# Patient Record
Sex: MALE | Race: WHITE | NOT HISPANIC OR LATINO
[De-identification: names, ages, dates, MRNs, and addresses within clinical notes are randomized per-mention and may not be internally consistent; named-entity substitution may affect disease eponyms.]

---

## 2017-01-19 ENCOUNTER — APPOINTMENT (OUTPATIENT)
Dept: CARDIOLOGY | Facility: CLINIC | Age: 82
End: 2017-01-19

## 2017-01-19 LAB — INR PPP: 2.3 RATIO

## 2017-02-23 ENCOUNTER — APPOINTMENT (OUTPATIENT)
Dept: CARDIOLOGY | Facility: CLINIC | Age: 82
End: 2017-02-23

## 2017-03-03 ENCOUNTER — APPOINTMENT (OUTPATIENT)
Dept: CARDIOLOGY | Facility: CLINIC | Age: 82
End: 2017-03-03

## 2017-03-03 LAB — INR PPP: 2.4 RATIO

## 2017-03-31 ENCOUNTER — APPOINTMENT (OUTPATIENT)
Dept: CARDIOLOGY | Facility: CLINIC | Age: 82
End: 2017-03-31

## 2017-03-31 VITALS — HEART RATE: 90 BPM | HEIGHT: 73 IN | BODY MASS INDEX: 23.99 KG/M2 | WEIGHT: 181 LBS

## 2017-03-31 LAB
INR PPP: 2.3 RATIO
QUALITY CONTROL: YES

## 2017-04-19 ENCOUNTER — RX RENEWAL (OUTPATIENT)
Age: 82
End: 2017-04-19

## 2017-04-28 ENCOUNTER — APPOINTMENT (OUTPATIENT)
Dept: CARDIOLOGY | Facility: CLINIC | Age: 82
End: 2017-04-28

## 2017-05-05 ENCOUNTER — APPOINTMENT (OUTPATIENT)
Dept: CARDIOLOGY | Facility: CLINIC | Age: 82
End: 2017-05-05

## 2017-05-05 VITALS — BODY MASS INDEX: 23.99 KG/M2 | HEART RATE: 90 BPM | HEIGHT: 73 IN | WEIGHT: 181 LBS

## 2017-05-05 LAB
INR PPP: 2.2 RATIO
QUALITY CONTROL: YES

## 2017-05-25 ENCOUNTER — APPOINTMENT (OUTPATIENT)
Dept: CARDIOLOGY | Facility: CLINIC | Age: 82
End: 2017-05-25

## 2017-05-25 VITALS — WEIGHT: 181 LBS | HEIGHT: 73 IN | HEART RATE: 90 BPM | BODY MASS INDEX: 23.99 KG/M2

## 2017-05-25 LAB
INR PPP: 2 RATIO
QUALITY CONTROL: YES

## 2017-06-02 ENCOUNTER — APPOINTMENT (OUTPATIENT)
Dept: CARDIOLOGY | Facility: CLINIC | Age: 82
End: 2017-06-02

## 2017-06-02 LAB — INR PPP: 2.9 RATIO

## 2017-06-19 ENCOUNTER — RX RENEWAL (OUTPATIENT)
Age: 82
End: 2017-06-19

## 2017-06-22 ENCOUNTER — APPOINTMENT (OUTPATIENT)
Dept: CARDIOLOGY | Facility: CLINIC | Age: 82
End: 2017-06-22

## 2017-06-22 LAB — INR PPP: 5.5 RATIO

## 2017-06-26 ENCOUNTER — APPOINTMENT (OUTPATIENT)
Dept: CARDIOLOGY | Facility: CLINIC | Age: 82
End: 2017-06-26

## 2017-06-26 LAB — INR PPP: 1.5 RATIO

## 2017-07-03 ENCOUNTER — APPOINTMENT (OUTPATIENT)
Dept: CARDIOLOGY | Facility: CLINIC | Age: 82
End: 2017-07-03

## 2017-07-03 LAB — INR PPP: 2.2 RATIO

## 2017-07-10 ENCOUNTER — APPOINTMENT (OUTPATIENT)
Dept: CARDIOLOGY | Facility: CLINIC | Age: 82
End: 2017-07-10

## 2017-07-11 LAB — INR PPP: 1.7 RATIO

## 2017-07-17 ENCOUNTER — APPOINTMENT (OUTPATIENT)
Dept: CARDIOLOGY | Facility: CLINIC | Age: 82
End: 2017-07-17

## 2017-07-17 LAB — INR PPP: 2.3 RATIO

## 2017-07-24 ENCOUNTER — APPOINTMENT (OUTPATIENT)
Dept: CARDIOLOGY | Facility: CLINIC | Age: 82
End: 2017-07-24

## 2017-07-31 ENCOUNTER — APPOINTMENT (OUTPATIENT)
Dept: CARDIOLOGY | Facility: CLINIC | Age: 82
End: 2017-07-31

## 2017-08-02 ENCOUNTER — APPOINTMENT (OUTPATIENT)
Dept: CARDIOLOGY | Facility: CLINIC | Age: 82
End: 2017-08-02
Payer: MEDICARE

## 2017-08-02 ENCOUNTER — NON-APPOINTMENT (OUTPATIENT)
Age: 82
End: 2017-08-02

## 2017-08-02 VITALS
SYSTOLIC BLOOD PRESSURE: 132 MMHG | HEART RATE: 113 BPM | OXYGEN SATURATION: 100 % | BODY MASS INDEX: 23.99 KG/M2 | WEIGHT: 181 LBS | DIASTOLIC BLOOD PRESSURE: 82 MMHG | HEIGHT: 73 IN

## 2017-08-02 DIAGNOSIS — E78.5 HYPERLIPIDEMIA, UNSPECIFIED: ICD-10-CM

## 2017-08-02 PROCEDURE — 93000 ELECTROCARDIOGRAM COMPLETE: CPT

## 2017-08-02 PROCEDURE — 99215 OFFICE O/P EST HI 40 MIN: CPT

## 2017-08-10 ENCOUNTER — APPOINTMENT (OUTPATIENT)
Dept: CARDIOLOGY | Facility: CLINIC | Age: 82
End: 2017-08-10
Payer: MEDICARE

## 2017-08-10 PROCEDURE — 93224 XTRNL ECG REC UP TO 48 HRS: CPT

## 2017-08-21 ENCOUNTER — OTHER (OUTPATIENT)
Age: 82
End: 2017-08-21

## 2017-09-01 ENCOUNTER — OTHER (OUTPATIENT)
Age: 82
End: 2017-09-01

## 2017-09-05 ENCOUNTER — APPOINTMENT (OUTPATIENT)
Dept: CARDIOLOGY | Facility: CLINIC | Age: 82
End: 2017-09-05

## 2017-10-24 ENCOUNTER — APPOINTMENT (OUTPATIENT)
Dept: CARDIOLOGY | Facility: CLINIC | Age: 82
End: 2017-10-24
Payer: MEDICARE

## 2017-10-24 VITALS — HEIGHT: 73 IN | WEIGHT: 181 LBS | HEART RATE: 100 BPM | BODY MASS INDEX: 23.99 KG/M2

## 2017-10-24 LAB
INR PPP: 1.9 RATIO
QUALITY CONTROL: YES

## 2017-10-24 PROCEDURE — 85610 PROTHROMBIN TIME: CPT | Mod: QW

## 2017-10-24 PROCEDURE — 99211 OFF/OP EST MAY X REQ PHY/QHP: CPT

## 2017-11-21 ENCOUNTER — APPOINTMENT (OUTPATIENT)
Dept: CARDIOLOGY | Facility: CLINIC | Age: 82
End: 2017-11-21
Payer: MEDICARE

## 2017-11-21 LAB — INR PPP: 3.4 RATIO

## 2017-11-21 PROCEDURE — 85610 PROTHROMBIN TIME: CPT | Mod: QW

## 2017-11-21 PROCEDURE — 99211 OFF/OP EST MAY X REQ PHY/QHP: CPT

## 2017-12-15 ENCOUNTER — RX RENEWAL (OUTPATIENT)
Age: 82
End: 2017-12-15

## 2017-12-21 ENCOUNTER — APPOINTMENT (OUTPATIENT)
Dept: CARDIOLOGY | Facility: CLINIC | Age: 82
End: 2017-12-21

## 2018-03-08 ENCOUNTER — APPOINTMENT (OUTPATIENT)
Dept: CARDIOLOGY | Facility: CLINIC | Age: 83
End: 2018-03-08
Payer: MEDICARE

## 2018-03-08 VITALS — HEIGHT: 73 IN | BODY MASS INDEX: 23.99 KG/M2 | HEART RATE: 100 BPM | WEIGHT: 181 LBS

## 2018-03-08 LAB
INR PPP: 1.6 RATIO
QUALITY CONTROL: YES

## 2018-03-08 PROCEDURE — 85610 PROTHROMBIN TIME: CPT | Mod: QW

## 2018-03-08 PROCEDURE — 99211 OFF/OP EST MAY X REQ PHY/QHP: CPT

## 2018-03-15 ENCOUNTER — APPOINTMENT (OUTPATIENT)
Dept: CARDIOLOGY | Facility: CLINIC | Age: 83
End: 2018-03-15
Payer: MEDICARE

## 2018-03-15 LAB
INR PPP: 1.9 RATIO
QUALITY CONTROL: YES

## 2018-03-15 PROCEDURE — 85610 PROTHROMBIN TIME: CPT | Mod: QW

## 2018-03-15 PROCEDURE — 99211 OFF/OP EST MAY X REQ PHY/QHP: CPT

## 2018-03-29 ENCOUNTER — APPOINTMENT (OUTPATIENT)
Dept: CARDIOLOGY | Facility: CLINIC | Age: 83
End: 2018-03-29
Payer: MEDICARE

## 2018-03-29 LAB
INR PPP: 1.8 RATIO
QUALITY CONTROL: YES

## 2018-03-29 PROCEDURE — 85610 PROTHROMBIN TIME: CPT | Mod: QW

## 2018-03-29 PROCEDURE — 99211 OFF/OP EST MAY X REQ PHY/QHP: CPT

## 2018-04-12 ENCOUNTER — APPOINTMENT (OUTPATIENT)
Dept: CARDIOLOGY | Facility: CLINIC | Age: 83
End: 2018-04-12
Payer: MEDICARE

## 2018-04-12 PROCEDURE — 99211 OFF/OP EST MAY X REQ PHY/QHP: CPT

## 2018-04-12 PROCEDURE — 85610 PROTHROMBIN TIME: CPT | Mod: QW

## 2018-04-15 LAB
INR PPP: 2.4 RATIO
QUALITY CONTROL: YES

## 2018-04-23 ENCOUNTER — APPOINTMENT (OUTPATIENT)
Dept: CARDIOLOGY | Facility: CLINIC | Age: 83
End: 2018-04-23
Payer: MEDICARE

## 2018-04-23 VITALS — BODY MASS INDEX: 23.99 KG/M2 | WEIGHT: 181 LBS | HEART RATE: 100 BPM | HEIGHT: 73 IN

## 2018-04-23 LAB
INR PPP: 2.1 RATIO
QUALITY CONTROL: YES

## 2018-04-23 PROCEDURE — 85610 PROTHROMBIN TIME: CPT | Mod: QW

## 2018-04-23 PROCEDURE — 99211 OFF/OP EST MAY X REQ PHY/QHP: CPT

## 2018-05-07 ENCOUNTER — APPOINTMENT (OUTPATIENT)
Dept: CARDIOLOGY | Facility: CLINIC | Age: 83
End: 2018-05-07
Payer: MEDICARE

## 2018-05-07 VITALS — BODY MASS INDEX: 23.99 KG/M2 | HEIGHT: 73 IN | WEIGHT: 181 LBS | HEART RATE: 100 BPM

## 2018-05-07 LAB
INR PPP: 2.2 RATIO
QUALITY CONTROL: YES

## 2018-05-07 PROCEDURE — 99211 OFF/OP EST MAY X REQ PHY/QHP: CPT

## 2018-05-07 PROCEDURE — 85610 PROTHROMBIN TIME: CPT | Mod: QW

## 2018-05-08 ENCOUNTER — RX RENEWAL (OUTPATIENT)
Age: 83
End: 2018-05-08

## 2018-05-29 ENCOUNTER — APPOINTMENT (OUTPATIENT)
Dept: CARDIOLOGY | Facility: CLINIC | Age: 83
End: 2018-05-29
Payer: MEDICARE

## 2018-05-29 VITALS — WEIGHT: 181 LBS | HEART RATE: 100 BPM | HEIGHT: 73 IN | BODY MASS INDEX: 23.99 KG/M2

## 2018-05-29 PROCEDURE — 85610 PROTHROMBIN TIME: CPT | Mod: QW

## 2018-05-29 PROCEDURE — 99211 OFF/OP EST MAY X REQ PHY/QHP: CPT

## 2018-05-30 LAB
INR PPP: 1.7 RATIO
QUALITY CONTROL: YES

## 2018-06-14 ENCOUNTER — APPOINTMENT (OUTPATIENT)
Dept: CARDIOLOGY | Facility: CLINIC | Age: 83
End: 2018-06-14
Payer: MEDICARE

## 2018-06-14 VITALS — HEIGHT: 73 IN | BODY MASS INDEX: 23.99 KG/M2 | WEIGHT: 181 LBS

## 2018-06-14 LAB
INR PPP: 3 RATIO
QUALITY CONTROL: YES

## 2018-06-14 PROCEDURE — 93793 ANTICOAG MGMT PT WARFARIN: CPT

## 2018-06-14 PROCEDURE — 85610 PROTHROMBIN TIME: CPT | Mod: QW

## 2018-06-28 ENCOUNTER — APPOINTMENT (OUTPATIENT)
Dept: CARDIOLOGY | Facility: CLINIC | Age: 83
End: 2018-06-28
Payer: MEDICARE

## 2018-06-28 VITALS — HEIGHT: 73 IN | HEART RATE: 100 BPM | WEIGHT: 181 LBS | BODY MASS INDEX: 23.99 KG/M2

## 2018-06-28 LAB
INR PPP: 2.5 RATIO
QUALITY CONTROL: YES

## 2018-06-28 PROCEDURE — 93793 ANTICOAG MGMT PT WARFARIN: CPT

## 2018-06-28 PROCEDURE — 85610 PROTHROMBIN TIME: CPT | Mod: QW

## 2018-07-26 ENCOUNTER — APPOINTMENT (OUTPATIENT)
Dept: CARDIOLOGY | Facility: CLINIC | Age: 83
End: 2018-07-26

## 2018-08-09 ENCOUNTER — APPOINTMENT (OUTPATIENT)
Dept: CARDIOLOGY | Facility: CLINIC | Age: 83
End: 2018-08-09

## 2018-08-16 ENCOUNTER — APPOINTMENT (OUTPATIENT)
Dept: CARDIOLOGY | Facility: CLINIC | Age: 83
End: 2018-08-16
Payer: MEDICARE

## 2018-08-16 VITALS — BODY MASS INDEX: 23.99 KG/M2 | HEIGHT: 73 IN | WEIGHT: 181 LBS | HEART RATE: 100 BPM

## 2018-08-16 LAB
INR PPP: 2.5 RATIO
QUALITY CONTROL: YES

## 2018-08-16 PROCEDURE — 85610 PROTHROMBIN TIME: CPT | Mod: QW

## 2018-08-16 PROCEDURE — 93793 ANTICOAG MGMT PT WARFARIN: CPT

## 2018-09-13 ENCOUNTER — APPOINTMENT (OUTPATIENT)
Dept: CARDIOLOGY | Facility: CLINIC | Age: 83
End: 2018-09-13

## 2018-09-20 ENCOUNTER — APPOINTMENT (OUTPATIENT)
Dept: CARDIOLOGY | Facility: CLINIC | Age: 83
End: 2018-09-20
Payer: MEDICARE

## 2018-09-20 VITALS — HEART RATE: 100 BPM | BODY MASS INDEX: 23.99 KG/M2 | WEIGHT: 181 LBS | HEIGHT: 73 IN

## 2018-09-20 LAB
INR PPP: 3.3 RATIO
QUALITY CONTROL: YES

## 2018-09-20 PROCEDURE — 93793 ANTICOAG MGMT PT WARFARIN: CPT

## 2018-09-20 PROCEDURE — 85610 PROTHROMBIN TIME: CPT | Mod: QW

## 2018-10-04 ENCOUNTER — APPOINTMENT (OUTPATIENT)
Dept: CARDIOLOGY | Facility: CLINIC | Age: 83
End: 2018-10-04
Payer: MEDICARE

## 2018-10-04 VITALS — HEIGHT: 73 IN | HEART RATE: 100 BPM | BODY MASS INDEX: 23.99 KG/M2 | WEIGHT: 181 LBS

## 2018-10-04 LAB
INR PPP: 2.7 RATIO
QUALITY CONTROL: YES

## 2018-10-04 PROCEDURE — 85610 PROTHROMBIN TIME: CPT | Mod: QW

## 2018-10-04 PROCEDURE — 93793 ANTICOAG MGMT PT WARFARIN: CPT

## 2018-10-25 ENCOUNTER — APPOINTMENT (OUTPATIENT)
Dept: CARDIOLOGY | Facility: CLINIC | Age: 83
End: 2018-10-25

## 2018-11-06 ENCOUNTER — APPOINTMENT (OUTPATIENT)
Dept: CARDIOLOGY | Facility: CLINIC | Age: 83
End: 2018-11-06

## 2018-11-06 LAB
INR PPP: 2.51 RATIO
PT BLD: 28.9 SEC

## 2018-11-29 ENCOUNTER — APPOINTMENT (OUTPATIENT)
Dept: CARDIOLOGY | Facility: CLINIC | Age: 83
End: 2018-11-29
Payer: MEDICARE

## 2018-11-29 VITALS — HEART RATE: 100 BPM | HEIGHT: 73 IN | BODY MASS INDEX: 23.99 KG/M2 | WEIGHT: 181 LBS

## 2018-11-29 LAB
INR PPP: 2.1 RATIO
QUALITY CONTROL: YES

## 2018-11-29 PROCEDURE — 93793 ANTICOAG MGMT PT WARFARIN: CPT

## 2018-11-29 PROCEDURE — 85610 PROTHROMBIN TIME: CPT | Mod: QW

## 2018-12-03 ENCOUNTER — APPOINTMENT (OUTPATIENT)
Dept: CARDIOLOGY | Facility: CLINIC | Age: 83
End: 2018-12-03
Payer: MEDICARE

## 2018-12-03 VITALS — WEIGHT: 181 LBS | BODY MASS INDEX: 23.99 KG/M2 | HEIGHT: 73 IN | HEART RATE: 100 BPM

## 2018-12-03 LAB
INR PPP: 2 RATIO
QUALITY CONTROL: YES

## 2018-12-03 PROCEDURE — 93793 ANTICOAG MGMT PT WARFARIN: CPT

## 2018-12-03 PROCEDURE — 85610 PROTHROMBIN TIME: CPT | Mod: QW

## 2019-02-11 ENCOUNTER — RX RENEWAL (OUTPATIENT)
Age: 84
End: 2019-02-11

## 2019-02-26 ENCOUNTER — APPOINTMENT (OUTPATIENT)
Dept: CARDIOLOGY | Facility: CLINIC | Age: 84
End: 2019-02-26
Payer: MEDICARE

## 2019-02-26 VITALS
BODY MASS INDEX: 23.88 KG/M2 | HEART RATE: 100 BPM | WEIGHT: 181 LBS | SYSTOLIC BLOOD PRESSURE: 132 MMHG | DIASTOLIC BLOOD PRESSURE: 82 MMHG

## 2019-02-26 LAB
INR PPP: 3 RATIO
QUALITY CONTROL: YES

## 2019-02-26 PROCEDURE — 85610 PROTHROMBIN TIME: CPT | Mod: QW

## 2019-02-26 PROCEDURE — 93793 ANTICOAG MGMT PT WARFARIN: CPT

## 2019-03-26 ENCOUNTER — APPOINTMENT (OUTPATIENT)
Dept: CARDIOLOGY | Facility: CLINIC | Age: 84
End: 2019-03-26

## 2019-04-09 ENCOUNTER — APPOINTMENT (OUTPATIENT)
Dept: CARDIOLOGY | Facility: CLINIC | Age: 84
End: 2019-04-09
Payer: MEDICARE

## 2019-04-09 VITALS — HEART RATE: 100 BPM | OXYGEN SATURATION: 100 % | DIASTOLIC BLOOD PRESSURE: 82 MMHG | SYSTOLIC BLOOD PRESSURE: 132 MMHG

## 2019-04-09 LAB
INR PPP: 2.8 RATIO
QUALITY CONTROL: YES

## 2019-04-09 PROCEDURE — 85610 PROTHROMBIN TIME: CPT | Mod: QW

## 2019-04-09 PROCEDURE — 93793 ANTICOAG MGMT PT WARFARIN: CPT

## 2019-04-30 ENCOUNTER — RX RENEWAL (OUTPATIENT)
Age: 84
End: 2019-04-30

## 2019-05-03 ENCOUNTER — RX RENEWAL (OUTPATIENT)
Age: 84
End: 2019-05-03

## 2019-05-07 ENCOUNTER — APPOINTMENT (OUTPATIENT)
Dept: CARDIOLOGY | Facility: CLINIC | Age: 84
End: 2019-05-07
Payer: MEDICARE

## 2019-05-07 VITALS — SYSTOLIC BLOOD PRESSURE: 132 MMHG | HEART RATE: 100 BPM | OXYGEN SATURATION: 100 % | DIASTOLIC BLOOD PRESSURE: 82 MMHG

## 2019-05-07 LAB
INR PPP: 2.5 RATIO
QUALITY CONTROL: YES

## 2019-05-07 PROCEDURE — 93793 ANTICOAG MGMT PT WARFARIN: CPT

## 2019-05-07 PROCEDURE — 85610 PROTHROMBIN TIME: CPT | Mod: QW

## 2019-06-04 ENCOUNTER — APPOINTMENT (OUTPATIENT)
Dept: CARDIOLOGY | Facility: CLINIC | Age: 84
End: 2019-06-04

## 2019-06-20 ENCOUNTER — APPOINTMENT (OUTPATIENT)
Dept: CARDIOLOGY | Facility: CLINIC | Age: 84
End: 2019-06-20
Payer: MEDICARE

## 2019-06-20 VITALS — SYSTOLIC BLOOD PRESSURE: 132 MMHG | DIASTOLIC BLOOD PRESSURE: 82 MMHG | HEART RATE: 100 BPM | OXYGEN SATURATION: 100 %

## 2019-06-20 LAB
ALBUMIN SERPL ELPH-MCNC: 4.1 G/DL
ALP BLD-CCNC: 87 U/L
ALT SERPL-CCNC: 16 U/L
ANION GAP SERPL CALC-SCNC: 10 MMOL/L
AST SERPL-CCNC: 19 U/L
BILIRUB SERPL-MCNC: 1 MG/DL
BUN SERPL-MCNC: 17 MG/DL
CALCIUM SERPL-MCNC: 9.4 MG/DL
CHLORIDE SERPL-SCNC: 101 MMOL/L
CHOLEST SERPL-MCNC: 186 MG/DL
CHOLEST/HDLC SERPL: 4 RATIO
CO2 SERPL-SCNC: 29 MMOL/L
CREAT SERPL-MCNC: 1.14 MG/DL
GLUCOSE SERPL-MCNC: 112 MG/DL
HDLC SERPL-MCNC: 46 MG/DL
INR PPP: 2 RATIO
LDLC SERPL CALC-MCNC: 123 MG/DL
POTASSIUM SERPL-SCNC: 4.6 MMOL/L
PROT SERPL-MCNC: 6.9 G/DL
QUALITY CONTROL: YES
SODIUM SERPL-SCNC: 140 MMOL/L
TRIGL SERPL-MCNC: 87 MG/DL
TSH SERPL-ACNC: 3.16 UIU/ML

## 2019-06-20 PROCEDURE — 85610 PROTHROMBIN TIME: CPT | Mod: QW

## 2019-06-20 PROCEDURE — 93793 ANTICOAG MGMT PT WARFARIN: CPT

## 2019-06-21 LAB
ESTIMATED AVERAGE GLUCOSE: 105 MG/DL
HBA1C MFR BLD HPLC: 5.3 %

## 2019-08-29 ENCOUNTER — APPOINTMENT (OUTPATIENT)
Dept: CARDIOLOGY | Facility: CLINIC | Age: 84
End: 2019-08-29
Payer: MEDICARE

## 2019-08-29 ENCOUNTER — NON-APPOINTMENT (OUTPATIENT)
Age: 84
End: 2019-08-29

## 2019-08-29 VITALS
HEIGHT: 73 IN | HEART RATE: 77 BPM | DIASTOLIC BLOOD PRESSURE: 66 MMHG | OXYGEN SATURATION: 97 % | BODY MASS INDEX: 22.26 KG/M2 | WEIGHT: 168 LBS | SYSTOLIC BLOOD PRESSURE: 102 MMHG

## 2019-08-29 PROCEDURE — 93000 ELECTROCARDIOGRAM COMPLETE: CPT

## 2019-08-29 PROCEDURE — 99214 OFFICE O/P EST MOD 30 MIN: CPT

## 2019-08-29 RX ORDER — DILTIAZEM HYDROCHLORIDE 180 MG/1
180 CAPSULE, COATED, EXTENDED RELEASE ORAL
Qty: 90 | Refills: 3 | Status: DISCONTINUED | COMMUNITY
Start: 2017-08-21 | End: 2019-08-29

## 2019-09-11 ENCOUNTER — APPOINTMENT (OUTPATIENT)
Dept: CARDIOLOGY | Facility: CLINIC | Age: 84
End: 2019-09-11
Payer: MEDICARE

## 2019-09-11 PROCEDURE — 93306 TTE W/DOPPLER COMPLETE: CPT

## 2019-10-10 ENCOUNTER — APPOINTMENT (OUTPATIENT)
Dept: CARDIOLOGY | Facility: CLINIC | Age: 84
End: 2019-10-10
Payer: MEDICARE

## 2019-10-10 VITALS — SYSTOLIC BLOOD PRESSURE: 102 MMHG | OXYGEN SATURATION: 97 % | HEART RATE: 77 BPM | DIASTOLIC BLOOD PRESSURE: 66 MMHG

## 2019-10-10 PROCEDURE — 93793 ANTICOAG MGMT PT WARFARIN: CPT

## 2019-10-10 PROCEDURE — 85610 PROTHROMBIN TIME: CPT | Mod: QW

## 2019-10-11 LAB
INR PPP: 2.4 RATIO
QUALITY CONTROL: YES

## 2019-11-05 ENCOUNTER — APPOINTMENT (OUTPATIENT)
Dept: CARDIOLOGY | Facility: CLINIC | Age: 84
End: 2019-11-05
Payer: MEDICARE

## 2019-11-05 VITALS — WEIGHT: 168 LBS | HEIGHT: 73 IN | BODY MASS INDEX: 22.26 KG/M2 | HEART RATE: 77 BPM

## 2019-11-05 LAB
INR PPP: 2.9 RATIO
QUALITY CONTROL: YES

## 2019-11-05 PROCEDURE — 85610 PROTHROMBIN TIME: CPT | Mod: QW

## 2019-11-05 PROCEDURE — 93793 ANTICOAG MGMT PT WARFARIN: CPT

## 2019-12-03 ENCOUNTER — APPOINTMENT (OUTPATIENT)
Dept: CARDIOLOGY | Facility: CLINIC | Age: 84
End: 2019-12-03

## 2019-12-19 ENCOUNTER — APPOINTMENT (OUTPATIENT)
Dept: CARDIOLOGY | Facility: CLINIC | Age: 84
End: 2019-12-19
Payer: MEDICARE

## 2019-12-19 VITALS — HEIGHT: 73 IN | WEIGHT: 168 LBS | HEART RATE: 77 BPM | BODY MASS INDEX: 22.26 KG/M2

## 2019-12-19 LAB
INR PPP: 2.8 RATIO
QUALITY CONTROL: YES

## 2019-12-19 PROCEDURE — 85610 PROTHROMBIN TIME: CPT | Mod: QW

## 2019-12-19 PROCEDURE — 93793 ANTICOAG MGMT PT WARFARIN: CPT

## 2020-01-16 ENCOUNTER — APPOINTMENT (OUTPATIENT)
Dept: CARDIOLOGY | Facility: CLINIC | Age: 85
End: 2020-01-16
Payer: MEDICARE

## 2020-01-16 VITALS — HEIGHT: 73 IN | WEIGHT: 168 LBS | BODY MASS INDEX: 22.26 KG/M2 | HEART RATE: 77 BPM

## 2020-01-16 LAB
INR PPP: 2 RATIO
QUALITY CONTROL: YES

## 2020-01-16 PROCEDURE — 85610 PROTHROMBIN TIME: CPT | Mod: QW

## 2020-01-16 PROCEDURE — 93793 ANTICOAG MGMT PT WARFARIN: CPT

## 2020-02-18 ENCOUNTER — APPOINTMENT (OUTPATIENT)
Dept: CARDIOLOGY | Facility: CLINIC | Age: 85
End: 2020-02-18
Payer: MEDICARE

## 2020-02-18 LAB
INR PPP: 2.6 RATIO
QUALITY CONTROL: YES

## 2020-02-18 PROCEDURE — 85610 PROTHROMBIN TIME: CPT | Mod: QW

## 2020-02-18 PROCEDURE — 93793 ANTICOAG MGMT PT WARFARIN: CPT

## 2020-02-27 ENCOUNTER — RX RENEWAL (OUTPATIENT)
Age: 85
End: 2020-02-27

## 2020-03-17 ENCOUNTER — APPOINTMENT (OUTPATIENT)
Dept: CARDIOLOGY | Facility: CLINIC | Age: 85
End: 2020-03-17
Payer: MEDICARE

## 2020-03-17 LAB
INR PPP: 3.2 RATIO
QUALITY CONTROL: YES

## 2020-03-17 PROCEDURE — 85610 PROTHROMBIN TIME: CPT | Mod: QW

## 2020-03-17 PROCEDURE — 93793 ANTICOAG MGMT PT WARFARIN: CPT

## 2020-03-30 ENCOUNTER — APPOINTMENT (OUTPATIENT)
Dept: CARDIOLOGY | Facility: CLINIC | Age: 85
End: 2020-03-30

## 2020-04-15 ENCOUNTER — APPOINTMENT (OUTPATIENT)
Dept: CARDIOLOGY | Facility: CLINIC | Age: 85
End: 2020-04-15
Payer: MEDICARE

## 2020-04-15 PROCEDURE — 85610 PROTHROMBIN TIME: CPT | Mod: QW

## 2020-04-15 PROCEDURE — 93793 ANTICOAG MGMT PT WARFARIN: CPT

## 2020-04-16 VITALS — BODY MASS INDEX: 22.26 KG/M2 | HEIGHT: 73 IN | HEART RATE: 77 BPM | WEIGHT: 168 LBS

## 2020-04-16 LAB
INR PPP: 3.7 RATIO
QUALITY CONTROL: YES

## 2020-04-22 ENCOUNTER — APPOINTMENT (OUTPATIENT)
Dept: CARDIOLOGY | Facility: CLINIC | Age: 85
End: 2020-04-22
Payer: MEDICARE

## 2020-04-22 PROCEDURE — 93793 ANTICOAG MGMT PT WARFARIN: CPT

## 2020-04-22 PROCEDURE — 85610 PROTHROMBIN TIME: CPT | Mod: QW

## 2020-04-27 VITALS — RESPIRATION RATE: 16 BRPM | HEART RATE: 75 BPM | HEIGHT: 73 IN

## 2020-04-27 LAB
INR PPP: 2.4 RATIO
QUALITY CONTROL: YES

## 2020-05-06 ENCOUNTER — APPOINTMENT (OUTPATIENT)
Dept: CARDIOLOGY | Facility: CLINIC | Age: 85
End: 2020-05-06
Payer: MEDICARE

## 2020-05-06 PROCEDURE — 85610 PROTHROMBIN TIME: CPT | Mod: QW

## 2020-05-06 PROCEDURE — 93793 ANTICOAG MGMT PT WARFARIN: CPT

## 2020-05-11 VITALS — HEART RATE: 75 BPM | HEIGHT: 73 IN | BODY MASS INDEX: 22.26 KG/M2 | WEIGHT: 168 LBS

## 2020-05-11 LAB
INR PPP: 3.5 RATIO
QUALITY CONTROL: YES

## 2020-05-20 ENCOUNTER — APPOINTMENT (OUTPATIENT)
Dept: CARDIOLOGY | Facility: CLINIC | Age: 85
End: 2020-05-20
Payer: MEDICARE

## 2020-05-20 LAB
INR PPP: 3.1 RATIO
QUALITY CONTROL: YES

## 2020-05-20 PROCEDURE — 85610 PROTHROMBIN TIME: CPT | Mod: QW

## 2020-05-20 PROCEDURE — 93793 ANTICOAG MGMT PT WARFARIN: CPT

## 2020-06-10 ENCOUNTER — APPOINTMENT (OUTPATIENT)
Dept: CARDIOLOGY | Facility: CLINIC | Age: 85
End: 2020-06-10
Payer: MEDICARE

## 2020-06-10 PROCEDURE — 85610 PROTHROMBIN TIME: CPT | Mod: QW

## 2020-06-10 PROCEDURE — 93793 ANTICOAG MGMT PT WARFARIN: CPT

## 2020-06-11 LAB
INR PPP: 2.7 RATIO
QUALITY CONTROL: YES

## 2020-07-16 ENCOUNTER — APPOINTMENT (OUTPATIENT)
Dept: CARDIOLOGY | Facility: CLINIC | Age: 85
End: 2020-07-16
Payer: MEDICARE

## 2020-07-16 VITALS — HEART RATE: 75 BPM | BODY MASS INDEX: 22.26 KG/M2 | HEIGHT: 73 IN | WEIGHT: 168 LBS

## 2020-07-16 DIAGNOSIS — Z79.01 LONG TERM (CURRENT) USE OF ANTICOAGULANTS: ICD-10-CM

## 2020-07-16 DIAGNOSIS — Z51.81 ENCOUNTER FOR THERAPEUTIC DRUG LVL MONITORING: ICD-10-CM

## 2020-07-16 DIAGNOSIS — Z79.01 ENCOUNTER FOR THERAPEUTIC DRUG LVL MONITORING: ICD-10-CM

## 2020-07-16 PROCEDURE — 93793 ANTICOAG MGMT PT WARFARIN: CPT

## 2020-07-16 PROCEDURE — 85610 PROTHROMBIN TIME: CPT | Mod: QW

## 2020-07-23 LAB
INR PPP: 2.3 RATIO
QUALITY CONTROL: YES

## 2020-08-27 ENCOUNTER — APPOINTMENT (OUTPATIENT)
Dept: CARDIOLOGY | Facility: CLINIC | Age: 85
End: 2020-08-27
Payer: MEDICARE

## 2020-08-27 VITALS — HEIGHT: 73 IN | RESPIRATION RATE: 15 BRPM | BODY MASS INDEX: 22.26 KG/M2 | WEIGHT: 168 LBS

## 2020-08-27 LAB
INR PPP: 1.7 RATIO
QUALITY CONTROL: YES

## 2020-08-27 PROCEDURE — 85610 PROTHROMBIN TIME: CPT | Mod: QW

## 2020-08-27 PROCEDURE — 93793 ANTICOAG MGMT PT WARFARIN: CPT

## 2020-09-18 ENCOUNTER — APPOINTMENT (OUTPATIENT)
Dept: CARDIOLOGY | Facility: CLINIC | Age: 85
End: 2020-09-18

## 2020-10-08 ENCOUNTER — APPOINTMENT (OUTPATIENT)
Dept: CARDIOLOGY | Facility: CLINIC | Age: 85
End: 2020-10-08
Payer: MEDICARE

## 2020-10-08 VITALS — RESPIRATION RATE: 16 BRPM | HEIGHT: 73 IN | HEART RATE: 75 BPM

## 2020-10-08 PROCEDURE — 93793 ANTICOAG MGMT PT WARFARIN: CPT

## 2020-10-08 PROCEDURE — 85610 PROTHROMBIN TIME: CPT | Mod: QW

## 2020-10-09 LAB
INR PPP: 2.1 RATIO
QUALITY CONTROL: YES

## 2020-11-19 ENCOUNTER — APPOINTMENT (OUTPATIENT)
Dept: CARDIOLOGY | Facility: CLINIC | Age: 85
End: 2020-11-19
Payer: MEDICARE

## 2020-11-19 VITALS — RESPIRATION RATE: 15 BRPM | BODY MASS INDEX: 22.26 KG/M2 | HEIGHT: 73 IN | WEIGHT: 168 LBS

## 2020-11-19 PROCEDURE — 93793 ANTICOAG MGMT PT WARFARIN: CPT

## 2020-11-19 PROCEDURE — 85610 PROTHROMBIN TIME: CPT | Mod: QW

## 2020-11-20 ENCOUNTER — NON-APPOINTMENT (OUTPATIENT)
Age: 85
End: 2020-11-20

## 2020-11-20 LAB
INR PPP: 2.4 RATIO
QUALITY CONTROL: YES

## 2021-01-01 ENCOUNTER — NON-APPOINTMENT (OUTPATIENT)
Age: 86
End: 2021-01-01

## 2021-01-01 ENCOUNTER — OUTPATIENT (OUTPATIENT)
Dept: OUTPATIENT SERVICES | Facility: HOSPITAL | Age: 86
LOS: 1 days | End: 2021-01-01
Payer: MEDICARE

## 2021-01-01 ENCOUNTER — APPOINTMENT (OUTPATIENT)
Dept: CARDIOLOGY | Facility: CLINIC | Age: 86
End: 2021-01-01
Payer: MEDICARE

## 2021-01-01 ENCOUNTER — APPOINTMENT (OUTPATIENT)
Dept: ULTRASOUND IMAGING | Facility: CLINIC | Age: 86
End: 2021-01-01
Payer: MEDICARE

## 2021-01-01 VITALS — OXYGEN SATURATION: 94 % | HEART RATE: 109 BPM | HEIGHT: 73 IN

## 2021-01-01 VITALS — RESPIRATION RATE: 15 BRPM | HEIGHT: 73 IN | HEART RATE: 75 BPM

## 2021-01-01 VITALS — BODY MASS INDEX: 22.26 KG/M2 | WEIGHT: 168 LBS | TEMPERATURE: 98 F | HEIGHT: 73 IN

## 2021-01-01 VITALS
WEIGHT: 155 LBS | OXYGEN SATURATION: 100 % | BODY MASS INDEX: 20.54 KG/M2 | HEART RATE: 112 BPM | HEIGHT: 73 IN | DIASTOLIC BLOOD PRESSURE: 82 MMHG | SYSTOLIC BLOOD PRESSURE: 127 MMHG

## 2021-01-01 VITALS — HEART RATE: 98 BPM | OXYGEN SATURATION: 95 % | HEIGHT: 73 IN

## 2021-01-01 VITALS — RESPIRATION RATE: 16 BRPM | HEIGHT: 73 IN | BODY MASS INDEX: 22.26 KG/M2 | WEIGHT: 168 LBS

## 2021-01-01 DIAGNOSIS — Z00.8 ENCOUNTER FOR OTHER GENERAL EXAMINATION: ICD-10-CM

## 2021-01-01 LAB
INR PPP: 2.2 RATIO
INR PPP: 2.3 RATIO
INR PPP: 2.4 RATIO
INR PPP: 2.7 RATIO
INR PPP: 3.3 RATIO
QUALITY CONTROL: YES

## 2021-01-01 PROCEDURE — 93793 ANTICOAG MGMT PT WARFARIN: CPT

## 2021-01-01 PROCEDURE — 20611 DRAIN/INJ JOINT/BURSA W/US: CPT

## 2021-01-01 PROCEDURE — 85610 PROTHROMBIN TIME: CPT | Mod: QW

## 2021-01-01 PROCEDURE — 99214 OFFICE O/P EST MOD 30 MIN: CPT

## 2021-01-01 PROCEDURE — 20611 DRAIN/INJ JOINT/BURSA W/US: CPT | Mod: LT

## 2021-01-01 PROCEDURE — 93000 ELECTROCARDIOGRAM COMPLETE: CPT

## 2021-05-13 ENCOUNTER — APPOINTMENT (OUTPATIENT)
Dept: CARDIOLOGY | Facility: CLINIC | Age: 86
End: 2021-05-13
Payer: MEDICARE

## 2021-05-13 VITALS — OXYGEN SATURATION: 97 % | HEART RATE: 75 BPM | HEIGHT: 73 IN

## 2021-05-13 LAB
INR PPP: 2 RATIO
QUALITY CONTROL: YES

## 2021-05-13 PROCEDURE — 93793 ANTICOAG MGMT PT WARFARIN: CPT

## 2021-05-13 PROCEDURE — 85610 PROTHROMBIN TIME: CPT | Mod: QW

## 2021-06-17 ENCOUNTER — APPOINTMENT (OUTPATIENT)
Dept: CARDIOLOGY | Facility: CLINIC | Age: 86
End: 2021-06-17

## 2022-01-01 ENCOUNTER — INPATIENT (INPATIENT)
Facility: HOSPITAL | Age: 87
LOS: 16 days | DRG: 865 | End: 2022-07-02
Attending: INTERNAL MEDICINE | Admitting: INTERNAL MEDICINE
Payer: MEDICARE

## 2022-01-01 ENCOUNTER — NON-APPOINTMENT (OUTPATIENT)
Age: 87
End: 2022-01-01

## 2022-01-01 ENCOUNTER — TRANSCRIPTION ENCOUNTER (OUTPATIENT)
Age: 87
End: 2022-01-01

## 2022-01-01 ENCOUNTER — APPOINTMENT (OUTPATIENT)
Dept: CARDIOLOGY | Facility: CLINIC | Age: 87
End: 2022-01-01
Payer: MEDICARE

## 2022-01-01 ENCOUNTER — APPOINTMENT (OUTPATIENT)
Dept: CARDIOLOGY | Facility: CLINIC | Age: 87
End: 2022-01-01

## 2022-01-01 VITALS
HEIGHT: 72 IN | BODY MASS INDEX: 22.21 KG/M2 | SYSTOLIC BLOOD PRESSURE: 132 MMHG | WEIGHT: 164 LBS | HEART RATE: 130 BPM | DIASTOLIC BLOOD PRESSURE: 92 MMHG | OXYGEN SATURATION: 95 %

## 2022-01-01 VITALS
OXYGEN SATURATION: 97 % | WEIGHT: 154.98 LBS | RESPIRATION RATE: 18 BRPM | DIASTOLIC BLOOD PRESSURE: 69 MMHG | HEART RATE: 97 BPM | HEIGHT: 72 IN | TEMPERATURE: 98 F | SYSTOLIC BLOOD PRESSURE: 92 MMHG

## 2022-01-01 VITALS
DIASTOLIC BLOOD PRESSURE: 78 MMHG | WEIGHT: 158 LBS | BODY MASS INDEX: 20.94 KG/M2 | SYSTOLIC BLOOD PRESSURE: 119 MMHG | HEART RATE: 101 BPM | HEIGHT: 73 IN | OXYGEN SATURATION: 96 %

## 2022-01-01 VITALS — HEART RATE: 100 BPM | HEIGHT: 73 IN | OXYGEN SATURATION: 95 %

## 2022-01-01 VITALS — OXYGEN SATURATION: 96 % | HEIGHT: 72 IN | RESPIRATION RATE: 18 BRPM

## 2022-01-01 VITALS — RESPIRATION RATE: 15 BRPM | HEIGHT: 73 IN | WEIGHT: 155 LBS | BODY MASS INDEX: 20.54 KG/M2

## 2022-01-01 DIAGNOSIS — I48.91 UNSPECIFIED ATRIAL FIBRILLATION: ICD-10-CM

## 2022-01-01 DIAGNOSIS — G93.40 ENCEPHALOPATHY, UNSPECIFIED: ICD-10-CM

## 2022-01-01 DIAGNOSIS — N17.9 ACUTE KIDNEY FAILURE, UNSPECIFIED: ICD-10-CM

## 2022-01-01 DIAGNOSIS — I50.9 HEART FAILURE, UNSPECIFIED: ICD-10-CM

## 2022-01-01 DIAGNOSIS — I10 ESSENTIAL (PRIMARY) HYPERTENSION: ICD-10-CM

## 2022-01-01 DIAGNOSIS — J96.02 ACUTE RESPIRATORY FAILURE WITH HYPERCAPNIA: ICD-10-CM

## 2022-01-01 DIAGNOSIS — I50.22 CHRONIC SYSTOLIC (CONGESTIVE) HEART FAILURE: ICD-10-CM

## 2022-01-01 DIAGNOSIS — R63.4 ABNORMAL WEIGHT LOSS: ICD-10-CM

## 2022-01-01 DIAGNOSIS — E78.5 HYPERLIPIDEMIA, UNSPECIFIED: ICD-10-CM

## 2022-01-01 DIAGNOSIS — R53.1 WEAKNESS: ICD-10-CM

## 2022-01-01 DIAGNOSIS — M10.9 GOUT, UNSPECIFIED: ICD-10-CM

## 2022-01-01 DIAGNOSIS — B34.8 OTHER VIRAL INFECTIONS OF UNSPECIFIED SITE: ICD-10-CM

## 2022-01-01 DIAGNOSIS — J90 PLEURAL EFFUSION, NOT ELSEWHERE CLASSIFIED: ICD-10-CM

## 2022-01-01 DIAGNOSIS — Z29.9 ENCOUNTER FOR PROPHYLACTIC MEASURES, UNSPECIFIED: ICD-10-CM

## 2022-01-01 LAB
ACHR BLOCK AB SER-ACNC: 11 % — SIGNIFICANT CHANGE UP (ref 0–25)
ACRM BINDING ANTIBODY: 0.05 NMOL/L — SIGNIFICANT CHANGE UP (ref 0–0.24)
ALBUMIN SERPL ELPH-MCNC: 1.9 G/DL — LOW (ref 3.3–5)
ALBUMIN SERPL ELPH-MCNC: 2 G/DL — LOW (ref 3.3–5)
ALBUMIN SERPL ELPH-MCNC: 2.1 G/DL — LOW (ref 3.3–5)
ALBUMIN SERPL ELPH-MCNC: 2.2 G/DL — LOW (ref 3.3–5)
ALBUMIN SERPL ELPH-MCNC: 2.3 G/DL — LOW (ref 3.3–5)
ALBUMIN SERPL ELPH-MCNC: 2.5 G/DL — LOW (ref 3.3–5)
ALBUMIN SERPL ELPH-MCNC: 2.6 G/DL — LOW (ref 3.3–5)
ALP SERPL-CCNC: 108 U/L — SIGNIFICANT CHANGE UP (ref 40–120)
ALP SERPL-CCNC: 112 U/L — SIGNIFICANT CHANGE UP (ref 40–120)
ALP SERPL-CCNC: 118 U/L — SIGNIFICANT CHANGE UP (ref 40–120)
ALP SERPL-CCNC: 119 U/L — SIGNIFICANT CHANGE UP (ref 40–120)
ALP SERPL-CCNC: 127 U/L — HIGH (ref 40–120)
ALP SERPL-CCNC: 130 U/L — HIGH (ref 40–120)
ALP SERPL-CCNC: 136 U/L — HIGH (ref 40–120)
ALP SERPL-CCNC: 156 U/L — HIGH (ref 40–120)
ALP SERPL-CCNC: 91 U/L — SIGNIFICANT CHANGE UP (ref 40–120)
ALP SERPL-CCNC: 93 U/L — SIGNIFICANT CHANGE UP (ref 40–120)
ALP SERPL-CCNC: 94 U/L — SIGNIFICANT CHANGE UP (ref 40–120)
ALP SERPL-CCNC: 96 U/L — SIGNIFICANT CHANGE UP (ref 40–120)
ALP SERPL-CCNC: 99 U/L — SIGNIFICANT CHANGE UP (ref 40–120)
ALT FLD-CCNC: 111 U/L — HIGH (ref 12–78)
ALT FLD-CCNC: 24 U/L — SIGNIFICANT CHANGE UP (ref 12–78)
ALT FLD-CCNC: 25 U/L — SIGNIFICANT CHANGE UP (ref 12–78)
ALT FLD-CCNC: 26 U/L — SIGNIFICANT CHANGE UP (ref 12–78)
ALT FLD-CCNC: 27 U/L — SIGNIFICANT CHANGE UP (ref 12–78)
ALT FLD-CCNC: 27 U/L — SIGNIFICANT CHANGE UP (ref 12–78)
ALT FLD-CCNC: 35 U/L — SIGNIFICANT CHANGE UP (ref 12–78)
ALT FLD-CCNC: 35 U/L — SIGNIFICANT CHANGE UP (ref 12–78)
ALT FLD-CCNC: 41 U/L — SIGNIFICANT CHANGE UP (ref 12–78)
ALT FLD-CCNC: 41 U/L — SIGNIFICANT CHANGE UP (ref 12–78)
ALT FLD-CCNC: 46 U/L — SIGNIFICANT CHANGE UP (ref 12–78)
ALT FLD-CCNC: 48 U/L — SIGNIFICANT CHANGE UP (ref 12–78)
ALT FLD-CCNC: 55 U/L — SIGNIFICANT CHANGE UP (ref 12–78)
ALT FLD-CCNC: 62 U/L — SIGNIFICANT CHANGE UP (ref 12–78)
ALT FLD-CCNC: 74 U/L — SIGNIFICANT CHANGE UP (ref 12–78)
AMMONIA BLD-MCNC: 31 UMOL/L — SIGNIFICANT CHANGE UP (ref 11–32)
ANION GAP SERPL CALC-SCNC: 1 MMOL/L — LOW (ref 5–17)
ANION GAP SERPL CALC-SCNC: 1 MMOL/L — LOW (ref 5–17)
ANION GAP SERPL CALC-SCNC: 10 MMOL/L — SIGNIFICANT CHANGE UP (ref 5–17)
ANION GAP SERPL CALC-SCNC: 10 MMOL/L — SIGNIFICANT CHANGE UP (ref 5–17)
ANION GAP SERPL CALC-SCNC: 16 MMOL/L — SIGNIFICANT CHANGE UP (ref 5–17)
ANION GAP SERPL CALC-SCNC: 2 MMOL/L — LOW (ref 5–17)
ANION GAP SERPL CALC-SCNC: 3 MMOL/L — LOW (ref 5–17)
ANION GAP SERPL CALC-SCNC: 4 MMOL/L — LOW (ref 5–17)
ANION GAP SERPL CALC-SCNC: 4 MMOL/L — LOW (ref 5–17)
ANION GAP SERPL CALC-SCNC: 5 MMOL/L — SIGNIFICANT CHANGE UP (ref 5–17)
ANION GAP SERPL CALC-SCNC: 6 MMOL/L — SIGNIFICANT CHANGE UP (ref 5–17)
ANION GAP SERPL CALC-SCNC: 7 MMOL/L — SIGNIFICANT CHANGE UP (ref 5–17)
ANION GAP SERPL CALC-SCNC: 8 MMOL/L — SIGNIFICANT CHANGE UP (ref 5–17)
ANION GAP SERPL CALC-SCNC: 8 MMOL/L — SIGNIFICANT CHANGE UP (ref 5–17)
APPEARANCE UR: ABNORMAL
APPEARANCE UR: CLEAR — SIGNIFICANT CHANGE UP
APTT BLD: 31.2 SEC — SIGNIFICANT CHANGE UP (ref 27.5–35.5)
APTT BLD: 33.6 SEC — SIGNIFICANT CHANGE UP (ref 27.5–35.5)
APTT BLD: 33.7 SEC — SIGNIFICANT CHANGE UP (ref 27.5–35.5)
APTT BLD: 34 SEC — SIGNIFICANT CHANGE UP (ref 27.5–35.5)
APTT BLD: 35.5 SEC — SIGNIFICANT CHANGE UP (ref 27.5–35.5)
AST SERPL-CCNC: 102 U/L — HIGH (ref 15–37)
AST SERPL-CCNC: 125 U/L — HIGH (ref 15–37)
AST SERPL-CCNC: 20 U/L — SIGNIFICANT CHANGE UP (ref 15–37)
AST SERPL-CCNC: 23 U/L — SIGNIFICANT CHANGE UP (ref 15–37)
AST SERPL-CCNC: 24 U/L — SIGNIFICANT CHANGE UP (ref 15–37)
AST SERPL-CCNC: 24 U/L — SIGNIFICANT CHANGE UP (ref 15–37)
AST SERPL-CCNC: 26 U/L — SIGNIFICANT CHANGE UP (ref 15–37)
AST SERPL-CCNC: 28 U/L — SIGNIFICANT CHANGE UP (ref 15–37)
AST SERPL-CCNC: 28 U/L — SIGNIFICANT CHANGE UP (ref 15–37)
AST SERPL-CCNC: 29 U/L — SIGNIFICANT CHANGE UP (ref 15–37)
AST SERPL-CCNC: 34 U/L — SIGNIFICANT CHANGE UP (ref 15–37)
AST SERPL-CCNC: 35 U/L — SIGNIFICANT CHANGE UP (ref 15–37)
AST SERPL-CCNC: 37 U/L — SIGNIFICANT CHANGE UP (ref 15–37)
AST SERPL-CCNC: 37 U/L — SIGNIFICANT CHANGE UP (ref 15–37)
AST SERPL-CCNC: 43 U/L — HIGH (ref 15–37)
BASE EXCESS BLDA CALC-SCNC: 1.8 MMOL/L — SIGNIFICANT CHANGE UP (ref -2–3)
BASE EXCESS BLDA CALC-SCNC: 10 MMOL/L — HIGH (ref -2–3)
BASE EXCESS BLDA CALC-SCNC: 10.4 MMOL/L — HIGH (ref -2–3)
BASE EXCESS BLDA CALC-SCNC: 11.9 MMOL/L — HIGH (ref -2–3)
BASE EXCESS BLDA CALC-SCNC: 7.4 MMOL/L — HIGH (ref -2–3)
BASE EXCESS BLDV CALC-SCNC: 13.5 MMOL/L — HIGH (ref -2–3)
BASE EXCESS BLDV CALC-SCNC: 14.7 MMOL/L — HIGH (ref -2–3)
BASOPHILS # BLD AUTO: 0 K/UL — SIGNIFICANT CHANGE UP (ref 0–0.2)
BASOPHILS # BLD AUTO: 0.02 K/UL — SIGNIFICANT CHANGE UP (ref 0–0.2)
BASOPHILS # BLD AUTO: 0.02 K/UL — SIGNIFICANT CHANGE UP (ref 0–0.2)
BASOPHILS # BLD AUTO: 0.03 K/UL — SIGNIFICANT CHANGE UP (ref 0–0.2)
BASOPHILS # BLD AUTO: 0.03 K/UL — SIGNIFICANT CHANGE UP (ref 0–0.2)
BASOPHILS # BLD AUTO: 0.04 K/UL — SIGNIFICANT CHANGE UP (ref 0–0.2)
BASOPHILS # BLD AUTO: 0.04 K/UL — SIGNIFICANT CHANGE UP (ref 0–0.2)
BASOPHILS # BLD AUTO: 0.05 K/UL — SIGNIFICANT CHANGE UP (ref 0–0.2)
BASOPHILS # BLD AUTO: 0.07 K/UL — SIGNIFICANT CHANGE UP (ref 0–0.2)
BASOPHILS NFR BLD AUTO: 0 % — SIGNIFICANT CHANGE UP (ref 0–2)
BASOPHILS NFR BLD AUTO: 0.3 % — SIGNIFICANT CHANGE UP (ref 0–2)
BASOPHILS NFR BLD AUTO: 0.5 % — SIGNIFICANT CHANGE UP (ref 0–2)
BASOPHILS NFR BLD AUTO: 0.7 % — SIGNIFICANT CHANGE UP (ref 0–2)
BASOPHILS NFR BLD AUTO: 0.9 % — SIGNIFICANT CHANGE UP (ref 0–2)
BILIRUB SERPL-MCNC: 1.4 MG/DL — HIGH (ref 0.2–1.2)
BILIRUB SERPL-MCNC: 1.6 MG/DL — HIGH (ref 0.2–1.2)
BILIRUB SERPL-MCNC: 1.7 MG/DL — HIGH (ref 0.2–1.2)
BILIRUB SERPL-MCNC: 1.8 MG/DL — HIGH (ref 0.2–1.2)
BILIRUB SERPL-MCNC: 2 MG/DL — HIGH (ref 0.2–1.2)
BILIRUB SERPL-MCNC: 2 MG/DL — HIGH (ref 0.2–1.2)
BILIRUB SERPL-MCNC: 2.1 MG/DL — HIGH (ref 0.2–1.2)
BILIRUB SERPL-MCNC: 2.5 MG/DL — HIGH (ref 0.2–1.2)
BILIRUB UR-MCNC: NEGATIVE — SIGNIFICANT CHANGE UP
BILIRUB UR-MCNC: NEGATIVE — SIGNIFICANT CHANGE UP
BLOOD GAS COMMENTS ARTERIAL: SIGNIFICANT CHANGE UP
BLOOD GAS COMMENTS, VENOUS: SIGNIFICANT CHANGE UP
BLOOD GAS COMMENTS, VENOUS: SIGNIFICANT CHANGE UP
BUN SERPL-MCNC: 28 MG/DL — HIGH (ref 7–23)
BUN SERPL-MCNC: 28 MG/DL — HIGH (ref 7–23)
BUN SERPL-MCNC: 29 MG/DL — HIGH (ref 7–23)
BUN SERPL-MCNC: 30 MG/DL — HIGH (ref 7–23)
BUN SERPL-MCNC: 35 MG/DL — HIGH (ref 7–23)
BUN SERPL-MCNC: 37 MG/DL — HIGH (ref 7–23)
BUN SERPL-MCNC: 39 MG/DL — HIGH (ref 7–23)
BUN SERPL-MCNC: 42 MG/DL — HIGH (ref 7–23)
BUN SERPL-MCNC: 43 MG/DL — HIGH (ref 7–23)
BUN SERPL-MCNC: 44 MG/DL — HIGH (ref 7–23)
BUN SERPL-MCNC: 48 MG/DL — HIGH (ref 7–23)
BUN SERPL-MCNC: 49 MG/DL — HIGH (ref 7–23)
BUN SERPL-MCNC: 50 MG/DL — HIGH (ref 7–23)
BUN SERPL-MCNC: 55 MG/DL — HIGH (ref 7–23)
BUN SERPL-MCNC: 56 MG/DL — HIGH (ref 7–23)
BUN SERPL-MCNC: 63 MG/DL — HIGH (ref 7–23)
BUN SERPL-MCNC: 64 MG/DL — HIGH (ref 7–23)
BUN SERPL-MCNC: 65 MG/DL — HIGH (ref 7–23)
BUN SERPL-MCNC: 70 MG/DL — HIGH (ref 7–23)
BUN SERPL-MCNC: 80 MG/DL — HIGH (ref 7–23)
CALCIUM SERPL-MCNC: 8.1 MG/DL — LOW (ref 8.5–10.1)
CALCIUM SERPL-MCNC: 8.2 MG/DL — LOW (ref 8.5–10.1)
CALCIUM SERPL-MCNC: 8.5 MG/DL — SIGNIFICANT CHANGE UP (ref 8.5–10.1)
CALCIUM SERPL-MCNC: 8.6 MG/DL — SIGNIFICANT CHANGE UP (ref 8.5–10.1)
CALCIUM SERPL-MCNC: 8.7 MG/DL — SIGNIFICANT CHANGE UP (ref 8.5–10.1)
CALCIUM SERPL-MCNC: 8.7 MG/DL — SIGNIFICANT CHANGE UP (ref 8.5–10.1)
CALCIUM SERPL-MCNC: 8.8 MG/DL — SIGNIFICANT CHANGE UP (ref 8.5–10.1)
CALCIUM SERPL-MCNC: 8.9 MG/DL — SIGNIFICANT CHANGE UP (ref 8.5–10.1)
CALCIUM SERPL-MCNC: 8.9 MG/DL — SIGNIFICANT CHANGE UP (ref 8.5–10.1)
CALCIUM SERPL-MCNC: 9.4 MG/DL — SIGNIFICANT CHANGE UP (ref 8.5–10.1)
CALCIUM SERPL-MCNC: 9.4 MG/DL — SIGNIFICANT CHANGE UP (ref 8.5–10.1)
CHLORIDE SERPL-SCNC: 100 MMOL/L — SIGNIFICANT CHANGE UP (ref 96–108)
CHLORIDE SERPL-SCNC: 103 MMOL/L — SIGNIFICANT CHANGE UP (ref 96–108)
CHLORIDE SERPL-SCNC: 104 MMOL/L — SIGNIFICANT CHANGE UP (ref 96–108)
CHLORIDE SERPL-SCNC: 104 MMOL/L — SIGNIFICANT CHANGE UP (ref 96–108)
CHLORIDE SERPL-SCNC: 105 MMOL/L — SIGNIFICANT CHANGE UP (ref 96–108)
CHLORIDE SERPL-SCNC: 106 MMOL/L — SIGNIFICANT CHANGE UP (ref 96–108)
CHLORIDE SERPL-SCNC: 106 MMOL/L — SIGNIFICANT CHANGE UP (ref 96–108)
CHLORIDE SERPL-SCNC: 108 MMOL/L — SIGNIFICANT CHANGE UP (ref 96–108)
CHLORIDE SERPL-SCNC: 97 MMOL/L — SIGNIFICANT CHANGE UP (ref 96–108)
CHOLEST SERPL-MCNC: 173 MG/DL — SIGNIFICANT CHANGE UP
CO2 SERPL-SCNC: 19 MMOL/L — LOW (ref 22–31)
CO2 SERPL-SCNC: 24 MMOL/L — SIGNIFICANT CHANGE UP (ref 22–31)
CO2 SERPL-SCNC: 26 MMOL/L — SIGNIFICANT CHANGE UP (ref 22–31)
CO2 SERPL-SCNC: 28 MMOL/L — SIGNIFICANT CHANGE UP (ref 22–31)
CO2 SERPL-SCNC: 29 MMOL/L — SIGNIFICANT CHANGE UP (ref 22–31)
CO2 SERPL-SCNC: 29 MMOL/L — SIGNIFICANT CHANGE UP (ref 22–31)
CO2 SERPL-SCNC: 30 MMOL/L — SIGNIFICANT CHANGE UP (ref 22–31)
CO2 SERPL-SCNC: 30 MMOL/L — SIGNIFICANT CHANGE UP (ref 22–31)
CO2 SERPL-SCNC: 31 MMOL/L — SIGNIFICANT CHANGE UP (ref 22–31)
CO2 SERPL-SCNC: 32 MMOL/L — HIGH (ref 22–31)
CO2 SERPL-SCNC: 33 MMOL/L — HIGH (ref 22–31)
CO2 SERPL-SCNC: 34 MMOL/L — HIGH (ref 22–31)
CO2 SERPL-SCNC: 34 MMOL/L — HIGH (ref 22–31)
CO2 SERPL-SCNC: 35 MMOL/L — HIGH (ref 22–31)
CO2 SERPL-SCNC: 35 MMOL/L — HIGH (ref 22–31)
CO2 SERPL-SCNC: 36 MMOL/L — HIGH (ref 22–31)
CO2 SERPL-SCNC: 40 MMOL/L — HIGH (ref 22–31)
COLOR SPEC: SIGNIFICANT CHANGE UP
COLOR SPEC: YELLOW — SIGNIFICANT CHANGE UP
CREAT SERPL-MCNC: 0.84 MG/DL — SIGNIFICANT CHANGE UP (ref 0.5–1.3)
CREAT SERPL-MCNC: 0.85 MG/DL — SIGNIFICANT CHANGE UP (ref 0.5–1.3)
CREAT SERPL-MCNC: 0.9 MG/DL — SIGNIFICANT CHANGE UP (ref 0.5–1.3)
CREAT SERPL-MCNC: 0.93 MG/DL — SIGNIFICANT CHANGE UP (ref 0.5–1.3)
CREAT SERPL-MCNC: 0.98 MG/DL — SIGNIFICANT CHANGE UP (ref 0.5–1.3)
CREAT SERPL-MCNC: 0.98 MG/DL — SIGNIFICANT CHANGE UP (ref 0.5–1.3)
CREAT SERPL-MCNC: 1 MG/DL — SIGNIFICANT CHANGE UP (ref 0.5–1.3)
CREAT SERPL-MCNC: 1.1 MG/DL — SIGNIFICANT CHANGE UP (ref 0.5–1.3)
CREAT SERPL-MCNC: 1.2 MG/DL — SIGNIFICANT CHANGE UP (ref 0.5–1.3)
CREAT SERPL-MCNC: 1.3 MG/DL — SIGNIFICANT CHANGE UP (ref 0.5–1.3)
CREAT SERPL-MCNC: 1.4 MG/DL — HIGH (ref 0.5–1.3)
CREAT SERPL-MCNC: 1.5 MG/DL — HIGH (ref 0.5–1.3)
CREAT SERPL-MCNC: 1.8 MG/DL — HIGH (ref 0.5–1.3)
CREAT SERPL-MCNC: 2.1 MG/DL — HIGH (ref 0.5–1.3)
CREAT SERPL-MCNC: 2.9 MG/DL — HIGH (ref 0.5–1.3)
CULTURE RESULTS: SIGNIFICANT CHANGE UP
DIFF PNL FLD: ABNORMAL
DIFF PNL FLD: ABNORMAL
DIGOXIN SERPL-MCNC: 0.9 NG/ML — SIGNIFICANT CHANGE UP (ref 0.8–2)
DIGOXIN SERPL-MCNC: 1.4 NG/ML — SIGNIFICANT CHANGE UP (ref 0.8–2)
EGFR: 20 ML/MIN/1.73M2 — LOW
EGFR: 29 ML/MIN/1.73M2 — LOW
EGFR: 35 ML/MIN/1.73M2 — LOW
EGFR: 44 ML/MIN/1.73M2 — LOW
EGFR: 47 ML/MIN/1.73M2 — LOW
EGFR: 52 ML/MIN/1.73M2 — LOW
EGFR: 57 ML/MIN/1.73M2 — LOW
EGFR: 63 ML/MIN/1.73M2 — SIGNIFICANT CHANGE UP
EGFR: 71 ML/MIN/1.73M2 — SIGNIFICANT CHANGE UP
EGFR: 73 ML/MIN/1.73M2 — SIGNIFICANT CHANGE UP
EGFR: 73 ML/MIN/1.73M2 — SIGNIFICANT CHANGE UP
EGFR: 78 ML/MIN/1.73M2 — SIGNIFICANT CHANGE UP
EGFR: 81 ML/MIN/1.73M2 — SIGNIFICANT CHANGE UP
EGFR: 82 ML/MIN/1.73M2 — SIGNIFICANT CHANGE UP
EGFR: 82 ML/MIN/1.73M2 — SIGNIFICANT CHANGE UP
EOSINOPHIL # BLD AUTO: 0 K/UL — SIGNIFICANT CHANGE UP (ref 0–0.5)
EOSINOPHIL # BLD AUTO: 0.01 K/UL — SIGNIFICANT CHANGE UP (ref 0–0.5)
EOSINOPHIL # BLD AUTO: 0.02 K/UL — SIGNIFICANT CHANGE UP (ref 0–0.5)
EOSINOPHIL # BLD AUTO: 0.07 K/UL — SIGNIFICANT CHANGE UP (ref 0–0.5)
EOSINOPHIL # BLD AUTO: 0.09 K/UL — SIGNIFICANT CHANGE UP (ref 0–0.5)
EOSINOPHIL # BLD AUTO: 0.14 K/UL — SIGNIFICANT CHANGE UP (ref 0–0.5)
EOSINOPHIL # BLD AUTO: 0.16 K/UL — SIGNIFICANT CHANGE UP (ref 0–0.5)
EOSINOPHIL # BLD AUTO: 0.4 K/UL — SIGNIFICANT CHANGE UP (ref 0–0.5)
EOSINOPHIL NFR BLD AUTO: 0 % — SIGNIFICANT CHANGE UP (ref 0–6)
EOSINOPHIL NFR BLD AUTO: 0.1 % — SIGNIFICANT CHANGE UP (ref 0–6)
EOSINOPHIL NFR BLD AUTO: 0.2 % — SIGNIFICANT CHANGE UP (ref 0–6)
EOSINOPHIL NFR BLD AUTO: 0.7 % — SIGNIFICANT CHANGE UP (ref 0–6)
EOSINOPHIL NFR BLD AUTO: 1.3 % — SIGNIFICANT CHANGE UP (ref 0–6)
EOSINOPHIL NFR BLD AUTO: 2 % — SIGNIFICANT CHANGE UP (ref 0–6)
EOSINOPHIL NFR BLD AUTO: 2 % — SIGNIFICANT CHANGE UP (ref 0–6)
EOSINOPHIL NFR BLD AUTO: 5.3 % — SIGNIFICANT CHANGE UP (ref 0–6)
FOLATE SERPL-MCNC: >20 NG/ML — SIGNIFICANT CHANGE UP
GAS PNL BLDA: SIGNIFICANT CHANGE UP
GAS PNL BLDV: SIGNIFICANT CHANGE UP
GLUCOSE SERPL-MCNC: 102 MG/DL — HIGH (ref 70–99)
GLUCOSE SERPL-MCNC: 103 MG/DL — HIGH (ref 70–99)
GLUCOSE SERPL-MCNC: 104 MG/DL — HIGH (ref 70–99)
GLUCOSE SERPL-MCNC: 104 MG/DL — HIGH (ref 70–99)
GLUCOSE SERPL-MCNC: 108 MG/DL — HIGH (ref 70–99)
GLUCOSE SERPL-MCNC: 108 MG/DL — HIGH (ref 70–99)
GLUCOSE SERPL-MCNC: 111 MG/DL — HIGH (ref 70–99)
GLUCOSE SERPL-MCNC: 130 MG/DL — HIGH (ref 70–99)
GLUCOSE SERPL-MCNC: 137 MG/DL — HIGH (ref 70–99)
GLUCOSE SERPL-MCNC: 143 MG/DL — HIGH (ref 70–99)
GLUCOSE SERPL-MCNC: 143 MG/DL — HIGH (ref 70–99)
GLUCOSE SERPL-MCNC: 148 MG/DL — HIGH (ref 70–99)
GLUCOSE SERPL-MCNC: 169 MG/DL — HIGH (ref 70–99)
GLUCOSE SERPL-MCNC: 184 MG/DL — HIGH (ref 70–99)
GLUCOSE SERPL-MCNC: 197 MG/DL — HIGH (ref 70–99)
GLUCOSE SERPL-MCNC: 252 MG/DL — HIGH (ref 70–99)
GLUCOSE SERPL-MCNC: 82 MG/DL — SIGNIFICANT CHANGE UP (ref 70–99)
GLUCOSE SERPL-MCNC: 92 MG/DL — SIGNIFICANT CHANGE UP (ref 70–99)
GLUCOSE SERPL-MCNC: 92 MG/DL — SIGNIFICANT CHANGE UP (ref 70–99)
GLUCOSE SERPL-MCNC: 99 MG/DL — SIGNIFICANT CHANGE UP (ref 70–99)
GLUCOSE UR QL: NEGATIVE — SIGNIFICANT CHANGE UP
GLUCOSE UR QL: NEGATIVE — SIGNIFICANT CHANGE UP
HCO3 BLDA-SCNC: 27 MMOL/L — SIGNIFICANT CHANGE UP (ref 21–28)
HCO3 BLDA-SCNC: 32 MMOL/L — HIGH (ref 21–28)
HCO3 BLDA-SCNC: 34 MMOL/L — HIGH (ref 21–28)
HCO3 BLDA-SCNC: 35 MMOL/L — HIGH (ref 21–28)
HCO3 BLDA-SCNC: 37 MMOL/L — HIGH (ref 21–28)
HCO3 BLDV-SCNC: 38 MMOL/L — HIGH (ref 22–29)
HCO3 BLDV-SCNC: 39 MMOL/L — HIGH (ref 22–29)
HCT VFR BLD CALC: 34.6 % — LOW (ref 39–50)
HCT VFR BLD CALC: 36.9 % — LOW (ref 39–50)
HCT VFR BLD CALC: 36.9 % — LOW (ref 39–50)
HCT VFR BLD CALC: 37.9 % — LOW (ref 39–50)
HCT VFR BLD CALC: 38 % — LOW (ref 39–50)
HCT VFR BLD CALC: 38.9 % — LOW (ref 39–50)
HCT VFR BLD CALC: 39.2 % — SIGNIFICANT CHANGE UP (ref 39–50)
HCT VFR BLD CALC: 39.3 % — SIGNIFICANT CHANGE UP (ref 39–50)
HCT VFR BLD CALC: 39.4 % — SIGNIFICANT CHANGE UP (ref 39–50)
HCT VFR BLD CALC: 39.9 % — SIGNIFICANT CHANGE UP (ref 39–50)
HCT VFR BLD CALC: 40 % — SIGNIFICANT CHANGE UP (ref 39–50)
HCT VFR BLD CALC: 40.1 % — SIGNIFICANT CHANGE UP (ref 39–50)
HCT VFR BLD CALC: 40.4 % — SIGNIFICANT CHANGE UP (ref 39–50)
HCT VFR BLD CALC: 40.7 % — SIGNIFICANT CHANGE UP (ref 39–50)
HCT VFR BLD CALC: 40.8 % — SIGNIFICANT CHANGE UP (ref 39–50)
HCT VFR BLD CALC: 40.9 % — SIGNIFICANT CHANGE UP (ref 39–50)
HCT VFR BLD CALC: 41.4 % — SIGNIFICANT CHANGE UP (ref 39–50)
HCT VFR BLD CALC: 42.1 % — SIGNIFICANT CHANGE UP (ref 39–50)
HCT VFR BLD CALC: 42.1 % — SIGNIFICANT CHANGE UP (ref 39–50)
HCT VFR BLD CALC: 43.1 % — SIGNIFICANT CHANGE UP (ref 39–50)
HDLC SERPL-MCNC: 32 MG/DL — LOW
HGB BLD-MCNC: 11 G/DL — LOW (ref 13–17)
HGB BLD-MCNC: 11.8 G/DL — LOW (ref 13–17)
HGB BLD-MCNC: 12.4 G/DL — LOW (ref 13–17)
HGB BLD-MCNC: 12.5 G/DL — LOW (ref 13–17)
HGB BLD-MCNC: 12.5 G/DL — LOW (ref 13–17)
HGB BLD-MCNC: 12.6 G/DL — LOW (ref 13–17)
HGB BLD-MCNC: 12.7 G/DL — LOW (ref 13–17)
HGB BLD-MCNC: 12.8 G/DL — LOW (ref 13–17)
HGB BLD-MCNC: 12.8 G/DL — LOW (ref 13–17)
HGB BLD-MCNC: 13 G/DL — SIGNIFICANT CHANGE UP (ref 13–17)
HGB BLD-MCNC: 13.1 G/DL — SIGNIFICANT CHANGE UP (ref 13–17)
HGB BLD-MCNC: 13.3 G/DL — SIGNIFICANT CHANGE UP (ref 13–17)
HGB BLD-MCNC: 13.5 G/DL — SIGNIFICANT CHANGE UP (ref 13–17)
HGB BLD-MCNC: 13.5 G/DL — SIGNIFICANT CHANGE UP (ref 13–17)
HGB BLD-MCNC: 13.8 G/DL — SIGNIFICANT CHANGE UP (ref 13–17)
HMPV RNA SPEC QL NAA+PROBE: DETECTED
HOROWITZ INDEX BLDA+IHG-RTO: 100 — SIGNIFICANT CHANGE UP
HOROWITZ INDEX BLDA+IHG-RTO: 30 — SIGNIFICANT CHANGE UP
HOROWITZ INDEX BLDA+IHG-RTO: 40 — SIGNIFICANT CHANGE UP
HOROWITZ INDEX BLDA+IHG-RTO: 50 — SIGNIFICANT CHANGE UP
IMM GRANULOCYTES NFR BLD AUTO: 0.4 % — SIGNIFICANT CHANGE UP (ref 0–1.5)
IMM GRANULOCYTES NFR BLD AUTO: 0.5 % — SIGNIFICANT CHANGE UP (ref 0–1.5)
IMM GRANULOCYTES NFR BLD AUTO: 0.5 % — SIGNIFICANT CHANGE UP (ref 0–1.5)
IMM GRANULOCYTES NFR BLD AUTO: 0.7 % — SIGNIFICANT CHANGE UP (ref 0–1.5)
IMM GRANULOCYTES NFR BLD AUTO: 0.7 % — SIGNIFICANT CHANGE UP (ref 0–1.5)
IMM GRANULOCYTES NFR BLD AUTO: 1 % — SIGNIFICANT CHANGE UP (ref 0–1.5)
INR BLD: 2 RATIO — HIGH (ref 0.88–1.16)
INR BLD: 2.1 RATIO — HIGH (ref 0.88–1.16)
INR BLD: 2.8 RATIO — HIGH (ref 0.88–1.16)
INR BLD: 2.96 RATIO — HIGH (ref 0.88–1.16)
INR BLD: 3.43 RATIO — HIGH (ref 0.88–1.16)
INR PPP: 1.5 RATIO
INR PPP: 2 RATIO
INR PPP: 2 RATIO
INR PPP: 2.1 RATIO
INR PPP: 2.2 RATIO
INR PPP: 3.2 RATIO
INR PPP: 4.1 RATIO
KETONES UR-MCNC: NEGATIVE — SIGNIFICANT CHANGE UP
KETONES UR-MCNC: NEGATIVE — SIGNIFICANT CHANGE UP
LACTATE SERPL-SCNC: 1.5 MMOL/L — SIGNIFICANT CHANGE UP (ref 0.7–2)
LACTATE SERPL-SCNC: 1.8 MMOL/L — SIGNIFICANT CHANGE UP (ref 0.7–2)
LACTATE SERPL-SCNC: 2.9 MMOL/L — HIGH (ref 0.7–2)
LACTATE SERPL-SCNC: 3.1 MMOL/L — HIGH (ref 0.7–2)
LACTATE SERPL-SCNC: 3.2 MMOL/L — HIGH (ref 0.7–2)
LACTATE SERPL-SCNC: 3.4 MMOL/L — HIGH (ref 0.7–2)
LACTATE SERPL-SCNC: 3.5 MMOL/L — HIGH (ref 0.7–2)
LACTATE SERPL-SCNC: 3.6 MMOL/L — HIGH (ref 0.7–2)
LACTATE SERPL-SCNC: 5.2 MMOL/L — CRITICAL HIGH (ref 0.7–2)
LEUKOCYTE ESTERASE UR-ACNC: ABNORMAL
LEUKOCYTE ESTERASE UR-ACNC: NEGATIVE — SIGNIFICANT CHANGE UP
LIPID PNL WITH DIRECT LDL SERPL: 119 MG/DL — HIGH
LYMPHOCYTES # BLD AUTO: 0.59 K/UL — LOW (ref 1–3.3)
LYMPHOCYTES # BLD AUTO: 0.63 K/UL — LOW (ref 1–3.3)
LYMPHOCYTES # BLD AUTO: 0.63 K/UL — LOW (ref 1–3.3)
LYMPHOCYTES # BLD AUTO: 0.7 K/UL — LOW (ref 1–3.3)
LYMPHOCYTES # BLD AUTO: 0.7 K/UL — LOW (ref 1–3.3)
LYMPHOCYTES # BLD AUTO: 0.75 K/UL — LOW (ref 1–3.3)
LYMPHOCYTES # BLD AUTO: 0.86 K/UL — LOW (ref 1–3.3)
LYMPHOCYTES # BLD AUTO: 0.87 K/UL — LOW (ref 1–3.3)
LYMPHOCYTES # BLD AUTO: 0.89 K/UL — LOW (ref 1–3.3)
LYMPHOCYTES # BLD AUTO: 0.96 K/UL — LOW (ref 1–3.3)
LYMPHOCYTES # BLD AUTO: 1.44 K/UL — SIGNIFICANT CHANGE UP (ref 1–3.3)
LYMPHOCYTES # BLD AUTO: 10 % — LOW (ref 13–44)
LYMPHOCYTES # BLD AUTO: 11.1 % — LOW (ref 13–44)
LYMPHOCYTES # BLD AUTO: 14.3 % — SIGNIFICANT CHANGE UP (ref 13–44)
LYMPHOCYTES # BLD AUTO: 6.9 % — LOW (ref 13–44)
LYMPHOCYTES # BLD AUTO: 7.4 % — LOW (ref 13–44)
LYMPHOCYTES # BLD AUTO: 7.5 % — LOW (ref 13–44)
LYMPHOCYTES # BLD AUTO: 7.6 % — LOW (ref 13–44)
LYMPHOCYTES # BLD AUTO: 8.2 % — LOW (ref 13–44)
LYMPHOCYTES # BLD AUTO: 8.3 % — LOW (ref 13–44)
MAGNESIUM SERPL-MCNC: 2.1 MG/DL — SIGNIFICANT CHANGE UP (ref 1.6–2.6)
MAGNESIUM SERPL-MCNC: 2.1 MG/DL — SIGNIFICANT CHANGE UP (ref 1.6–2.6)
MAGNESIUM SERPL-MCNC: 2.2 MG/DL — SIGNIFICANT CHANGE UP (ref 1.6–2.6)
MAGNESIUM SERPL-MCNC: 2.3 MG/DL — SIGNIFICANT CHANGE UP (ref 1.6–2.6)
MAGNESIUM SERPL-MCNC: 2.4 MG/DL — SIGNIFICANT CHANGE UP (ref 1.6–2.6)
MAGNESIUM SERPL-MCNC: 2.5 MG/DL — SIGNIFICANT CHANGE UP (ref 1.6–2.6)
MAGNESIUM SERPL-MCNC: 2.7 MG/DL — HIGH (ref 1.6–2.6)
MAGNESIUM SERPL-MCNC: 2.8 MG/DL — HIGH (ref 1.6–2.6)
MCHC RBC-ENTMCNC: 31.3 GM/DL — LOW (ref 32–36)
MCHC RBC-ENTMCNC: 31.4 GM/DL — LOW (ref 32–36)
MCHC RBC-ENTMCNC: 31.5 GM/DL — LOW (ref 32–36)
MCHC RBC-ENTMCNC: 31.6 GM/DL — LOW (ref 32–36)
MCHC RBC-ENTMCNC: 31.6 GM/DL — LOW (ref 32–36)
MCHC RBC-ENTMCNC: 31.8 GM/DL — LOW (ref 32–36)
MCHC RBC-ENTMCNC: 31.9 GM/DL — LOW (ref 32–36)
MCHC RBC-ENTMCNC: 32 GM/DL — SIGNIFICANT CHANGE UP (ref 32–36)
MCHC RBC-ENTMCNC: 32.1 GM/DL — SIGNIFICANT CHANGE UP (ref 32–36)
MCHC RBC-ENTMCNC: 32.1 PG — SIGNIFICANT CHANGE UP (ref 27–34)
MCHC RBC-ENTMCNC: 32.3 PG — SIGNIFICANT CHANGE UP (ref 27–34)
MCHC RBC-ENTMCNC: 32.3 PG — SIGNIFICANT CHANGE UP (ref 27–34)
MCHC RBC-ENTMCNC: 32.4 PG — SIGNIFICANT CHANGE UP (ref 27–34)
MCHC RBC-ENTMCNC: 32.4 PG — SIGNIFICANT CHANGE UP (ref 27–34)
MCHC RBC-ENTMCNC: 32.5 PG — SIGNIFICANT CHANGE UP (ref 27–34)
MCHC RBC-ENTMCNC: 32.5 PG — SIGNIFICANT CHANGE UP (ref 27–34)
MCHC RBC-ENTMCNC: 32.6 PG — SIGNIFICANT CHANGE UP (ref 27–34)
MCHC RBC-ENTMCNC: 32.7 GM/DL — SIGNIFICANT CHANGE UP (ref 32–36)
MCHC RBC-ENTMCNC: 32.7 GM/DL — SIGNIFICANT CHANGE UP (ref 32–36)
MCHC RBC-ENTMCNC: 32.7 PG — SIGNIFICANT CHANGE UP (ref 27–34)
MCHC RBC-ENTMCNC: 32.7 PG — SIGNIFICANT CHANGE UP (ref 27–34)
MCHC RBC-ENTMCNC: 32.8 GM/DL — SIGNIFICANT CHANGE UP (ref 32–36)
MCHC RBC-ENTMCNC: 32.8 PG — SIGNIFICANT CHANGE UP (ref 27–34)
MCHC RBC-ENTMCNC: 32.9 GM/DL — SIGNIFICANT CHANGE UP (ref 32–36)
MCHC RBC-ENTMCNC: 32.9 GM/DL — SIGNIFICANT CHANGE UP (ref 32–36)
MCHC RBC-ENTMCNC: 32.9 PG — SIGNIFICANT CHANGE UP (ref 27–34)
MCHC RBC-ENTMCNC: 33 PG — SIGNIFICANT CHANGE UP (ref 27–34)
MCHC RBC-ENTMCNC: 33 PG — SIGNIFICANT CHANGE UP (ref 27–34)
MCHC RBC-ENTMCNC: 33.2 PG — SIGNIFICANT CHANGE UP (ref 27–34)
MCHC RBC-ENTMCNC: 33.3 GM/DL — SIGNIFICANT CHANGE UP (ref 32–36)
MCHC RBC-ENTMCNC: 33.3 PG — SIGNIFICANT CHANGE UP (ref 27–34)
MCHC RBC-ENTMCNC: 33.6 GM/DL — SIGNIFICANT CHANGE UP (ref 32–36)
MCHC RBC-ENTMCNC: 33.7 PG — SIGNIFICANT CHANGE UP (ref 27–34)
MCHC RBC-ENTMCNC: 33.8 PG — SIGNIFICANT CHANGE UP (ref 27–34)
MCV RBC AUTO: 100.5 FL — HIGH (ref 80–100)
MCV RBC AUTO: 100.5 FL — HIGH (ref 80–100)
MCV RBC AUTO: 100.8 FL — HIGH (ref 80–100)
MCV RBC AUTO: 101 FL — HIGH (ref 80–100)
MCV RBC AUTO: 101.1 FL — HIGH (ref 80–100)
MCV RBC AUTO: 101.3 FL — HIGH (ref 80–100)
MCV RBC AUTO: 101.3 FL — HIGH (ref 80–100)
MCV RBC AUTO: 101.5 FL — HIGH (ref 80–100)
MCV RBC AUTO: 101.7 FL — HIGH (ref 80–100)
MCV RBC AUTO: 102.3 FL — HIGH (ref 80–100)
MCV RBC AUTO: 102.4 FL — HIGH (ref 80–100)
MCV RBC AUTO: 102.5 FL — HIGH (ref 80–100)
MCV RBC AUTO: 102.9 FL — HIGH (ref 80–100)
MCV RBC AUTO: 103.1 FL — HIGH (ref 80–100)
MCV RBC AUTO: 103.1 FL — HIGH (ref 80–100)
MCV RBC AUTO: 103.3 FL — HIGH (ref 80–100)
MCV RBC AUTO: 103.7 FL — HIGH (ref 80–100)
MCV RBC AUTO: 103.9 FL — HIGH (ref 80–100)
MCV RBC AUTO: 104.4 FL — HIGH (ref 80–100)
MCV RBC AUTO: 99.5 FL — SIGNIFICANT CHANGE UP (ref 80–100)
MONOCYTES # BLD AUTO: 0.5 K/UL — SIGNIFICANT CHANGE UP (ref 0–0.9)
MONOCYTES # BLD AUTO: 0.52 K/UL — SIGNIFICANT CHANGE UP (ref 0–0.9)
MONOCYTES # BLD AUTO: 0.54 K/UL — SIGNIFICANT CHANGE UP (ref 0–0.9)
MONOCYTES # BLD AUTO: 0.58 K/UL — SIGNIFICANT CHANGE UP (ref 0–0.9)
MONOCYTES # BLD AUTO: 0.64 K/UL — SIGNIFICANT CHANGE UP (ref 0–0.9)
MONOCYTES # BLD AUTO: 0.76 K/UL — SIGNIFICANT CHANGE UP (ref 0–0.9)
MONOCYTES # BLD AUTO: 0.78 K/UL — SIGNIFICANT CHANGE UP (ref 0–0.9)
MONOCYTES # BLD AUTO: 0.89 K/UL — SIGNIFICANT CHANGE UP (ref 0–0.9)
MONOCYTES # BLD AUTO: 0.9 K/UL — SIGNIFICANT CHANGE UP (ref 0–0.9)
MONOCYTES # BLD AUTO: 0.97 K/UL — HIGH (ref 0–0.9)
MONOCYTES # BLD AUTO: 1.13 K/UL — HIGH (ref 0–0.9)
MONOCYTES NFR BLD AUTO: 11.1 % — SIGNIFICANT CHANGE UP (ref 2–14)
MONOCYTES NFR BLD AUTO: 11.4 % — SIGNIFICANT CHANGE UP (ref 2–14)
MONOCYTES NFR BLD AUTO: 12.8 % — SIGNIFICANT CHANGE UP (ref 2–14)
MONOCYTES NFR BLD AUTO: 13 % — SIGNIFICANT CHANGE UP (ref 2–14)
MONOCYTES NFR BLD AUTO: 13.3 % — SIGNIFICANT CHANGE UP (ref 2–14)
MONOCYTES NFR BLD AUTO: 4 % — SIGNIFICANT CHANGE UP (ref 2–14)
MONOCYTES NFR BLD AUTO: 5.9 % — SIGNIFICANT CHANGE UP (ref 2–14)
MONOCYTES NFR BLD AUTO: 6 % — SIGNIFICANT CHANGE UP (ref 2–14)
MONOCYTES NFR BLD AUTO: 6.3 % — SIGNIFICANT CHANGE UP (ref 2–14)
MONOCYTES NFR BLD AUTO: 6.4 % — SIGNIFICANT CHANGE UP (ref 2–14)
MONOCYTES NFR BLD AUTO: 6.8 % — SIGNIFICANT CHANGE UP (ref 2–14)
MUSK IGG SER IA-MCNC: SIGNIFICANT CHANGE UP
NEUTROPHILS # BLD AUTO: 12.38 K/UL — HIGH (ref 1.8–7.4)
NEUTROPHILS # BLD AUTO: 4.83 K/UL — SIGNIFICANT CHANGE UP (ref 1.8–7.4)
NEUTROPHILS # BLD AUTO: 5.22 K/UL — SIGNIFICANT CHANGE UP (ref 1.8–7.4)
NEUTROPHILS # BLD AUTO: 5.46 K/UL — SIGNIFICANT CHANGE UP (ref 1.8–7.4)
NEUTROPHILS # BLD AUTO: 5.97 K/UL — SIGNIFICANT CHANGE UP (ref 1.8–7.4)
NEUTROPHILS # BLD AUTO: 6.58 K/UL — SIGNIFICANT CHANGE UP (ref 1.8–7.4)
NEUTROPHILS # BLD AUTO: 6.79 K/UL — SIGNIFICANT CHANGE UP (ref 1.8–7.4)
NEUTROPHILS # BLD AUTO: 7.26 K/UL — SIGNIFICANT CHANGE UP (ref 1.8–7.4)
NEUTROPHILS # BLD AUTO: 7.85 K/UL — HIGH (ref 1.8–7.4)
NEUTROPHILS # BLD AUTO: 8.45 K/UL — HIGH (ref 1.8–7.4)
NEUTROPHILS # BLD AUTO: 9.75 K/UL — HIGH (ref 1.8–7.4)
NEUTROPHILS NFR BLD AUTO: 59 % — SIGNIFICANT CHANGE UP (ref 43–77)
NEUTROPHILS NFR BLD AUTO: 71 % — SIGNIFICANT CHANGE UP (ref 43–77)
NEUTROPHILS NFR BLD AUTO: 72.2 % — SIGNIFICANT CHANGE UP (ref 43–77)
NEUTROPHILS NFR BLD AUTO: 72.3 % — SIGNIFICANT CHANGE UP (ref 43–77)
NEUTROPHILS NFR BLD AUTO: 74.4 % — SIGNIFICANT CHANGE UP (ref 43–77)
NEUTROPHILS NFR BLD AUTO: 77.4 % — HIGH (ref 43–77)
NEUTROPHILS NFR BLD AUTO: 78 % — HIGH (ref 43–77)
NEUTROPHILS NFR BLD AUTO: 84.1 % — HIGH (ref 43–77)
NEUTROPHILS NFR BLD AUTO: 84.7 % — HIGH (ref 43–77)
NEUTROPHILS NFR BLD AUTO: 85.6 % — HIGH (ref 43–77)
NEUTROPHILS NFR BLD AUTO: 86.2 % — HIGH (ref 43–77)
NITRITE UR-MCNC: NEGATIVE — SIGNIFICANT CHANGE UP
NITRITE UR-MCNC: NEGATIVE — SIGNIFICANT CHANGE UP
NON HDL CHOLESTEROL: 141 MG/DL — HIGH
NRBC # BLD: 0 /100 WBCS — SIGNIFICANT CHANGE UP (ref 0–0)
NRBC # BLD: 1 /100 WBCS — HIGH (ref 0–0)
NRBC # BLD: SIGNIFICANT CHANGE UP /100 WBCS (ref 0–0)
NT-PROBNP SERPL-SCNC: 4263 PG/ML — HIGH (ref 0–450)
NT-PROBNP SERPL-SCNC: 4274 PG/ML — HIGH (ref 0–450)
NT-PROBNP SERPL-SCNC: HIGH PG/ML (ref 0–450)
PCO2 BLDA: 43 MMHG — SIGNIFICANT CHANGE UP (ref 35–48)
PCO2 BLDA: 45 MMHG — SIGNIFICANT CHANGE UP (ref 35–48)
PCO2 BLDA: 51 MMHG — HIGH (ref 35–48)
PCO2 BLDA: 55 MMHG — HIGH (ref 35–48)
PCO2 BLDA: 66 MMHG — HIGH (ref 35–48)
PCO2 BLDV: 54 MMHG — SIGNIFICANT CHANGE UP (ref 42–55)
PCO2 BLDV: 62 MMHG — HIGH (ref 42–55)
PH BLDA: 7.36 — SIGNIFICANT CHANGE UP (ref 7.35–7.45)
PH BLDA: 7.38 — SIGNIFICANT CHANGE UP (ref 7.35–7.45)
PH BLDA: 7.4 — SIGNIFICANT CHANGE UP (ref 7.35–7.45)
PH BLDA: 7.44 — SIGNIFICANT CHANGE UP (ref 7.35–7.45)
PH BLDA: 7.48 — HIGH (ref 7.35–7.45)
PH BLDV: 7.41 — SIGNIFICANT CHANGE UP (ref 7.32–7.43)
PH BLDV: 7.45 — HIGH (ref 7.32–7.43)
PH UR: 6 — SIGNIFICANT CHANGE UP (ref 5–8)
PH UR: 6 — SIGNIFICANT CHANGE UP (ref 5–8)
PHOSPHATE SERPL-MCNC: 2 MG/DL — LOW (ref 2.5–4.5)
PHOSPHATE SERPL-MCNC: 2.1 MG/DL — LOW (ref 2.5–4.5)
PHOSPHATE SERPL-MCNC: 2.3 MG/DL — LOW (ref 2.5–4.5)
PHOSPHATE SERPL-MCNC: 2.4 MG/DL — LOW (ref 2.5–4.5)
PHOSPHATE SERPL-MCNC: 2.4 MG/DL — LOW (ref 2.5–4.5)
PHOSPHATE SERPL-MCNC: 2.5 MG/DL — SIGNIFICANT CHANGE UP (ref 2.5–4.5)
PHOSPHATE SERPL-MCNC: 2.5 MG/DL — SIGNIFICANT CHANGE UP (ref 2.5–4.5)
PHOSPHATE SERPL-MCNC: 2.6 MG/DL — SIGNIFICANT CHANGE UP (ref 2.5–4.5)
PHOSPHATE SERPL-MCNC: 2.7 MG/DL — SIGNIFICANT CHANGE UP (ref 2.5–4.5)
PHOSPHATE SERPL-MCNC: 2.8 MG/DL — SIGNIFICANT CHANGE UP (ref 2.5–4.5)
PHOSPHATE SERPL-MCNC: 3.2 MG/DL — SIGNIFICANT CHANGE UP (ref 2.5–4.5)
PHOSPHATE SERPL-MCNC: 3.3 MG/DL — SIGNIFICANT CHANGE UP (ref 2.5–4.5)
PHOSPHATE SERPL-MCNC: 3.7 MG/DL — SIGNIFICANT CHANGE UP (ref 2.5–4.5)
PHOSPHATE SERPL-MCNC: 4 MG/DL — SIGNIFICANT CHANGE UP (ref 2.5–4.5)
PHOSPHATE SERPL-MCNC: 4.1 MG/DL — SIGNIFICANT CHANGE UP (ref 2.5–4.5)
PHOSPHATE SERPL-MCNC: 4.2 MG/DL — SIGNIFICANT CHANGE UP (ref 2.5–4.5)
PLATELET # BLD AUTO: 156 K/UL — SIGNIFICANT CHANGE UP (ref 150–400)
PLATELET # BLD AUTO: 158 K/UL — SIGNIFICANT CHANGE UP (ref 150–400)
PLATELET # BLD AUTO: 158 K/UL — SIGNIFICANT CHANGE UP (ref 150–400)
PLATELET # BLD AUTO: 162 K/UL — SIGNIFICANT CHANGE UP (ref 150–400)
PLATELET # BLD AUTO: 164 K/UL — SIGNIFICANT CHANGE UP (ref 150–400)
PLATELET # BLD AUTO: 166 K/UL — SIGNIFICANT CHANGE UP (ref 150–400)
PLATELET # BLD AUTO: 169 K/UL — SIGNIFICANT CHANGE UP (ref 150–400)
PLATELET # BLD AUTO: 172 K/UL — SIGNIFICANT CHANGE UP (ref 150–400)
PLATELET # BLD AUTO: 173 K/UL — SIGNIFICANT CHANGE UP (ref 150–400)
PLATELET # BLD AUTO: 175 K/UL — SIGNIFICANT CHANGE UP (ref 150–400)
PLATELET # BLD AUTO: 176 K/UL — SIGNIFICANT CHANGE UP (ref 150–400)
PLATELET # BLD AUTO: 181 K/UL — SIGNIFICANT CHANGE UP (ref 150–400)
PLATELET # BLD AUTO: 182 K/UL — SIGNIFICANT CHANGE UP (ref 150–400)
PLATELET # BLD AUTO: 184 K/UL — SIGNIFICANT CHANGE UP (ref 150–400)
PLATELET # BLD AUTO: 186 K/UL — SIGNIFICANT CHANGE UP (ref 150–400)
PLATELET # BLD AUTO: 188 K/UL — SIGNIFICANT CHANGE UP (ref 150–400)
PLATELET # BLD AUTO: 192 K/UL — SIGNIFICANT CHANGE UP (ref 150–400)
PLATELET # BLD AUTO: 208 K/UL — SIGNIFICANT CHANGE UP (ref 150–400)
PLATELET # BLD AUTO: 210 K/UL — SIGNIFICANT CHANGE UP (ref 150–400)
PLATELET # BLD AUTO: 214 K/UL — SIGNIFICANT CHANGE UP (ref 150–400)
PO2 BLDA: 106 MMHG — SIGNIFICANT CHANGE UP (ref 83–108)
PO2 BLDA: 238 MMHG — HIGH (ref 83–108)
PO2 BLDA: 57 MMHG — LOW (ref 83–108)
PO2 BLDA: 80 MMHG — LOW (ref 83–108)
PO2 BLDA: 93 MMHG — SIGNIFICANT CHANGE UP (ref 83–108)
PO2 BLDV: 167 MMHG — HIGH (ref 25–45)
PO2 BLDV: 61 MMHG — HIGH (ref 25–45)
POTASSIUM SERPL-MCNC: 3 MMOL/L — LOW (ref 3.5–5.3)
POTASSIUM SERPL-MCNC: 3 MMOL/L — LOW (ref 3.5–5.3)
POTASSIUM SERPL-MCNC: 3.2 MMOL/L — LOW (ref 3.5–5.3)
POTASSIUM SERPL-MCNC: 3.5 MMOL/L — SIGNIFICANT CHANGE UP (ref 3.5–5.3)
POTASSIUM SERPL-MCNC: 3.7 MMOL/L — SIGNIFICANT CHANGE UP (ref 3.5–5.3)
POTASSIUM SERPL-MCNC: 3.9 MMOL/L — SIGNIFICANT CHANGE UP (ref 3.5–5.3)
POTASSIUM SERPL-MCNC: 4 MMOL/L — SIGNIFICANT CHANGE UP (ref 3.5–5.3)
POTASSIUM SERPL-MCNC: 4 MMOL/L — SIGNIFICANT CHANGE UP (ref 3.5–5.3)
POTASSIUM SERPL-MCNC: 4.1 MMOL/L — SIGNIFICANT CHANGE UP (ref 3.5–5.3)
POTASSIUM SERPL-MCNC: 4.2 MMOL/L — SIGNIFICANT CHANGE UP (ref 3.5–5.3)
POTASSIUM SERPL-MCNC: 4.2 MMOL/L — SIGNIFICANT CHANGE UP (ref 3.5–5.3)
POTASSIUM SERPL-MCNC: 4.4 MMOL/L — SIGNIFICANT CHANGE UP (ref 3.5–5.3)
POTASSIUM SERPL-MCNC: 4.4 MMOL/L — SIGNIFICANT CHANGE UP (ref 3.5–5.3)
POTASSIUM SERPL-MCNC: 4.5 MMOL/L — SIGNIFICANT CHANGE UP (ref 3.5–5.3)
POTASSIUM SERPL-MCNC: 4.7 MMOL/L — SIGNIFICANT CHANGE UP (ref 3.5–5.3)
POTASSIUM SERPL-MCNC: 5.3 MMOL/L — SIGNIFICANT CHANGE UP (ref 3.5–5.3)
POTASSIUM SERPL-SCNC: 3 MMOL/L — LOW (ref 3.5–5.3)
POTASSIUM SERPL-SCNC: 3 MMOL/L — LOW (ref 3.5–5.3)
POTASSIUM SERPL-SCNC: 3.2 MMOL/L — LOW (ref 3.5–5.3)
POTASSIUM SERPL-SCNC: 3.5 MMOL/L — SIGNIFICANT CHANGE UP (ref 3.5–5.3)
POTASSIUM SERPL-SCNC: 3.7 MMOL/L — SIGNIFICANT CHANGE UP (ref 3.5–5.3)
POTASSIUM SERPL-SCNC: 3.9 MMOL/L — SIGNIFICANT CHANGE UP (ref 3.5–5.3)
POTASSIUM SERPL-SCNC: 4 MMOL/L — SIGNIFICANT CHANGE UP (ref 3.5–5.3)
POTASSIUM SERPL-SCNC: 4 MMOL/L — SIGNIFICANT CHANGE UP (ref 3.5–5.3)
POTASSIUM SERPL-SCNC: 4.1 MMOL/L — SIGNIFICANT CHANGE UP (ref 3.5–5.3)
POTASSIUM SERPL-SCNC: 4.2 MMOL/L — SIGNIFICANT CHANGE UP (ref 3.5–5.3)
POTASSIUM SERPL-SCNC: 4.2 MMOL/L — SIGNIFICANT CHANGE UP (ref 3.5–5.3)
POTASSIUM SERPL-SCNC: 4.4 MMOL/L — SIGNIFICANT CHANGE UP (ref 3.5–5.3)
POTASSIUM SERPL-SCNC: 4.4 MMOL/L — SIGNIFICANT CHANGE UP (ref 3.5–5.3)
POTASSIUM SERPL-SCNC: 4.5 MMOL/L — SIGNIFICANT CHANGE UP (ref 3.5–5.3)
POTASSIUM SERPL-SCNC: 4.7 MMOL/L — SIGNIFICANT CHANGE UP (ref 3.5–5.3)
POTASSIUM SERPL-SCNC: 5.3 MMOL/L — SIGNIFICANT CHANGE UP (ref 3.5–5.3)
PROCALCITONIN SERPL-MCNC: 0.1 NG/ML — HIGH
PROCALCITONIN SERPL-MCNC: 0.1 NG/ML — HIGH (ref 0–0.04)
PROCALCITONIN SERPL-MCNC: 0.13 NG/ML — HIGH (ref 0–0.04)
PROT SERPL-MCNC: 6.4 G/DL — SIGNIFICANT CHANGE UP (ref 6–8.3)
PROT SERPL-MCNC: 6.4 G/DL — SIGNIFICANT CHANGE UP (ref 6–8.3)
PROT SERPL-MCNC: 6.5 G/DL — SIGNIFICANT CHANGE UP (ref 6–8.3)
PROT SERPL-MCNC: 6.5 G/DL — SIGNIFICANT CHANGE UP (ref 6–8.3)
PROT SERPL-MCNC: 6.6 G/DL — SIGNIFICANT CHANGE UP (ref 6–8.3)
PROT SERPL-MCNC: 6.7 G/DL — SIGNIFICANT CHANGE UP (ref 6–8.3)
PROT SERPL-MCNC: 6.8 G/DL — SIGNIFICANT CHANGE UP (ref 6–8.3)
PROT SERPL-MCNC: 6.8 G/DL — SIGNIFICANT CHANGE UP (ref 6–8.3)
PROT SERPL-MCNC: 6.9 G/DL — SIGNIFICANT CHANGE UP (ref 6–8.3)
PROT SERPL-MCNC: 7.7 G/DL — SIGNIFICANT CHANGE UP (ref 6–8.3)
PROT UR-MCNC: 30 MG/DL
PROT UR-MCNC: NEGATIVE — SIGNIFICANT CHANGE UP
PROTHROM AB SERPL-ACNC: 23.6 SEC — HIGH (ref 10.5–13.4)
PROTHROM AB SERPL-ACNC: 24.8 SEC — HIGH (ref 10.5–13.4)
PROTHROM AB SERPL-ACNC: 33.1 SEC — HIGH (ref 10.5–13.4)
PROTHROM AB SERPL-ACNC: 35 SEC — HIGH (ref 10.5–13.4)
PROTHROM AB SERPL-ACNC: 40.6 SEC — HIGH (ref 10.5–13.4)
QUALITY CONTROL: YES
RAPID RVP RESULT: DETECTED
RBC # BLD: 3.35 M/UL — LOW (ref 4.2–5.8)
RBC # BLD: 3.65 M/UL — LOW (ref 4.2–5.8)
RBC # BLD: 3.67 M/UL — LOW (ref 4.2–5.8)
RBC # BLD: 3.76 M/UL — LOW (ref 4.2–5.8)
RBC # BLD: 3.8 M/UL — LOW (ref 4.2–5.8)
RBC # BLD: 3.81 M/UL — LOW (ref 4.2–5.8)
RBC # BLD: 3.82 M/UL — LOW (ref 4.2–5.8)
RBC # BLD: 3.83 M/UL — LOW (ref 4.2–5.8)
RBC # BLD: 3.84 M/UL — LOW (ref 4.2–5.8)
RBC # BLD: 3.87 M/UL — LOW (ref 4.2–5.8)
RBC # BLD: 3.87 M/UL — LOW (ref 4.2–5.8)
RBC # BLD: 3.95 M/UL — LOW (ref 4.2–5.8)
RBC # BLD: 3.99 M/UL — LOW (ref 4.2–5.8)
RBC # BLD: 3.99 M/UL — LOW (ref 4.2–5.8)
RBC # BLD: 4.01 M/UL — LOW (ref 4.2–5.8)
RBC # BLD: 4.01 M/UL — LOW (ref 4.2–5.8)
RBC # BLD: 4.09 M/UL — LOW (ref 4.2–5.8)
RBC # BLD: 4.1 M/UL — LOW (ref 4.2–5.8)
RBC # BLD: 4.11 M/UL — LOW (ref 4.2–5.8)
RBC # BLD: 4.15 M/UL — LOW (ref 4.2–5.8)
RBC # FLD: 14.7 % — HIGH (ref 10.3–14.5)
RBC # FLD: 15 % — HIGH (ref 10.3–14.5)
RBC # FLD: 15.1 % — HIGH (ref 10.3–14.5)
RBC # FLD: 15.2 % — HIGH (ref 10.3–14.5)
RBC # FLD: 15.3 % — HIGH (ref 10.3–14.5)
RBC # FLD: 15.4 % — HIGH (ref 10.3–14.5)
RBC # FLD: 15.4 % — HIGH (ref 10.3–14.5)
RBC # FLD: 15.5 % — HIGH (ref 10.3–14.5)
RBC # FLD: 15.5 % — HIGH (ref 10.3–14.5)
RBC # FLD: 15.6 % — HIGH (ref 10.3–14.5)
RBC # FLD: 15.7 % — HIGH (ref 10.3–14.5)
RBC # FLD: 15.8 % — HIGH (ref 10.3–14.5)
SAO2 % BLDA: 91.1 % — LOW (ref 94–98)
SAO2 % BLDA: 98.3 % — HIGH (ref 94–98)
SAO2 % BLDA: 99.2 % — HIGH (ref 94–98)
SAO2 % BLDA: 99.2 % — HIGH (ref 94–98)
SAO2 % BLDA: 99.9 % — HIGH (ref 94–98)
SAO2 % BLDV: 100 % — HIGH (ref 67–88)
SAO2 % BLDV: 90.8 % — HIGH (ref 67–88)
SARS-COV-2 RNA SPEC QL NAA+PROBE: SIGNIFICANT CHANGE UP
SODIUM SERPL-SCNC: 134 MMOL/L — LOW (ref 135–145)
SODIUM SERPL-SCNC: 136 MMOL/L — SIGNIFICANT CHANGE UP (ref 135–145)
SODIUM SERPL-SCNC: 137 MMOL/L — SIGNIFICANT CHANGE UP (ref 135–145)
SODIUM SERPL-SCNC: 138 MMOL/L — SIGNIFICANT CHANGE UP (ref 135–145)
SODIUM SERPL-SCNC: 139 MMOL/L — SIGNIFICANT CHANGE UP (ref 135–145)
SODIUM SERPL-SCNC: 141 MMOL/L — SIGNIFICANT CHANGE UP (ref 135–145)
SODIUM SERPL-SCNC: 142 MMOL/L — SIGNIFICANT CHANGE UP (ref 135–145)
SODIUM SERPL-SCNC: 142 MMOL/L — SIGNIFICANT CHANGE UP (ref 135–145)
SODIUM SERPL-SCNC: 143 MMOL/L — SIGNIFICANT CHANGE UP (ref 135–145)
SODIUM SERPL-SCNC: 144 MMOL/L — SIGNIFICANT CHANGE UP (ref 135–145)
SODIUM SERPL-SCNC: 145 MMOL/L — SIGNIFICANT CHANGE UP (ref 135–145)
SP GR SPEC: 1 — LOW (ref 1.01–1.02)
SP GR SPEC: 1.01 — SIGNIFICANT CHANGE UP (ref 1.01–1.02)
SPECIMEN SOURCE: SIGNIFICANT CHANGE UP
T3 SERPL-MCNC: 51 NG/DL — LOW (ref 80–200)
T4 AB SER-ACNC: 3.9 UG/DL — LOW (ref 4.6–12)
TRIGL SERPL-MCNC: 110 MG/DL — SIGNIFICANT CHANGE UP
TSH SERPL-MCNC: 2.35 UIU/ML — SIGNIFICANT CHANGE UP (ref 0.36–3.74)
TSH SERPL-MCNC: 2.46 UIU/ML — SIGNIFICANT CHANGE UP (ref 0.36–3.74)
URATE SERPL-MCNC: 5.3 MG/DL — SIGNIFICANT CHANGE UP (ref 3.4–8.8)
UROBILINOGEN FLD QL: NEGATIVE — SIGNIFICANT CHANGE UP
UROBILINOGEN FLD QL: NEGATIVE — SIGNIFICANT CHANGE UP
VGCC-P/Q BIND AB SER-SCNC: 11.1 PMOL/L — SIGNIFICANT CHANGE UP (ref 0–24.5)
VIT B12 SERPL-MCNC: >2000 PG/ML — HIGH (ref 232–1245)
VIT D25+D1,25 OH+D1,25 PNL SERPL-MCNC: 35.8 PG/ML — SIGNIFICANT CHANGE UP (ref 19.9–79.3)
WBC # BLD: 10.04 K/UL — SIGNIFICANT CHANGE UP (ref 3.8–10.5)
WBC # BLD: 10.1 K/UL — SIGNIFICANT CHANGE UP (ref 3.8–10.5)
WBC # BLD: 11.51 K/UL — HIGH (ref 3.8–10.5)
WBC # BLD: 14.4 K/UL — HIGH (ref 3.8–10.5)
WBC # BLD: 6.26 K/UL — SIGNIFICANT CHANGE UP (ref 3.8–10.5)
WBC # BLD: 6.69 K/UL — SIGNIFICANT CHANGE UP (ref 3.8–10.5)
WBC # BLD: 6.7 K/UL — SIGNIFICANT CHANGE UP (ref 3.8–10.5)
WBC # BLD: 6.8 K/UL — SIGNIFICANT CHANGE UP (ref 3.8–10.5)
WBC # BLD: 6.96 K/UL — SIGNIFICANT CHANGE UP (ref 3.8–10.5)
WBC # BLD: 7.19 K/UL — SIGNIFICANT CHANGE UP (ref 3.8–10.5)
WBC # BLD: 7.56 K/UL — SIGNIFICANT CHANGE UP (ref 3.8–10.5)
WBC # BLD: 7.93 K/UL — SIGNIFICANT CHANGE UP (ref 3.8–10.5)
WBC # BLD: 7.99 K/UL — SIGNIFICANT CHANGE UP (ref 3.8–10.5)
WBC # BLD: 8.02 K/UL — SIGNIFICANT CHANGE UP (ref 3.8–10.5)
WBC # BLD: 8.03 K/UL — SIGNIFICANT CHANGE UP (ref 3.8–10.5)
WBC # BLD: 8.5 K/UL — SIGNIFICANT CHANGE UP (ref 3.8–10.5)
WBC # BLD: 8.5 K/UL — SIGNIFICANT CHANGE UP (ref 3.8–10.5)
WBC # BLD: 8.64 K/UL — SIGNIFICANT CHANGE UP (ref 3.8–10.5)
WBC # BLD: 8.81 K/UL — SIGNIFICANT CHANGE UP (ref 3.8–10.5)
WBC # BLD: 9.11 K/UL — SIGNIFICANT CHANGE UP (ref 3.8–10.5)
WBC # FLD AUTO: 10.04 K/UL — SIGNIFICANT CHANGE UP (ref 3.8–10.5)
WBC # FLD AUTO: 10.1 K/UL — SIGNIFICANT CHANGE UP (ref 3.8–10.5)
WBC # FLD AUTO: 11.51 K/UL — HIGH (ref 3.8–10.5)
WBC # FLD AUTO: 14.4 K/UL — HIGH (ref 3.8–10.5)
WBC # FLD AUTO: 6.26 K/UL — SIGNIFICANT CHANGE UP (ref 3.8–10.5)
WBC # FLD AUTO: 6.69 K/UL — SIGNIFICANT CHANGE UP (ref 3.8–10.5)
WBC # FLD AUTO: 6.7 K/UL — SIGNIFICANT CHANGE UP (ref 3.8–10.5)
WBC # FLD AUTO: 6.8 K/UL — SIGNIFICANT CHANGE UP (ref 3.8–10.5)
WBC # FLD AUTO: 6.96 K/UL — SIGNIFICANT CHANGE UP (ref 3.8–10.5)
WBC # FLD AUTO: 7.19 K/UL — SIGNIFICANT CHANGE UP (ref 3.8–10.5)
WBC # FLD AUTO: 7.56 K/UL — SIGNIFICANT CHANGE UP (ref 3.8–10.5)
WBC # FLD AUTO: 7.93 K/UL — SIGNIFICANT CHANGE UP (ref 3.8–10.5)
WBC # FLD AUTO: 7.99 K/UL — SIGNIFICANT CHANGE UP (ref 3.8–10.5)
WBC # FLD AUTO: 8.02 K/UL — SIGNIFICANT CHANGE UP (ref 3.8–10.5)
WBC # FLD AUTO: 8.03 K/UL — SIGNIFICANT CHANGE UP (ref 3.8–10.5)
WBC # FLD AUTO: 8.5 K/UL — SIGNIFICANT CHANGE UP (ref 3.8–10.5)
WBC # FLD AUTO: 8.5 K/UL — SIGNIFICANT CHANGE UP (ref 3.8–10.5)
WBC # FLD AUTO: 8.64 K/UL — SIGNIFICANT CHANGE UP (ref 3.8–10.5)
WBC # FLD AUTO: 8.81 K/UL — SIGNIFICANT CHANGE UP (ref 3.8–10.5)
WBC # FLD AUTO: 9.11 K/UL — SIGNIFICANT CHANGE UP (ref 3.8–10.5)

## 2022-01-01 PROCEDURE — 83605 ASSAY OF LACTIC ACID: CPT

## 2022-01-01 PROCEDURE — 85610 PROTHROMBIN TIME: CPT | Mod: QW

## 2022-01-01 PROCEDURE — 99233 SBSQ HOSP IP/OBS HIGH 50: CPT | Mod: GC

## 2022-01-01 PROCEDURE — 92611 MOTION FLUOROSCOPY/SWALLOW: CPT

## 2022-01-01 PROCEDURE — 84145 PROCALCITONIN (PCT): CPT

## 2022-01-01 PROCEDURE — 93793 ANTICOAG MGMT PT WARFARIN: CPT

## 2022-01-01 PROCEDURE — 87040 BLOOD CULTURE FOR BACTERIA: CPT

## 2022-01-01 PROCEDURE — 99291 CRITICAL CARE FIRST HOUR: CPT

## 2022-01-01 PROCEDURE — 85027 COMPLETE CBC AUTOMATED: CPT

## 2022-01-01 PROCEDURE — 84480 ASSAY TRIIODOTHYRONINE (T3): CPT

## 2022-01-01 PROCEDURE — 99221 1ST HOSP IP/OBS SF/LOW 40: CPT

## 2022-01-01 PROCEDURE — 99233 SBSQ HOSP IP/OBS HIGH 50: CPT

## 2022-01-01 PROCEDURE — 71045 X-RAY EXAM CHEST 1 VIEW: CPT | Mod: 26

## 2022-01-01 PROCEDURE — 43753 TX GASTRO INTUB W/ASP: CPT | Mod: 59

## 2022-01-01 PROCEDURE — G1004: CPT

## 2022-01-01 PROCEDURE — 87635 SARS-COV-2 COVID-19 AMP PRB: CPT

## 2022-01-01 PROCEDURE — 36600 WITHDRAWAL OF ARTERIAL BLOOD: CPT

## 2022-01-01 PROCEDURE — 80048 BASIC METABOLIC PNL TOTAL CA: CPT

## 2022-01-01 PROCEDURE — 84100 ASSAY OF PHOSPHORUS: CPT

## 2022-01-01 PROCEDURE — 94667 MNPJ CHEST WALL 1ST: CPT

## 2022-01-01 PROCEDURE — 81001 URINALYSIS AUTO W/SCOPE: CPT

## 2022-01-01 PROCEDURE — 70450 CT HEAD/BRAIN W/O DYE: CPT | Mod: 26

## 2022-01-01 PROCEDURE — 82746 ASSAY OF FOLIC ACID SERUM: CPT

## 2022-01-01 PROCEDURE — 93000 ELECTROCARDIOGRAM COMPLETE: CPT

## 2022-01-01 PROCEDURE — 93224 XTRNL ECG REC UP TO 48 HRS: CPT

## 2022-01-01 PROCEDURE — 99232 SBSQ HOSP IP/OBS MODERATE 35: CPT

## 2022-01-01 PROCEDURE — 83036 HEMOGLOBIN GLYCOSYLATED A1C: CPT

## 2022-01-01 PROCEDURE — 82652 VIT D 1 25-DIHYDROXY: CPT

## 2022-01-01 PROCEDURE — 93306 TTE W/DOPPLER COMPLETE: CPT | Mod: 26

## 2022-01-01 PROCEDURE — 99497 ADVNCD CARE PLAN 30 MIN: CPT

## 2022-01-01 PROCEDURE — 85025 COMPLETE CBC W/AUTO DIFF WBC: CPT

## 2022-01-01 PROCEDURE — 97110 THERAPEUTIC EXERCISES: CPT

## 2022-01-01 PROCEDURE — 80053 COMPREHEN METABOLIC PANEL: CPT

## 2022-01-01 PROCEDURE — 99215 OFFICE O/P EST HI 40 MIN: CPT

## 2022-01-01 PROCEDURE — 86043 ACETYLCHOLN RCPTR MODLG ANTB: CPT

## 2022-01-01 PROCEDURE — 93308 TTE F-UP OR LMTD: CPT | Mod: 26

## 2022-01-01 PROCEDURE — 94668 MNPJ CHEST WALL SBSQ: CPT

## 2022-01-01 PROCEDURE — 99223 1ST HOSP IP/OBS HIGH 75: CPT

## 2022-01-01 PROCEDURE — 80061 LIPID PANEL: CPT

## 2022-01-01 PROCEDURE — 99214 OFFICE O/P EST MOD 30 MIN: CPT

## 2022-01-01 PROCEDURE — 76604 US EXAM CHEST: CPT | Mod: 26

## 2022-01-01 PROCEDURE — 93010 ELECTROCARDIOGRAM REPORT: CPT

## 2022-01-01 PROCEDURE — 70450 CT HEAD/BRAIN W/O DYE: CPT

## 2022-01-01 PROCEDURE — 36415 COLL VENOUS BLD VENIPUNCTURE: CPT

## 2022-01-01 PROCEDURE — 85730 THROMBOPLASTIN TIME PARTIAL: CPT

## 2022-01-01 PROCEDURE — 99292 CRITICAL CARE ADDL 30 MIN: CPT

## 2022-01-01 PROCEDURE — 97530 THERAPEUTIC ACTIVITIES: CPT

## 2022-01-01 PROCEDURE — A9698: CPT

## 2022-01-01 PROCEDURE — 0225U NFCT DS DNA&RNA 21 SARSCOV2: CPT

## 2022-01-01 PROCEDURE — 93005 ELECTROCARDIOGRAM TRACING: CPT

## 2022-01-01 PROCEDURE — 85610 PROTHROMBIN TIME: CPT

## 2022-01-01 PROCEDURE — 84436 ASSAY OF TOTAL THYROXINE: CPT

## 2022-01-01 PROCEDURE — 86041 ACETYLCHOLN RCPTR BNDNG ANTB: CPT

## 2022-01-01 PROCEDURE — 96374 THER/PROPH/DIAG INJ IV PUSH: CPT

## 2022-01-01 PROCEDURE — 97161 PT EVAL LOW COMPLEX 20 MIN: CPT

## 2022-01-01 PROCEDURE — 99291 CRITICAL CARE FIRST HOUR: CPT | Mod: 25

## 2022-01-01 PROCEDURE — 74230 X-RAY XM SWLNG FUNCJ C+: CPT | Mod: 26

## 2022-01-01 PROCEDURE — 99285 EMERGENCY DEPT VISIT HI MDM: CPT | Mod: FS

## 2022-01-01 PROCEDURE — 93306 TTE W/DOPPLER COMPLETE: CPT

## 2022-01-01 PROCEDURE — 74176 CT ABD & PELVIS W/O CONTRAST: CPT | Mod: 26,ME

## 2022-01-01 PROCEDURE — 82140 ASSAY OF AMMONIA: CPT

## 2022-01-01 PROCEDURE — 94640 AIRWAY INHALATION TREATMENT: CPT

## 2022-01-01 PROCEDURE — 71045 X-RAY EXAM CHEST 1 VIEW: CPT

## 2022-01-01 PROCEDURE — 86366 MUSCLE-SPECIFIC KINASE ANTB: CPT

## 2022-01-01 PROCEDURE — 83519 RIA NONANTIBODY: CPT

## 2022-01-01 PROCEDURE — 86042 ACETYLCHOLN RCPTR BLCKG ANTB: CPT

## 2022-01-01 PROCEDURE — 80162 ASSAY OF DIGOXIN TOTAL: CPT

## 2022-01-01 PROCEDURE — 84443 ASSAY THYROID STIM HORMONE: CPT

## 2022-01-01 PROCEDURE — 74176 CT ABD & PELVIS W/O CONTRAST: CPT | Mod: ME

## 2022-01-01 PROCEDURE — 83880 ASSAY OF NATRIURETIC PEPTIDE: CPT

## 2022-01-01 PROCEDURE — 71250 CT THORAX DX C-: CPT | Mod: 26,MG

## 2022-01-01 PROCEDURE — 83735 ASSAY OF MAGNESIUM: CPT

## 2022-01-01 PROCEDURE — 99285 EMERGENCY DEPT VISIT HI MDM: CPT

## 2022-01-01 PROCEDURE — 92610 EVALUATE SWALLOWING FUNCTION: CPT

## 2022-01-01 PROCEDURE — 82803 BLOOD GASES ANY COMBINATION: CPT

## 2022-01-01 PROCEDURE — 94760 N-INVAS EAR/PLS OXIMETRY 1: CPT

## 2022-01-01 PROCEDURE — 87086 URINE CULTURE/COLONY COUNT: CPT

## 2022-01-01 PROCEDURE — 86596 VOLTAGE-GTD CA CHNL ANTB EA: CPT

## 2022-01-01 PROCEDURE — 74230 X-RAY XM SWLNG FUNCJ C+: CPT

## 2022-01-01 PROCEDURE — 71250 CT THORAX DX C-: CPT | Mod: MG

## 2022-01-01 PROCEDURE — 82607 VITAMIN B-12: CPT

## 2022-01-01 PROCEDURE — P9047: CPT

## 2022-01-01 PROCEDURE — 94660 CPAP INITIATION&MGMT: CPT

## 2022-01-01 PROCEDURE — 84550 ASSAY OF BLOOD/URIC ACID: CPT

## 2022-01-01 PROCEDURE — 82962 GLUCOSE BLOOD TEST: CPT

## 2022-01-01 RX ORDER — APIXABAN 5 MG/1
5 TABLET, FILM COATED ORAL
Qty: 180 | Refills: 3 | Status: ACTIVE | COMMUNITY
Start: 2022-01-01

## 2022-01-01 RX ORDER — MIDODRINE HYDROCHLORIDE 2.5 MG/1
5 TABLET ORAL EVERY 8 HOURS
Refills: 0 | Status: DISCONTINUED | OUTPATIENT
Start: 2022-01-01 | End: 2022-01-01

## 2022-01-01 RX ORDER — FUROSEMIDE 40 MG
60 TABLET ORAL ONCE
Refills: 0 | Status: COMPLETED | OUTPATIENT
Start: 2022-01-01 | End: 2022-01-01

## 2022-01-01 RX ORDER — ACETAZOLAMIDE 250 MG/1
500 TABLET ORAL ONCE
Refills: 0 | Status: COMPLETED | OUTPATIENT
Start: 2022-01-01 | End: 2022-01-01

## 2022-01-01 RX ORDER — CEFEPIME 1 G/1
2000 INJECTION, POWDER, FOR SOLUTION INTRAMUSCULAR; INTRAVENOUS EVERY 24 HOURS
Refills: 0 | Status: DISCONTINUED | OUTPATIENT
Start: 2022-07-03 | End: 2022-01-01

## 2022-01-01 RX ORDER — AMIODARONE HYDROCHLORIDE 400 MG/1
150 TABLET ORAL ONCE
Refills: 0 | Status: COMPLETED | OUTPATIENT
Start: 2022-01-01 | End: 2022-01-01

## 2022-01-01 RX ORDER — DILTIAZEM HCL 120 MG
360 CAPSULE, EXT RELEASE 24 HR ORAL DAILY
Refills: 0 | Status: DISCONTINUED | OUTPATIENT
Start: 2022-01-01 | End: 2022-01-01

## 2022-01-01 RX ORDER — CHOLECALCIFEROL (VITAMIN D3) 125 MCG
1 CAPSULE ORAL
Qty: 0 | Refills: 0 | DISCHARGE

## 2022-01-01 RX ORDER — PYRIDOSTIGMINE BROMIDE 60 MG/5ML
30 SOLUTION ORAL EVERY 8 HOURS
Refills: 0 | Status: DISCONTINUED | OUTPATIENT
Start: 2022-01-01 | End: 2022-01-01

## 2022-01-01 RX ORDER — DIGOXIN 250 MCG
125 TABLET ORAL EVERY OTHER DAY
Refills: 0 | Status: DISCONTINUED | OUTPATIENT
Start: 2022-01-01 | End: 2022-01-01

## 2022-01-01 RX ORDER — METOPROLOL TARTRATE 50 MG
25 TABLET ORAL
Refills: 0 | Status: DISCONTINUED | OUTPATIENT
Start: 2022-01-01 | End: 2022-01-01

## 2022-01-01 RX ORDER — DILTIAZEM HCL 120 MG
10 CAPSULE, EXT RELEASE 24 HR ORAL
Refills: 0 | Status: DISCONTINUED | OUTPATIENT
Start: 2022-01-01 | End: 2022-01-01

## 2022-01-01 RX ORDER — AMIODARONE HYDROCHLORIDE 400 MG/1
1 TABLET ORAL
Qty: 900 | Refills: 0 | Status: COMPLETED | OUTPATIENT
Start: 2022-01-01 | End: 2022-01-01

## 2022-01-01 RX ORDER — SODIUM CHLORIDE 9 MG/ML
4 INJECTION INTRAMUSCULAR; INTRAVENOUS; SUBCUTANEOUS EVERY 6 HOURS
Refills: 0 | Status: DISCONTINUED | OUTPATIENT
Start: 2022-01-01 | End: 2022-01-01

## 2022-01-01 RX ORDER — METOPROLOL TARTRATE 50 MG
50 TABLET ORAL
Refills: 0 | Status: DISCONTINUED | OUTPATIENT
Start: 2022-01-01 | End: 2022-01-01

## 2022-01-01 RX ORDER — ACETAMINOPHEN 500 MG
650 TABLET ORAL EVERY 6 HOURS
Refills: 0 | Status: DISCONTINUED | OUTPATIENT
Start: 2022-01-01 | End: 2022-01-01

## 2022-01-01 RX ORDER — FUROSEMIDE 40 MG
40 TABLET ORAL ONCE
Refills: 0 | Status: COMPLETED | OUTPATIENT
Start: 2022-01-01 | End: 2022-01-01

## 2022-01-01 RX ORDER — ONDANSETRON 8 MG/1
4 TABLET, FILM COATED ORAL EVERY 8 HOURS
Refills: 0 | Status: DISCONTINUED | OUTPATIENT
Start: 2022-01-01 | End: 2022-01-01

## 2022-01-01 RX ORDER — SODIUM CHLORIDE 9 MG/ML
500 INJECTION, SOLUTION INTRAVENOUS ONCE
Refills: 0 | Status: COMPLETED | OUTPATIENT
Start: 2022-01-01 | End: 2022-01-01

## 2022-01-01 RX ORDER — MIDODRINE HYDROCHLORIDE 2.5 MG/1
15 TABLET ORAL EVERY 8 HOURS
Refills: 0 | Status: DISCONTINUED | OUTPATIENT
Start: 2022-01-01 | End: 2022-01-01

## 2022-01-01 RX ORDER — SODIUM CHLORIDE 9 MG/ML
500 INJECTION INTRAMUSCULAR; INTRAVENOUS; SUBCUTANEOUS ONCE
Refills: 0 | Status: COMPLETED | OUTPATIENT
Start: 2022-01-01 | End: 2022-01-01

## 2022-01-01 RX ORDER — FUROSEMIDE 40 MG
60 TABLET ORAL ONCE
Refills: 0 | Status: DISCONTINUED | OUTPATIENT
Start: 2022-01-01 | End: 2022-01-01

## 2022-01-01 RX ORDER — MIRTAZAPINE 45 MG/1
15 TABLET, ORALLY DISINTEGRATING ORAL DAILY
Refills: 0 | Status: DISCONTINUED | OUTPATIENT
Start: 2022-01-01 | End: 2022-01-01

## 2022-01-01 RX ORDER — PHENYLEPHRINE HYDROCHLORIDE 10 MG/ML
8 INJECTION INTRAVENOUS
Qty: 160 | Refills: 0 | Status: DISCONTINUED | OUTPATIENT
Start: 2022-01-01 | End: 2022-01-01

## 2022-01-01 RX ORDER — DEXTROSE 50 % IN WATER 50 %
50 SYRINGE (ML) INTRAVENOUS ONCE
Refills: 0 | Status: DISCONTINUED | OUTPATIENT
Start: 2022-01-01 | End: 2022-01-01

## 2022-01-01 RX ORDER — FUROSEMIDE 40 MG
1 TABLET ORAL
Qty: 0 | Refills: 0 | DISCHARGE

## 2022-01-01 RX ORDER — PANTOPRAZOLE SODIUM 20 MG/1
40 TABLET, DELAYED RELEASE ORAL ONCE
Refills: 0 | Status: COMPLETED | OUTPATIENT
Start: 2022-01-01 | End: 2022-01-01

## 2022-01-01 RX ORDER — LANOLIN ALCOHOL/MO/W.PET/CERES
3 CREAM (GRAM) TOPICAL AT BEDTIME
Refills: 0 | Status: DISCONTINUED | OUTPATIENT
Start: 2022-01-01 | End: 2022-01-01

## 2022-01-01 RX ORDER — CEFEPIME 1 G/1
INJECTION, POWDER, FOR SOLUTION INTRAMUSCULAR; INTRAVENOUS
Refills: 0 | Status: DISCONTINUED | OUTPATIENT
Start: 2022-01-01 | End: 2022-01-01

## 2022-01-01 RX ORDER — METOPROLOL TARTRATE 50 MG
25 TABLET ORAL ONCE
Refills: 0 | Status: COMPLETED | OUTPATIENT
Start: 2022-01-01 | End: 2022-01-01

## 2022-01-01 RX ORDER — POTASSIUM CHLORIDE 20 MEQ
40 PACKET (EA) ORAL ONCE
Refills: 0 | Status: COMPLETED | OUTPATIENT
Start: 2022-01-01 | End: 2022-01-01

## 2022-01-01 RX ORDER — BROMFENAC 0.76 MG/ML
1 SOLUTION/ DROPS OPHTHALMIC
Qty: 0 | Refills: 0 | DISCHARGE

## 2022-01-01 RX ORDER — APIXABAN 2.5 MG/1
2.5 TABLET, FILM COATED ORAL EVERY 12 HOURS
Refills: 0 | Status: DISCONTINUED | OUTPATIENT
Start: 2022-01-01 | End: 2022-01-01

## 2022-01-01 RX ORDER — IPRATROPIUM/ALBUTEROL SULFATE 18-103MCG
3 AEROSOL WITH ADAPTER (GRAM) INHALATION EVERY 6 HOURS
Refills: 0 | Status: DISCONTINUED | OUTPATIENT
Start: 2022-01-01 | End: 2022-01-01

## 2022-01-01 RX ORDER — SODIUM,POTASSIUM PHOSPHATES 278-250MG
1 POWDER IN PACKET (EA) ORAL ONCE
Refills: 0 | Status: COMPLETED | OUTPATIENT
Start: 2022-01-01 | End: 2022-01-01

## 2022-01-01 RX ORDER — ERYTHROMYCIN BASE 5 MG/GRAM
1 OINTMENT (GRAM) OPHTHALMIC (EYE)
Qty: 0 | Refills: 0 | DISCHARGE

## 2022-01-01 RX ORDER — ALLOPURINOL 300 MG
1 TABLET ORAL
Qty: 0 | Refills: 0 | DISCHARGE

## 2022-01-01 RX ORDER — SODIUM CHLORIDE 9 MG/ML
1000 INJECTION, SOLUTION INTRAVENOUS
Refills: 0 | Status: DISCONTINUED | OUTPATIENT
Start: 2022-01-01 | End: 2022-01-01

## 2022-01-01 RX ORDER — METOPROLOL TARTRATE 50 MG
5 TABLET ORAL
Refills: 0 | Status: DISCONTINUED | OUTPATIENT
Start: 2022-01-01 | End: 2022-01-01

## 2022-01-01 RX ORDER — ALBUMIN HUMAN 25 %
100 VIAL (ML) INTRAVENOUS ONCE
Refills: 0 | Status: COMPLETED | OUTPATIENT
Start: 2022-01-01 | End: 2022-01-01

## 2022-01-01 RX ORDER — CEFEPIME 1 G/1
2000 INJECTION, POWDER, FOR SOLUTION INTRAMUSCULAR; INTRAVENOUS ONCE
Refills: 0 | Status: COMPLETED | OUTPATIENT
Start: 2022-01-01 | End: 2022-01-01

## 2022-01-01 RX ORDER — DIGOXIN 250 MCG
125 TABLET ORAL DAILY
Refills: 0 | Status: DISCONTINUED | OUTPATIENT
Start: 2022-01-01 | End: 2022-01-01

## 2022-01-01 RX ORDER — MIDODRINE HYDROCHLORIDE 2.5 MG/1
5 TABLET ORAL ONCE
Refills: 0 | Status: COMPLETED | OUTPATIENT
Start: 2022-01-01 | End: 2022-01-01

## 2022-01-01 RX ORDER — ALBUTEROL 90 UG/1
2.5 AEROSOL, METERED ORAL EVERY 4 HOURS
Refills: 0 | Status: DISCONTINUED | OUTPATIENT
Start: 2022-01-01 | End: 2022-01-01

## 2022-01-01 RX ORDER — APIXABAN 2.5 MG/1
1 TABLET, FILM COATED ORAL
Qty: 0 | Refills: 0 | DISCHARGE

## 2022-01-01 RX ORDER — AMIODARONE HYDROCHLORIDE 400 MG/1
200 TABLET ORAL ONCE
Refills: 0 | Status: COMPLETED | OUTPATIENT
Start: 2022-01-01 | End: 2022-01-01

## 2022-01-01 RX ORDER — AMIODARONE HYDROCHLORIDE 400 MG/1
200 TABLET ORAL
Refills: 0 | Status: DISCONTINUED | OUTPATIENT
Start: 2022-01-01 | End: 2022-01-01

## 2022-01-01 RX ORDER — DILTIAZEM HCL 120 MG
15 CAPSULE, EXT RELEASE 24 HR ORAL
Qty: 125 | Refills: 0 | Status: DISCONTINUED | OUTPATIENT
Start: 2022-01-01 | End: 2022-01-01

## 2022-01-01 RX ORDER — POTASSIUM PHOSPHATE, MONOBASIC POTASSIUM PHOSPHATE, DIBASIC 236; 224 MG/ML; MG/ML
15 INJECTION, SOLUTION INTRAVENOUS ONCE
Refills: 0 | Status: COMPLETED | OUTPATIENT
Start: 2022-01-01 | End: 2022-01-01

## 2022-01-01 RX ORDER — DIGOXIN 250 MCG
250 TABLET ORAL EVERY 6 HOURS
Refills: 0 | Status: COMPLETED | OUTPATIENT
Start: 2022-01-01 | End: 2022-01-01

## 2022-01-01 RX ORDER — DIGOXIN 0.12 MG/1
125 TABLET ORAL
Qty: 90 | Refills: 3 | Status: DISCONTINUED | COMMUNITY
Start: 2017-04-19 | End: 2022-01-01

## 2022-01-01 RX ORDER — DILTIAZEM HCL 120 MG
90 CAPSULE, EXT RELEASE 24 HR ORAL
Refills: 0 | Status: DISCONTINUED | OUTPATIENT
Start: 2022-01-01 | End: 2022-01-01

## 2022-01-01 RX ORDER — VANCOMYCIN HCL 1 G
1000 VIAL (EA) INTRAVENOUS ONCE
Refills: 0 | Status: COMPLETED | OUTPATIENT
Start: 2022-01-01 | End: 2022-01-01

## 2022-01-01 RX ORDER — METOPROLOL TARTRATE 50 MG
100 TABLET ORAL
Refills: 0 | Status: DISCONTINUED | OUTPATIENT
Start: 2022-01-01 | End: 2022-01-01

## 2022-01-01 RX ORDER — METOPROLOL TARTRATE 50 MG
1 TABLET ORAL
Qty: 0 | Refills: 0 | DISCHARGE

## 2022-01-01 RX ORDER — DIGOXIN 250 MCG
125 TABLET ORAL ONCE
Refills: 0 | Status: DISCONTINUED | OUTPATIENT
Start: 2022-01-01 | End: 2022-01-01

## 2022-01-01 RX ORDER — DEXTROSE 50 % IN WATER 50 %
25 SYRINGE (ML) INTRAVENOUS ONCE
Refills: 0 | Status: COMPLETED | OUTPATIENT
Start: 2022-01-01 | End: 2022-01-01

## 2022-01-01 RX ORDER — DILTIAZEM HCL 120 MG
20 CAPSULE, EXT RELEASE 24 HR ORAL ONCE
Refills: 0 | Status: COMPLETED | OUTPATIENT
Start: 2022-01-01 | End: 2022-01-01

## 2022-01-01 RX ORDER — AMIODARONE HYDROCHLORIDE 400 MG/1
200 TABLET ORAL DAILY
Refills: 0 | Status: DISCONTINUED | OUTPATIENT
Start: 2022-01-01 | End: 2022-01-01

## 2022-01-01 RX ORDER — DILTIAZEM HCL 120 MG
90 CAPSULE, EXT RELEASE 24 HR ORAL EVERY 6 HOURS
Refills: 0 | Status: COMPLETED | OUTPATIENT
Start: 2022-01-01 | End: 2022-01-01

## 2022-01-01 RX ORDER — FUROSEMIDE 40 MG
20 TABLET ORAL DAILY
Refills: 0 | Status: DISCONTINUED | OUTPATIENT
Start: 2022-01-01 | End: 2022-01-01

## 2022-01-01 RX ORDER — METOPROLOL TARTRATE 50 MG
5 TABLET ORAL ONCE
Refills: 0 | Status: COMPLETED | OUTPATIENT
Start: 2022-01-01 | End: 2022-01-01

## 2022-01-01 RX ORDER — METOPROLOL TARTRATE 50 MG
25 TABLET ORAL EVERY 6 HOURS
Refills: 0 | Status: DISCONTINUED | OUTPATIENT
Start: 2022-01-01 | End: 2022-01-01

## 2022-01-01 RX ORDER — PANTOPRAZOLE SODIUM 20 MG/1
40 TABLET, DELAYED RELEASE ORAL DAILY
Refills: 0 | Status: DISCONTINUED | OUTPATIENT
Start: 2022-01-01 | End: 2022-01-01

## 2022-01-01 RX ORDER — PHENYLEPHRINE HYDROCHLORIDE 10 MG/ML
0.1 INJECTION INTRAVENOUS
Qty: 40 | Refills: 0 | Status: DISCONTINUED | OUTPATIENT
Start: 2022-01-01 | End: 2022-01-01

## 2022-01-01 RX ORDER — ASCORBIC ACID 60 MG
1 TABLET,CHEWABLE ORAL
Qty: 0 | Refills: 0 | DISCHARGE

## 2022-01-01 RX ORDER — FUROSEMIDE 40 MG
40 TABLET ORAL ONCE
Refills: 0 | Status: DISCONTINUED | OUTPATIENT
Start: 2022-01-01 | End: 2022-01-01

## 2022-01-01 RX ORDER — POTASSIUM CHLORIDE 20 MEQ
10 PACKET (EA) ORAL
Refills: 0 | Status: COMPLETED | OUTPATIENT
Start: 2022-01-01 | End: 2022-01-01

## 2022-01-01 RX ORDER — PANTOPRAZOLE SODIUM 20 MG/1
40 TABLET, DELAYED RELEASE ORAL
Refills: 0 | Status: DISCONTINUED | OUTPATIENT
Start: 2022-01-01 | End: 2022-01-01

## 2022-01-01 RX ORDER — GUAIFENESIN/DEXTROMETHORPHAN 600MG-30MG
10 TABLET, EXTENDED RELEASE 12 HR ORAL EVERY 6 HOURS
Refills: 0 | Status: DISCONTINUED | OUTPATIENT
Start: 2022-01-01 | End: 2022-01-01

## 2022-01-01 RX ORDER — DEXTROSE 50 % IN WATER 50 %
50 SYRINGE (ML) INTRAVENOUS ONCE
Refills: 0 | Status: COMPLETED | OUTPATIENT
Start: 2022-01-01 | End: 2022-01-01

## 2022-01-01 RX ORDER — AMIODARONE HYDROCHLORIDE 400 MG/1
0.5 TABLET ORAL
Qty: 900 | Refills: 0 | Status: COMPLETED | OUTPATIENT
Start: 2022-01-01 | End: 2022-01-01

## 2022-01-01 RX ORDER — FUROSEMIDE 20 MG/1
20 TABLET ORAL DAILY
Qty: 90 | Refills: 3 | Status: ACTIVE | COMMUNITY
Start: 2022-01-01 | End: 1900-01-01

## 2022-01-01 RX ORDER — METOPROLOL TARTRATE 50 MG
50 TABLET ORAL EVERY 8 HOURS
Refills: 0 | Status: COMPLETED | OUTPATIENT
Start: 2022-01-01 | End: 2022-01-01

## 2022-01-01 RX ORDER — SODIUM CHLORIDE 9 MG/ML
500 INJECTION, SOLUTION INTRAVENOUS
Refills: 0 | Status: COMPLETED | OUTPATIENT
Start: 2022-01-01 | End: 2022-01-01

## 2022-01-01 RX ORDER — VANCOMYCIN HCL 1 G
1000 VIAL (EA) INTRAVENOUS ONCE
Refills: 0 | Status: DISCONTINUED | OUTPATIENT
Start: 2022-01-01 | End: 2022-01-01

## 2022-01-01 RX ORDER — DILTIAZEM HCL 120 MG
5 CAPSULE, EXT RELEASE 24 HR ORAL
Qty: 125 | Refills: 0 | Status: DISCONTINUED | OUTPATIENT
Start: 2022-01-01 | End: 2022-01-01

## 2022-01-01 RX ORDER — VANCOMYCIN HCL 1 G
VIAL (EA) INTRAVENOUS
Refills: 0 | Status: DISCONTINUED | OUTPATIENT
Start: 2022-01-01 | End: 2022-01-01

## 2022-01-01 RX ORDER — SODIUM CHLORIDE 9 MG/ML
250 INJECTION INTRAMUSCULAR; INTRAVENOUS; SUBCUTANEOUS ONCE
Refills: 0 | Status: COMPLETED | OUTPATIENT
Start: 2022-01-01 | End: 2022-01-01

## 2022-01-01 RX ORDER — ALBUMIN HUMAN 25 %
50 VIAL (ML) INTRAVENOUS ONCE
Refills: 0 | Status: COMPLETED | OUTPATIENT
Start: 2022-01-01 | End: 2022-01-01

## 2022-01-01 RX ORDER — POTASSIUM CHLORIDE 20 MEQ
40 PACKET (EA) ORAL
Refills: 0 | Status: COMPLETED | OUTPATIENT
Start: 2022-01-01 | End: 2022-01-01

## 2022-01-01 RX ORDER — CHLORHEXIDINE GLUCONATE 213 G/1000ML
1 SOLUTION TOPICAL
Refills: 0 | Status: DISCONTINUED | OUTPATIENT
Start: 2022-01-01 | End: 2022-01-01

## 2022-01-01 RX ORDER — PREGABALIN 225 MG/1
1 CAPSULE ORAL
Qty: 0 | Refills: 0 | DISCHARGE

## 2022-01-01 RX ADMIN — Medication 360 MILLIGRAM(S): at 05:04

## 2022-01-01 RX ADMIN — CHLORHEXIDINE GLUCONATE 1 APPLICATION(S): 213 SOLUTION TOPICAL at 05:04

## 2022-01-01 RX ADMIN — Medication 20 MILLIGRAM(S): at 05:59

## 2022-01-01 RX ADMIN — Medication 100 MILLILITER(S): at 18:27

## 2022-01-01 RX ADMIN — Medication 125 MICROGRAM(S): at 11:26

## 2022-01-01 RX ADMIN — Medication 3 MILLILITER(S): at 15:03

## 2022-01-01 RX ADMIN — Medication 40 MILLIGRAM(S): at 11:28

## 2022-01-01 RX ADMIN — SODIUM CHLORIDE 4 MILLILITER(S): 9 INJECTION INTRAMUSCULAR; INTRAVENOUS; SUBCUTANEOUS at 01:14

## 2022-01-01 RX ADMIN — SODIUM CHLORIDE 4 MILLILITER(S): 9 INJECTION INTRAMUSCULAR; INTRAVENOUS; SUBCUTANEOUS at 15:04

## 2022-01-01 RX ADMIN — Medication 10 MILLILITER(S): at 22:03

## 2022-01-01 RX ADMIN — SODIUM CHLORIDE 4 MILLILITER(S): 9 INJECTION INTRAMUSCULAR; INTRAVENOUS; SUBCUTANEOUS at 07:53

## 2022-01-01 RX ADMIN — CHLORHEXIDINE GLUCONATE 1 APPLICATION(S): 213 SOLUTION TOPICAL at 05:07

## 2022-01-01 RX ADMIN — SODIUM CHLORIDE 4 MILLILITER(S): 9 INJECTION INTRAMUSCULAR; INTRAVENOUS; SUBCUTANEOUS at 08:16

## 2022-01-01 RX ADMIN — Medication 5 MILLIGRAM(S): at 21:40

## 2022-01-01 RX ADMIN — MIRTAZAPINE 15 MILLIGRAM(S): 45 TABLET, ORALLY DISINTEGRATING ORAL at 12:31

## 2022-01-01 RX ADMIN — MIDODRINE HYDROCHLORIDE 5 MILLIGRAM(S): 2.5 TABLET ORAL at 13:43

## 2022-01-01 RX ADMIN — Medication 90 MILLIGRAM(S): at 17:04

## 2022-01-01 RX ADMIN — SODIUM CHLORIDE 4 MILLILITER(S): 9 INJECTION INTRAMUSCULAR; INTRAVENOUS; SUBCUTANEOUS at 20:22

## 2022-01-01 RX ADMIN — Medication 250 MICROGRAM(S): at 12:29

## 2022-01-01 RX ADMIN — Medication 125 MICROGRAM(S): at 17:46

## 2022-01-01 RX ADMIN — PYRIDOSTIGMINE BROMIDE 30 MILLIGRAM(S): 60 SOLUTION ORAL at 21:39

## 2022-01-01 RX ADMIN — Medication 5 MILLIGRAM(S): at 05:31

## 2022-01-01 RX ADMIN — SODIUM CHLORIDE 4 MILLILITER(S): 9 INJECTION INTRAMUSCULAR; INTRAVENOUS; SUBCUTANEOUS at 14:12

## 2022-01-01 RX ADMIN — MIDODRINE HYDROCHLORIDE 5 MILLIGRAM(S): 2.5 TABLET ORAL at 22:25

## 2022-01-01 RX ADMIN — SODIUM CHLORIDE 500 MILLILITER(S): 9 INJECTION, SOLUTION INTRAVENOUS at 21:17

## 2022-01-01 RX ADMIN — Medication 10 MILLILITER(S): at 00:51

## 2022-01-01 RX ADMIN — Medication 50 MILLIGRAM(S): at 05:04

## 2022-01-01 RX ADMIN — Medication 3 MILLILITER(S): at 20:42

## 2022-01-01 RX ADMIN — Medication 90 MILLIGRAM(S): at 00:33

## 2022-01-01 RX ADMIN — Medication 50 MILLIGRAM(S): at 05:28

## 2022-01-01 RX ADMIN — APIXABAN 2.5 MILLIGRAM(S): 2.5 TABLET, FILM COATED ORAL at 05:11

## 2022-01-01 RX ADMIN — APIXABAN 2.5 MILLIGRAM(S): 2.5 TABLET, FILM COATED ORAL at 05:06

## 2022-01-01 RX ADMIN — Medication 3 MILLILITER(S): at 01:51

## 2022-01-01 RX ADMIN — PANTOPRAZOLE SODIUM 40 MILLIGRAM(S): 20 TABLET, DELAYED RELEASE ORAL at 05:10

## 2022-01-01 RX ADMIN — Medication 5 MILLIGRAM(S): at 11:21

## 2022-01-01 RX ADMIN — Medication 50 MILLIGRAM(S): at 11:28

## 2022-01-01 RX ADMIN — Medication 50 MILLIGRAM(S): at 22:05

## 2022-01-01 RX ADMIN — Medication 3 MILLILITER(S): at 20:10

## 2022-01-01 RX ADMIN — Medication 10 MILLILITER(S): at 05:28

## 2022-01-01 RX ADMIN — Medication 5 MILLIGRAM(S): at 17:47

## 2022-01-01 RX ADMIN — Medication 90 MILLIGRAM(S): at 11:24

## 2022-01-01 RX ADMIN — Medication 3 MILLIGRAM(S): at 01:24

## 2022-01-01 RX ADMIN — MIDODRINE HYDROCHLORIDE 5 MILLIGRAM(S): 2.5 TABLET ORAL at 22:28

## 2022-01-01 RX ADMIN — APIXABAN 2.5 MILLIGRAM(S): 2.5 TABLET, FILM COATED ORAL at 17:03

## 2022-01-01 RX ADMIN — Medication 3 MILLILITER(S): at 01:14

## 2022-01-01 RX ADMIN — PYRIDOSTIGMINE BROMIDE 30 MILLIGRAM(S): 60 SOLUTION ORAL at 22:05

## 2022-01-01 RX ADMIN — Medication 50 MILLIGRAM(S): at 17:33

## 2022-01-01 RX ADMIN — Medication 3 MILLILITER(S): at 01:36

## 2022-01-01 RX ADMIN — SODIUM CHLORIDE 500 MILLILITER(S): 9 INJECTION INTRAMUSCULAR; INTRAVENOUS; SUBCUTANEOUS at 13:44

## 2022-01-01 RX ADMIN — Medication 50 MILLIGRAM(S): at 05:59

## 2022-01-01 RX ADMIN — PHENYLEPHRINE HYDROCHLORIDE 2.31 MICROGRAM(S)/KG/MIN: 10 INJECTION INTRAVENOUS at 03:09

## 2022-01-01 RX ADMIN — Medication 20 MILLIGRAM(S): at 22:22

## 2022-01-01 RX ADMIN — PYRIDOSTIGMINE BROMIDE 30 MILLIGRAM(S): 60 SOLUTION ORAL at 13:03

## 2022-01-01 RX ADMIN — MIDODRINE HYDROCHLORIDE 5 MILLIGRAM(S): 2.5 TABLET ORAL at 21:15

## 2022-01-01 RX ADMIN — Medication 50 MILLIGRAM(S): at 11:11

## 2022-01-01 RX ADMIN — MIDODRINE HYDROCHLORIDE 5 MILLIGRAM(S): 2.5 TABLET ORAL at 14:06

## 2022-01-01 RX ADMIN — Medication 25 GRAM(S): at 06:40

## 2022-01-01 RX ADMIN — PYRIDOSTIGMINE BROMIDE 30 MILLIGRAM(S): 60 SOLUTION ORAL at 14:30

## 2022-01-01 RX ADMIN — MIDODRINE HYDROCHLORIDE 15 MILLIGRAM(S): 2.5 TABLET ORAL at 05:06

## 2022-01-01 RX ADMIN — APIXABAN 2.5 MILLIGRAM(S): 2.5 TABLET, FILM COATED ORAL at 05:04

## 2022-01-01 RX ADMIN — Medication 90 MILLIGRAM(S): at 17:02

## 2022-01-01 RX ADMIN — PYRIDOSTIGMINE BROMIDE 30 MILLIGRAM(S): 60 SOLUTION ORAL at 13:14

## 2022-01-01 RX ADMIN — Medication 90 MILLIGRAM(S): at 06:00

## 2022-01-01 RX ADMIN — ACETAZOLAMIDE 110 MILLIGRAM(S): 250 TABLET ORAL at 10:02

## 2022-01-01 RX ADMIN — SODIUM CHLORIDE 1000 MILLILITER(S): 9 INJECTION INTRAMUSCULAR; INTRAVENOUS; SUBCUTANEOUS at 14:32

## 2022-01-01 RX ADMIN — PYRIDOSTIGMINE BROMIDE 30 MILLIGRAM(S): 60 SOLUTION ORAL at 22:13

## 2022-01-01 RX ADMIN — Medication 3 MILLILITER(S): at 08:28

## 2022-01-01 RX ADMIN — Medication 20 MILLIGRAM(S): at 05:04

## 2022-01-01 RX ADMIN — Medication 3 MILLILITER(S): at 20:21

## 2022-01-01 RX ADMIN — Medication 90 MILLIGRAM(S): at 11:10

## 2022-01-01 RX ADMIN — Medication 90 MILLIGRAM(S): at 11:06

## 2022-01-01 RX ADMIN — APIXABAN 2.5 MILLIGRAM(S): 2.5 TABLET, FILM COATED ORAL at 17:13

## 2022-01-01 RX ADMIN — MIDODRINE HYDROCHLORIDE 5 MILLIGRAM(S): 2.5 TABLET ORAL at 12:42

## 2022-01-01 RX ADMIN — PANTOPRAZOLE SODIUM 40 MILLIGRAM(S): 20 TABLET, DELAYED RELEASE ORAL at 12:20

## 2022-01-01 RX ADMIN — Medication 90 MILLIGRAM(S): at 23:18

## 2022-01-01 RX ADMIN — APIXABAN 2.5 MILLIGRAM(S): 2.5 TABLET, FILM COATED ORAL at 05:08

## 2022-01-01 RX ADMIN — Medication 3 MILLILITER(S): at 14:00

## 2022-01-01 RX ADMIN — SODIUM CHLORIDE 4 MILLILITER(S): 9 INJECTION INTRAMUSCULAR; INTRAVENOUS; SUBCUTANEOUS at 20:10

## 2022-01-01 RX ADMIN — Medication 3 MILLILITER(S): at 20:17

## 2022-01-01 RX ADMIN — Medication 20 MILLIGRAM(S): at 06:22

## 2022-01-01 RX ADMIN — Medication 15 MG/HR: at 09:26

## 2022-01-01 RX ADMIN — Medication 10 MILLILITER(S): at 18:43

## 2022-01-01 RX ADMIN — Medication 40 MILLIGRAM(S): at 17:46

## 2022-01-01 RX ADMIN — Medication 20 MILLIGRAM(S): at 06:00

## 2022-01-01 RX ADMIN — APIXABAN 2.5 MILLIGRAM(S): 2.5 TABLET, FILM COATED ORAL at 17:05

## 2022-01-01 RX ADMIN — Medication 90 MILLIGRAM(S): at 02:13

## 2022-01-01 RX ADMIN — Medication 125 MICROGRAM(S): at 11:17

## 2022-01-01 RX ADMIN — SODIUM CHLORIDE 4 MILLILITER(S): 9 INJECTION INTRAMUSCULAR; INTRAVENOUS; SUBCUTANEOUS at 14:08

## 2022-01-01 RX ADMIN — Medication 3 MILLILITER(S): at 07:54

## 2022-01-01 RX ADMIN — SODIUM CHLORIDE 25 MILLILITER(S): 9 INJECTION, SOLUTION INTRAVENOUS at 06:09

## 2022-01-01 RX ADMIN — Medication 10 MILLILITER(S): at 05:03

## 2022-01-01 RX ADMIN — Medication 50 MILLIGRAM(S): at 19:31

## 2022-01-01 RX ADMIN — SODIUM CHLORIDE 25 MILLILITER(S): 9 INJECTION, SOLUTION INTRAVENOUS at 12:21

## 2022-01-01 RX ADMIN — Medication 3 MILLILITER(S): at 08:16

## 2022-01-01 RX ADMIN — SODIUM CHLORIDE 50 MILLILITER(S): 9 INJECTION, SOLUTION INTRAVENOUS at 06:51

## 2022-01-01 RX ADMIN — APIXABAN 2.5 MILLIGRAM(S): 2.5 TABLET, FILM COATED ORAL at 04:12

## 2022-01-01 RX ADMIN — Medication 90 MILLIGRAM(S): at 20:25

## 2022-01-01 RX ADMIN — Medication 3 MILLILITER(S): at 07:45

## 2022-01-01 RX ADMIN — MIDODRINE HYDROCHLORIDE 15 MILLIGRAM(S): 2.5 TABLET ORAL at 06:08

## 2022-01-01 RX ADMIN — PYRIDOSTIGMINE BROMIDE 30 MILLIGRAM(S): 60 SOLUTION ORAL at 06:08

## 2022-01-01 RX ADMIN — Medication 15 MG/HR: at 04:09

## 2022-01-01 RX ADMIN — PANTOPRAZOLE SODIUM 40 MILLIGRAM(S): 20 TABLET, DELAYED RELEASE ORAL at 13:03

## 2022-01-01 RX ADMIN — Medication 20 MILLIGRAM(S): at 05:03

## 2022-01-01 RX ADMIN — Medication 25 MILLIGRAM(S): at 17:32

## 2022-01-01 RX ADMIN — SODIUM CHLORIDE 4 MILLILITER(S): 9 INJECTION INTRAMUSCULAR; INTRAVENOUS; SUBCUTANEOUS at 08:28

## 2022-01-01 RX ADMIN — Medication 3 MILLILITER(S): at 13:43

## 2022-01-01 RX ADMIN — AMIODARONE HYDROCHLORIDE 618 MILLIGRAM(S): 400 TABLET ORAL at 04:32

## 2022-01-01 RX ADMIN — APIXABAN 2.5 MILLIGRAM(S): 2.5 TABLET, FILM COATED ORAL at 04:24

## 2022-01-01 RX ADMIN — SODIUM CHLORIDE 4 MILLILITER(S): 9 INJECTION INTRAMUSCULAR; INTRAVENOUS; SUBCUTANEOUS at 20:35

## 2022-01-01 RX ADMIN — Medication 125 MICROGRAM(S): at 05:07

## 2022-01-01 RX ADMIN — CHLORHEXIDINE GLUCONATE 1 APPLICATION(S): 213 SOLUTION TOPICAL at 05:05

## 2022-01-01 RX ADMIN — MIDODRINE HYDROCHLORIDE 15 MILLIGRAM(S): 2.5 TABLET ORAL at 15:51

## 2022-01-01 RX ADMIN — Medication 90 MILLIGRAM(S): at 22:23

## 2022-01-01 RX ADMIN — APIXABAN 2.5 MILLIGRAM(S): 2.5 TABLET, FILM COATED ORAL at 17:43

## 2022-01-01 RX ADMIN — PYRIDOSTIGMINE BROMIDE 30 MILLIGRAM(S): 60 SOLUTION ORAL at 21:31

## 2022-01-01 RX ADMIN — MIDODRINE HYDROCHLORIDE 5 MILLIGRAM(S): 2.5 TABLET ORAL at 22:01

## 2022-01-01 RX ADMIN — Medication 650 MILLIGRAM(S): at 01:22

## 2022-01-01 RX ADMIN — PYRIDOSTIGMINE BROMIDE 30 MILLIGRAM(S): 60 SOLUTION ORAL at 15:51

## 2022-01-01 RX ADMIN — Medication 1 PACKET(S): at 08:30

## 2022-01-01 RX ADMIN — AMIODARONE HYDROCHLORIDE 33.3 MG/MIN: 400 TABLET ORAL at 23:19

## 2022-01-01 RX ADMIN — CHLORHEXIDINE GLUCONATE 1 APPLICATION(S): 213 SOLUTION TOPICAL at 05:26

## 2022-01-01 RX ADMIN — Medication 90 MILLIGRAM(S): at 11:17

## 2022-01-01 RX ADMIN — Medication 3 MILLILITER(S): at 20:32

## 2022-01-01 RX ADMIN — AMIODARONE HYDROCHLORIDE 600 MILLIGRAM(S): 400 TABLET ORAL at 17:20

## 2022-01-01 RX ADMIN — Medication 50 MILLIGRAM(S): at 17:05

## 2022-01-01 RX ADMIN — Medication 125 MICROGRAM(S): at 11:10

## 2022-01-01 RX ADMIN — Medication 15 MG/HR: at 07:50

## 2022-01-01 RX ADMIN — SODIUM CHLORIDE 4 MILLILITER(S): 9 INJECTION INTRAMUSCULAR; INTRAVENOUS; SUBCUTANEOUS at 00:41

## 2022-01-01 RX ADMIN — MIDODRINE HYDROCHLORIDE 5 MILLIGRAM(S): 2.5 TABLET ORAL at 05:05

## 2022-01-01 RX ADMIN — POTASSIUM PHOSPHATE, MONOBASIC POTASSIUM PHOSPHATE, DIBASIC 62.5 MILLIMOLE(S): 236; 224 INJECTION, SOLUTION INTRAVENOUS at 09:11

## 2022-01-01 RX ADMIN — Medication 100 MILLIGRAM(S): at 04:25

## 2022-01-01 RX ADMIN — Medication 25 MILLIGRAM(S): at 11:33

## 2022-01-01 RX ADMIN — Medication 40 MILLIEQUIVALENT(S): at 14:16

## 2022-01-01 RX ADMIN — MIDODRINE HYDROCHLORIDE 15 MILLIGRAM(S): 2.5 TABLET ORAL at 21:40

## 2022-01-01 RX ADMIN — PHENYLEPHRINE HYDROCHLORIDE 2.31 MICROGRAM(S)/KG/MIN: 10 INJECTION INTRAVENOUS at 00:00

## 2022-01-01 RX ADMIN — Medication 90 MILLIGRAM(S): at 05:26

## 2022-01-01 RX ADMIN — SODIUM CHLORIDE 4 MILLILITER(S): 9 INJECTION INTRAMUSCULAR; INTRAVENOUS; SUBCUTANEOUS at 13:43

## 2022-01-01 RX ADMIN — SODIUM CHLORIDE 4 MILLILITER(S): 9 INJECTION INTRAMUSCULAR; INTRAVENOUS; SUBCUTANEOUS at 14:02

## 2022-01-01 RX ADMIN — MIDODRINE HYDROCHLORIDE 15 MILLIGRAM(S): 2.5 TABLET ORAL at 21:31

## 2022-01-01 RX ADMIN — MIDODRINE HYDROCHLORIDE 5 MILLIGRAM(S): 2.5 TABLET ORAL at 18:41

## 2022-01-01 RX ADMIN — Medication 125 MICROGRAM(S): at 05:04

## 2022-01-01 RX ADMIN — Medication 125 MICROGRAM(S): at 06:08

## 2022-01-01 RX ADMIN — APIXABAN 2.5 MILLIGRAM(S): 2.5 TABLET, FILM COATED ORAL at 17:23

## 2022-01-01 RX ADMIN — Medication 3 MILLILITER(S): at 19:44

## 2022-01-01 RX ADMIN — AMIODARONE HYDROCHLORIDE 33.3 MG/MIN: 400 TABLET ORAL at 17:28

## 2022-01-01 RX ADMIN — SODIUM CHLORIDE 4 MILLILITER(S): 9 INJECTION INTRAMUSCULAR; INTRAVENOUS; SUBCUTANEOUS at 20:17

## 2022-01-01 RX ADMIN — Medication 50 MILLIGRAM(S): at 21:03

## 2022-01-01 RX ADMIN — Medication 125 MICROGRAM(S): at 05:08

## 2022-01-01 RX ADMIN — AMIODARONE HYDROCHLORIDE 600 MILLIGRAM(S): 400 TABLET ORAL at 23:18

## 2022-01-01 RX ADMIN — PHENYLEPHRINE HYDROCHLORIDE 2.31 MICROGRAM(S)/KG/MIN: 10 INJECTION INTRAVENOUS at 01:11

## 2022-01-01 RX ADMIN — Medication 5 MILLIGRAM(S): at 21:17

## 2022-01-01 RX ADMIN — Medication 5 MILLIGRAM(S): at 14:13

## 2022-01-01 RX ADMIN — MIDODRINE HYDROCHLORIDE 5 MILLIGRAM(S): 2.5 TABLET ORAL at 13:51

## 2022-01-01 RX ADMIN — Medication 50 MILLIGRAM(S): at 22:01

## 2022-01-01 RX ADMIN — APIXABAN 2.5 MILLIGRAM(S): 2.5 TABLET, FILM COATED ORAL at 17:33

## 2022-01-01 RX ADMIN — Medication 40 MILLIGRAM(S): at 11:56

## 2022-01-01 RX ADMIN — Medication 90 MILLIGRAM(S): at 17:03

## 2022-01-01 RX ADMIN — AMIODARONE HYDROCHLORIDE 16.7 MG/MIN: 400 TABLET ORAL at 23:22

## 2022-01-01 RX ADMIN — Medication 50 MILLIGRAM(S): at 08:30

## 2022-01-01 RX ADMIN — MIDODRINE HYDROCHLORIDE 15 MILLIGRAM(S): 2.5 TABLET ORAL at 21:32

## 2022-01-01 RX ADMIN — SODIUM CHLORIDE 500 MILLILITER(S): 9 INJECTION, SOLUTION INTRAVENOUS at 05:46

## 2022-01-01 RX ADMIN — Medication 100 MILLIGRAM(S): at 05:04

## 2022-01-01 RX ADMIN — APIXABAN 2.5 MILLIGRAM(S): 2.5 TABLET, FILM COATED ORAL at 17:02

## 2022-01-01 RX ADMIN — Medication 125 MICROGRAM(S): at 11:21

## 2022-01-01 RX ADMIN — Medication 100 MILLIEQUIVALENT(S): at 11:02

## 2022-01-01 RX ADMIN — PYRIDOSTIGMINE BROMIDE 30 MILLIGRAM(S): 60 SOLUTION ORAL at 14:18

## 2022-01-01 RX ADMIN — PHENYLEPHRINE HYDROCHLORIDE 2.31 MICROGRAM(S)/KG/MIN: 10 INJECTION INTRAVENOUS at 00:30

## 2022-01-01 RX ADMIN — APIXABAN 2.5 MILLIGRAM(S): 2.5 TABLET, FILM COATED ORAL at 06:09

## 2022-01-01 RX ADMIN — PHENYLEPHRINE HYDROCHLORIDE 2.31 MICROGRAM(S)/KG/MIN: 10 INJECTION INTRAVENOUS at 20:38

## 2022-01-01 RX ADMIN — MIDODRINE HYDROCHLORIDE 15 MILLIGRAM(S): 2.5 TABLET ORAL at 05:11

## 2022-01-01 RX ADMIN — MIDODRINE HYDROCHLORIDE 5 MILLIGRAM(S): 2.5 TABLET ORAL at 07:00

## 2022-01-01 RX ADMIN — MIDODRINE HYDROCHLORIDE 5 MILLIGRAM(S): 2.5 TABLET ORAL at 05:27

## 2022-01-01 RX ADMIN — CHLORHEXIDINE GLUCONATE 1 APPLICATION(S): 213 SOLUTION TOPICAL at 05:12

## 2022-01-01 RX ADMIN — PYRIDOSTIGMINE BROMIDE 30 MILLIGRAM(S): 60 SOLUTION ORAL at 05:04

## 2022-01-01 RX ADMIN — SODIUM CHLORIDE 50 MILLILITER(S): 9 INJECTION, SOLUTION INTRAVENOUS at 08:36

## 2022-01-01 RX ADMIN — APIXABAN 2.5 MILLIGRAM(S): 2.5 TABLET, FILM COATED ORAL at 05:31

## 2022-01-01 RX ADMIN — Medication 3 MILLILITER(S): at 00:41

## 2022-01-01 RX ADMIN — Medication 40 MILLIEQUIVALENT(S): at 08:06

## 2022-01-01 RX ADMIN — Medication 3 MILLILITER(S): at 14:08

## 2022-01-01 RX ADMIN — Medication 40 MILLIEQUIVALENT(S): at 17:22

## 2022-01-01 RX ADMIN — Medication 5 MG/HR: at 23:28

## 2022-01-01 RX ADMIN — Medication 3 MILLILITER(S): at 07:30

## 2022-01-01 RX ADMIN — AMIODARONE HYDROCHLORIDE 16.7 MG/MIN: 400 TABLET ORAL at 17:23

## 2022-01-01 RX ADMIN — Medication 25 MILLIGRAM(S): at 05:09

## 2022-01-01 RX ADMIN — MIDODRINE HYDROCHLORIDE 15 MILLIGRAM(S): 2.5 TABLET ORAL at 13:25

## 2022-01-01 RX ADMIN — Medication 50 MILLIGRAM(S): at 18:27

## 2022-01-01 RX ADMIN — MIDODRINE HYDROCHLORIDE 5 MILLIGRAM(S): 2.5 TABLET ORAL at 13:05

## 2022-01-01 RX ADMIN — APIXABAN 2.5 MILLIGRAM(S): 2.5 TABLET, FILM COATED ORAL at 06:53

## 2022-01-01 RX ADMIN — Medication 3 MILLILITER(S): at 14:02

## 2022-01-01 RX ADMIN — Medication 3 MILLILITER(S): at 00:30

## 2022-01-01 RX ADMIN — Medication 1 PACKET(S): at 08:41

## 2022-01-01 RX ADMIN — APIXABAN 2.5 MILLIGRAM(S): 2.5 TABLET, FILM COATED ORAL at 05:27

## 2022-01-01 RX ADMIN — Medication 90 MILLIGRAM(S): at 05:04

## 2022-01-01 RX ADMIN — PHENYLEPHRINE HYDROCHLORIDE 86 MICROGRAM(S)/KG/MIN: 10 INJECTION INTRAVENOUS at 04:25

## 2022-01-01 RX ADMIN — CHLORHEXIDINE GLUCONATE 1 APPLICATION(S): 213 SOLUTION TOPICAL at 05:09

## 2022-01-01 RX ADMIN — AMIODARONE HYDROCHLORIDE 618 MILLIGRAM(S): 400 TABLET ORAL at 05:00

## 2022-01-01 RX ADMIN — MIDODRINE HYDROCHLORIDE 15 MILLIGRAM(S): 2.5 TABLET ORAL at 13:03

## 2022-01-01 RX ADMIN — SODIUM CHLORIDE 4 MILLILITER(S): 9 INJECTION INTRAMUSCULAR; INTRAVENOUS; SUBCUTANEOUS at 08:54

## 2022-01-01 RX ADMIN — PYRIDOSTIGMINE BROMIDE 30 MILLIGRAM(S): 60 SOLUTION ORAL at 06:51

## 2022-01-01 RX ADMIN — Medication 50 MILLIGRAM(S): at 17:15

## 2022-01-01 RX ADMIN — Medication 90 MILLIGRAM(S): at 17:14

## 2022-01-01 RX ADMIN — Medication 90 MILLIGRAM(S): at 22:01

## 2022-01-01 RX ADMIN — SODIUM CHLORIDE 250 MILLILITER(S): 9 INJECTION, SOLUTION INTRAVENOUS at 14:02

## 2022-01-01 RX ADMIN — APIXABAN 2.5 MILLIGRAM(S): 2.5 TABLET, FILM COATED ORAL at 06:00

## 2022-01-01 RX ADMIN — Medication 20 MILLIGRAM(S): at 05:28

## 2022-01-01 RX ADMIN — SODIUM CHLORIDE 50 MILLILITER(S): 9 INJECTION, SOLUTION INTRAVENOUS at 10:30

## 2022-01-01 RX ADMIN — SODIUM CHLORIDE 4 MILLILITER(S): 9 INJECTION INTRAMUSCULAR; INTRAVENOUS; SUBCUTANEOUS at 01:40

## 2022-01-01 RX ADMIN — APIXABAN 2.5 MILLIGRAM(S): 2.5 TABLET, FILM COATED ORAL at 17:14

## 2022-01-01 RX ADMIN — Medication 40 MILLIEQUIVALENT(S): at 09:02

## 2022-01-01 RX ADMIN — Medication 60 MILLIGRAM(S): at 12:20

## 2022-01-01 RX ADMIN — SODIUM CHLORIDE 4 MILLILITER(S): 9 INJECTION INTRAMUSCULAR; INTRAVENOUS; SUBCUTANEOUS at 19:46

## 2022-01-01 RX ADMIN — PYRIDOSTIGMINE BROMIDE 30 MILLIGRAM(S): 60 SOLUTION ORAL at 13:24

## 2022-01-01 RX ADMIN — MIDODRINE HYDROCHLORIDE 15 MILLIGRAM(S): 2.5 TABLET ORAL at 14:39

## 2022-01-01 RX ADMIN — APIXABAN 2.5 MILLIGRAM(S): 2.5 TABLET, FILM COATED ORAL at 17:22

## 2022-01-01 RX ADMIN — SODIUM CHLORIDE 4 MILLILITER(S): 9 INJECTION INTRAMUSCULAR; INTRAVENOUS; SUBCUTANEOUS at 14:00

## 2022-01-01 RX ADMIN — MIDODRINE HYDROCHLORIDE 15 MILLIGRAM(S): 2.5 TABLET ORAL at 22:13

## 2022-01-01 RX ADMIN — Medication 125 MICROGRAM(S): at 05:14

## 2022-01-01 RX ADMIN — Medication 125 MICROGRAM(S): at 05:05

## 2022-01-01 RX ADMIN — Medication 50 MILLILITER(S): at 12:18

## 2022-01-01 RX ADMIN — Medication 100 MILLIEQUIVALENT(S): at 10:06

## 2022-01-01 RX ADMIN — Medication 40 MILLIGRAM(S): at 11:20

## 2022-01-01 RX ADMIN — MIDODRINE HYDROCHLORIDE 5 MILLIGRAM(S): 2.5 TABLET ORAL at 22:05

## 2022-01-01 RX ADMIN — APIXABAN 2.5 MILLIGRAM(S): 2.5 TABLET, FILM COATED ORAL at 06:07

## 2022-01-01 RX ADMIN — MIDODRINE HYDROCHLORIDE 5 MILLIGRAM(S): 2.5 TABLET ORAL at 05:04

## 2022-01-01 RX ADMIN — Medication 50 MILLIGRAM(S): at 22:25

## 2022-01-01 RX ADMIN — Medication 3 MILLILITER(S): at 14:09

## 2022-01-01 RX ADMIN — Medication 90 MILLIGRAM(S): at 08:30

## 2022-01-01 RX ADMIN — Medication 90 MILLIGRAM(S): at 20:33

## 2022-01-01 RX ADMIN — Medication 100 MILLIEQUIVALENT(S): at 09:02

## 2022-01-01 RX ADMIN — SODIUM CHLORIDE 25 MILLILITER(S): 9 INJECTION, SOLUTION INTRAVENOUS at 18:54

## 2022-01-01 RX ADMIN — PYRIDOSTIGMINE BROMIDE 30 MILLIGRAM(S): 60 SOLUTION ORAL at 05:14

## 2022-01-01 RX ADMIN — Medication 90 MILLIGRAM(S): at 14:17

## 2022-01-01 RX ADMIN — CHLORHEXIDINE GLUCONATE 1 APPLICATION(S): 213 SOLUTION TOPICAL at 05:31

## 2022-01-01 RX ADMIN — CHLORHEXIDINE GLUCONATE 1 APPLICATION(S): 213 SOLUTION TOPICAL at 06:36

## 2022-01-01 RX ADMIN — Medication 25 MILLIGRAM(S): at 12:27

## 2022-01-01 RX ADMIN — Medication 25 MILLIGRAM(S): at 05:13

## 2022-01-01 RX ADMIN — Medication 3 MILLILITER(S): at 00:44

## 2022-01-01 RX ADMIN — Medication 50 MILLIGRAM(S): at 17:13

## 2022-01-01 RX ADMIN — MIRTAZAPINE 15 MILLIGRAM(S): 45 TABLET, ORALLY DISINTEGRATING ORAL at 11:12

## 2022-01-01 RX ADMIN — Medication 15 MG/HR: at 02:06

## 2022-01-01 RX ADMIN — SODIUM CHLORIDE 4 MILLILITER(S): 9 INJECTION INTRAMUSCULAR; INTRAVENOUS; SUBCUTANEOUS at 00:44

## 2022-01-01 RX ADMIN — MIDODRINE HYDROCHLORIDE 5 MILLIGRAM(S): 2.5 TABLET ORAL at 13:45

## 2022-01-01 RX ADMIN — MIDODRINE HYDROCHLORIDE 5 MILLIGRAM(S): 2.5 TABLET ORAL at 05:03

## 2022-01-01 RX ADMIN — Medication 360 MILLIGRAM(S): at 03:33

## 2022-01-01 RX ADMIN — SODIUM CHLORIDE 4 MILLILITER(S): 9 INJECTION INTRAMUSCULAR; INTRAVENOUS; SUBCUTANEOUS at 00:33

## 2022-01-01 RX ADMIN — Medication 90 MILLIGRAM(S): at 23:37

## 2022-01-01 RX ADMIN — APIXABAN 2.5 MILLIGRAM(S): 2.5 TABLET, FILM COATED ORAL at 05:59

## 2022-01-01 RX ADMIN — Medication 90 MILLIGRAM(S): at 11:26

## 2022-01-01 RX ADMIN — Medication 90 MILLIGRAM(S): at 05:03

## 2022-01-01 RX ADMIN — Medication 10 MILLILITER(S): at 02:51

## 2022-01-01 RX ADMIN — SODIUM CHLORIDE 4 MILLILITER(S): 9 INJECTION INTRAMUSCULAR; INTRAVENOUS; SUBCUTANEOUS at 07:54

## 2022-01-01 RX ADMIN — Medication 50 MILLIGRAM(S): at 12:42

## 2022-01-01 RX ADMIN — PYRIDOSTIGMINE BROMIDE 30 MILLIGRAM(S): 60 SOLUTION ORAL at 21:34

## 2022-01-01 RX ADMIN — MIDODRINE HYDROCHLORIDE 5 MILLIGRAM(S): 2.5 TABLET ORAL at 06:00

## 2022-01-01 RX ADMIN — Medication 40 MILLIGRAM(S): at 13:24

## 2022-01-01 RX ADMIN — PYRIDOSTIGMINE BROMIDE 30 MILLIGRAM(S): 60 SOLUTION ORAL at 05:07

## 2022-01-01 RX ADMIN — Medication 250 MICROGRAM(S): at 19:31

## 2022-01-01 RX ADMIN — Medication 90 MILLIGRAM(S): at 00:16

## 2022-01-01 RX ADMIN — PYRIDOSTIGMINE BROMIDE 30 MILLIGRAM(S): 60 SOLUTION ORAL at 11:45

## 2022-01-01 RX ADMIN — SODIUM CHLORIDE 4 MILLILITER(S): 9 INJECTION INTRAMUSCULAR; INTRAVENOUS; SUBCUTANEOUS at 20:40

## 2022-01-01 RX ADMIN — AMIODARONE HYDROCHLORIDE 200 MILLIGRAM(S): 400 TABLET ORAL at 23:58

## 2022-01-01 RX ADMIN — PYRIDOSTIGMINE BROMIDE 30 MILLIGRAM(S): 60 SOLUTION ORAL at 05:05

## 2022-01-01 RX ADMIN — MIDODRINE HYDROCHLORIDE 15 MILLIGRAM(S): 2.5 TABLET ORAL at 13:15

## 2022-01-01 RX ADMIN — MIDODRINE HYDROCHLORIDE 5 MILLIGRAM(S): 2.5 TABLET ORAL at 15:32

## 2022-01-01 RX ADMIN — APIXABAN 2.5 MILLIGRAM(S): 2.5 TABLET, FILM COATED ORAL at 19:31

## 2022-01-01 RX ADMIN — Medication 125 MICROGRAM(S): at 05:10

## 2022-01-01 RX ADMIN — Medication 90 MILLIGRAM(S): at 08:06

## 2022-01-01 RX ADMIN — Medication 90 MILLIGRAM(S): at 14:31

## 2022-01-01 RX ADMIN — SODIUM CHLORIDE 50 MILLILITER(S): 9 INJECTION, SOLUTION INTRAVENOUS at 09:47

## 2022-01-01 RX ADMIN — Medication 1 PACKET(S): at 08:06

## 2022-01-01 RX ADMIN — SODIUM CHLORIDE 4 MILLILITER(S): 9 INJECTION INTRAMUSCULAR; INTRAVENOUS; SUBCUTANEOUS at 07:45

## 2022-01-01 RX ADMIN — Medication 50 MILLIGRAM(S): at 05:10

## 2022-01-01 RX ADMIN — Medication 90 MILLIGRAM(S): at 06:07

## 2022-01-01 RX ADMIN — Medication 50 MILLIGRAM(S): at 06:06

## 2022-01-01 RX ADMIN — MIDODRINE HYDROCHLORIDE 15 MILLIGRAM(S): 2.5 TABLET ORAL at 21:35

## 2022-01-01 RX ADMIN — Medication 10 MILLIGRAM(S): at 17:45

## 2022-01-01 RX ADMIN — Medication 3 MILLILITER(S): at 08:54

## 2022-01-01 RX ADMIN — SODIUM CHLORIDE 50 MILLILITER(S): 9 INJECTION, SOLUTION INTRAVENOUS at 00:20

## 2022-01-01 RX ADMIN — Medication 25 MILLILITER(S): at 05:27

## 2022-01-01 RX ADMIN — MIDODRINE HYDROCHLORIDE 15 MILLIGRAM(S): 2.5 TABLET ORAL at 05:14

## 2022-06-15 NOTE — ED PROVIDER NOTE - NSICDXPASTMEDICALHX_GEN_ALL_CORE_FT
PAST MEDICAL HISTORY:  Anemia     Atrial fibrillation     CHF (congestive heart failure)     HLD (hyperlipidemia)     HTN (hypertension)

## 2022-06-15 NOTE — ED ADULT NURSE NOTE - NSICDXPASTMEDICALHX_GEN_ALL_CORE_FT
PAST MEDICAL HISTORY:  Anemia     Atrial fibrillation     CHF (congestive heart failure)     HLD (hyperlipidemia)     HTN (hypertension)      PAST MEDICAL HISTORY:  Anemia     Atrial fibrillation     CHF (congestive heart failure)     DVT, lower extremity     HLD (hyperlipidemia)     HTN (hypertension)

## 2022-06-15 NOTE — H&P ADULT - PROBLEM SELECTOR PLAN 3
BNP >10k  CT shows moderate bilateral pleural effusions  pt was reportedly on recent outpatient lasix, will consider starting tomorrow after afib controlled  cardiology consulted appreciate reccs

## 2022-06-15 NOTE — ED PROVIDER NOTE - CARE PLAN
1 Principal Discharge DX:	Generalized weakness  Secondary Diagnosis:	Pleural effusion  Secondary Diagnosis:	Human metapneumovirus pneumonia

## 2022-06-15 NOTE — H&P ADULT - PROBLEM SELECTOR PLAN 6
tested positive in ED  unclear if source of ground glass opacities on CT  symptomatic treatment, APAP PRN fever and guaifenesin-DM PRN cough  stable on room air, will monitor

## 2022-06-15 NOTE — CONSULT NOTE ADULT - NS ATTEND AMEND GEN_ALL_CORE FT
Pt with AF with RVR. BB as tolerate He appears sig dry on exam despite elevated BNP and pleural effusion. can give gentle hydration. May have metastatic disease. Consider thoracentesis. . Likely with underlying infectious process. trend lactate.   echo   monitor tele  + transaminitis. trend  hold ac 2.2 supratheraputic inr  Further cardiac workup will depend on clinical course.

## 2022-06-15 NOTE — ED ADULT NURSE NOTE - OBJECTIVE STATEMENT
Received pt sent in for increased weakness over the last week and cough for the past 4 days.   Denies fevers home.   Respirations even and unlabored, cough is moist but non productive.  Daughter reports an unintentional weight loss of ~30 lbs over the last several months with a decreased appetite.   Abd soft, diffusely tender to palpation.   Pt appears pale, denies blood in stool and states he was recently switched from Coumadin to Eliquis for tx of his Afib.  Pt calm, alert and oriented.   Pt tachycardic at this time afib, denies cp/sob/palpitations.  Will continue frequent monitoring. BN Received pt sent in for increased weakness over the last week and cough for the past 4 days.   Denies fevers home.   Respirations even and unlabored, cough is moist but non productive.  Daughter reports an unintentional weight loss of ~30 lbs over the last several months with a decreased appetite.   Abd soft, diffusely tender to palpation.   Pt appears pale, denies blood in stool and states he was recently switched from Coumadin to Eliquis for tx of his Afib.  Pt calm, alert and oriented.   Pt tachycardic at this time afib, denies cp/sob/palpitations.  Will continue frequent monitoring. BN  8106:  Call made to cardiology NP Nenita warren pt persistent taachycrdiac despite previously fluids and metoprolol.   Pending call back, pt continues to deny sob/cp/palpitations. BN Received pt sent in for increased weakness over the last week and cough for the past 4 days.   Denies fevers home.   Respirations even and unlabored, cough is moist but non productive.  Daughter reports an unintentional weight loss of ~30 lbs over the last several months with a decreased appetite.   Abd soft, diffusely tender to palpation.   Pt appears pale, denies blood in stool and states he was recently switched from Coumadin to Eliquis for tx of his Afib.  Pt calm, alert and oriented.   Pt tachycardic at this time afib, denies cp/sob/palpitations.  Will continue frequent monitoring. BN  1555:  Call made to cardiology NP Nenita warren pt persistent taachycrdiac despite previously fluids and metoprolol.   Pending call back, pt continues to deny sob/cp/palpitations. BN  1626:  Cardiology returned to bedside to reassess pt at this time.   Respirations even and unlabored.  Pt offers no complaints will continue to monitor. BN Received pt sent in for increased weakness over the last week and cough for the past 4 days.   Denies fevers home.   Respirations even and unlabored, cough is moist but non productive.  Daughter reports an unintentional weight loss of ~30 lbs over the last several months with a decreased appetite.   Abd soft, diffusely tender to palpation.   Pt appears pale, denies blood in stool and states he was recently switched from Coumadin to Eliquis for tx of his Afib.  Pt calm, alert and oriented.   Pt tachycardic at this time afib, denies cp/sob/palpitations.  Will continue frequent monitoring. BN  1554:  Call made to cardiology NP Nenita regarnding pt persistent taachycrdiac despite previously fluids and metoprolol.   Pending call back, pt continues to deny sob/cp/palpitations. BN  1626:  Cardiology returned to bedside to reassess pt at this time.   Respirations even and unlabored.  Pt offers no complaints will continue to monitor. BN  2038: After several conversations with cardio NP Nenita and conversation with Dr Johnson cardio, Dr Johnson requested admitted dr come to reeval pt regarding persistent tachycardia.   Dr Mahmood at bedside at this time to reeval pt.  Pt remains symptomatic of tachycardia.  Per MD he will further discuss with cardio and place orders at this time. BN Received pt sent in for increased weakness over the last week and cough for the past 4 days.   Denies fevers home.   Respirations even and unlabored, cough is moist but non productive.  Daughter reports an unintentional weight loss of ~30 lbs over the last several months with a decreased appetite.   Abd soft, diffusely tender to palpation.   Pt appears pale, denies blood in stool and states he was recently switched from Coumadin to Eliquis for tx of his Afib.  Pt calm, alert and oriented.   Pt tachycardic at this time afib, denies cp/sob/palpitations.  Will continue frequent monitoring. BN  1554:  Call made to cardiology NP Nenita regarnding pt persistent taachycrdiac despite previously fluids and metoprolol.   Pending call back, pt continues to deny sob/cp/palpitations. BN  1626:  Cardiology returned to bedside to reassess pt at this time.   Respirations even and unlabored.  Pt offers no complaints will continue to monitor. BN  2038: After several conversations with cardio NP Nenita and conversation with Dr Johnson cardio, Dr Johnson requested admitted dr come to reeval pt regarding persistent tachycardia.   Dr Mahmood at bedside at this time to reeval pt.  Pt remains symptomatic of tachycardia.  Per MD he will further discuss with cardio and place orders at this time. BN  2255:   Discussed improvement in HR after initially given bolus of cardizem.  Per MD he will put in orders for gtt at this time.   Pt safety maintained, continues to deny complaints.   BN

## 2022-06-15 NOTE — H&P ADULT - PROBLEM SELECTOR PLAN 4
BNP >10k  cardiology consulted appreciate reccs Multiple differentials possible. May be adverse effect of digoxin, recently discontinued last week  TSH, vit D levels pending, procalcitonin pending  positive for human metapneumovirus, will treat symptomatically  CT shows multiple nodules, will consider repeat imaging as outpatient or PET

## 2022-06-15 NOTE — H&P ADULT - PROBLEM SELECTOR PLAN 1
Likely triggered by recent medication change, URI, CHF  s/p IV metoprolol in ED, will trial 5mg IV push, resume home PO metoprolol as tartrate for now  cardiology consulted, appreciate recommendations  recently discontinued digoxin, will hold  will consider diltiazem if RVR not resolved  gentle IV bolus as per cardiology  continue home eliquis BID

## 2022-06-15 NOTE — H&P ADULT - PROBLEM SELECTOR PLAN 5
Multiple differentials possible. May be adverse effect of digoxin, recently discontinued last week  TSH, vit D levels pending, procalcitonin pending  positive for human metapneumovirus, will treat symptomatically  CT shows tested positive in ED  unclear if source of ground glass opacities on CT  symptomatic treatment, APAP PRN fever and guaifenesin-DM PRN cough  stable on room air, will monitor

## 2022-06-15 NOTE — CONSULT NOTE ADULT - SUBJECTIVE AND OBJECTIVE BOX
Date/Time Patient Seen:  		  Referring MD:   Data Reviewed	       Patient is a 89y old  Male who presents with a chief complaint of weakness (15 Rui 2022 14:26)      Subjective/HPI   · Chief Complaint: The patient is a 89y Male complaining of weakness.  · HPI Objective Statement: 89 M from home due to decreased appetite, weight loss x months, now with cough x 4-5 days. Yesterday was too weak to get up from toilet, family assisted. This morning he called Dr. Allen and was recommended go to ED for evaluation. Recently switched from coumadin to eliquis and his digoxin was discontinued. Has been on lasix 20mg for at least 3-4 weeks due to swelling. Denies f/c, cp, palpitations, abd pain, n/v/d, urinary complaints.    PAST MEDICAL & SURGICAL HISTORY:  Atrial fibrillation    HTN (hypertension)    HLD (hyperlipidemia)    Anemia    CHF (congestive heart failure)    DVT, lower extremity          Medication list         MEDICATIONS  (STANDING):  lactated ringers Bolus 500 milliLiter(s) IV Bolus once  metoprolol tartrate Injectable 5 milliGRAM(s) IV Push once    MEDICATIONS  (PRN):  acetaminophen     Tablet .. 650 milliGRAM(s) Oral every 6 hours PRN Temp greater or equal to 38C (100.4F), Mild Pain (1 - 3)  aluminum hydroxide/magnesium hydroxide/simethicone Suspension 30 milliLiter(s) Oral every 4 hours PRN Dyspepsia  melatonin 3 milliGRAM(s) Oral at bedtime PRN Insomnia  ondansetron Injectable 4 milliGRAM(s) IV Push every 8 hours PRN Nausea and/or Vomiting         Vitals log        ICU Vital Signs Last 24 Hrs  T(C): 36.7 (15 Rui 2022 12:24), Max: 36.7 (15 Rui 2022 12:24)  T(F): 98 (15 Rui 2022 12:24), Max: 98 (15 Rui 2022 12:24)  HR: 142 (15 Rui 2022 20:40) (97 - 142)  BP: 109/65 (15 Rui 2022 20:40) (92/69 - 118/70)  BP(mean): --  ABP: --  ABP(mean): --  RR: 18 (15 Rui 2022 20:40) (18 - 18)  SpO2: 98% (15 Rui 2022 20:40) (96% - 99%)           Input and Output:  I&O's Detail      Lab Data                        13.1   6.69  )-----------( 208      ( 15 Rui 2022 13:16 )             40.0     06-15    139  |  103  |  63<H>  ----------------------------<  143<H>  5.3   |  29  |  1.80<H>    Ca    9.4      15 Rui 2022 13:16  Mg     2.8     06-15    TPro  6.9  /  Alb  2.6<L>  /  TBili  1.7<H>  /  DBili  x   /  AST  102<H>  /  ALT  111<H>  /  AlkPhos  136<H>  06-15            Review of Systems	      Objective     Physical Examination        Pertinent Lab findings & Imaging      Elmore:  NO   Adequate UO     I&O's Detail           Discussed with:     Cultures:	        Radiology    ACC: [id number] EXAM:  CT CHEST                          PROCEDURE DATE:  06/15/2022          INTERPRETATION:  INDICATION: Shortness of breath  TECHNIQUE: Unenhanced CT of the chest. Coronal, sagittal, and MIP images   were reconstructed and reviewed.  COMPARISON: No prior chest CT.    FINDINGS:    AIRWAYS, LUNGS and PLEURA: Patent central airways. Moderate bilateral   pleural effusions, interlobular septal thickening in patchy groundglass   opacities representing pulmonary edema. Multiple bilateral smaller than 6   mm nodules, indeterminant etiology.    MEDIASTINUM AND RENZO: Borderline/mildly enlarged mediastinal lymph nodes   including AP window lymph node measuring 1.1 cm short axis.    HEART AND VESSELS: Cardiomegaly. The left atrium is severely dilated.   Mitral annulus and coronary calcifications. No pericardial effusion.   Thoracic aorta and pulmonary artery normal in diameter.    VISUALIZED UPPER ABDOMEN: Small exophytic cyst within the left kidney.    CHEST WALL AND BONES: No aggressive osseous lesion.    LOWER NECK: Within normal limits.    IMPRESSION:    Moderate bilateral pleural effusions, interlobular septal thickening in   patchy groundglass opacities representing pulmonary edema.    Multiple bilateral small lung nodules, indeterminant etiology. Metastatic   disease is a possibility. Recommend follow-up chest CT in 4 weeks to   determine the stability. Further evaluation can be performed with PET CT.    --- End of Report ---            JADEN RODRÍGUEZ MD; Attending Radiologist  This document has been electronically signed. Rui 15 2022  4:10PM

## 2022-06-15 NOTE — CONSULT NOTE ADULT - SUBJECTIVE AND OBJECTIVE BOX
[facility] - Vincenzo Wyman, Daina Allen, Alxe Akers Savella  Office Number: [phone]    Initial Consult Note    CHIEF COMPLAINT: Patient is a 89y old  Male who presents with a chief complaint of     HPI:  89 year old male with past medical history of Afib on coumadin, HLD     PAST MEDICAL & SURGICAL HISTORY:      SOCIAL HISTORY:  No tobacco, ethanol, or drug abuse.    FAMILY HISTORY:    No family history of acute MI or sudden cardiac death.    MEDICATIONS  (STANDING):    MEDICATIONS  (PRN):      Allergies    No Known Allergies    Intolerances        REVIEW OF SYSTEMS:    All other review of systems is negative unless indicated above    VITAL SIGNS:   Vital Signs Last 24 Hrs  T(C): 36.7 (15 Rui 2022 12:24), Max: 36.7 (15 Rui 2022 12:24)  T(F): 98 (15 Rui 2022 12:24), Max: 98 (15 Rui 2022 12:24)  HR: 132 (15 Rui 2022 14:10) (97 - 138)  BP: 109/76 (15 Rui 2022 14:10) (92/69 - 115/60)  BP(mean): --  RR: 18 (15 Rui 2022 14:10) (18 - 18)  SpO2: 99% (15 Rui 2022 14:10) (96% - 99%)    I&O's Summary      On Exam:    Constitutional: NAD, alert and oriented x 3  Lungs:  Non-labored, breath sounds are clear bilaterally, No wheezing, rales or rhonchi  Cardiovascular: RRR.  S1 and S2 positive.  No murmurs, rubs, gallops or clicks  Gastrointestinal: Bowel Sounds present, soft, nontender.   Lymph: No peripheral edema. No cervical lymphadenopathy.  Neurological: Alert, no focal deficits  Skin: No rashes or ulcers   Psych:  Mood & affect appropriate.    LABS: All Labs Reviewed:                        13.1   6.69  )-----------( 208      ( 15 Rui 2022 13:16 )             40.0     15 Rui 2022 13:16    139    |  103    |  63     ----------------------------<  143    5.3     |  29     |  1.80     Ca    9.4        15 Rui 2022 13:16  Mg     2.8       15 Rui 2022 13:16    TPro  6.9    /  Alb  2.6    /  TBili  1.7    /  DBili  x      /  AST  102    /  ALT  111    /  AlkPhos  136    15 Rui 2022 13:16    PT/INR - ( 15 Rui 2022 13:16 )   PT: 40.6 sec;   INR: 3.43 ratio         PTT - ( 15 Rui 2022 13:16 )  PTT:33.7 sec      Blood Culture:   06-15 @ 13:16  Pro Bnp [id number]        RADIOLOGY:    EKG:       [facility] - Vincenzo Wyman, Tiffany, Daina, Delphine, Deandra Johnson  Office Number: [phone]    Initial Consult Note    CHIEF COMPLAINT: Patient is a 89y old  Male who presents with a chief complaint of weakness. Patient was seen in office 6/6 by Dr Allen for decreased appetite and weakness. At that time digoxin was discontinued, was recommended to increase lopressor to 100mg Po BID and coumadin was changed to eliquis. Over the past week he reports worsening nausea, decreased appetite and weakness. He does reports 7 pound weight loss but over a year. Does admit to difficulty swallowing. Denies chest pain dizziness shortness of breath .Denies syncope no fever chills dysuria no recent illness or travel     HPI:  89 year old male with past medical history of Afib on coumadin, HLD     PAST MEDICAL & SURGICAL HISTORY:      SOCIAL HISTORY:  No tobacco, ethanol, or drug abuse.    FAMILY HISTORY:    No family history of acute MI or sudden cardiac death.    MEDICATIONS  (STANDING):    MEDICATIONS  (PRN):      Allergies    No Known Allergies    Intolerances        REVIEW OF SYSTEMS:    All other review of systems is negative unless indicated above    VITAL SIGNS:   Vital Signs Last 24 Hrs  T(C): 36.7 (15 Rui 2022 12:24), Max: 36.7 (15 Rui 2022 12:24)  T(F): 98 (15 Rui 2022 12:24), Max: 98 (15 Rui 2022 12:24)  HR: 132 (15 Rui 2022 14:10) (97 - 138)  BP: 109/76 (15 Rui 2022 14:10) (92/69 - 115/60)  BP(mean): --  RR: 18 (15 Rui 2022 14:10) (18 - 18)  SpO2: 99% (15 Rui 2022 14:10) (96% - 99%)    I&O's Summary      On Exam:    Constitutional: NAD, alert and oriented x 3 frail appearing   Lungs:  Non-labored, breath sounds are dim bases   Cardiovascular: tachy irregular  S1 and S2 positive. murmur  Gastrointestinal: Bowel Sounds present, soft, nontender.   Lymph: No peripheral edema. No cervical lymphadenopathy.  Neurological: Alert, no focal deficits  Skin: No rashes or ulcers   Psych:  Mood & affect appropriate.    LABS: All Labs Reviewed:                        13.1   6.69  )-----------( 208      ( 15 Riu 2022 13:16 )             40.0     15 Rui 2022 13:16    139    |  103    |  63     ----------------------------<  143    5.3     |  29     |  1.80     Ca    9.4        15 Rui 2022 13:16  Mg     2.8       15 Rui 2022 13:16    TPro  6.9    /  Alb  2.6    /  TBili  1.7    /  DBili  x      /  AST  102    /  ALT  111    /  AlkPhos  136    15 Rui 2022 13:16    PT/INR - ( 15 Rui 2022 13:16 )   PT: 40.6 sec;   INR: 3.43 ratio         PTT - ( 15 Rui 2022 13:16 )  PTT:33.7 sec      Blood Culture:   06-15 @ 13:16  Pro Bnp [id number]           [facility] - Vincenzo Wyman, Tiffany, Daina, Delphine, Deandra Johnson  Office Number: [phone]    Initial Consult Note    CHIEF COMPLAINT: Patient is a 89y old  Male who presents with a chief complaint of weakness.      HPI  Patient was seen in office 6/6 by Dr Allen for decreased appetite and weakness. At that time digoxin was discontinued, was recommended to increase lopressor to 100mg Po BID and coumadin was changed to eliquis. Over the past week he reports worsening nausea, decreased appetite and weakness. He does reports 7 pound weight loss but over a year. Does admit to difficulty swallowing. Denies chest pain dizziness shortness of breath .Denies syncope no fever chills dysuria no recent illness or travel       PAST MEDICAL & SURGICAL HISTORY:  afib htn hld     SOCIAL HISTORY:  No tobacco, ethanol, or drug abuse.    FAMILY HISTORY:    No family history of acute MI or sudden cardiac death.    MEDICATIONS  (STANDING):    MEDICATIONS  (PRN):      Allergies    No Known Allergies    Intolerances        REVIEW OF SYSTEMS:    All other review of systems is negative unless indicated above    VITAL SIGNS:   Vital Signs Last 24 Hrs  T(C): 36.7 (15 Rui 2022 12:24), Max: 36.7 (15 Rui 2022 12:24)  T(F): 98 (15 Rui 2022 12:24), Max: 98 (15 Rui 2022 12:24)  HR: 132 (15 Rui 2022 14:10) (97 - 138)  BP: 109/76 (15 Rui 2022 14:10) (92/69 - 115/60)  BP(mean): --  RR: 18 (15 Rui 2022 14:10) (18 - 18)  SpO2: 99% (15 Rui 2022 14:10) (96% - 99%)    I&O's Summary      On Exam:    Constitutional: NAD, alert and oriented x 3 frail appearing   Lungs:  Non-labored, breath sounds are dim bases   Cardiovascular: tachy irregular  S1 and S2 positive. murmur  Gastrointestinal: Bowel Sounds present, soft, nontender.   Lymph: No peripheral edema. No cervical lymphadenopathy.  Neurological: Alert, no focal deficits  Skin: No rashes or ulcers   Psych:  Mood & affect appropriate.    LABS: All Labs Reviewed:                        13.1   6.69  )-----------( 208      ( 15 Rui 2022 13:16 )             40.0     15 Rui 2022 13:16    139    |  103    |  63     ----------------------------<  143    5.3     |  29     |  1.80     Ca    9.4        15 Rui 2022 13:16  Mg     2.8       15 Rui 2022 13:16    TPro  6.9    /  Alb  2.6    /  TBili  1.7    /  DBili  x      /  AST  102    /  ALT  111    /  AlkPhos  136    15 Rui 2022 13:16    PT/INR - ( 15 Rui 2022 13:16 )   PT: 40.6 sec;   INR: 3.43 ratio         PTT - ( 15 Rui 2022 13:16 )  PTT:33.7 sec      Blood Culture:   06-15 @ 13:16  Pro Bnp [id number]           [facility] - Vincenzo Wyman, Tiffany, Daina, Delphine, Deandra Johnson  Office Number: [phone]    Initial Consult Note    CHIEF COMPLAINT: Patient is a 89y old  Male who presents with a chief complaint of weakness.      HPI  89 male pmh afib on ac, htn hld seen in office 6/6 by Dr Allen for decreased appetite and weakness. At that time digoxin was discontinued, was recommended to increase lopressor to 100mg Po BID and coumadin was changed to eliquis. Over the past week he reports worsening nausea, decreased appetite and weakness. He does reports 7 pound weight loss but over a year. Does admit to difficulty swallowing. Denies chest pain dizziness shortness of breath .Denies syncope no fever chills dysuria no recent illness or travel       PAST MEDICAL & SURGICAL HISTORY:  afib htn hld     SOCIAL HISTORY:  No tobacco, ethanol, or drug abuse.    FAMILY HISTORY:    No family history of acute MI or sudden cardiac death.    MEDICATIONS  (STANDING):    MEDICATIONS  (PRN):      Allergies    No Known Allergies    Intolerances        REVIEW OF SYSTEMS:    All other review of systems is negative unless indicated above    VITAL SIGNS:   Vital Signs Last 24 Hrs  T(C): 36.7 (15 Rui 2022 12:24), Max: 36.7 (15 Rui 2022 12:24)  T(F): 98 (15 Rui 2022 12:24), Max: 98 (15 Rui 2022 12:24)  HR: 132 (15 Rui 2022 14:10) (97 - 138)  BP: 109/76 (15 Rui 2022 14:10) (92/69 - 115/60)  BP(mean): --  RR: 18 (15 Rui 2022 14:10) (18 - 18)  SpO2: 99% (15 Rui 2022 14:10) (96% - 99%)    I&O's Summary      On Exam:    Constitutional: NAD, alert and oriented x 3 frail appearing   Lungs:  Non-labored, breath sounds are dim bases   Cardiovascular: tachy irregular  S1 and S2 positive. murmur  Gastrointestinal: Bowel Sounds present, soft, nontender.   Lymph: No peripheral edema. No cervical lymphadenopathy.  Neurological: Alert, no focal deficits  Skin: No rashes or ulcers   Psych:  Mood & affect appropriate.    LABS: All Labs Reviewed:                        13.1   6.69  )-----------( 208      ( 15 Rui 2022 13:16 )             40.0     15 Rui 2022 13:16    139    |  103    |  63     ----------------------------<  143    5.3     |  29     |  1.80     Ca    9.4        15 Rui 2022 13:16  Mg     2.8       15 Rui 2022 13:16    TPro  6.9    /  Alb  2.6    /  TBili  1.7    /  DBili  x      /  AST  102    /  ALT  111    /  AlkPhos  136    15 Rui 2022 13:16    PT/INR - ( 15 Rui 2022 13:16 )   PT: 40.6 sec;   INR: 3.43 ratio         PTT - ( 15 Rui 2022 13:16 )  PTT:33.7 sec      Blood Culture:   06-15 @ 13:16  Pro Bnp [id number]           [facility] - Vincenzo Wyman, Tiffany, Daina, Delphine, Deandra Johnson  Office Number: [phone]    Initial Consult Note    CHIEF COMPLAINT: Patient is a 89y old  Male who presents with a chief complaint of weakness.      HPI  89 male pmh afib on ac, htn hld seen in office 6/6 by Dr Allen for decreased appetite and weakness. At that time digoxin was discontinued, was recommended to increase lopressor to 100mg Po BID and coumadin was changed to eliquis. Over the past week he reports worsening nausea, decreased appetite and weakness. He does reports 7 pound weight loss but over a year. Does admit to difficulty swallowing. Denies chest pain dizziness shortness of breath .Denies syncope no fever chills dysuria no recent illness or travel       PAST MEDICAL & SURGICAL HISTORY:  afib htn hld     SOCIAL HISTORY:  No tobacco, ethanol, or drug abuse.    FAMILY HISTORY:    No family history of acute MI or sudden cardiac death.    MEDICATIONS  (STANDING):    MEDICATIONS  (PRN):      Allergies    No Known Allergies    Intolerances        REVIEW OF SYSTEMS:    All other review of systems is negative unless indicated above    VITAL SIGNS:   Vital Signs Last 24 Hrs  T(C): 36.7 (15 Rui 2022 12:24), Max: 36.7 (15 Rui 2022 12:24)  T(F): 98 (15 Rui 2022 12:24), Max: 98 (15 Rui 2022 12:24)  HR: 132 (15 Rui 2022 14:10) (97 - 138)  BP: 109/76 (15 Rui 2022 14:10) (92/69 - 115/60)  BP(mean): --  RR: 18 (15 Rui 2022 14:10) (18 - 18)  SpO2: 99% (15 Rui 2022 14:10) (96% - 99%)    I&O's Summary      On Exam:    Constitutional: NAD, alert and oriented x 3 frail appearing   HEENT Dry MM anicteric   Lungs:  Non-labored, breath sounds are dim bases   Cardiovascular: tachy irregular  S1 and S2 positive. murmur  Gastrointestinal: Bowel Sounds present, soft, nontender.   Lymph: No peripheral edema. No cervical lymphadenopathy.  Neurological: Alert, no focal deficits  Skin: No rashes or ulcers   Psych:  Mood & affect appropriate.    LABS: All Labs Reviewed:                        13.1   6.69  )-----------( 208      ( 15 Rui 2022 13:16 )             40.0     15 Rui 2022 13:16    139    |  103    |  63     ----------------------------<  143    5.3     |  29     |  1.80     Ca    9.4        15 Rui 2022 13:16  Mg     2.8       15 Rui 2022 13:16    TPro  6.9    /  Alb  2.6    /  TBili  1.7    /  DBili  x      /  AST  102    /  ALT  111    /  AlkPhos  136    15 Rui 2022 13:16    PT/INR - ( 15 Rui 2022 13:16 )   PT: 40.6 sec;   INR: 3.43 ratio         PTT - ( 15 Rui 2022 13:16 )  PTT:33.7 sec      Blood Culture:   06-15 @ 13:16  Pro Bnp [id number]

## 2022-06-15 NOTE — CONSULT NOTE ADULT - ASSESSMENT
stress test 2011 no ischemia  Echo 2019 MAC aortic sclerosis normal Lv function, no pulmonary htn, normal LA size, mild MR ef 64%    Home medications digitek , toprol  coumadin for goal INR 2-3 89 year old male with past medical history of Afib on coumadin, HLD         stress test 2011 no ischemia  Echo 2019 MAC aortic sclerosis normal Lv function, no pulmonary htn, normal LA size, mild MR ef 64%    Home medications digitek , toprol  coumadin for goal INR 2-3  MARISOL 1.8   Transaminitis   elevated lactate   BNP 51834  Chest xray with bilateral lower lobe infiltrates  Patient is a 89y old  Male who presents with a chief complaint of weakness. Patient was seen in office 6/6 by Dr Allen for decreased appetite and weakness. At that time digoxin was discontinued, was recommended to increase lopressor to 100mg Po BID and coumadin was changed to eliquis. Over the past week he reports worsening nausea, decreased appetite and weakness. He does reports 7 pound weight loss but over a year. Does admit to difficulty swallowing. Denies chest pain dizziness shortness of breath .Denies syncope, no fever chills dysuria no recent illness or travel     stress test 2011 no ischemia  Echo 2019 MAC aortic sclerosis normal Lv function, no pulmonary htn, normal LA size, mild MR ef 64%    Ekg afib with rvr lopressor 5 mg iv given in ed   tachycardia multifactorial in setting of underlying infection, lactate 3.1, decreased po intake and medication noncompliance   Digoxin recently discontinued for complaints of decreased appetite  he saw Dr Allen 6/6   lopressor was increased to 100mg BID 6/6 unclear if he increased as he has periods of confusion   coumadin was changed to eliquis 5mg po bid 6/6   MARISOL 1.8 likely pre renal in setting of decreased po intake   Transaminitis work up  follow up CT abdomen pelvis   BNP 53494 does not appear volume overloaded on exam with complaints of decreased appetite weight loss now sp IVF monitor closely for signs of fluid overload   Chest xray with bilateral lower lobe infiltrates   fu speech swallow eval complaints of dyphagia   further recs pending clinical course and results of above   Monitor and replete electrolytes. Keep K>4.0 and Mg>2.0.  Nenita Martinez FNP-C  Cardiology NP  [facility] [phone]   Patient is a 89y old  Male who presents with a chief complaint of weakness. Patient was seen in office 6/6 by Dr Allen for decreased appetite and weakness. At that time digoxin was discontinued, was recommended to increase lopressor to 100mg Po BID and coumadin was changed to eliquis. Over the past week he reports worsening nausea, decreased appetite and weakness. He does reports 7 pound weight loss but over a year. Does admit to difficulty swallowing. Denies chest pain dizziness shortness of breath .Denies syncope, no fever chills dysuria no recent illness or travel     stress test 2011 no ischemia  Echo 2019 MAC aortic sclerosis normal Lv function, no pulmonary htn, normal LA size, mild MR ef 64%    Ekg afib with rvr lopressor 5 mg iv given in ed   tachycardia multifactorial in setting of underlying infection, lactate 3.1, decreased po intake dry on exam and medication noncompliance   Digoxin recently discontinued for complaints of decreased appetite  he saw Dr Allen 6/6   lopressor was increased to 100mg BID 6/6 unclear if he increased as he has periods of confusion   coumadin was changed to eliquis 5mg po bid 6/6   MARISOL 1.8 likely pre renal in setting of decreased po intake   Transaminitis work up  follow up CT abdomen pelvis   BNP 62176 does not appear volume overloaded on exam with complaints of decreased appetite weight loss, agree with IVF   Chest xray with bilateral lower lobe infiltrates   fu speech swallow eval complaints of dyphagia   further recs pending clinical course and results of above   Monitor and replete electrolytes. Keep K>4.0 and Mg>2.0.  Nenita Martinez FNP-C  Cardiology NP  [facility] [phone]   Patient is a 89y old  Male pmg afib on ac htn hld chief complaint of weakness. Patient was seen in office 6/6 by Dr Allen for decreased appetite and weakness. At that time digoxin was discontinued, was recommended to increase lopressor to 100mg Po BID and coumadin was changed to eliquis. Over the past week he reports worsening nausea, decreased appetite and weakness. He does reports 7 pound weight loss but over a year. Does admit to difficulty swallowing. Denies chest pain dizziness shortness of breath .Denies syncope, no fever chills dysuria no recent illness or travel     stress test 2011 no ischemia  Echo 2019 MAC aortic sclerosis normal Lv function, no pulmonary htn, normal LA size, mild MR ef 64%    Ekg afib with rvr lopressor 5 mg iv given in ed   tachycardia multifactorial in setting of underlying infection, lactate 3.1, decreased po intake dry on exam and medication noncompliance   Digoxin recently discontinued for complaints of decreased appetite  he saw Dr Allen 6/6   lopressor was increased to 100mg BID 6/6 unclear if he increased as he has periods of confusion   coumadin was changed to eliquis 5mg po bid 6/6   MARISOL 1.8 likely pre renal in setting of decreased po intake   Transaminitis work up  follow up CT abdomen pelvis   BNP 13241 does not appear volume overloaded on exam with complaints of decreased appetite weight loss, agree with IVF   Chest xray with bilateral lower lobe infiltrates   fu speech swallow eval complaints of dyphagia   further recs pending clinical course and results of above   Monitor and replete electrolytes. Keep K>4.0 and Mg>2.0.  Nenita Martinez FNP-C  Cardiology NP  [facility] [phone]

## 2022-06-15 NOTE — H&P ADULT - NSICDXPASTMEDICALHX_GEN_ALL_CORE_FT
PAST MEDICAL HISTORY:  Anemia     Atrial fibrillation     CHF (congestive heart failure)     DVT, lower extremity     HLD (hyperlipidemia)     HTN (hypertension)

## 2022-06-15 NOTE — H&P ADULT - HISTORY OF PRESENT ILLNESS
Patient is a 89 yo M with PMH of Afib, HTN, DLD who was brought in by family for complaint of cough, weakness for the last few days. Patient reports weight loss and reduced appetite for the last month. Denies chest pain or palpitations, denies fever, denies N/V/D. Of note, patient was seen recently by cardiologist Dr Allen on 6/6 for weakness and decreased appetite and his digoxin was discontinue and his lopressor was increased and coumadin changed to Eliquis.          lactate 3.1 -> 1.8, LFTs elevated, BNP >10k, CT shows effusion, metapneumovirus  ?CHF   Patient is a 89 yo M with PMH of Afib, HTN, DLD who was brought in by family for complaint of cough, weakness for the last few days. Patient reports weight loss and reduced appetite for the last month. Denies chest pain or palpitations, denies fever, denies N/V/D. Of note, patient was seen recently by cardiologist Dr Allen on 6/6 for weakness and decreased appetite and his digoxin was discontinue and his lopressor was increased and coumadin changed to Eliquis.      ED course:  Na 139, K 5.3 (hemolyzed), Cr 1.80, Lactate 3.1, Mg 2.8, BNP 97239, Lactate 1.8, INR 3.43. Covid negative, resp panel positive for human metapneumovirus. CT shows ground glass and nodules. EKG shows afib, . Received metoprolol 5mg IV and cardiology consulted.  Patient is a 89 yo M with PMH of Afib, HTN, DLD who was brought in by family for complaint of cough, weakness for the last few days. Patient reports weight loss and reduced appetite for the last month. Denies chest pain or palpitations, denies fever, denies N/V/D. Of note, patient was seen recently by cardiologist Dr Allen on 6/6 for weakness and decreased appetite and his digoxin was discontinue and his lopressor was increased and coumadin changed to Eliquis.  Uses a cane occasionally at home, denies recent falls. Denies blood in stool.     ED course:  Na 139, K 5.3 (hemolyzed), Cr 1.80, Lactate 3.1, Mg 2.8, BNP 91358, Lactate 1.8, INR 3.43. Covid negative, resp panel positive for human metapneumovirus. CT shows ground glass and nodules. EKG shows afib, . Received metoprolol 5mg IV and cardiology consulted.

## 2022-06-15 NOTE — H&P ADULT - NSHPREVIEWOFSYSTEMS_GEN_ALL_CORE
REVIEW OF SYSTEMS:  CONSTITUTIONAL: Weight loss, weakness  EYES: No eye pain, visual disturbances, or discharge  ENMT:  No difficulty hearing, tinnitus, vertigo; No sinus or throat pain  NECK: No pain or stiffness  RESPIRATORY: Increased congestion, denies cough, no wheezing, chills or hemoptysis; No shortness of breath  CARDIOVASCULAR: No chest pain or palpitations  GASTROINTESTINAL: No abdominal or epigastric pain. No nausea, vomiting, or hematemesis; No diarrhea or constipation. No melena or hematochezia.  GENITOURINARY: No dysuria, frequency, hematuria, or incontinence  NEUROLOGICAL: No headaches, memory loss, loss of strength, numbness, or tremors  SKIN: No itching, burning, rashes, or lesions   LYMPH NODES: No enlarged glands  MUSCULOSKELETAL: No joint pain or swelling; No muscle, back, or extremity pain  PSYCHIATRIC: No depression, anxiety, mood swings, or difficulty sleeping  HEME/LYMPH: No easy bruising, or bleeding gums

## 2022-06-15 NOTE — ED ADULT TRIAGE NOTE - CHIEF COMPLAINT QUOTE
Sent by MD flowers for weakness and decreased appetite, cough, and shortness of breath. Per daughter, patient also having difficutly swallowing recently.

## 2022-06-15 NOTE — ED ADULT NURSE NOTE - NSIMPLEMENTINTERV_GEN_ALL_ED
Implemented All Fall with Harm Risk Interventions:  [place] to call system. Call bell, personal items and telephone within reach. Instruct patient to call for assistance. Room bathroom lighting operational. Non-slip footwear when patient is off stretcher. Physically safe environment: no spills, clutter or unnecessary equipment. Stretcher in lowest position, wheels locked, appropriate side rails in place. Provide visual cue, wrist band, yellow gown, etc. Monitor gait and stability. Monitor for mental status changes and reorient to person, place, and time. Review medications for side effects contributing to fall risk. Reinforce activity limits and safety measures with patient and family. Provide visual clues: red socks.

## 2022-06-15 NOTE — ED PROVIDER NOTE - ATTENDING APP SHARED VISIT CONTRIBUTION OF CARE
Exam revealed thin white elderly male in NAD with clear lungs, rapid irreg/irreg HSs and flat and soft, non tender abdomen. I agree with plan and management outlined by PA.

## 2022-06-15 NOTE — ED PROVIDER NOTE - NS ED ATTENDING STATEMENT MOD
This was a shared visit with the BETH. I reviewed and verified the documentation and independently performed the documented:

## 2022-06-15 NOTE — H&P ADULT - PROBLEM SELECTOR PLAN 2
MOHS Surgery with Dr.Lauren Coreas- referral is placed  Address: [id number] [address] [place] [address]  Phone: [phone]    Unprotected UV exposure to the sun or indoor tanning devices is a known risk  factor for the development of skin cancer.    There is no scientifically validated, safe threshold level of UV exposure from the sun or indoor tanning devices that allows for maximal vitamin D synthesis without  increasing skin cancer risk.    To protect against skin cancer, a comprehensive photoprotective regimen,  including the regular use and proper use of a broad-spectrum sunscreen, is  recommended.     Ideally, sunscreen should be broad-spectrum (protecting against UVA and UVB rays), and at least SPF 30.  Sunscreen should be reapplied every 2 hours, or even more frequently if swimming or toweling off.  Make sure sunscreen is water resistant if you will be swimming or perspiring heavily.  Pregnant women should avoid sunscreen containing oxybenzone.      For more information on sunscreen:  https://www.aad.org/media/stats/prevention-and-care/sunscreen-faqs    The American Academy of Dermatology recommends that an adequate amount of vitamin D should be obtained from a healthy diet that includes foods naturally rich in vitamin D, foods/beverages fortified with vitamin D, and/or vitamin D supplements. Vitamin D should not be obtained from unprotected exposure to ultraviolet (UV) radiation.    
gentle IV bolus as per cardiology  continue home metoprolol

## 2022-06-15 NOTE — H&P ADULT - NSHPLABSRESULTS_GEN_ALL_CORE
Labs:                          13.1   6.69  )-----------( 208      ( 15 Rui 2022 13:16 )             40.0       06-15    139  |  103  |  63<H>  ----------------------------<  143<H>  5.3   |  29  |  1.80<H>    Ca    9.4      15 Rui 2022 13:16  Mg     2.8     06-15    TPro  6.9  /  Alb  2.6<L>  /  TBili  1.7<H>  /  DBili  x   /  AST  102<H>  /  ALT  111<H>  /  AlkPhos  136<H>  -15              Urinalysis Basic - ( 15 Rui 2022 16:37 )    Color: Yellow / Appearance: Clear / S.015 / pH: x  Gluc: x / Ketone: Negative  / Bili: Negative / Urobili: Negative   Blood: x / Protein: 30 mg/dL / Nitrite: Negative   Leuk Esterase: Negative / RBC: 3-5 /HPF / WBC 0-2   Sq Epi: x / Non Sq Epi: Occasional / Bacteria: Occasional        PT/INR - ( 15 Rui 2022 13:16 )   PT: 40.6 sec;   INR: 3.43 ratio         PTT - ( 15 Rui 2022 13:16 )  PTT:33.7 sec    Lactate Trend  15 @ 16:37 Lactate:1.8   15 @ 13:16 Lactate:3.1             CAPILLARY BLOOD GLUCOSE

## 2022-06-15 NOTE — ED PROVIDER NOTE - CLINICAL SUMMARY MEDICAL DECISION MAKING FREE TEXT BOX
89 yo white male with few days of weakness, fatigue and poor appetite x few days. No chest pain. No SOB. Recent hard time swallowing. This case will require evaluation as well as labs, ecg, IVFs and meds.

## 2022-06-15 NOTE — H&P ADULT - NSHPPHYSICALEXAM_GEN_ALL_CORE
GENERAL: Elderly adult male, appears frail, no acute distress, appropriate behavior  EYES: sclera clear, PERRLA  ENMT: poor dentition, no cervical lymphadenopathy appreciated  NECK: supple, soft, no thyromegaly noted  LUNGS:  clear to auscultation bilaterally, no rales, wheezing or rhonchi appreciated  HEART: S1/S2, rapid irregularly irregular rate and rhythm, no murmurs noted  GASTROINTESTINAL: abdomen is soft, nontender, nondistended, no palpable masses appreciated  INTEGUMENT: good skin turgor, warm, dry and intact, no lesions appreciated  MUSCULOSKELETAL: no clubbing or cyanosis, no obvious deformity  NEUROLOGIC: awake, alert, oriented x3, strength no obvious sensory deficits  PSYCHIATRIC: mood is good, affect is congruent, linear and logical thought process  HEME/LYMPH:  no obvious ecchymosis or petechiae appreciated

## 2022-06-15 NOTE — ED PROVIDER NOTE - OBJECTIVE STATEMENT
89 M from home due to decreased appetite, weight loss x months, now with cough x 4-5 days. Yesterday was too weak to get up from toilet, family assisted. This morning he called Dr. Allen and was recommended go to ED for evaluation. Recently switched from coumadin to eliquis and his digoxin was discontinued. Has been on lasix 20mg for at least 3-4 weeks due to swelling. Denies f/c, cp, palpitations, abd pain, n/v/d, urinary complaints.

## 2022-06-16 NOTE — ED ADULT NURSE REASSESSMENT NOTE - NS ED NURSE REASSESS COMMENT FT1
0730:  received patient.  awake, alert.  continuously removing nasal cannula from face. Will dip down to 89-90 on room air.  94-95 on 2L.   No complaints at this time. No complaints of chest pain or sob.  He remains hypotensive and tachy.  on cardizem infusion at 15ml/hour.  awaiting bed assignment.  alvaro nation.
1115:  son in law at bedside.  alvaro nation.
1239:  patient medicated as per order.  Dr. hunter at the bedside.   echo is still to be done but will wait for tachycardia to subside.  alvaro nation.
pt assisted to hospital bed for increased comfort, pt reports decreased back pain.  Pt afib, rate at 120''s MD aware as Spoke with Dr Pace, Re orders for cardizem maintenance dose @ 15/hr, orders received. Pt unable to tolerate PO metoprolol secondary to BP of 89/52, notified Dr Pace.  Pt asymptomatic, resting comfortably, will continue to monitor
pt resting in bed, on CM, CHILANGO, -130's, Cardizem drip started at 5ml, increased to 10ml due to HR maintaining 120's, will continue to monitor

## 2022-06-16 NOTE — PROGRESS NOTE ADULT - ATTENDING COMMENTS
89 yo M with PMH of chronic Afib, HTN, DVT, HLD, GOUT (on allopurinol and Colchicine), CHF (TTE 2019 LVED 64%) and macular degeneration who was brought in by family for complaint of cough, weakness for the last few days. Imaging shows moderate bilateral pleural effusions Admitted with acute on chronic HFpEF A fib with RVR and management of pleural effusion, + hMPv Virus PLan: supportive care, cont cardizem gtt, apprec cardio recs, monitor clinical course, apprec pulm recs, cont vol status monitoring, diuresis tomorrow, monitor on tele

## 2022-06-16 NOTE — PROGRESS NOTE ADULT - PROBLEM SELECTOR PLAN 2
- Patient with sob, cough and  breath sounds at the bases on the physical exam  - CT shows Moderate bilateral pleural effusions, interlobular septal thickening in patchy ground glass opacities representing pulmonary edema.  - CXR  shows Bilateral lower lobe infiltrates.  - Incentive spirometer  - Resumed home Lasix 20 mg daily with hold parameter    - Consulted Dr. Carpio, no immediate need for thoracentesis

## 2022-06-16 NOTE — PROGRESS NOTE ADULT - PROBLEM SELECTOR PLAN 3
- 's DBP 50-60's   - gentle IV bolus as per cardiology  - continue home lopressor 50mg BID with hold parameters   - Monitor routine hemodynamics

## 2022-06-16 NOTE — CONSULT NOTE ADULT - SUBJECTIVE AND OBJECTIVE BOX
[facility]  INFECTIOUS DISEASES - Jet Goldman, Carlos  [address] [place] [address], [place] [address]  Tel: [phone]     Fax: [phone]  =======================================================    [id number]  ROBERT JOHNSON     CC: Patient is a 89y old  Male who presents with a chief complaint of weight loss (2022 08:51)    HPI:  Patient is a 89 yo M with PMH of Afib, HTN, DLD who was brought in by family for complaint of cough, weakness for the last few days. Patient reports weight loss and reduced appetite for the last month. Denies chest pain or palpitations, denies fever, denies N/V/D. Of note, patient was seen recently by cardiologist Dr Allen on  for weakness and decreased appetite and his digoxin was discontinue and his lopressor was increased and coumadin changed to Eliquis.  Uses a cane occasionally at home, denies recent falls. Denies blood in stool.     ED course:  Na 139, K 5.3 (hemolyzed), Cr 1.80, Lactate 3.1, Mg 2.8, BNP 98408, Lactate 1.8, INR 3.43. Covid negative, resp panel positive for human metapneumovirus. CT shows ground glass and nodules. EKG shows afib, . Received metoprolol 5mg IV and cardiology consulted.  (15 Rui 2022 20:48)      PAST MEDICAL & SURGICAL HISTORY:  Atrial fibrillation      HTN (hypertension)      HLD (hyperlipidemia)      Anemia      CHF (congestive heart failure)      DVT, lower extremity          Social Hx:     FAMILY HISTORY:      Allergies    No Known Allergies    Intolerances        Antibiotics:  MEDICATIONS  (STANDING):  apixaban 2.5 milliGRAM(s) Oral every 12 hours  digoxin  Injectable 250 MICROGram(s) IV Push every 6 hours  diltiazem Infusion 15 mG/Hr (15 mL/Hr) IV Continuous <Continuous>  furosemide    Tablet 20 milliGRAM(s) Oral daily  metoprolol tartrate 50 milliGRAM(s) Oral two times a day    MEDICATIONS  (PRN):  acetaminophen     Tablet .. 650 milliGRAM(s) Oral every 6 hours PRN Temp greater or equal to 38C (100.4F), Mild Pain (1 - 3)  ALBUTerol    0.083% 2.5 milliGRAM(s) Nebulizer every 4 hours PRN Shortness of Breath and/or Wheezing  aluminum hydroxide/magnesium hydroxide/simethicone Suspension 30 milliLiter(s) Oral every 4 hours PRN Dyspepsia  guaifenesin/dextromethorphan Oral Liquid 10 milliLiter(s) Oral every 6 hours PRN Cough  melatonin 3 milliGRAM(s) Oral at bedtime PRN Insomnia  ondansetron Injectable 4 milliGRAM(s) IV Push every 8 hours PRN Nausea and/or Vomiting       REVIEW OF SYSTEMS:  CONSTITUTIONAL:  No Fever or chills  HEENT:  No diplopia or blurred vision.  No sore throat or runny nose.  CARDIOVASCULAR:  No chest pain or SOB.  RESPIRATORY:  No cough, shortness of breath, PND or orthopnea.  GASTROINTESTINAL:  No nausea, vomiting or diarrhea.  GENITOURINARY:  No dysuria, frequency or urgency. No Blood in urine  MUSCULOSKELETAL:  no joint aches, no muscle pain  SKIN:  No change in skin, hair or nails.  NEUROLOGIC:  No paresthesias, fasciculations, seizures or weakness.  PSYCHIATRIC:  No disorder of thought or mood.  ENDOCRINE:  No heat or cold intolerance, polyuria or polydipsia.  HEMATOLOGICAL:  No easy bruising or bleeding.     Physical Exam:  Vital Signs Last 24 Hrs  T(C): 37.1 (2022 07:35), Max: 37.2 (15 Rui 2022 21:16)  T(F): 98.8 (2022 07:35), Max: 98.9 (15 Rui 2022 21:16)  HR: 122 (2022 12:38) (119 - 142)  BP: 103/73 (2022 12:38) (82/51 - 118/70)  BP(mean): --  RR: 17 (2022 12:38) (17 - 18)  SpO2: 98% (2022 12:38) (93% - 99%)    GEN: NAD  HEENT: normocephalic and atraumatic. EOMI. PERRL.    NECK: Supple.  No lymphadenopathy   LUNGS: Clear to auscultation.  HEART: Regular rate and rhythm without murmur.  ABDOMEN: Soft, nontender, and nondistended.  Positive bowel sounds.    : No CVA tenderness  EXTREMITIES: Without any cyanosis, clubbing, rash, lesions or edema.  NEUROLOGIC: grossly intact.  PSYCHIATRIC: Appropriate affect .  SKIN: No ulceration or induration present.    Labs:      141  |  105  |  55<H>  ----------------------------<  103<H>  4.4   |  30  |  1.40<H>    Ca    8.8      2022 06:17  Mg     2.8     06-15    TPro  6.9  /  Alb  2.6<L>  /  TBili  1.7<H>  /  DBili  x   /  AST  102<H>  /  ALT  111<H>  /  AlkPhos  136<H>  06-15                          12.4   6.70  )-----------( 210      ( 2022 06:17 )             36.9     PT/INR - ( 2022 06:17 )   PT: 33.1 sec;   INR: 2.80 ratio         PTT - ( 2022 06:17 )  PTT:34.0 sec  Urinalysis Basic - ( 15 Rui 2022 16:37 )    Color: Yellow / Appearance: Clear / S.015 / pH: x  Gluc: x / Ketone: Negative  / Bili: Negative / Urobili: Negative   Blood: x / Protein: 30 mg/dL / Nitrite: Negative   Leuk Esterase: Negative / RBC: 3-5 /HPF / WBC 0-2   Sq Epi: x / Non Sq Epi: Occasional / Bacteria: Occasional      LIVER FUNCTIONS - ( 15 Rui 2022 13:16 )  Alb: 2.6 g/dL / Pro: 6.9 g/dL / ALK PHOS: 136 U/L / ALT: 111 U/L / AST: 102 U/L / GGT: x                 Procalcitonin, Serum: 0.10 ng/mL (22 @ 08:13)  Procalcitonin, Serum: 0.10 ng/mL (22 @ 06:17)        SARS-CoV-2: NotDetec (06-15-22 @ 13:19)      RECENT CULTURES:  06-15 @ 13:19          Detected        All imaging and other data have been reviewed.      Assessment and Plan:       Thank you for courtesy of this consult.     Will follow.  Discussed with the primary team.     Danitza Gonzalez MD  Division of Infectious Diseases   Cell [phone] between 8am and 6pm   After 6pm and weekends please call ID service at [phone].  [facility]  INFECTIOUS DISEASES - Jet Goldman, [place] [address]  Tel: [phone]     Fax: [phone]  =======================================================    [id number]  ROBERT JOHNSON     CC: Patient is a 89y old  Male who presents with a chief complaint of weight loss (2022 08:51)    HPI:  89 yo M with PMH of Afib, HTN, DLD who presented with cough, weakness for the last few days. Patient reports weight loss and reduced appetite for the last month. Denies chest pain or palpitations, denies fever, denies N/V/D. Of note, patient was seen recently by cardiologist Dr Allen on  for weakness and decreased appetite and his digoxin was discontinue and his lopressor was increased and coumadin changed to Eliquis. Tachycardic in the ED and on 2L nasal cannula.      PAST MEDICAL & SURGICAL HISTORY:  Atrial fibrillation      HTN (hypertension)      HLD (hyperlipidemia)      Anemia      CHF (congestive heart failure)      DVT, lower extremity          Social Hx:     FAMILY HISTORY:      Allergies    No Known Allergies    Intolerances        Antibiotics:  MEDICATIONS  (STANDING):  apixaban 2.5 milliGRAM(s) Oral every 12 hours  digoxin  Injectable 250 MICROGram(s) IV Push every 6 hours  diltiazem Infusion 15 mG/Hr (15 mL/Hr) IV Continuous <Continuous>  furosemide    Tablet 20 milliGRAM(s) Oral daily  metoprolol tartrate 50 milliGRAM(s) Oral two times a day    MEDICATIONS  (PRN):  acetaminophen     Tablet .. 650 milliGRAM(s) Oral every 6 hours PRN Temp greater or equal to 38C (100.4F), Mild Pain (1 - 3)  ALBUTerol    0.083% 2.5 milliGRAM(s) Nebulizer every 4 hours PRN Shortness of Breath and/or Wheezing  aluminum hydroxide/magnesium hydroxide/simethicone Suspension 30 milliLiter(s) Oral every 4 hours PRN Dyspepsia  guaifenesin/dextromethorphan Oral Liquid 10 milliLiter(s) Oral every 6 hours PRN Cough  melatonin 3 milliGRAM(s) Oral at bedtime PRN Insomnia  ondansetron Injectable 4 milliGRAM(s) IV Push every 8 hours PRN Nausea and/or Vomiting       REVIEW OF SYSTEMS:  CONSTITUTIONAL:  No Fever or chills  CARDIOVASCULAR:  No chest pain   RESPIRATORY:  see history  GASTROINTESTINAL:  No nausea, vomiting or diarrhea. no abdominal pain  GENITOURINARY:  No dysuria, frequency or urgency.   MUSCULOSKELETAL:  no back pain  NEUROLOGIC:  No headache, no dizziness  PSYCHIATRIC:  No disorder of thought or mood.      Physical Exam:  Vital Signs Last 24 Hrs  T(C): 37.1 (2022 07:35), Max: 37.2 (15 Rui 2022 21:16)  T(F): 98.8 (2022 07:35), Max: 98.9 (15 Rui 2022 21:16)  HR: 122 (2022 12:38) (119 - 142)  BP: 103/73 (2022 12:38) (82/51 - 118/70)  BP(mean): --  RR: 17 (2022 12:38) (17 - 18)  SpO2: 98% (2022 12:38) (93% - 99%)    GEN: NAD  HEENT: normocephalic and atraumatic.    NECK: Supple.   LUNGS: Clear to auscultation.  HEART: Tachycardic  ABDOMEN: Soft, nontender, and nondistended.    EXTREMITIES: No leg edema  NEUROLOGIC: grossly intact.  PSYCHIATRIC: Appropriate affect .      Labs:      141  |  105  |  55<H>  ----------------------------<  103<H>  4.4   |  30  |  1.40<H>    Ca    8.8      2022 06:17  Mg     2.8     15    TPro  6.9  /  Alb  2.6<L>  /  TBili  1.7<H>  /  DBili  x   /  AST  102<H>  /  ALT  111<H>  /  AlkPhos  136<H>  15                          12.4   6.70  )-----------( 210      ( 2022 06:17 )             36.9     PT/INR - ( 2022 06:17 )   PT: 33.1 sec;   INR: 2.80 ratio         PTT - ( 2022 06:17 )  PTT:34.0 sec  Urinalysis Basic - ( 15 Rui 2022 16:37 )    Color: Yellow / Appearance: Clear / S.015 / pH: x  Gluc: x / Ketone: Negative  / Bili: Negative / Urobili: Negative   Blood: x / Protein: 30 mg/dL / Nitrite: Negative   Leuk Esterase: Negative / RBC: 3-5 /HPF / WBC 0-2   Sq Epi: x / Non Sq Epi: Occasional / Bacteria: Occasional      LIVER FUNCTIONS - ( 15 Rui 2022 13:16 )  Alb: 2.6 g/dL / Pro: 6.9 g/dL / ALK PHOS: 136 U/L / ALT: 111 U/L / AST: 102 U/L / GGT: x                 Procalcitonin, Serum: 0.10 ng/mL (22 @ 08:13)  Procalcitonin, Serum: 0.10 ng/mL (22 @ 06:17)        SARS-CoV-2: NotDetec (06-15-22 @ 13:19)      RECENT CULTURES:  06-15 @ 13:19          Detected        All imaging and other data have been reviewed.    CT chest and A/P:  FINDINGS:  LOWER CHEST: Bilateral moderate pleural effusions. Mild atelectatic   changes.    LIVER: Within normal limits.  BILE DUCTS: Normal caliber.  GALLBLADDER: Within normal limits.  SPLEEN: Within normal limits.  PANCREAS: Within normal limits.  ADRENALS: Within normal limits.  KIDNEYS/URETERS: 1.4 cm left renal cyst.    BLADDER: Within normal limits.  REPRODUCTIVE ORGANS: Grossly unremarkable.    BOWEL: No bowel obstruction. No evidence for appendicitis. Colonic   diverticuli without gross inflammation.  PERITONEUM: No ascites.  VESSELS: Vascular calcifications.  RETROPERITONEUM/LYMPH NODES: No lymphadenopathy.  ABDOMINAL WALL: Small umbilical hernia containing fat. Small left   inguinal hernia containing fat.  BONES: Degenerative changes of bone.    IMPRESSION:  No acute intracranial pathology visualized on this noncontrast   examination.. Please correlate clinically.    Moderate bilateral pleural effusions.

## 2022-06-16 NOTE — PROGRESS NOTE ADULT - PROBLEM SELECTOR PLAN 5
- likely prerenal azotemia from decreased PO intake BUN /cr : 63/1.8 --> 55/1.4  - Continue gentle IV fluids, if tolerating PO will DC.    - s/p 1 L LR and 500ml bolus NS   - check urine lytes  - Avoid nephrotoxic agents.  - Caution with IVF given hx of CHF with b/l pleural effusion   - F/u BMP in AM  - Will obtain renal consult if renal function worsens.

## 2022-06-16 NOTE — PHYSICAL THERAPY INITIAL EVALUATION ADULT - ADDITIONAL COMMENTS
Pt lives in a private home with his son. 3 CARLOS. Prior to admission, pt was independent with ADLs and ambulation without an AD. Family stated that they recently purchased a walker as they noticed a decline in ambulation ability and weakness.

## 2022-06-16 NOTE — PROGRESS NOTE ADULT - PROBLEM SELECTOR PLAN 6
- On admission, BNP >10k  - CT shows Moderate bilateral pleural effusions, interlobular septal thickening in patchy ground glass opacities representing pulmonary edema.  - CXR  shows Bilateral lower lobe infiltrates.  - Resumed home Lasix 20 mg daily with hold parameter    - Repeat TTE, f/u results   - Cardiology consulted Dr Lama following

## 2022-06-16 NOTE — CONSULT NOTE ADULT - ASSESSMENT
89 yo M with PMH of Afib, HTN, DLD who presented with cough, weakness for the last few days. Found to be in Afib with RVR as well as positive for human metapneumovirus. He has no fever or leukocytosis. Serum procalcitonin is 0.10. CT showed moderate bilateral pleural effusions but no evidence of pneumonia.    -continue supportive management  -follow cultures to completion    Thank you for courtesy of this consult.     Will follow.  Discussed with the primary team.     Danitza Gonzalez MD  Division of Infectious Diseases   Cell [phone] between 8am and 6pm   After 6pm and weekends please call ID service at [phone].

## 2022-06-16 NOTE — PHYSICAL THERAPY INITIAL EVALUATION ADULT - GENERAL OBSERVATIONS, REHAB EVAL
Pt found NAD supine in bed. AOx4. IV. Tele. Ext Cath. Pt found NAD supine in bed. AOx4. IV. Tele, family present

## 2022-06-16 NOTE — PROGRESS NOTE ADULT - ASSESSMENT
Patient is a 89 yo M with PMH of Afib, HTN, DVT, HLD, GOUT (on allopurinol and Colchicine), CHF (TTE 2019 LVED 64%) and macular degeneration who was brought in by family for complaint of cough, weakness for the last few days. Imaging shows moderate bilateral pleural effusions Admitted with A fib with RVR and management of pleural effusion, + hMPv Virus  Patient is a 89 yo M with PMH of chronic Afib, HTN, DVT, HLD, GOUT (on allopurinol and Colchicine), CHF (TTE 2019 LVED 64%) and macular degeneration who was brought in by family for complaint of cough, weakness for the last few days. Imaging shows moderate bilateral pleural effusions Admitted with acute on chronic HFpEF A fib with RVR and management of pleural effusion, + hMPv Virus

## 2022-06-16 NOTE — PATIENT PROFILE ADULT - FALL HARM RISK - HARM RISK INTERVENTIONS

## 2022-06-16 NOTE — SWALLOW BEDSIDE ASSESSMENT ADULT - COMMENTS
Pt received upright in bed, awake and cooperative, on supplemental O2 via NC. Pt's daughter present at bedside. Pt was agreeable to assessment. Pt followed simple commands independently. Pt's vocal quality/intensity and speech production was WFL. Pt denied pain pre and post assessment.    CT chest 6/15: "Moderate bilateral pleural effusions, interlobular septal thickening in patchy groundglass opacities representing pulmonary edema. Multiple bilateral small lung nodules, indeterminant etiology. Metastatic disease is a possibility. Recommend follow-up chest CT in 4 weeks to determine the stability. Further evaluation can be performed with PET CT." Pt's WBC is WFL, no fever.

## 2022-06-16 NOTE — PROGRESS NOTE ADULT - ASSESSMENT
89y old  Male PMH Afib on ac htn hld chief complaint of weakness. Patient was seen in office 6/6 by Dr Allen for decreased appetite and weakness, digoxin was discontinued and increased lopressor to 100mg Po BID     - Tachycardia multifactorial in setting of underlying infection, poor po intake and medication noncompliance   - EKG Afib with RVR  - Tele with Afib -130s   - S/p lopressor 5 mg iv given in ed  - Continue Diltiazem at 15 mg/hr.   - Continue BB   - Digoxin recently discontinued for complaints of decreased appetite  he saw Dr Allen 6/6. Lopressor was increased to 100mg BID 6/6 unclear if he increased as he has periods of confusion   - Continue Eliquis     - Echo 2019 MAC aortic sclerosis normal Lv function, no pulmonary htn, normal LA size, mild MR ef 64%  - Serum Pro-Brain Natriuretic Peptide: [id number]  - CT shows moderate bilateral pleural effusions  - Does not appear volume overloaded on exam  - Can resume home Lasix in am     - Creatinine improving, likely prerenal in setting of decreased po intake   - Chest x-ray with bilateral lower lobe infiltrates, +  human meta pneumovirus (hMPV). Management per primary team     - Monitor and replete lytes, keep K>4, Mg>2.  - Will continue to follow.    Angelique Villegas, MS FNP, AGAP  Nurse Practitioner- Cardiology   Spectra #[phone] /[phone]   89y old  Male PMH Afib on ac htn hld chief complaint of weakness. Patient was seen in office 6/6 by Dr Allen for decreased appetite and weakness, digoxin was discontinued and increased lopressor to 100mg Po BID     - Tachycardia multifactorial in setting of underlying infection, poor po intake and medication noncompliance   - EKG Afib with RVR  - Tele with Afib -130s   - S/p lopressor 5 mg iv given in ed  - Continue Diltiazem at 15 mg/hr.   - Continue BB   - Resume Digoxin. Load with 0.25 mcg IV Q6H x 2, then 0.125 mcg PO every other day   - Digoxin recently discontinued for complaints of decreased appetite  he saw Dr Allen 6/6. Lopressor was increased to 100mg BID 6/6 unclear if he increased as he has periods of confusion   - Continue Eliquis     - Echo 2019 MAC aortic sclerosis normal Lv function, no pulmonary htn, normal LA size, mild MR ef 64%  - Serum Pro-Brain Natriuretic Peptide: [id number]  - CT shows moderate bilateral pleural effusions  - Does not appear volume overloaded on exam  - Can resume home Lasix in am     - Creatinine improving, likely prerenal in setting of decreased po intake   - Chest x-ray with bilateral lower lobe infiltrates, +  human meta pneumovirus (hMPV). Management per primary team     - Monitor and replete lytes, keep K>4, Mg>2.  - Will continue to follow.    Angelique Villegas, MS FNP, [facility]  Nurse Practitioner- Cardiology   Spectra #[phone] /[phone]

## 2022-06-16 NOTE — ED ADULT NURSE REASSESSMENT NOTE - SYMPTOMS
afib
cough, isolation maintained. Tachycardia, afib on cardiac monitored. Diltiazem infusing per order
none

## 2022-06-16 NOTE — PROGRESS NOTE ADULT - PROBLEM SELECTOR PLAN 9
- continue ELIQUIS 2.5mg BID - continue ELIQUIS 2.5mg BID    PT EVAL: rehabilitation facility; [facility]

## 2022-06-16 NOTE — PROGRESS NOTE ADULT - SUBJECTIVE AND OBJECTIVE BOX
Patient is a 89y old  Male who presents with a chief complaint of weight loss (2022 08:51)      INTERVAL HPI/OVERNIGHT EVENTS: Patient is [place].  Patient with low BP, 's and DBP 50-60's, HR ranges b/w 120-130's. On 2 L nasal cannula sat >90%. Patient desat to 89% on RA, back to 2L nasal cannula. Patient awake, alert, resting in bed. AAOx2, No complaints of chest pain, headache,  palpitations or dizziness. No signs of orthopnea or PND. Tolerating O2 via nasal cannula. Continuing on Cardizem gtt at 15. HR in 120s. Passed bedside S&S. tolerating diet well, bite size  Reports he lives with son Rainer. Daughter Leigh [phone]. Ambulates with walker at home.      MEDICATIONS  (STANDING):  apixaban 2.5 milliGRAM(s) Oral every 12 hours  digoxin  Injectable 250 MICROGram(s) IV Push every 6 hours  diltiazem Infusion 15 mG/Hr (15 mL/Hr) IV Continuous <Continuous>  furosemide    Tablet 20 milliGRAM(s) Oral daily  metoprolol tartrate 50 milliGRAM(s) Oral two times a day    MEDICATIONS  (PRN):  acetaminophen     Tablet .. 650 milliGRAM(s) Oral every 6 hours PRN Temp greater or equal to 38C (100.4F), Mild Pain (1 - 3)  ALBUTerol    0.083% 2.5 milliGRAM(s) Nebulizer every 4 hours PRN Shortness of Breath and/or Wheezing  aluminum hydroxide/magnesium hydroxide/simethicone Suspension 30 milliLiter(s) Oral every 4 hours PRN Dyspepsia  guaifenesin/dextromethorphan Oral Liquid 10 milliLiter(s) Oral every 6 hours PRN Cough  melatonin 3 milliGRAM(s) Oral at bedtime PRN Insomnia  ondansetron Injectable 4 milliGRAM(s) IV Push every 8 hours PRN Nausea and/or Vomiting      Allergies    No Known Allergies    Intolerances        REVIEW OF SYSTEMS:  CONSTITUTIONAL: No fever or chills  HEENT:  No headache, no sore throat  RESPIRATORY: + intermittent cough, no wheezing, +shortness of breath  CARDIOVASCULAR: No chest pain, palpitations  GASTROINTESTINAL: No abd pain, nausea, vomiting, or diarrhea  GENITOURINARY: No dysuria, frequency, or hematuria  NEUROLOGICAL: no focal weakness or dizziness  MUSCULOSKELETAL: no myalgias     Vital Signs Last 24 Hrs  T(C): 37.1 (2022 07:35), Max: 37.2 (15 Rui 2022 21:16)  T(F): 98.8 (2022 07:35), Max: 98.9 (15 Rui 2022 21:16)  HR: 122 (2022 11:03) (119 - 142)  BP: 101/73 (2022 11:03) (82/51 - 118/70)  BP(mean): --  RR: 18 (2022 10:31) (17 - 18)  SpO2: 98% (2022 10:31) (93% - 99%)    PHYSICAL EXAM:  GENERAL: on 2 litre nasal cannula sat >90%, NAD, Appears cachetic, weak and frail , [place]   HEENT:  anicteric, moist mucous membranes  CHEST/LUNG:  decrease breath sounds b/l at the lower lobes, no rales, mild expiratory wheezes, or rhonchi  HEART:  RRR, S1, S2  ABDOMEN:  BS+, soft, nontender, nondistended  EXTREMITIES: trace peripheral edema, no cyanosis, or calf tenderness  NERVOUS SYSTEM: answers questions and follows commands appropriately, cooperative    LABS:                        12.4   6.70  )-----------( 210      ( 2022 06:17 )             36.9     CBC Full  -  ( 2022 06:17 )  WBC Count : 6.70 K/uL  Hemoglobin : 12.4 g/dL  Hematocrit : 36.9 %  Platelet Count - Automated : 210 K/uL  Mean Cell Volume : 100.5 fl  Mean Cell Hemoglobin : 33.8 pg  Mean Cell Hemoglobin Concentration : 33.6 gm/dL  Auto Neutrophil # : x  Auto Lymphocyte # : x  Auto Monocyte # : x  Auto Eosinophil # : x  Auto Basophil # : x  Auto Neutrophil % : x  Auto Lymphocyte % : x  Auto Monocyte % : x  Auto Eosinophil % : x  Auto Basophil % : x    2022 06:17    141    |  105    |  55     ----------------------------<  103    4.4     |  30     |  1.40     Ca    8.8        2022 06:17  Mg     2.8       15 Rui 2022 13:16    TPro  6.9    /  Alb  2.6    /  TBili  1.7    /  DBili  x      /  AST  102    /  ALT  111    /  AlkPhos  136    15 Rui 2022 13:16    PT/INR - ( 2022 06:17 )   PT: 33.1 sec;   INR: 2.80 ratio         PTT - ( 2022 06:17 )  PTT:34.0 sec  Urinalysis Basic - ( 15 Rui 2022 16:37 )    Color: Yellow / Appearance: Clear / S.015 / pH: x  Gluc: x / Ketone: Negative  / Bili: Negative / Urobili: Negative   Blood: x / Protein: 30 mg/dL / Nitrite: Negative   Leuk Esterase: Negative / RBC: 3-5 /HPF / WBC 0-2   Sq Epi: x / Non Sq Epi: Occasional / Bacteria: Occasional      CAPILLARY BLOOD GLUCOSE      RADIOLOGY & ADDITIONAL TESTS:  < from: CT Chest No Cont (06.15.22 @ 15:39) >  IMPRESSION:    Moderate bilateral pleural effusions, interlobular septal thickening in   patchy groundglass opacities representing pulmonary edema.    Multiple bilateral small lung nodules, indeterminant etiology. Metastatic   disease is a possibility. Recommend follow-up chest CT in 4 weeks to   determine the stability. Further evaluation can be performed with PET CT.    --- End of Report ---    JADEN RODRÍGUEZ MD; Attending Radiologist  This document has been electronically signed. Rui 15 2022  4:10PM    < end of copied text >  < from: CT Abdomen and Pelvis No Cont (06.15.22 @ 15:39) >  IMPRESSION:  No acute intracranial pathology visualized on this noncontrast   examination.. Please correlate clinically.    Moderate bilateral pleural effusions.    --- End of Report ---      LINWOOD BLISS MD; Attending Radiologist  This document has been electronically signed. Rui 15 2022  4:22PM    < end of copied text >  < from: Xray Chest 1 View- PORTABLE-Urgent (06.15.22 @ 13:35) >  IMPRESSION: Bilateral lower lobe infiltrates.    --- End of Report ---    HASEEB BRAUN MD; Attending Radiologist  This document has been electronically signed. Rui 15 2022  1:56PM    < end of copied text >      Personally reviewed.     Consultant(s) Notes Reviewed:  [x] YES  [ ] NO     Patient is a 89y old  Male who presents with a chief complaint of weight loss (2022 08:51)      INTERVAL HPI/OVERNIGHT EVENTS: Patient is [place].  Patient with low BP, 's and DBP 50-60's, HR ranges b/w 120-130's. On 2 L nasal cannula sat >90%. Patient desat to 89% on RA, back to 2L nasal cannula. Patient awake, alert, resting in bed. AAOx2, + intermittent cough. No complaints of chest pain, headache,  palpitations or dizziness. No signs of orthopnea or PND. Tolerating O2 via nasal cannula. Continuing on Cardizem gtt at 15. HR in 120s. Passed bedside S&S. tolerating diet well, bite size  Reports he lives with son Rainer. Daughter Leigh [phone]. Ambulates with walker at home.      MEDICATIONS  (STANDING):  apixaban 2.5 milliGRAM(s) Oral every 12 hours  digoxin  Injectable 250 MICROGram(s) IV Push every 6 hours  diltiazem Infusion 15 mG/Hr (15 mL/Hr) IV Continuous <Continuous>  furosemide    Tablet 20 milliGRAM(s) Oral daily  metoprolol tartrate 50 milliGRAM(s) Oral two times a day    MEDICATIONS  (PRN):  acetaminophen     Tablet .. 650 milliGRAM(s) Oral every 6 hours PRN Temp greater or equal to 38C (100.4F), Mild Pain (1 - 3)  ALBUTerol    0.083% 2.5 milliGRAM(s) Nebulizer every 4 hours PRN Shortness of Breath and/or Wheezing  aluminum hydroxide/magnesium hydroxide/simethicone Suspension 30 milliLiter(s) Oral every 4 hours PRN Dyspepsia  guaifenesin/dextromethorphan Oral Liquid 10 milliLiter(s) Oral every 6 hours PRN Cough  melatonin 3 milliGRAM(s) Oral at bedtime PRN Insomnia  ondansetron Injectable 4 milliGRAM(s) IV Push every 8 hours PRN Nausea and/or Vomiting      Allergies    No Known Allergies    Intolerances        REVIEW OF SYSTEMS:  CONSTITUTIONAL: No fever or chills  HEENT:  No headache, no sore throat  RESPIRATORY: + intermittent cough, no wheezing, +shortness of breath  CARDIOVASCULAR: No chest pain, palpitations  GASTROINTESTINAL: No abd pain, nausea, vomiting, or diarrhea  GENITOURINARY: No dysuria, frequency, or hematuria  NEUROLOGICAL: no focal weakness or dizziness  MUSCULOSKELETAL: no myalgias     Vital Signs Last 24 Hrs  T(C): 37.1 (2022 07:35), Max: 37.2 (15 Rui 2022 21:16)  T(F): 98.8 (2022 07:35), Max: 98.9 (15 Rui 2022 21:16)  HR: 122 (2022 11:03) (119 - 142)  BP: 101/73 (2022 11:03) (82/51 - 118/70)  BP(mean): --  RR: 18 (2022 10:31) (17 - 18)  SpO2: 98% (2022 10:31) (93% - 99%)    PHYSICAL EXAM:  GENERAL: on 2 litre nasal cannula sat >90%, NAD, Appears cachetic, weak and frail , [place]   HEENT:  anicteric, moist mucous membranes  CHEST/LUNG:  decrease breath sounds b/l at the lower lobes, no rales, mild expiratory wheezes, or rhonchi  HEART:  RRR, S1, S2  ABDOMEN:  BS+, soft, nontender, nondistended  EXTREMITIES: trace peripheral edema, no cyanosis, or calf tenderness  NERVOUS SYSTEM: answers questions and follows commands appropriately, cooperative    LABS:                        12.4   6.70  )-----------( 210      ( 2022 06:17 )             36.9     CBC Full  -  ( 2022 06:17 )  WBC Count : 6.70 K/uL  Hemoglobin : 12.4 g/dL  Hematocrit : 36.9 %  Platelet Count - Automated : 210 K/uL  Mean Cell Volume : 100.5 fl  Mean Cell Hemoglobin : 33.8 pg  Mean Cell Hemoglobin Concentration : 33.6 gm/dL  Auto Neutrophil # : x  Auto Lymphocyte # : x  Auto Monocyte # : x  Auto Eosinophil # : x  Auto Basophil # : x  Auto Neutrophil % : x  Auto Lymphocyte % : x  Auto Monocyte % : x  Auto Eosinophil % : x  Auto Basophil % : x    2022 06:17    141    |  105    |  55     ----------------------------<  103    4.4     |  30     |  1.40     Ca    8.8        2022 06:17  Mg     2.8       15 Rui 2022 13:16    TPro  6.9    /  Alb  2.6    /  TBili  1.7    /  DBili  x      /  AST  102    /  ALT  111    /  AlkPhos  136    15 Rui 2022 13:16    PT/INR - ( 2022 06:17 )   PT: 33.1 sec;   INR: 2.80 ratio         PTT - ( 2022 06:17 )  PTT:34.0 sec  Urinalysis Basic - ( 15 Rui 2022 16:37 )    Color: Yellow / Appearance: Clear / S.015 / pH: x  Gluc: x / Ketone: Negative  / Bili: Negative / Urobili: Negative   Blood: x / Protein: 30 mg/dL / Nitrite: Negative   Leuk Esterase: Negative / RBC: 3-5 /HPF / WBC 0-2   Sq Epi: x / Non Sq Epi: Occasional / Bacteria: Occasional      CAPILLARY BLOOD GLUCOSE      RADIOLOGY & ADDITIONAL TESTS:  < from: CT Chest No Cont (06.15.22 @ 15:39) >  IMPRESSION:    Moderate bilateral pleural effusions, interlobular septal thickening in   patchy groundglass opacities representing pulmonary edema.    Multiple bilateral small lung nodules, indeterminant etiology. Metastatic   disease is a possibility. Recommend follow-up chest CT in 4 weeks to   determine the stability. Further evaluation can be performed with PET CT.    --- End of Report ---    JADEN RODRÍGUEZ MD; Attending Radiologist  This document has been electronically signed. Rui 15 2022  4:10PM    < end of copied text >  < from: CT Abdomen and Pelvis No Cont (06.15.22 @ 15:39) >  IMPRESSION:  No acute intracranial pathology visualized on this noncontrast   examination.. Please correlate clinically.    Moderate bilateral pleural effusions.    --- End of Report ---      LINWOOD BLISS MD; Attending Radiologist  This document has been electronically signed. Rui 15 2022  4:22PM    < end of copied text >  < from: Xray Chest 1 View- PORTABLE-Urgent (06.15.22 @ 13:35) >  IMPRESSION: Bilateral lower lobe infiltrates.    --- End of Report ---    HASEEB BRAUN MD; Attending Radiologist  This document has been electronically signed. Rui 15 2022  1:56PM    < end of copied text >      Personally reviewed.     Consultant(s) Notes Reviewed:  [x] YES  [ ] NO

## 2022-06-16 NOTE — PROGRESS NOTE ADULT - CRITICAL CARE ATTENDING COMMENT
f underlying infection, poor po intake and medication noncompliance   - EKG Afib with RVR  - Tele with Afib -130s   poorly rate controlled af in the setting of vol depl and suspected malignancy  hypotensive and tachycardic, with difficulty admin both dilt gtt and bb  resume dig with abbreviated load  cont dilt and bb as elsy  trend creat, does not seem overtly volume overloaded though does have mirna pl effs  can consider resuming diuretics in am    Upon my evaluation, this patient is at high risk for imminent or life threatening deterioration due to resp failure, hypotension,  tachycardia and other active medical issues which require my direct attention, intervention, and personal management.  I have personally spent >35 minutes  of critical care time exclusive of time spent on separate billing procedures. This includes review of laboratory data, radiology results, discussion with primary team\patient, and monitoring for potential decompensation Interventions were performed as documented above.

## 2022-06-16 NOTE — PROGRESS NOTE ADULT - PROBLEM SELECTOR PLAN 7
- Multiple differentials possible. May be adverse effect of digoxin, recently discontinued last week  - TSH wnl r/o thyroid disease   - f/u vit D level  - CT shows Multiple bilateral small lung nodules, indeterminant etiology. Metastatic disease is a possibility. - Recommend follow-up chest CT in 4 weeks to determine the stability. Further evaluation can be performed with PET CT. will consider repeat imaging as outpatient or PET

## 2022-06-16 NOTE — PROGRESS NOTE ADULT - PROBLEM SELECTOR PLAN 1
-  Tachycardia multifactorial in setting of underlying infection, poor po intake and medication noncompliance+ hMPv   - s/p IV metoprolol, 2x Lopressor 5mg IV push, Cardizem 20mg IVP  in ED  - Tele with Afib -130s   - gentle IV bolus as per cardiology  - Continue lopressor 50mg BID and Cardizem  at 15 mg/hr.   - Resumed Digoxin. Loading with 0.25 mcg IV Q6H x 2, then 0.125 mcg PO every other day  - Continue home Eliquis 2.5mg  BID  - Cardiology consulted Dr Lama following

## 2022-06-16 NOTE — PROGRESS NOTE ADULT - ASSESSMENT
89y old  Male pmg afib on ac htn hld chief complaint of weakness    AF management - rate and rhythm control  AC for AF  I and O  cvs rx regimen and BP control  hMPV - resp viral illness - supportive measures - Albuterol PRN for sob and or wheezing  monitor VS and HD and Sat  Pleural Eff - Atelectasis - I ez if able to tolerate - no immediate need for thoracentesis - medical management  diuresis as per CARDIO recs  GOC discussion  isolation precs for hMPV  cough rx regimen  cata liu

## 2022-06-16 NOTE — SWALLOW BEDSIDE ASSESSMENT ADULT - ORAL PREPARATORY PHASE
mildly prolonged mastication 2/2 incomplete dentition, though complete and functional Within functional limits

## 2022-06-16 NOTE — CHART NOTE - NSCHARTNOTEFT_GEN_A_CORE
Called by RN for patient w/ afib RVR rate ~120, on cardizem drip titrated to 15/hr. Patient reportedly missed evening metoprolol dose. Patient is asymptomatic at this time and hemodynamically stable.    - give morning metoprolol early  - will continue cardizem drip at 15mg/hr as patient unable to be transferred to floor on titrated drip.  - RN to call w/ any changes Called by RN for patient w/ afib RVR rate ~120, on cardizem drip titrated to 15/hr. Patient reportedly missed evening metoprolol dose. Patient is asymptomatic at this time and hemodynamically stable.    - give morning metoprolol early  - will continue cardizem drip at 15mg/hr as patient unable to be transferred to floor on titrated drip.  - RN to call w/ any changes    addendum: called by rn for patient w/ systolic bp 88, below BP threshold for hold parameter for metoprolol. Hold metoprolol, continue 15mg/hr cardizem. Will give additional 500cc bolus and recheck bp Called by RN for patient w/ afib RVR rate ~120, on cardizem drip titrated to 15/hr. Patient reportedly missed evening metoprolol dose. Patient is asymptomatic at this time and hemodynamically stable.    - give morning metoprolol early  - will continue cardizem drip at 15mg/hr as patient unable to be transferred to floor on titrated drip.  - RN to call w/ any changes    addendum: called by rn for patient w/ bp 88/55, below BP threshold for hold parameter for metoprolol. Hold metoprolol, continue 15mg/hr cardizem. Will give additional 500cc bolus and recheck bp as patient is likely overdiuresed, despite pleural effusions as per cardio note.

## 2022-06-16 NOTE — PROGRESS NOTE ADULT - SUBJECTIVE AND OBJECTIVE BOX
Date/Time Patient Seen:  		  Referring MD:   Data Reviewed	       Patient is a 89y old  Male who presents with a chief complaint of weight loss (15 Rui 2022 20:48)      Subjective/HPI     PAST MEDICAL & SURGICAL HISTORY:  Atrial fibrillation    HTN (hypertension)    HLD (hyperlipidemia)    Anemia    CHF (congestive heart failure)    DVT, lower extremity          Medication list         MEDICATIONS  (STANDING):  apixaban 2.5 milliGRAM(s) Oral every 12 hours  diltiazem Infusion 15 mG/Hr (15 mL/Hr) IV Continuous <Continuous>  metoprolol tartrate 50 milliGRAM(s) Oral two times a day    MEDICATIONS  (PRN):  acetaminophen     Tablet .. 650 milliGRAM(s) Oral every 6 hours PRN Temp greater or equal to 38C (100.4F), Mild Pain (1 - 3)  aluminum hydroxide/magnesium hydroxide/simethicone Suspension 30 milliLiter(s) Oral every 4 hours PRN Dyspepsia  guaifenesin/dextromethorphan Oral Liquid 10 milliLiter(s) Oral every 6 hours PRN Cough  melatonin 3 milliGRAM(s) Oral at bedtime PRN Insomnia  ondansetron Injectable 4 milliGRAM(s) IV Push every 8 hours PRN Nausea and/or Vomiting         Vitals log        ICU Vital Signs Last 24 Hrs  T(C): 37.2 (16 Jun 2022 00:47), Max: 37.2 (15 Rui 2022 21:16)  T(F): 98.9 (16 Jun 2022 00:47), Max: 98.9 (15 Rui 2022 21:16)  HR: 120 (16 Jun 2022 06:15) (97 - 142)  BP: 88/58 (16 Jun 2022 06:15) (88/58 - 118/70)  BP(mean): --  ABP: --  ABP(mean): --  RR: 18 (16 Jun 2022 06:15) (18 - 18)  SpO2: 93% (16 Jun 2022 06:15) (93% - 99%)           Input and Output:  I&O's Detail    15 Rui 2022 07:01  -  16 Jun 2022 06:30  --------------------------------------------------------  IN:    Diltiazem: 30 mL    Diltiazem: 30 mL    Oral Fluid: 120 mL  Total IN: 180 mL    OUT:    Voided (mL): 500 mL  Total OUT: 500 mL    Total NET: -320 mL          Lab Data                        12.4   6.70  )-----------( 210      ( 16 Jun 2022 06:17 )             36.9     06-15    139  |  103  |  63<H>  ----------------------------<  143<H>  5.3   |  29  |  1.80<H>    Ca    9.4      15 Rui 2022 13:16  Mg     2.8     06-15    TPro  6.9  /  Alb  2.6<L>  /  TBili  1.7<H>  /  DBili  x   /  AST  102<H>  /  ALT  111<H>  /  AlkPhos  136<H>  06-15            Review of Systems	      Objective     Physical Examination    heart s1s2  lung dec BS  abd soft      Pertinent Lab findings & Imaging      Ramírez:  NO   Adequate UO     I&O's Detail    15 Rui 2022 07:01  -  16 Jun 2022 06:30  --------------------------------------------------------  IN:    Diltiazem: 30 mL    Diltiazem: 30 mL    Oral Fluid: 120 mL  Total IN: 180 mL    OUT:    Voided (mL): 500 mL  Total OUT: 500 mL    Total NET: -320 mL               Discussed with:     Cultures:	        Radiology

## 2022-06-16 NOTE — PROGRESS NOTE ADULT - SUBJECTIVE AND OBJECTIVE BOX
[facility] -- Vincenzo Wyman, Tiffany, Daina, Alex Akers, Ganesh Liriano: Office # [phone]    Follow Up:  A fib RVR    Subjective/Observations: Patient seen and examined. Patient awake, alert, resting in bed. No complaints of chest pain, dyspnea, palpitations or dizziness. No signs of orthopnea or PND. Tolerating O2 via nasal cannula. Continuing on Cardizem gtt at 15. HR in 120s    REVIEW OF SYSTEMS: All other review of systems are negative unless indicated above    PAST MEDICAL & SURGICAL HISTORY:  Atrial fibrillation      HTN (hypertension)      HLD (hyperlipidemia)      Anemia      Anemia      CHF (congestive heart failure)      DVT, lower extremity    MEDICATIONS  (STANDING):  apixaban 2.5 milliGRAM(s) Oral every 12 hours  diltiazem Infusion 15 mG/Hr (15 mL/Hr) IV Continuous <Continuous>  metoprolol tartrate 50 milliGRAM(s) Oral two times a day    MEDICATIONS  (PRN):  acetaminophen     Tablet .. 650 milliGRAM(s) Oral every 6 hours PRN Temp greater or equal to 38C (100.4F), Mild Pain (1 - 3)  ALBUTerol    0.083% 2.5 milliGRAM(s) Nebulizer every 4 hours PRN Shortness of Breath and/or Wheezing  aluminum hydroxide/magnesium hydroxide/simethicone Suspension 30 milliLiter(s) Oral every 4 hours PRN Dyspepsia  guaifenesin/dextromethorphan Oral Liquid 10 milliLiter(s) Oral every 6 hours PRN Cough  melatonin 3 milliGRAM(s) Oral at bedtime PRN Insomnia  ondansetron Injectable 4 milliGRAM(s) IV Push every 8 hours PRN Nausea and/or Vomiting    Allergies  No Known Allergies    Vital Signs Last 24 Hrs  T(C): 37.1 (16 Jun 2022 07:35), Max: 37.2 (15 Joelle 2022 21:16)  T(F): 98.8 (16 Jun 2022 07:35), Max: 98.9 (15 Joelle 2022 21:16)  HR: 124 (16 Jun 2022 08:30) (97 - 142)  BP: 101/72 (16 Jun 2022 08:30) (85/59 - 118/70)  BP(mean): --  RR: 17 (16 Jun 2022 08:30) (17 - 18)  SpO2: 99% (16 Jun 2022 08:30) (93% - 99%)  I&O's Summary    15 Joelle 2022 07:01  -  16 Jun 2022 07:00  --------------------------------------------------------  IN: 180 mL / OUT: 500 mL / NET: -320 mL      Weight (kg): 70.3 (06-15 @ 12:24)    TELE: A fib -130s   PHYSICAL EXAM:  Constitutional: NAD, awake and alert, Well developed   HEENT: Moist Mucous Membranes, Anicteric  Pulmonary: Non-labored, breath sounds are clear bilaterally, Diminished at bases No wheezing, rales or rhonchi  Cardiovascular: Regular, S1 and S2, No murmurs, No rubs, gallops or clicks  Gastrointestinal:  soft, nontender, nondistended   Lymph: trace peripheral edema. No lymphadenopathy.   Skin: No visible rashes or ulcers.  Psych:  Mood & affect appropriate      LABS: All Labs Reviewed:                        12.4   6.70  )-----------( 210      ( 16 Jun 2022 06:17 )             36.9                         13.1   6.69  )-----------( 208      ( 15 Joelle 2022 13:16 )             40.0     16 Jun 2022 06:17    141    |  105    |  55     ----------------------------<  103    4.4     |  30     |  1.40   15 Joelle 2022 13:16    139    |  103    |  63     ----------------------------<  143    5.3     |  29     |  1.80     Ca    8.8        16 Jun 2022 06:17  Ca    9.4        15 Joelle 2022 13:16  Mg     2.8       15 Joelle 2022 13:16    TPro  6.9    /  Alb  2.6    /  TBili  1.7    /  DBili  x      /  AST  102    /  ALT  111    /  AlkPhos  136    15 Joelle 2022 13:16   LIVER FUNCTIONS - ( 15 Joelle 2022 13:16 )  Alb: 2.6 g/dL / Pro: 6.9 g/dL / ALK PHOS: 136 U/L / ALT: 111 U/L / AST: 102 U/L / GGT: x           PT/INR - ( 16 Jun 2022 06:17 )   PT: 33.1 sec;   INR: 2.80 ratio         PTT - ( 16 Jun 2022 06:17 )  PTT:34.0 secLactate, Blood: 1.8 mmol/L (06-15-22 @ 16:37)  Lactate, Blood: 3.1 mmol/L (06-15-22 @ 13:16)    12 Lead ECG:   Ventricular Rate 130 BPM    QRS Duration 78 ms    Q-T Interval 288 ms    QTC Calculation(Bazett) 423 ms    R Axis 78 degrees    T Axis 269 degrees    Diagnosis Line Atrial fibrillation with rapid ventricular response  ST & T wave abnormality, consider inferior ischemia  ST & T wave abnormality, consider anterolateral ischemia  Abnormal ECG  When compared with ECG of 15-JOELLE-2022 12:46, (Unconfirmed)  No significant change was found  Confirmed by TOMASZ HEBERT ([id number]) on 6/15/2022 3:04:33 PM (06-15-22 @ 12:48)

## 2022-06-17 NOTE — DIETITIAN INITIAL EVALUATION ADULT - PERTINENT LABORATORY DATA
06-17    142  |  105  |  42<H>  ----------------------------<  108<H>  4.0   |  31  |  1.10    Ca    8.8      17 Jun 2022 07:59  Mg     2.8     06-15    TPro  6.7  /  Alb  2.5<L>  /  TBili  1.8<H>  /  DBili  x   /  AST  37  /  ALT  74  /  AlkPhos  136<H>  06-17

## 2022-06-17 NOTE — DIETITIAN INITIAL EVALUATION ADULT - OTHER INFO
89 yr old male admitted for weight loss and weakness. + HMPV. CT chest confirms mild B/L pleural effusions with pulmonary edema.

## 2022-06-17 NOTE — DIETITIAN INITIAL EVALUATION ADULT - PERTINENT MEDS FT
MEDICATIONS  (STANDING):  apixaban 2.5 milliGRAM(s) Oral every 12 hours  diltiazem Infusion 15 mG/Hr (15 mL/Hr) IV Continuous <Continuous>  furosemide    Tablet 20 milliGRAM(s) Oral daily  metoprolol tartrate 50 milliGRAM(s) Oral two times a day    MEDICATIONS  (PRN):  acetaminophen     Tablet .. 650 milliGRAM(s) Oral every 6 hours PRN Temp greater or equal to 38C (100.4F), Mild Pain (1 - 3)  ALBUTerol    0.083% 2.5 milliGRAM(s) Nebulizer every 4 hours PRN Shortness of Breath and/or Wheezing  aluminum hydroxide/magnesium hydroxide/simethicone Suspension 30 milliLiter(s) Oral every 4 hours PRN Dyspepsia  guaifenesin/dextromethorphan Oral Liquid 10 milliLiter(s) Oral every 6 hours PRN Cough  melatonin 3 milliGRAM(s) Oral at bedtime PRN Insomnia  ondansetron Injectable 4 milliGRAM(s) IV Push every 8 hours PRN Nausea and/or Vomiting

## 2022-06-17 NOTE — PROGRESS NOTE ADULT - ASSESSMENT
89y old  Male pmg afib on ac htn hld chief complaint of weakness    ID eval noted  VS noted    AF management - rate and rhythm control  AC for AF  I and O  cvs rx regimen and BP control  hMPV - resp viral illness - supportive measures - Albuterol PRN for sob and or wheezing  monitor VS and HD and Sat  Pleural Eff - Atelectasis - I ze if able to tolerate - no immediate need for thoracentesis - medical management  diuresis as per CARDIO recs  GOC discussion  isolation precs for hMPV  cough rx regimen  replmelinda liu

## 2022-06-17 NOTE — PROGRESS NOTE ADULT - PROBLEM SELECTOR PLAN 4
- symptomatic treatment, APAP PRN fever and guaifenesin-DM PRN cough  - hypoxic desat 89% on RA , now on 4 litre nasal cannula sat >90%  - Diet bite size/thin fluid, aspiration precaution  - Blood cx x2: NGTD , urine cx : <10,000 normal urogenital lupe   - Consulted ID Dr Goldman. supportive management

## 2022-06-17 NOTE — CONSULT NOTE ADULT - ASSESSMENT
Patient is a 89 yo male with a pmh of Afib (on eliquis), CHF, DVT, HLD, HTN, Anemia who is now being consulted for ICU for a-fib rvr and hypotension.    Upon assessment, patient does not require ICU at this time, please see recommendations below.    Problem:  - Afib with RVR  - Hypotension  - Pleural effusions  - CHF    Recommendations:  - Ok to give metoprolol per hold parameters at this time, can give and reassess in x1 hour.  - Hypotensive likely due to tachy rhythm, monitor for improvement post rate control  - Cardiology following, appreciate recs, f/u plan with them   - May need to increase dig dose, consider with cardiology  - X1 albumin to be given to assist with diffusion of pleural effusion into vasculature to diuresis, unsure if not intravascularly depleted with pleural effusions remaining   - X1 5mg midodrine state, consider increasing dose to 10-15mg q 8 hr.   - May want to consider obtaining TTE, last one in 2019   - If SBP > 100 give cardizem this evening, do not skip dose if not needed.   - Patient may need cardizem infusion again if HR is uncontrolled   - If requiring continued diuresis, may consider giving albumin first followed by diuresis for diffusion.    Given patient is with appropriate hemodynamics, patient does not require ICU admission at this time. If patient decompensates or need arises, please reconsult. Thank you!     Case discussed with ICU attending, Dr. Dylan Talbert Patient is a 89 yo male with a pmh of Afib (on eliquis), CHF, DVT, HLD, HTN, Anemia who is now being consulted for ICU for a-fib rvr and hypotension.    Upon assessment, patient does not require ICU at this time, please see recommendations below.    Problem:  - Afib with RVR  - Hypotension  - Pleural effusions  - CHF    Recommendations:  - Ok to give metoprolol per hold parameters at this time, can give and reassess in x1 hour.  - Patient was noted to be eating and per family attempting to stand, unclear if prior HR elevation was d/t activity.   - Hypotensive likely due to tachy rhythm, monitor for improvement post rate control  - Cardiology following, appreciate recs, f/u plan with them   - May need to increase dig dose, consider with cardiology  - X1 albumin to be given to assist with diffusion of pleural effusion into vasculature to diuresis, unsure if not intravascularly depleted with pleural effusions remaining   - X1 5mg midodrine state, consider increasing dose to 10-15mg q 8 hr.   - May want to consider obtaining TTE, last one in 2019   - If SBP > 100 give cardizem this evening, do not skip dose if not needed.   - Patient may need cardizem infusion again if HR is uncontrolled   - If requiring continued diuresis, may consider giving albumin first followed by diuresis for diffusion.    Given patient is with appropriate hemodynamics, patient does not require ICU admission at this time. If patient decompensates or need arises, please reconsult. Thank you!     Case discussed with ICU attending, Dr. Dylan Talbert

## 2022-06-17 NOTE — PROGRESS NOTE ADULT - PROBLEM SELECTOR PLAN 3
- 's DBP 50-60's   - gentle IV bolus as per cardiology  - continue home lopressor 50mg BID with hold parameters   - Monitor routine hemodynamics - VSS   - continue home lopressor 50mg BID with hold parameters   - Monitor routine hemodynamics

## 2022-06-17 NOTE — PROGRESS NOTE ADULT - PROBLEM SELECTOR PLAN 2
- CT shows Moderate bilateral pleural effusions, interlobular septal thickening in patchy ground glass opacities representing pulmonary edema.  - CXR  shows Bilateral lower lobe infiltrates.  - Incentive spirometer  - Resumed home Lasix 20 mg daily with hold parameter    - Consulted Dr. Carpio, no immediate need for thoracentesis

## 2022-06-17 NOTE — PROGRESS NOTE ADULT - PROBLEM SELECTOR PLAN 8
- chronic   - At home on allopurinol 300mg 1 tablet daily and colchicine 0.6mg 1 tablet daily   - Chema hold in the setting of supra therapeutic INR 2.8<--3.4, As patient was on warfarin few weeks back  - Uric acid wnl - chronic   - At home on allopurinol 300mg 1 tablet daily and colchicine 0.6mg 1 tablet daily   - Will resume home allopurinol  - Uric acid wnl

## 2022-06-17 NOTE — PROGRESS NOTE ADULT - SUBJECTIVE AND OBJECTIVE BOX
[facility] -- Vincenzo Wyman, Tiffany, Daina, Alex Akers Savella, Goodger  Office # [phone]    Follow Up:    decreased appetite and weakness, AFIB 120s  Subjective/Observations:   Patient seen and examined. He has no complaints today.      REVIEW OF SYSTEMS: All other review of systems is negative unless indicated above  PAST MEDICAL & SURGICAL HISTORY:  Atrial fibrillation  HTN (hypertension)  HLD (hyperlipidemia)  Anemia  CHF (congestive heart failure)  DVT, lower extremity    MEDICATIONS  (STANDING):  apixaban 2.5 milliGRAM(s) Oral every 12 hours  diltiazem Infusion 15 mG/Hr (15 mL/Hr) IV Continuous <Continuous>  furosemide    Tablet 20 milliGRAM(s) Oral daily  metoprolol tartrate 50 milliGRAM(s) Oral two times a day    MEDICATIONS  (PRN):  acetaminophen     Tablet .. 650 milliGRAM(s) Oral every 6 hours PRN Temp greater or equal to 38C (100.4F), Mild Pain (1 - 3)  ALBUTerol    0.083% 2.5 milliGRAM(s) Nebulizer every 4 hours PRN Shortness of Breath and/or Wheezing  aluminum hydroxide/magnesium hydroxide/simethicone Suspension 30 milliLiter(s) Oral every 4 hours PRN Dyspepsia  guaifenesin/dextromethorphan Oral Liquid 10 milliLiter(s) Oral every 6 hours PRN Cough  melatonin 3 milliGRAM(s) Oral at bedtime PRN Insomnia  ondansetron Injectable 4 milliGRAM(s) IV Push every 8 hours PRN Nausea and/or Vomiting    Allergies    No Known Allergies    Intolerances      Vital Signs Last 24 Hrs  T(C): 36.5 (17 Jun 2022 04:30), Max: 36.5 (16 Jun 2022 20:02)  T(F): 97.7 (17 Jun 2022 04:30), Max: 97.7 (16 Jun 2022 20:02)  HR: 79 (17 Jun 2022 04:30) (79 - 127)  BP: 106/74 (17 Jun 2022 04:30) (98/56 - 116/56)  BP(mean): --  RR: 18 (17 Jun 2022 04:30) (16 - 18)  SpO2: 95% (17 Jun 2022 04:30) (93% - 96%)  I&O's Summary    17 Jun 2022 07:01  -  17 Jun 2022 12:49  --------------------------------------------------------  IN: 60 mL / OUT: 0 mL / NET: 60 mL      Weight (kg): 61.5 (06-16 @ 23:13)  PHYSICAL EXAM:  TELE: atrial  fibrillation  Constitutional: NAD, awake and alert, well-developed  HEENT: Moist Mucous Membranes, Anicteric  Pulmonary: Non-labored, breath sounds are clear bilaterally, No wheezing, rales or rhonchi  Cardiovascular: irregular rate and rhythm  Gastrointestinal: Bowel Sounds present, soft, nontender.   Lymph: No peripheral edema. No lymphadenopathy.  Skin: No visible rashes or ulcers.  Psych:  Mood & affect appropriate  LABS: All Labs Reviewed:                        13.8   6.96  )-----------( 214      ( 17 Jun 2022 07:59 )             41.4                         12.4   6.70  )-----------( 210      ( 16 Jun 2022 06:17 )             36.9                         13.1   6.69  )-----------( 208      ( 15 Rui 2022 13:16 )             40.0     17 Jun 2022 07:59    142    |  105    |  42     ----------------------------<  108    4.0     |  31     |  1.10   16 Jun 2022 06:17    141    |  105    |  55     ----------------------------<  103    4.4     |  30     |  1.40   15 Rui 2022 13:16    139    |  103    |  63     ----------------------------<  143    5.3     |  29     |  1.80     Ca    8.8        17 Jun 2022 07:59  Ca    8.8        16 Jun 2022 06:17  Ca    9.4        15 Rui 2022 13:16  Phos  2.6       17 Jun 2022 07:59  Mg     2.2       17 Jun 2022 07:59  Mg     2.8       15 Rui 2022 13:16    TPro  6.7    /  Alb  2.5    /  TBili  1.8    /  DBili  x      /  AST  37     /  ALT  74     /  AlkPhos  136    17 Jun 2022 07:59  TPro  6.9    /  Alb  2.6    /  TBili  1.7    /  DBili  x      /  AST  102    /  ALT  111    /  AlkPhos  136    15 Rui 2022 13:16    PT/INR - ( 17 Jun 2022 07:59 )   PT: 23.6 sec;   INR: 2.00 ratio         PTT - ( 17 Jun 2022 07:59 )  PTT:33.6 sec

## 2022-06-17 NOTE — DIETITIAN INITIAL EVALUATION ADULT - NUTRITIONGOAL OUTCOME2
maximize intake of least restrictive texture without complications  po intake > 75% served meals and supplements

## 2022-06-17 NOTE — PROGRESS NOTE ADULT - SUBJECTIVE AND OBJECTIVE BOX
Date/Time Patient Seen:  		  Referring MD:   Data Reviewed	       Patient is a 89y old  Male who presents with a chief complaint of weight loss (16 Jun 2022 13:15)      Subjective/HPI     PAST MEDICAL & SURGICAL HISTORY:  Atrial fibrillation    HTN (hypertension)    HLD (hyperlipidemia)    Anemia    CHF (congestive heart failure)    DVT, lower extremity          Medication list         MEDICATIONS  (STANDING):  apixaban 2.5 milliGRAM(s) Oral every 12 hours  diltiazem Infusion 15 mG/Hr (15 mL/Hr) IV Continuous <Continuous>  furosemide    Tablet 20 milliGRAM(s) Oral daily  metoprolol tartrate 50 milliGRAM(s) Oral two times a day    MEDICATIONS  (PRN):  acetaminophen     Tablet .. 650 milliGRAM(s) Oral every 6 hours PRN Temp greater or equal to 38C (100.4F), Mild Pain (1 - 3)  ALBUTerol    0.083% 2.5 milliGRAM(s) Nebulizer every 4 hours PRN Shortness of Breath and/or Wheezing  aluminum hydroxide/magnesium hydroxide/simethicone Suspension 30 milliLiter(s) Oral every 4 hours PRN Dyspepsia  guaifenesin/dextromethorphan Oral Liquid 10 milliLiter(s) Oral every 6 hours PRN Cough  melatonin 3 milliGRAM(s) Oral at bedtime PRN Insomnia  ondansetron Injectable 4 milliGRAM(s) IV Push every 8 hours PRN Nausea and/or Vomiting         Vitals log        ICU Vital Signs Last 24 Hrs  T(C): 36.5 (17 Jun 2022 04:30), Max: 37.1 (16 Jun 2022 07:35)  T(F): 97.7 (17 Jun 2022 04:30), Max: 98.8 (16 Jun 2022 07:35)  HR: 79 (17 Jun 2022 04:30) (79 - 132)  BP: 106/74 (17 Jun 2022 04:30) (82/51 - 116/56)  BP(mean): --  ABP: --  ABP(mean): --  RR: 18 (17 Jun 2022 04:30) (16 - 18)  SpO2: 95% (17 Jun 2022 04:30) (93% - 99%)           Input and Output:  I&O's Detail      Lab Data                        12.4   6.70  )-----------( 210      ( 16 Jun 2022 06:17 )             36.9     06-16    141  |  105  |  55<H>  ----------------------------<  103<H>  4.4   |  30  |  1.40<H>    Ca    8.8      16 Jun 2022 06:17  Mg     2.8     06-15    TPro  6.9  /  Alb  2.6<L>  /  TBili  1.7<H>  /  DBili  x   /  AST  102<H>  /  ALT  111<H>  /  AlkPhos  136<H>  06-15            Review of Systems	      Objective     Physical Examination  heart s1s2  lung dec BS  abd soft        Pertinent Lab findings & Imaging      Ramírez:  NO   Adequate UO     I&O's Detail           Discussed with:     Cultures:	        Radiology

## 2022-06-17 NOTE — CONSULT NOTE ADULT - SUBJECTIVE AND OBJECTIVE BOX
Patient is a 89y old  Male who presents with a chief complaint of weight loss (17 Jun 2022 16:19)      BRIEF HOSPITAL COURSE: Patient is a 89 yo male with a pmh of Afib (on eliquis), CHF, DVT, HLD, HTN, Anemia who presented to [facility] ED on 06/15/22 with cough and weakness. Patient was previously seen by Dr. Allen in office on 06/6/22 where digoxin was d/c'd and lopressor dose was increased. Patient was found to be in a-fib RVR and + for human metapneumovirus. CT revealed b/l pleural effusions. Patient was admitted to telemetry floor and started on Cardizem was successfully controlled. Patient was transitioned to PO cardizem today. Patient also on dig every other day, and lopressor 50 BID. Patient on home eliquis dose. Of note, patient was started on midodrine for hypotension and started on 20 mg lasix daily to assist with diuresing pleural effusions.   This evening RN found patient to have HR in 120's and hypotensive with SBP in 80's with hold parameters on rate controlled medications. Resident placed consult to ICU team for further evaluation.  Upon assessment, patient is pleasantly confused though interactive. Vital signs obtained showing BP 94/65, Spo2 95%, HR ranging . Patient in no acute distress. Per RN, patient able to take metoprolol now per parameters.     Events last 24 hours: Transitioned off cardizem gtt to PO. Back in A-fib with RVR, SBP noted to be in 80's, though resolved, ICU consulted.     PAST MEDICAL & SURGICAL HISTORY:  Atrial fibrillation  HTN (hypertension)  HLD (hyperlipidemia)  Anemia  CHF (congestive heart failure)  DVT, lower extremity    Review of Systems:  CONSTITUTIONAL: No fever, chills, or fatigue  EYES: No eye pain, visual disturbances, or discharge  ENMT:  [place] as baseline  RESPIRATORY: Productive cough, does not feel SOB  CARDIOVASCULAR: No chest pain, palpitations, dizziness, or leg swelling  GASTROINTESTINAL: No abdominal or epigastric pain. No nausea, vomiting, or hematemesis; No diarrhea or constipation.  GENITOURINARY: [place] in place  NEUROLOGICAL: Generalized weakness  SKIN: No itching, burning, rashes, or lesions   MUSCULOSKELETAL: No joint pain or swelling; No muscle, back, or extremity pain        Medications:  diltiazem    Tablet 90 milliGRAM(s) Oral every 6 hours  furosemide    Tablet 20 milliGRAM(s) Oral daily  metoprolol tartrate 50 milliGRAM(s) Oral two times a day  midodrine. 5 milliGRAM(s) Oral once  ALBUTerol    0.083% 2.5 milliGRAM(s) Nebulizer every 4 hours PRN  guaifenesin/dextromethorphan Oral Liquid 10 milliLiter(s) Oral every 6 hours PRN  acetaminophen     Tablet .. 650 milliGRAM(s) Oral every 6 hours PRN  melatonin 3 milliGRAM(s) Oral at bedtime PRN  ondansetron Injectable 4 milliGRAM(s) IV Push every 8 hours PRN  apixaban 2.5 milliGRAM(s) Oral every 12 hours  aluminum hydroxide/magnesium hydroxide/simethicone Suspension 30 milliLiter(s) Oral every 4 hours PRN  albumin human 25% IVPB 100 milliLiter(s) IV Intermittent once      ICU Vital Signs Last 24 Hrs  T(C): 36.3 (17 Jun 2022 13:28), Max: 36.5 (16 Jun 2022 20:02)  T(F): 97.3 (17 Jun 2022 13:28), Max: 97.7 (16 Jun 2022 20:02)  HR: 122 (17 Jun 2022 17:22) (79 - 122)  BP: 87/62 (17 Jun 2022 17:22) (87/62 - 109/76)  BP(mean): --  ABP: --  ABP(mean): --  RR: 17 (17 Jun 2022 17:22) (17 - 18)  SpO2: 91% (17 Jun 2022 17:22) (91% - 96%)      I&O's Detail    17 Jun 2022 07:01  -  17 Jun 2022 18:14  --------------------------------------------------------  IN:    Diltiazem: 75 mL  Total IN: 75 mL    OUT:  Total OUT: 0 mL    Total NET: 75 mL    LABS:                        13.8   6.96  )-----------( 214      ( 17 Jun 2022 07:59 )             41.4     06-17    142  |  105  |  42<H>  ----------------------------<  108<H>  4.0   |  31  |  1.10    Ca    8.8      17 Jun 2022 07:59  Phos  2.6     06-17  Mg     2.2     06-17    TPro  6.7  /  Alb  2.5<L>  /  TBili  1.8<H>  /  DBili  x   /  AST  37  /  ALT  74  /  AlkPhos  136<H>  06-17    CAPILLARY BLOOD GLUCOSE    PT/INR - ( 17 Jun 2022 07:59 )   PT: 23.6 sec;   INR: 2.00 ratio         PTT - ( 17 Jun 2022 07:59 )  PTT:33.6 sec    CULTURES:  Culture Results:   <10,000 CFU/mL Normal Urogenital Renae (06-15-22 @ 22:25)  Culture Results:   No growth to date. (06-15-22 @ 18:31)  Culture Results:   No growth to date. (06-15-22 @ 18:31)  Rapid RVP Result: Detected (06-15-22 @ 13:19)    Physical Examination:    General: No acute distress.  Alert, interactive. Resting comfortably    HEENT: Pupils equal, reactive to light.  Symmetric.    PULM: Upper airway rhonchi clearing with cough, diminished breath sounds b/l. Chest expansion symmetrical     CVS: Iregular rate and rhythm, S1 S2    ABD: Soft, nondistended, nontender, present bowel sounds    EXT: No edema, nontender    SKIN: Warm and well perfused, no rashes noted.    NEURO: A&Ox2, moving all ext, sitting in chair, interactive following commands.      RADIOLOGY: < from: CT Chest No Cont (06.15.22 @ 15:39) >  INTERPRETATION:  INDICATION: Shortness of breath  TECHNIQUE: Unenhanced CT of the chest. Coronal, sagittal, and MIP images   were reconstructed and reviewed.  COMPARISON: No prior chest CT.    FINDINGS:    AIRWAYS, LUNGS and PLEURA: Patent central airways. Moderate bilateral   pleural effusions, interlobular septal thickening in patchy groundglass   opacities representing pulmonary edema. Multiple bilateral smaller than 6   mm nodules, indeterminant etiology.    MEDIASTINUM AND RENZO: Borderline/mildly enlarged mediastinal lymph nodes   including AP window lymph node measuring 1.1 cm short axis.    HEART AND VESSELS: Cardiomegaly. The left atrium is severely dilated.   Mitral annulus and coronary calcifications. No pericardial effusion.   Thoracic aorta and pulmonary artery normal in diameter.    VISUALIZED UPPER ABDOMEN: Small exophytic cyst within the left kidney.    CHEST WALL AND BONES: No aggressive osseous lesion.    LOWER NECK: Within normal limits.    IMPRESSION:    Moderate bilateral pleural effusions, interlobular septal thickening in   patchy groundglass opacities representing pulmonary edema.    Multiple bilateral small lung nodules, indeterminant etiology. Metastatic   disease is a possibility. Recommend follow-up chest CT in 4 weeks to   determine the stability. Further evaluation can be performed with PET CT.

## 2022-06-17 NOTE — PROGRESS NOTE ADULT - PROBLEM SELECTOR PLAN 7
- Multiple differentials possible. May be adverse effect of digoxin, recently discontinued last week  - TSH wnl r/o thyroid disease   - f/u vit D level  - CT shows Multiple bilateral small lung nodules, indeterminant etiology. Metastatic disease is a possibility. - Recommend follow-up chest CT in 4 weeks to determine the stability. Further evaluation can be performed with PET CT. will consider repeat imaging as outpatient or PET - Multiple differentials possible. May be adverse effect of digoxin, recently discontinued last week  - TSH wnl r/o thyroid disease   - CT shows Multiple bilateral small lung nodules, indeterminant etiology. Metastatic disease is a possibility. - Recommend follow-up chest CT in 4 weeks to determine the stability. Further evaluation can be performed with PET CT. will consider repeat imaging as outpatient or PET

## 2022-06-17 NOTE — PROGRESS NOTE ADULT - ASSESSMENT
89 yo M with PMH of Afib, HTN, DLD who presented with cough, weakness for the last few days. Found to be in Afib with RVR as well as positive for human metapneumovirus.     Patient at risk for aspiration/pneumonia, although currently lower suspicion for bacterial pneumonia. He remains afebrile and without leukocytosis. Serum procalcitonin is 0.10. CT showed moderate bilateral pleural effusions but no evidence of pneumonia. Blood cultures currently no growth. Urine culture unrevealing.    -continue supportive management  -follow cultures to completion  -aspiration precautions  -will sign off, please call ID if any further questions. Thank you.    Danitza Gonzalez MD  Division of Infectious Diseases   Cell [phone] between 8am and 6pm   After 6pm and weekends please call ID service at [phone].

## 2022-06-17 NOTE — PROGRESS NOTE ADULT - PROBLEM SELECTOR PLAN 6
- On admission, BNP >10k  - CT shows Moderate bilateral pleural effusions, interlobular septal thickening in patchy ground glass opacities representing pulmonary edema.  - CXR  shows Bilateral lower lobe infiltrates.  - Resumed home Lasix 20 mg daily with hold parameter    - Repeat TTE, f/u results   - Cardiology consulted Dr Lama following - On admission, BNP >10k  - CT shows Moderate bilateral pleural effusions, interlobular septal thickening in patchy ground glass opacities representing pulmonary edema.  - CXR  shows Bilateral lower lobe infiltrates.  - Continue home Lasix 20 mg daily with hold parameter    - f/u TTE  - Cardiology consulted Dr Lama following

## 2022-06-17 NOTE — PROGRESS NOTE ADULT - ASSESSMENT
89y old  Male PMH Afib on ac htn hld chief complaint of weakness. Patient was seen in office 6/6 by Dr Allen for decreased appetite and weakness, digoxin was discontinued and increased lopressor to 100mg Po BID     - Tachycardia multifactorial in setting of underlying infection, poor po intake and medication noncompliance     AFIB:  -rate is better controlled today  cardizem gtt discontinued, transitioned to 90mg of cardizem every 6 hours  -continue dig every other day  -continue lopressor 50 bid  - Continue Eliquis     - Echo 2019 MAC aortic sclerosis normal Lv function, no pulmonary htn, normal LA size, mild MR ef 64%  - Serum Pro-Brain Natriuretic Peptide: [id number]  - CT shows moderate bilateral pleural effusions  - Does not appear volume overloaded on exam  - lasix 20mg po qd resumed    - Creatinine improving, likely prerenal in setting of decreased po intake   - Chest x-ray with bilateral lower lobe infiltrates, +  human meta pneumovirus (hMPV). Management per primary team     - Monitor and replete lytes, keep K>4, Mg>2.  - Will continue to follow.  Rosalind Rodriguez, JESSY  [phone]

## 2022-06-17 NOTE — PROGRESS NOTE ADULT - ASSESSMENT
Patient is a 89 yo M with PMH of chronic Afib, HTN, DVT, HLD, GOUT (on allopurinol and Colchicine), CHF (TTE 2019 LVED 64%) and macular degeneration who was brought in by family for complaint of cough, weakness for the last few days. Imaging shows moderate bilateral pleural effusions Admitted with acute on chronic HFpEF A fib with RVR and management of pleural effusion, + hMPv Virus

## 2022-06-17 NOTE — DIETITIAN INITIAL EVALUATION ADULT - SIGNS/SYMPTOMS
BMI < 18, see NPFE , hx mild oral phase dysphagia, rx for soft bite sized texture and mild thick liquids

## 2022-06-17 NOTE — DIETITIAN INITIAL EVALUATION ADULT - ORAL INTAKE PTA/DIET HISTORY
pt sleeping diet hx pending  . pt sleeping diet hx pending   called daughter Leigh Mars at number in contacts and left message at 11:57 am 6/17/22

## 2022-06-17 NOTE — PROGRESS NOTE ADULT - ATTENDING COMMENTS
89 yo M with PMH of chronic Afib, HTN, DVT, HLD, GOUT (on allopurinol and Colchicine), CHF (TTE 2019 LVED 64%) and macular degeneration who was brought in by family for complaint of cough, weakness for the last few days. Imaging shows moderate bilateral pleural effusions Admitted with acute on chronic HFpEF A fib with RVR and management of pleural effusion, + hMPv Virus PLan: monitor clinical course, supportive care, apprec pulm and cardio recs, apprec ICU consult, monitor hemodynamics, monitor i/os, metabolic encephalopathy today

## 2022-06-17 NOTE — PROGRESS NOTE ADULT - SUBJECTIVE AND OBJECTIVE BOX
[facility]  INFECTIOUS DISEASES - [address], [place] [address]  Tel: [phone]     Fax: [phone]  =======================================================    ROBERT JOHNSON [id number]    Follow up: No fevers. On 4L nasal cannula. Sitting up in chair, still with cough but denies any pain or SOB. Appears more confused today.    Allergies:  No Known Allergies      Antibiotics:  acetaminophen     Tablet .. 650 milliGRAM(s) Oral every 6 hours PRN  ALBUTerol    0.083% 2.5 milliGRAM(s) Nebulizer every 4 hours PRN  aluminum hydroxide/magnesium hydroxide/simethicone Suspension 30 milliLiter(s) Oral every 4 hours PRN  apixaban 2.5 milliGRAM(s) Oral every 12 hours  diltiazem    Tablet 90 milliGRAM(s) Oral every 6 hours  furosemide    Tablet 20 milliGRAM(s) Oral daily  guaifenesin/dextromethorphan Oral Liquid 10 milliLiter(s) Oral every 6 hours PRN  melatonin 3 milliGRAM(s) Oral at bedtime PRN  metoprolol tartrate 50 milliGRAM(s) Oral two times a day  ondansetron Injectable 4 milliGRAM(s) IV Push every 8 hours PRN       REVIEW OF SYSTEMS: *limited 2/2 mental status*  CONSTITUTIONAL:  No Fevers  CARDIOVASCULAR:  No chest pain   RESPIRATORY:  see history  GASTROINTESTINAL:  No abdominal pain  PSYCHIATRIC:  No headache     Physical Exam:  ICU Vital Signs Last 24 Hrs  T(C): 36.3 (2022 13:28), Max: 36.5 (2022 20:02)  T(F): 97.3 (2022 13:28), Max: 97.7 (2022 20:02)  HR: 92 (2022 13:28) (79 - 122)  BP: 94/55 (2022 14:32) (90/54 - 109/76)  BP(mean): --  ABP: --  ABP(mean): --  RR: 18 (2022 13:28) (17 - 18)  SpO2: 92% (2022 13:28) (92% - 96%)     GEN: not in apparent distress  HEENT: normocephalic and atraumatic.    NECK: Supple.   LUNGS: Clear to auscultation.  HEART: mildly tachycardic  ABDOMEN: Soft, nontender, and nondistended.    EXTREMITIES: No leg edema  NEUROLOGIC: AO x 2 (knows the year is )    Labs:      142  |  105  |  42<H>  ----------------------------<  108<H>  4.0   |  31  |  1.10    Ca    8.8      2022 07:59  Phos  2.6       Mg     2.2         TPro  6.7  /  Alb  2.5<L>  /  TBili  1.8<H>  /  DBili  x   /  AST  37  /  ALT  74  /  AlkPhos  136<H>                            13.8   6.96  )-----------( 214      ( 2022 07:59 )             41.4     PT/INR - ( 2022 07:59 )   PT: 23.6 sec;   INR: 2.00 ratio         PTT - ( 2022 07:59 )  PTT:33.6 sec  Urinalysis Basic - ( 15 Rui 2022 16:37 )    Color: Yellow / Appearance: Clear / S.015 / pH: x  Gluc: x / Ketone: Negative  / Bili: Negative / Urobili: Negative   Blood: x / Protein: 30 mg/dL / Nitrite: Negative   Leuk Esterase: Negative / RBC: 3-5 /HPF / WBC 0-2   Sq Epi: x / Non Sq Epi: Occasional / Bacteria: Occasional      LIVER FUNCTIONS - ( 2022 07:59 )  Alb: 2.5 g/dL / Pro: 6.7 g/dL / ALK PHOS: 136 U/L / ALT: 74 U/L / AST: 37 U/L / GGT: x             RECENT CULTURES:  06-15 @ 22:25 Clean Catch Clean Catch (Midstream)     <10,000 CFU/mL Normal Urogenital Renae        06-15 @ 18:31 .Blood Blood-Peripheral     No growth to date.        06-15 @ 13:19          Detected        All imaging and data are reviewed.

## 2022-06-17 NOTE — DIETITIAN INITIAL EVALUATION ADULT - NSFNSGIIOFT_GEN_A_CORE
Ht in chart 6'8". Pt does appear tall. He is sleeping, unable to get pt confirmation. Left message with mandy Abraham. RD estimating ht of 6'4" and will use for assesment purposes at this time. Will re-assess when ht confirmed

## 2022-06-17 NOTE — PROGRESS NOTE ADULT - PROBLEM SELECTOR PLAN 1
- Heart rate is better controlled today  - Cardizem gtt discontinued, transitioned to 90mg of Cardizem every 6 hours  - Continue digoxin every other day and continue lopressor 50mg BID   - Likely Tachycardia multifactorial in setting of underlying infection, poor po intake and med noncompliance+ hMPv  - Continue home Eliquis 2.5mg  BID  - Cardiology consulted Dr Lama following

## 2022-06-17 NOTE — CHART NOTE - NSCHARTNOTEFT_GEN_A_CORE
Called by RN @1730 for Afib w/ RVR up to 120's. Pt has been in 100's through most of the day that has been trending up to high 120's. On diltiazem 90 q6h and metoprolol 50 q12h w/ hold parameters due at 1800, which were subsequently held for soft BP w/ SBP 87. s/p midodrine 5 mg @1530 without improvement in BP. Pt has hx of CHF w/ pleural effusions receiving lasix 20 mg po qd saturating well on room air.    T(C): 36.3 (06-17-22 @ 13:28), Max: 36.5 (06-16-22 @ 20:02)  HR: 122 (06-17-22 @ 17:22) (79 - 122)  BP: 87/62 (06-17-22 @ 17:22) (87/62 - 109/76)  RR: 17 (06-17-22 @ 17:22) (17 - 18)  SpO2: 91% (06-17-22 @ 17:22) (91% - 96%)    PHYSICAL EXAM:  GENERAL: NAD, sitting in chair, frail  HEAD:  Atraumatic, Normocephalic  EYES: EOMI, conjunctiva and sclera clear  ENT: Moist mucous membranes  NECK: Supple, No JVD  CHEST/LUNG: crackles in lungs at bases b/l with increased respiratory effort  HEART: Tachycardic w/ irregularly irregular rates; No murmurs, rubs, or gallops  ABDOMEN: Bowel sounds present; Soft, Nontender, Nondistended.  EXTREMITIES:  No clubbing, cyanosis, or edema  NERVOUS SYSTEM:  Alert & Oriented X3, speech clear. No deficits     Plan:  - Afib w/ RVR w/ heart rate trending up in setting of soft BP   - s/p midodrine 5 mg w/ minimal response  - hold off on IVF in setting of CHF w/ pleural effusions and fluid overload (crackles on exam)  - hold metoprolol and dilt for now for soft BP  - c/w digoxin per cardio  - ICU consulted  - Repeat BP in 1 hour -> discussed w/ RN to call resident team  - Will continue to monitor, RN to call if any changes Called by RN @1730 for Afib w/ RVR up to 120's. Pt has been in 100's through most of the day that has been trending up to high 120's. On diltiazem 90 q6h and metoprolol 50 q12h w/ hold parameters due at 1800, which were subsequently held for soft BP w/ SBP 87. s/p midodrine 5 mg @1530 without improvement in BP. Pt has hx of CHF w/ pleural effusions receiving lasix 20 mg po qd saturating well on room air.    T(C): 36.3 (06-17-22 @ 13:28), Max: 36.5 (06-16-22 @ 20:02)  HR: 122 (06-17-22 @ 17:22) (79 - 122)  BP: 87/62 (06-17-22 @ 17:22) (87/62 - 109/76)  RR: 17 (06-17-22 @ 17:22) (17 - 18)  SpO2: 91% (06-17-22 @ 17:22) (91% - 96%)    PHYSICAL EXAM:  GENERAL: NAD, sitting in chair, frail  HEAD:  Atraumatic, Normocephalic  EYES: EOMI, conjunctiva and sclera clear  ENT: Moist mucous membranes  NECK: Supple, No JVD  CHEST/LUNG: crackles in lungs at bases b/l with increased respiratory effort  HEART: Tachycardic w/ irregularly irregular rates; No murmurs, rubs, or gallops  ABDOMEN: Bowel sounds present; Soft, Nontender, Nondistended.  EXTREMITIES:  No clubbing, cyanosis, or edema  NERVOUS SYSTEM:  Alert & Oriented X3, speech clear. No deficits     Plan:  - Afib w/ RVR w/ heart rate trending up in setting of soft BP   - s/p midodrine 5 mg w/ minimal response  - hold off on IVF in setting of CHF w/ pleural effusions and fluid overload (crackles on exam)  - hold metoprolol and dilt for now for soft BP  - c/w digoxin per cardio  - ICU consulted  - Repeat BP in 1 hour -> discussed w/ RN to call resident team  - Will continue to monitor, RN to call if any changes  - Dr. Faulkner contacted, dino w/ plan Called by RN @1730 for Afib w/ RVR up to 120's. Pt has been in 100's through most of the day that has been trending up to high 120's. On diltiazem 90 q6h and metoprolol 50 q12h w/ hold parameters due at 1800, which were subsequently held for soft BP w/ SBP 87. s/p midodrine 5 mg @1530 without improvement in BP. Pt has hx of CHF w/ pleural effusions receiving lasix 20 mg po qd saturating well on room air. Endorses feeling ill w/ dyspnea and weakness. Denies CP palpitations or AMS at this time.     T(C): 36.3 (06-17-22 @ 13:28), Max: 36.5 (06-16-22 @ 20:02)  HR: 122 (06-17-22 @ 17:22) (79 - 122)  BP: 87/62 (06-17-22 @ 17:22) (87/62 - 109/76)  RR: 17 (06-17-22 @ 17:22) (17 - 18)  SpO2: 91% (06-17-22 @ 17:22) (91% - 96%)    PHYSICAL EXAM:  GENERAL: NAD, sitting in chair, frail  HEAD:  Atraumatic, Normocephalic  EYES: EOMI, conjunctiva and sclera clear  ENT: Moist mucous membranes  NECK: Supple, No JVD  CHEST/LUNG: crackles in lungs at bases b/l with increased respiratory effort  HEART: Tachycardic w/ irregularly irregular rates; No murmurs, rubs, or gallops  ABDOMEN: Bowel sounds present; Soft, Nontender, Nondistended.  EXTREMITIES:  No clubbing, cyanosis, or edema  NERVOUS SYSTEM:  Alert & Oriented X3, speech clear. No deficits     Plan:  - Afib w/ RVR w/ heart rate trending up in setting of soft BP   - s/p midodrine 5 mg w/ minimal response  - hold off on IVF in setting of CHF w/ pleural effusions and fluid overload (crackles on exam)  - hold metoprolol and dilt for now for soft BP  - c/w digoxin per cardio  - c/w lasix 20 mg po qd for now  - ICU consulted  - Repeat BP in 1 hour -> discussed w/ RN to call resident team  - Will continue to monitor, RN to call if any changes  - Dr. Faulkner contacted, dino w/ plan

## 2022-06-18 NOTE — PROGRESS NOTE ADULT - PROBLEM SELECTOR PLAN 5
- likely prerenal azotemia from decreased PO intake cr RESOLVED   - Avoid nephrotoxic agents.  - Caution with IVF given hx of CHF with b/l pleural effusion   - F/u BMP in AM  - Will obtain renal consult if renal function worsens.

## 2022-06-18 NOTE — PROGRESS NOTE ADULT - PROBLEM SELECTOR PLAN 7
- Multiple differentials possible. May be adverse effect of digoxin, recently discontinued last week  - TSH wnl r/o thyroid disease   - CT shows Multiple bilateral small lung nodules, indeterminant etiology. Metastatic disease is a possibility. - Recommend follow-up chest CT in 4 weeks to determine the stability. Further evaluation can be performed with PET CT. will consider repeat imaging as outpatient or PET - Multiple differentials possible. May be adverse effect of digoxin, recently discontinued last week  - TSH wnl    - CT shows Multiple bilateral small lung nodules, indeterminant etiology. Metastatic disease is a possibility. - Recommend follow-up chest CT in 4 weeks to determine the stability. Further evaluation can be performed with PET CT. will consider repeat imaging as outpatient or PET - Multiple differentials possible. May be adverse effect of digoxin, recently discontinued last week  - TSH wnl    - CT shows Multiple bilateral small lung nodules, indeterminant etiology. Metastatic disease is a possibility. - Recommend follow-up chest CT in 4 weeks to determine the stability. Further evaluation can be performed with PET CT. will consider repeat imaging as outpatient or PET  -apprec pulm recs

## 2022-06-18 NOTE — PROGRESS NOTE ADULT - PROBLEM SELECTOR PLAN 3
- CT shows Moderate bilateral pleural effusions, interlobular septal thickening in patchy ground glass opacities representing pulmonary edema.  - CXR  shows Bilateral lower lobe infiltrates.  - Incentive spirometer  - Resumed home Lasix 20 mg daily with hold parameter    - Consulted Dr. aCrpio, no immediate need for thoracentesis - CT shows Moderate bilateral pleural effusions, interlobular septal thickening in patchy ground glass opacities representing pulmonary edema.  - CXR  shows Bilateral lower lobe infiltrates.  - Incentive spirometer  - continue  home Lasix 20 mg daily with hold parameter    - Consulted Dr. Carpio, no immediate need for thoracentesis

## 2022-06-18 NOTE — PROGRESS NOTE ADULT - PROBLEM SELECTOR PLAN 1
- Over night, Heart rate B/W   -  Continue Cardizem 90mg  every 6 hours, digoxin every other day and  lopressor 50mg BID   - Likely Tachycardia multifactorial in setting of underlying infection, poor po intake and med noncompliance+ hMPv  - Continue home Eliquis 2.5mg  BID  - Cardiology consulted Dr Lama following - Over night, Heart rate B/W   -  Continue Cardizem 90mg  every 6 hours, digoxin every other day and  lopressor 50mg BID   - Likely multifactorial in setting of underlying infection, poor po intake and med noncompliance+ hMPv  - Continue home Eliquis 2.5mg  BID  - Cardiology consulted Dr Lama following - Over night, Heart rate B/W   -  Continue Cardizem 90mg  every 6 hours, digoxin every other day and  lopressor 50mg BID   - Likely multifactorial in setting of underlying infection, poor po intake and med noncompliance+ hMPv  - Continue home Eliquis 2.5mg  BID  - Cardiology recs apprec

## 2022-06-18 NOTE — PROGRESS NOTE ADULT - PROBLEM SELECTOR PLAN 8
- chronic   - At home on allopurinol 300mg 1 tablet daily and colchicine 0.6mg 1 tablet daily   - Will resume home allopurinol  - Uric acid wnl - chronic   - At home on allopurinol 300mg 1 tablet daily and colchicine 0.6mg 1 tablet daily  - continue home allopurinol  - Uric acid wnl

## 2022-06-18 NOTE — PROGRESS NOTE ADULT - SUBJECTIVE AND OBJECTIVE BOX
[facility] -- Vincenzo Wyman, Daina Allen, Alex Akers [place], [place]  Office # [phone]    Follow Up: Afib with RVR     Subjective/Observations: Seen and evaluated, on NC at 4L/min.  +cough but unable to expectorate.  Denies chest pain or palpitations.  Overnight tele events noted.  Episodes of brief tachycardia noted up to 120's    REVIEW OF SYSTEMS: All other review of systems is negative unless indicated above  PAST MEDICAL & SURGICAL HISTORY:  Atrial fibrillation  HTN (hypertension)  HLD (hyperlipidemia)  Anemia    CHF (congestive heart failure)  DVT, lower extremity    MEDICATIONS  (STANDING):  apixaban 2.5 milliGRAM(s) Oral every 12 hours  digoxin     Tablet 125 MICROGram(s) Oral every other day  diltiazem    Tablet 90 milliGRAM(s) Oral every 6 hours  furosemide    Tablet 20 milliGRAM(s) Oral daily  metoprolol tartrate 50 milliGRAM(s) Oral two times a day    MEDICATIONS  (PRN):  acetaminophen     Tablet .. 650 milliGRAM(s) Oral every 6 hours PRN Temp greater or equal to 38C (100.4F), Mild Pain (1 - 3)  ALBUTerol    0.083% 2.5 milliGRAM(s) Nebulizer every 4 hours PRN Shortness of Breath and/or Wheezing  aluminum hydroxide/magnesium hydroxide/simethicone Suspension 30 milliLiter(s) Oral every 4 hours PRN Dyspepsia  guaifenesin/dextromethorphan Oral Liquid 10 milliLiter(s) Oral every 6 hours PRN Cough  melatonin 3 milliGRAM(s) Oral at bedtime PRN Insomnia  ondansetron Injectable 4 milliGRAM(s) IV Push every 8 hours PRN Nausea and/or Vomiting    Allergies    No Known Allergies    Intolerances    Vital Signs Last 24 Hrs  T(C): 36.3 (18 Jun 2022 05:24), Max: 36.8 (17 Jun 2022 22:01)  T(F): 97.4 (18 Jun 2022 05:24), Max: 98.2 (17 Jun 2022 22:01)  HR: 90 (18 Jun 2022 07:59) (90 - 122)  BP: 99/78 (18 Jun 2022 07:59) (87/62 - 108/73)  BP(mean): --  RR: 18 (18 Jun 2022 05:24) (17 - 19)  SpO2: 94% (18 Jun 2022 05:24) (90% - 94%)  I&O's Summary    17 Jun 2022 07:01  -  18 Jun 2022 07:00  --------------------------------------------------------  IN: 75 mL / OUT: 0 mL / NET: 75 mL      PHYSICAL EXAM:  TELE: Afib  Constitutional: NAD, awake but lethargic, frail  HEENT: Moist Mucous Membranes, Anicteric  Pulmonary: Non-labored, breath sounds are diminished bilaterally, No wheezing, rales + rhonchi  Cardiovascular: IRRR, S1 and S2, No murmurs, rubs, gallops or clicks  Gastrointestinal: Bowel Sounds present, soft, nontender.   Lymph: No peripheral edema. No lymphadenopathy.  Skin: No visible rashes or ulcers.  Psych:  Mood & affect flat  LABS: All Labs Reviewed:                        12.4   6.26  )-----------( 184      ( 18 Jun 2022 07:29 )             37.9                         13.8   6.96  )-----------( 214      ( 17 Jun 2022 07:59 )             41.4                         12.4   6.70  )-----------( 210      ( 16 Jun 2022 06:17 )             36.9     18 Jun 2022 07:29    143    |  106    |  37     ----------------------------<  108    3.9     |  32     |  1.00   17 Jun 2022 07:59    142    |  105    |  42     ----------------------------<  108    4.0     |  31     |  1.10   16 Jun 2022 06:17    141    |  105    |  55     ----------------------------<  103    4.4     |  30     |  1.40     Ca    8.7        18 Jun 2022 07:29  Ca    8.8        17 Jun 2022 07:59  Ca    8.8        16 Jun 2022 06:17  Phos  2.5       18 Jun 2022 07:29  Phos  2.6       17 Jun 2022 07:59  Mg     2.5       17 Jun 2022 20:47  Mg     2.2       17 Jun 2022 07:59  Mg     2.8       15 Joelle 2022 13:16    TPro  6.5    /  Alb  2.5    /  TBili  1.8    /  DBili  x      /  AST  28     /  ALT  55     /  AlkPhos  108    18 Jun 2022 07:29  TPro  6.7    /  Alb  2.5    /  TBili  1.8    /  DBili  x      /  AST  37     /  ALT  74     /  AlkPhos  136    17 Jun 2022 07:59  TPro  6.9    /  Alb  2.6    /  TBili  1.7    /  DBili  x      /  AST  102    /  ALT  111    /  AlkPhos  136    15 Joelle 2022 13:16    PT/INR - ( 17 Jun 2022 07:59 )   PT: 23.6 sec;   INR: 2.00 ratio       PTT - ( 17 Jun 2022 07:59 )  PTT:33.6 sec    ACC: [id number] EXAM:  CT CHEST                          PROCEDURE DATE:  06/15/2022          INTERPRETATION:  INDICATION: Shortness of breath  TECHNIQUE: Unenhanced CT of the chest. Coronal, sagittal, and MIP images   were reconstructed and reviewed.  COMPARISON: No prior chest CT.    FINDINGS:    AIRWAYS, LUNGS and PLEURA: Patent central airways. Moderate bilateral   pleural effusions, interlobular septal thickening in patchy groundglass   opacities representing pulmonary edema. Multiple bilateral smaller than 6   mm nodules, indeterminant etiology.    MEDIASTINUM AND RENZO: Borderline/mildly enlarged mediastinal lymph nodes   including AP window lymph node measuring 1.1 cm short axis.    HEART AND VESSELS: Cardiomegaly. The left atrium is severely dilated.   Mitral annulus and coronary calcifications. No pericardial effusion.   Thoracic aorta and pulmonary artery normal in diameter.    VISUALIZED UPPER ABDOMEN: Small exophytic cyst within the left kidney.    CHEST WALL AND BONES: No aggressive osseous lesion.    LOWER NECK: Within normal limits.    IMPRESSION:    Moderate bilateral pleural effusions, interlobular septal thickening in   patchy groundglass opacities representing pulmonary edema.    Multiple bilateral small lung nodules, indeterminant etiology. Metastatic   disease is a possibility. Recommend follow-up chest CT in 4 weeks to   determine the stability. Further evaluation can be performed with PET CT.    --- End of Report ---    JADEN RODRÍGUEZ MD; Attending Radiologist  This document has been electronically signed. Joelle 15 2022  4:10PM      Ventricular Rate 130 BPM    QRS Duration 78 ms    Q-T Interval 288 ms    QTC Calculation(Bazett) 423 ms    R Axis 78 degrees    T Axis 269 degrees    Diagnosis Line Atrial fibrillation with rapid ventricular response  ST & T wave abnormality, consider inferior ischemia  ST & T wave abnormality, consider anterolateral ischemia  Abnormal ECG  When compared with ECG of 15-JOELLE-2022 12:46, (Unconfirmed)  No significant change was found  Confirmed by TOMASZ HEBERT ([id number]) on 6/15/2022 3:04:33 PM

## 2022-06-18 NOTE — PHARMACOTHERAPY INTERVENTION NOTE - COMMENTS
Patient with afib ordered for Eliquis 2.5mg BID for anticoagulation. Patient takes Eliquis 5mg BID outpatient, dose was reduced on admission due to poor renal function. Patient renal function now improved and no longer meets criteria for reduced dosing (2/3 of the following: SCr > 1.5, Age >80, and Weight < 60kg). Patient’s weight is borderline at 60.8kg and age is >80. Discussed dosing with cardio NP Deanna and informed NP of borderline values and home dose. Inquired if NP would like to increase to home dose of 5mg BID, NP prefers to keep patient on home dose for now given borderline values.

## 2022-06-18 NOTE — PROGRESS NOTE ADULT - ATTENDING COMMENTS
89 yo M with PMH of chronic Afib, HTN, DVT, HLD, GOUT (on allopurinol and Colchicine), CHF (TTE 2019 LVED 64%) and macular degeneration who was brought in by family for complaint of cough, weakness for the last few days. Imaging shows moderate bilateral pleural effusions Admitted with acute on chronic HFpEF A fib with RVR and management of pleural effusion, + hMPv Virus PLan: supportive care, apprec s/s eval,, monitor clinical course, apprec cardio recs, apprec pulm recs

## 2022-06-18 NOTE — DISCHARGE NOTE PROVIDER - NSDCCPCAREPLAN_GEN_ALL_CORE_FT
PRINCIPAL DISCHARGE DIAGNOSIS  Diagnosis: Generalized weakness  Assessment and Plan of Treatment:       SECONDARY DISCHARGE DIAGNOSES  Diagnosis: Pleural effusion  Assessment and Plan of Treatment:     Diagnosis: Human metapneumovirus pneumonia  Assessment and Plan of Treatment:      PRINCIPAL DISCHARGE DIAGNOSIS  Diagnosis: Atrial fibrillation with RVR  Assessment and Plan of Treatment: You have a known diagnosis of atrial fibrillation prior to your admission. This condition, if not treated, increases your risk of stroke or heart attack.   Please make sure to continue taking these medications to avoid developing blood clots and to lower your risk of stroke.   - Additionally, please follow up with your PCP (and/or cardiologist) on a regular basis to ensure that this condition does not require changes to the dose or type of medication.      SECONDARY DISCHARGE DIAGNOSES  Diagnosis: Human metapneumovirus pneumonia  Assessment and Plan of Treatment: You had a virus when you came to the hospial. You were supplied oxygen and cough medication to help you breath.  Follow up with your primary care doctor within 1 week of discharge.

## 2022-06-18 NOTE — PROGRESS NOTE ADULT - ASSESSMENT
89y old  Male pmg afib on ac htn hld chief complaint of weakness    ICU eval noted  cvs rx regimen optimization in progress  cardio follow up    AF management - rate and rhythm control  AC for AF  I and O  cvs rx regimen and BP control  hMPV - resp viral illness - supportive measures - Albuterol PRN for sob and or wheezing  monitor VS and HD and Sat  Pleural Eff - Atelectasis - I ze if able to tolerate - no immediate need for thoracentesis - medical management  diuresis as per CARDIO recs  GOC discussion  isolation precs for hMPV  cough rx regimen  replmelinda liu

## 2022-06-18 NOTE — DISCHARGE NOTE PROVIDER - HOSPITAL COURSE
HPI:  Patient is a 89 yo M with PMH of Afib, HTN, DLD who was brought in by family for complaint of cough, weakness for the last few days. Patient reports weight loss and reduced appetite for the last month. Denies chest pain or palpitations, denies fever, denies N/V/D. Of note, patient was seen recently by cardiologist Dr Allen on 6/6 for weakness and decreased appetite and his digoxin was discontinue and his lopressor was increased and coumadin changed to Eliquis.  Uses a cane occasionally at home, denies recent falls. Denies blood in stool.     ED course:  Na 139, K 5.3 (hemolyzed), Cr 1.80, Lactate 3.1, Mg 2.8, BNP 80700, Lactate 1.8, INR 3.43. Covid negative, resp panel positive for human metapneumovirus. CT shows ground glass and nodules. EKG shows afib, . Received metoprolol 5mg IV and cardiology consulted.  (15 Rui 2022 20:48)      ---  HOSPITAL COURSE: Patient admitted for a fib with RVR, - Likely multifactorial in setting of underlying infection, poor po intake and med noncompliance+ hMPv. Dr Cook was consulted. Patient was started on Cardizem gtt switched to Cardizem 90mg  every 6 hours, digoxin every other day and  lopressor 50mg BID for rate control and home Eliquis 2.5mg  BID for AC. Patient with episodes of hypotension with tachycardia, started on midodrine 5mg q8h with improvement.     For pleural effusion  b/l. Pulm Dr Carpio was consulted. No thoracocentesis.     VS reviewed and wnl. No concerning findings on exam. Importantly, pt was in no respiratory distress. Care plan reviewed with caregivers. Caregivers in agreement and endorse understanding. Pt deemed stable for d/c home w/ anticipatory guidance and strict indications for return. No outstanding issues or concerns noted.     Patient is stable to discharge home. Patient is medically optimized by primary care team and consultants to discharge home with f/u with out patient PCP within 1 week of discharge.  ---  CONSULTANTS:   Cardio: Dr Cook   ---  TIME SPENT:  I, the attending physician, was physically present for the key portions of the evaluation and management (E/M) service provided. The total amount of time spent reviewing the hospital notes, laboratory values, imaging findings, assessing/counseling the patient, discussing with consultant physicians, social work, nursing staff was -- minutes    ---  Primary care provider was made aware of plan for discharge:      [  ] NO     [  ] YES   HPI:  Patient is a 89 yo M with PMH of Afib, HTN, DLD who was brought in by family for complaint of cough, weakness for the last few days. Patient reports weight loss and reduced appetite for the last month. Denies chest pain or palpitations, denies fever, denies N/V/D. Of note, patient was seen recently by cardiologist Dr Allen on 6/6 for weakness and decreased appetite and his digoxin was discontinue and his lopressor was increased and coumadin changed to Eliquis.  Uses a cane occasionally at home, denies recent falls. Denies blood in stool.     ED course:  Na 139, K 5.3 (hemolyzed), Cr 1.80, Lactate 3.1, Mg 2.8, BNP 55011, Lactate 1.8, INR 3.43. Covid negative, resp panel positive for human metapneumovirus. CT shows ground glass and nodules. EKG shows afib, . Received metoprolol 5mg IV and cardiology consulted.  (15 Rui 2022 20:48)      ---  HOSPITAL COURSE: Patient admitted for a fib with RVR, - Likely multifactorial in setting of underlying infection, poor po intake and med noncompliance+ hMPv. Dr Cook was consulted. Patient was started on Cardizem gtt switched to Cardizem 90mg  every 6 hours, digoxin every other day and  lopressor 50mg BID for rate control and home Eliquis 2.5mg  BID for AC. Patient with episodes of hypotension with tachycardia, started on midodrine 5mg q8h with improvement. Cardizem was switched to 360mg qd, lopressor to 100mg bid for better compliance     For pleural effusion  b/l. Pulm Dr Carpio was consulted. No thoracocentesis.     VS reviewed and wnl. No concerning findings on exam. Importantly, pt was in no respiratory distress. Care plan reviewed with caregivers. Caregivers in agreement and endorse understanding. Pt deemed stable for d/c home w/ anticipatory guidance and strict indications for return. No outstanding issues or concerns noted.     Patient is stable to discharge home. Patient is medically optimized by primary care team and consultants to discharge home with f/u with out patient PCP within 1 week of discharge.  ---  CONSULTANTS:   Cardio: Dr Cook   ---  TIME SPENT:  I, the attending physician, was physically present for the key portions of the evaluation and management (E/M) service provided. The total amount of time spent reviewing the hospital notes, laboratory values, imaging findings, assessing/counseling the patient, discussing with consultant physicians, social work, nursing staff was -- minutes    ---  Primary care provider was made aware of plan for discharge:      [  ] NO     [  ] YES   HPI:  Patient is a 89 yo M with PMH of Afib, HTN, DLD who was brought in by family for complaint of cough, weakness for the last few days. Patient reports weight loss and reduced appetite for the last month. Denies chest pain or palpitations, denies fever, denies N/V/D. Of note, patient was seen recently by cardiologist Dr Allen on 6/6 for weakness and decreased appetite and his digoxin was discontinue and his lopressor was increased and coumadin changed to Eliquis.  Uses a cane occasionally at home, denies recent falls. Denies blood in stool.     ED course:  Na 139, K 5.3 (hemolyzed), Cr 1.80, Lactate 3.1, Mg 2.8, BNP 24640, Lactate 1.8, INR 3.43. Covid negative, resp panel positive for human metapneumovirus. CT shows ground glass and nodules. EKG shows afib, . Received metoprolol 5mg IV and cardiology consulted.  (15 Rui 2022 20:48)      ---  HOSPITAL COURSE: Patient admitted for a fib with RVR, - Likely multifactorial in setting of underlying infection, poor po intake and med noncompliance+ hMPv. Dr Cook was consulted. Patient was started on Cardizem gtt switched to Cardizem 90mg  every 6 hours, digoxin every other day and  lopressor 50mg BID for rate control and home Eliquis 2.5mg  BID for AC. Patient with episodes of hypotension with tachycardia, started on midodrine 5mg q8h with improvement. Cardizem was switched to 360mg qd, lopressor to 100mg bid for better compliance     For pleural effusion  b/l. Pulm Dr Carpio was consulted. No thoracocentesis.     Patients hMPV was treated symptomatically and supplemental oxygen supplied. Pleural effusions on CT and cxr noted. No intervention warranted.   MARISOL due to prerenal azotemia resolved with PO intake.    VS reviewed and wnl. No concerning findings on exam. Importantly, pt was in no respiratory distress. Care plan reviewed with caregivers. Caregivers in agreement and endorse understanding. Pt deemed stable for d/c home w/ anticipatory guidance and strict indications for return. No outstanding issues or concerns noted.     Patient is stable to discharge home. Patient is medically optimized by primary care team and consultants to discharge home with f/u with out patient PCP within 1 week of discharge.  ---  CONSULTANTS:   Cardio: Dr Cook   Neurology Dr Marshall   Pulcarline Carpio  ---  TIME SPENT:  I, the attending physician, was physically present for the key portions of the evaluation and management (E/M) service provided. The total amount of time spent reviewing the hospital notes, laboratory values, imaging findings, assessing/counseling the patient, discussing with consultant physicians, social work, nursing staff was -- minutes    ---  Primary care provider was made aware of plan for discharge:      [  ] NO     [  ] YES   HPI:  Patient is a 89 yo M with PMH of Afib, HTN, DLD who was brought in by family for complaint of cough, weakness for the last few days. Patient reports weight loss and reduced appetite for the last month. Denies chest pain or palpitations, denies fever, denies N/V/D. Of note, patient was seen recently by cardiologist Dr Allen on 6/6 for weakness and decreased appetite and his digoxin was discontinue and his lopressor was increased and coumadin changed to Eliquis.  Uses a cane occasionally at home, denies recent falls. Denies blood in stool.     ED course:  Na 139, K 5.3 (hemolyzed), Cr 1.80, Lactate 3.1, Mg 2.8, BNP 20380, Lactate 1.8, INR 3.43. Covid negative, resp panel positive for human metapneumovirus. CT shows ground glass and nodules. EKG shows afib, . Received metoprolol 5mg IV and cardiology consulted.  (15 Rui 2022 20:48)      ---  HOSPITAL COURSE: Patient admitted for a fib with RVR, - Likely multifactorial in setting of underlying infection, poor po intake and med noncompliance+ hMPv. Dr Cook was consulted. Patient was started on Cardizem gtt switched to Cardizem 90mg  every 6 hours, digoxin every other day and  lopressor 50mg BID for rate control and home Eliquis 2.5mg  BID for AC. Patient with episodes of hypotension with tachycardia, started on midodrine 5mg q8h with improvement. Cardizem was switched to 360mg qd, lopressor to 100mg bid for better compliance     For pleural effusion  b/l. Pulm Dr Carpio was consulted. No thoracocentesis.     Patients hMPV was treated symptomatically and supplemental oxygen supplied. Pleural effusions on CT and cxr noted. No intervention warranted.   MARISOL due to prerenal azotemia resolved with PO intake.    On the morning of 6/24/22, a rapid response was called for acute altered mental status and hypoxia. Patient found to be hypercarbic with lactic acidosis, started on BiPAP and transferred to MICU for further management. In MICU, patient received diuresis with Lasix and was continued on BiPAP. His respiratory status and mentation improved with diuresis and he was able to be transitioned to nasal cannula. Speech and swallow consulted and recommended ***.     Updated as of 6/25/22  ---  CONSULTANTS:   Cardio: Dr Cook   Neurology Dr Joanna Carpio  ---  TIME SPENT:  I, the attending physician, was physically present for the key portions of the evaluation and management (E/M) service provided. The total amount of time spent reviewing the hospital notes, laboratory values, imaging findings, assessing/counseling the patient, discussing with consultant physicians, social work, nursing staff was -- minutes    ---  Primary care provider was made aware of plan for discharge:      [  ] NO     [  ] YES   HPI:  Patient is a 89 yo M with PMH of Afib, HTN, DLD who was brought in by family for complaint of cough, weakness for the last few days. Patient reports weight loss and reduced appetite for the last month. Denies chest pain or palpitations, denies fever, denies N/V/D. Of note, patient was seen recently by cardiologist Dr Allen on 6/6 for weakness and decreased appetite and his digoxin was discontinue and his lopressor was increased and coumadin changed to Eliquis.  Uses a cane occasionally at home, denies recent falls. Denies blood in stool.     ED course:  Na 139, K 5.3 (hemolyzed), Cr 1.80, Lactate 3.1, Mg 2.8, BNP 68572, Lactate 1.8, INR 3.43. Covid negative, resp panel positive for human metapneumovirus. CT shows ground glass and nodules. EKG shows afib, . Received metoprolol 5mg IV and cardiology consulted.  (15 Rui 2022 20:48)      ---  HOSPITAL COURSE: Patient admitted for a fib with RVR, - Likely multifactorial in setting of underlying infection, poor po intake and med noncompliance+ hMPv. Dr Cook was consulted. Patient was started on Cardizem gtt switched to Cardizem 90mg  every 6 hours, digoxin every other day and  lopressor 50mg BID for rate control and home Eliquis 2.5mg  BID for AC. Patient with episodes of hypotension with tachycardia, started on midodrine 5mg q8h with improvement. Cardizem was switched to 360mg qd, lopressor to 100mg bid for better compliance     For pleural effusion  b/l. Pulm Dr Caripo was consulted. No thoracocentesis.     Patients hMPV was treated symptomatically and supplemental oxygen supplied. Pleural effusions on CT and cxr noted. No intervention warranted.   MARISOL due to prerenal azotemia resolved with PO intake.    On the morning of 6/24/22, a rapid response was called for acute altered mental status and hypoxia. Patient found to be hypercarbic with lactic acidosis, started on BiPAP and transferred to MICU for further management. In MICU, patient received diuresis with Lasix and was continued on BiPAP. The patient was weaned off BiPAP to HFNC but desaturated and required BiPAP again.  His respiratory status and mentation ---.    Updated as of 6/29/22  ---  CONSULTANTS:   Cardio: Dr Cook   Neurology Dr Marshall   Pulm Dr Carpio  ---  TIME SPENT:  I, the attending physician, was physically present for the key portions of the evaluation and management (E/M) service provided. The total amount of time spent reviewing the hospital notes, laboratory values, imaging findings, assessing/counseling the patient, discussing with consultant physicians, social work, nursing staff was -- minutes    ---  Primary care provider was made aware of plan for discharge:      [  ] NO     [  ] YES

## 2022-06-18 NOTE — PROGRESS NOTE ADULT - TIME BILLING
care coordination, plan of care discussed with patient face to face, primary team attempted to call shirley Mcnally multiple times with no response, MOON Hawkins

## 2022-06-18 NOTE — PROGRESS NOTE ADULT - PROBLEM SELECTOR PLAN 2
- BP soft, s/p 2x midodrine 5mg, ICU consulted for hypotension, continue to monitor on floor   - continue midodrine 5mg Q8H  - Monitor routine hemodynamics

## 2022-06-18 NOTE — PROGRESS NOTE ADULT - SUBJECTIVE AND OBJECTIVE BOX
Patient is a 89y old  Male who presents with a chief complaint of weight loss (18 Jun 2022 09:02)    INTERVAL HPI/OVERNIGHT EVENTS: over 24 hr interval, patient with low BP, asymptomatic. ICU contacted, will continue care on the floor. s/p 2 x midodrine 5mg , 1x albumin 25% IVPB. BP stable. + Intermittent cough, + laryngitis. afebrile. Denies chest pain, headache , palpitation.      MEDICATIONS  (STANDING):  apixaban 2.5 milliGRAM(s) Oral every 12 hours  digoxin     Tablet 125 MICROGram(s) Oral every other day  diltiazem    Tablet 90 milliGRAM(s) Oral every 6 hours  furosemide    Tablet 20 milliGRAM(s) Oral daily  metoprolol tartrate 50 milliGRAM(s) Oral two times a day  midodrine. 5 milliGRAM(s) Oral every 8 hours    MEDICATIONS  (PRN):  acetaminophen     Tablet .. 650 milliGRAM(s) Oral every 6 hours PRN Temp greater or equal to 38C (100.4F), Mild Pain (1 - 3)  ALBUTerol    0.083% 2.5 milliGRAM(s) Nebulizer every 4 hours PRN Shortness of Breath and/or Wheezing  aluminum hydroxide/magnesium hydroxide/simethicone Suspension 30 milliLiter(s) Oral every 4 hours PRN Dyspepsia  guaifenesin/dextromethorphan Oral Liquid 10 milliLiter(s) Oral every 6 hours PRN Cough  melatonin 3 milliGRAM(s) Oral at bedtime PRN Insomnia  ondansetron Injectable 4 milliGRAM(s) IV Push every 8 hours PRN Nausea and/or Vomiting      Allergies    No Known Allergies    Intolerances        REVIEW OF SYSTEMS:  CONSTITUTIONAL: No fever or chills  HEENT:  No headache, no sore throat  RESPIRATORY: + cough,  No wheezing, + shortness of breath  CARDIOVASCULAR: No chest pain, palpitations  GASTROINTESTINAL: No abd pain, nausea, vomiting, or diarrhea  GENITOURINARY: No dysuria, frequency, or hematuria  NEUROLOGICAL: no focal weakness or dizziness  MUSCULOSKELETAL: no myalgias, + weakness      Vital Signs Last 24 Hrs  T(C): 36.4 (18 Jun 2022 12:25), Max: 36.8 (17 Jun 2022 22:01)  T(F): 97.5 (18 Jun 2022 12:25), Max: 98.2 (17 Jun 2022 22:01)  HR: 114 (18 Jun 2022 12:25) (90 - 122)  BP: 105/65 (18 Jun 2022 12:25) (87/62 - 108/73)  BP(mean): --  RR: 20 (18 Jun 2022 12:25) (17 - 20)  SpO2: 94% (18 Jun 2022 12:25) (90% - 94%)    PHYSICAL EXAM:  GENERAL: [place], Appears cachetic, weak and frail   HEENT:  anicteric, moist mucous membranes  CHEST/LUNG: crackles in lungs at bases b/l with increased respiratory effort  HEART: Tachycardic w/ irregularly irregular rates; No murmurs, rubs, or gallops+ crackles at the base   ABDOMEN:  BS+, soft, nontender, nondistended  EXTREMITIES: trace edema, no  cyanosis, or calf tenderness  NERVOUS SYSTEM: answers questions and follows commands appropriately, confused at time     LABS:                        12.4   6.26  )-----------( 184      ( 18 Jun 2022 07:29 )             37.9     CBC Full  -  ( 18 Jun 2022 07:29 )  WBC Count : 6.26 K/uL  Hemoglobin : 12.4 g/dL  Hematocrit : 37.9 %  Platelet Count - Automated : 184 K/uL  Mean Cell Volume : 100.8 fl  Mean Cell Hemoglobin : 33.0 pg  Mean Cell Hemoglobin Concentration : 32.7 gm/dL  Auto Neutrophil # : x  Auto Lymphocyte # : x  Auto Monocyte # : x  Auto Eosinophil # : x  Auto Basophil # : x  Auto Neutrophil % : x  Auto Lymphocyte % : x  Auto Monocyte % : x  Auto Eosinophil % : x  Auto Basophil % : x    18 Jun 2022 07:29    143    |  106    |  37     ----------------------------<  108    3.9     |  32     |  1.00     Ca    8.7        18 Jun 2022 07:29  Phos  2.5       18 Jun 2022 07:29  Mg     2.5       17 Jun 2022 20:47    TPro  6.5    /  Alb  2.5    /  TBili  1.8    /  DBili  x      /  AST  28     /  ALT  55     /  AlkPhos  108    18 Jun 2022 07:29    PT/INR - ( 17 Jun 2022 07:59 )   PT: 23.6 sec;   INR: 2.00 ratio         PTT - ( 17 Jun 2022 07:59 )  PTT:33.6 sec    CAPILLARY BLOOD GLUCOSE      Culture - Urine (collected 06-15-22 @ 22:25)  Source: Clean Catch Clean Catch (Midstream)  Final Report (06-16-22 @ 22:56):    <10,000 CFU/mL Normal Urogenital Renae    Culture - Blood (collected 06-15-22 @ 18:31)  Source: .Blood Blood-Peripheral  Preliminary Report (06-16-22 @ 19:02):    No growth to date.    Culture - Blood (collected 06-15-22 @ 18:31)  Source: .Blood Blood-Peripheral  Preliminary Report (06-16-22 @ 19:02):    No growth to date.        RADIOLOGY & ADDITIONAL TESTS:    Personally reviewed.     Consultant(s) Notes Reviewed:  [x] YES  [ ] NO     Patient is a 89y old  Male who presents with a chief complaint of weight loss (18 Jun 2022 09:02)    INTERVAL HPI/OVERNIGHT EVENTS: over 24 hr interval, patient with low BP, asymptomatic. ICU contacted, will continue care on the floor. s/p 2 x midodrine 5mg , 1x albumin 25% IVPB. BP stable. + Intermittent cough, + laryngitis, lost voice . afebrile. Denies chest pain, headache , palpitation.      MEDICATIONS  (STANDING):  apixaban 2.5 milliGRAM(s) Oral every 12 hours  digoxin     Tablet 125 MICROGram(s) Oral every other day  diltiazem    Tablet 90 milliGRAM(s) Oral every 6 hours  furosemide    Tablet 20 milliGRAM(s) Oral daily  metoprolol tartrate 50 milliGRAM(s) Oral two times a day  midodrine. 5 milliGRAM(s) Oral every 8 hours    MEDICATIONS  (PRN):  acetaminophen     Tablet .. 650 milliGRAM(s) Oral every 6 hours PRN Temp greater or equal to 38C (100.4F), Mild Pain (1 - 3)  ALBUTerol    0.083% 2.5 milliGRAM(s) Nebulizer every 4 hours PRN Shortness of Breath and/or Wheezing  aluminum hydroxide/magnesium hydroxide/simethicone Suspension 30 milliLiter(s) Oral every 4 hours PRN Dyspepsia  guaifenesin/dextromethorphan Oral Liquid 10 milliLiter(s) Oral every 6 hours PRN Cough  melatonin 3 milliGRAM(s) Oral at bedtime PRN Insomnia  ondansetron Injectable 4 milliGRAM(s) IV Push every 8 hours PRN Nausea and/or Vomiting      Allergies    No Known Allergies    Intolerances        REVIEW OF SYSTEMS:  CONSTITUTIONAL: No fever or chills  HEENT:  No headache, no sore throat  RESPIRATORY: + cough,  No wheezing, + shortness of breath  CARDIOVASCULAR: No chest pain, palpitations  GASTROINTESTINAL: No abd pain, nausea, vomiting, or diarrhea  GENITOURINARY: No dysuria, frequency, or hematuria  NEUROLOGICAL: no focal weakness or dizziness  MUSCULOSKELETAL: no myalgias, + weakness      Vital Signs Last 24 Hrs  T(C): 36.4 (18 Jun 2022 12:25), Max: 36.8 (17 Jun 2022 22:01)  T(F): 97.5 (18 Jun 2022 12:25), Max: 98.2 (17 Jun 2022 22:01)  HR: 114 (18 Jun 2022 12:25) (90 - 122)  BP: 105/65 (18 Jun 2022 12:25) (87/62 - 108/73)  BP(mean): --  RR: 20 (18 Jun 2022 12:25) (17 - 20)  SpO2: 94% (18 Jun 2022 12:25) (90% - 94%)    PHYSICAL EXAM:  GENERAL: [place], Appears cachetic, weak and frail looking   HEENT:  anicteric, moist mucous membranes  CHEST/LUNG: crackles in lungs at bases b/l with increased respiratory effort  HEART: Tachycardic w/ irregularly irregular rates; No murmurs, rubs, or gallops  ABDOMEN:  BS+, soft, nontender, nondistended  EXTREMITIES: no edema, no  cyanosis, or calf tenderness  NERVOUS SYSTEM: answers questions and follows commands appropriately, confused at time     LABS:                        12.4   6.26  )-----------( 184      ( 18 Jun 2022 07:29 )             37.9     CBC Full  -  ( 18 Jun 2022 07:29 )  WBC Count : 6.26 K/uL  Hemoglobin : 12.4 g/dL  Hematocrit : 37.9 %  Platelet Count - Automated : 184 K/uL  Mean Cell Volume : 100.8 fl  Mean Cell Hemoglobin : 33.0 pg  Mean Cell Hemoglobin Concentration : 32.7 gm/dL  Auto Neutrophil # : x  Auto Lymphocyte # : x  Auto Monocyte # : x  Auto Eosinophil # : x  Auto Basophil # : x  Auto Neutrophil % : x  Auto Lymphocyte % : x  Auto Monocyte % : x  Auto Eosinophil % : x  Auto Basophil % : x    18 Jun 2022 07:29    143    |  106    |  37     ----------------------------<  108    3.9     |  32     |  1.00     Ca    8.7        18 Jun 2022 07:29  Phos  2.5       18 Jun 2022 07:29  Mg     2.5       17 Jun 2022 20:47    TPro  6.5    /  Alb  2.5    /  TBili  1.8    /  DBili  x      /  AST  28     /  ALT  55     /  AlkPhos  108    18 Jun 2022 07:29    PT/INR - ( 17 Jun 2022 07:59 )   PT: 23.6 sec;   INR: 2.00 ratio         PTT - ( 17 Jun 2022 07:59 )  PTT:33.6 sec    CAPILLARY BLOOD GLUCOSE      Culture - Urine (collected 06-15-22 @ 22:25)  Source: Clean Catch Clean Catch (Midstream)  Final Report (06-16-22 @ 22:56):    <10,000 CFU/mL Normal Urogenital Renae    Culture - Blood (collected 06-15-22 @ 18:31)  Source: .Blood Blood-Peripheral  Preliminary Report (06-16-22 @ 19:02):    No growth to date.    Culture - Blood (collected 06-15-22 @ 18:31)  Source: .Blood Blood-Peripheral  Preliminary Report (06-16-22 @ 19:02):    No growth to date.        RADIOLOGY & ADDITIONAL TESTS:    Personally reviewed.     Consultant(s) Notes Reviewed:  [x] YES  [ ] NO

## 2022-06-18 NOTE — DISCHARGE NOTE PROVIDER - DETAILS OF MALNUTRITION DIAGNOSIS/DIAGNOSES
This patient has been assessed with a concern for Malnutrition and was treated during this hospitalization for the following Nutrition diagnosis/diagnoses:     -  06/17/2022: Severe protein-calorie malnutrition   -  06/17/2022: Underweight (BMI < 19)

## 2022-06-18 NOTE — PROGRESS NOTE ADULT - SUBJECTIVE AND OBJECTIVE BOX
Date/Time Patient Seen:  		  Referring MD:   Data Reviewed	       Patient is a 89y old  Male who presents with a chief complaint of weight loss (17 Jun 2022 18:13)      Subjective/HPI     PAST MEDICAL & SURGICAL HISTORY:  Atrial fibrillation    HTN (hypertension)    HLD (hyperlipidemia)    Anemia    CHF (congestive heart failure)    DVT, lower extremity          Medication list         MEDICATIONS  (STANDING):  apixaban 2.5 milliGRAM(s) Oral every 12 hours  digoxin     Tablet 125 MICROGram(s) Oral every other day  diltiazem    Tablet 90 milliGRAM(s) Oral every 6 hours  furosemide    Tablet 20 milliGRAM(s) Oral daily  metoprolol tartrate 50 milliGRAM(s) Oral two times a day    MEDICATIONS  (PRN):  acetaminophen     Tablet .. 650 milliGRAM(s) Oral every 6 hours PRN Temp greater or equal to 38C (100.4F), Mild Pain (1 - 3)  ALBUTerol    0.083% 2.5 milliGRAM(s) Nebulizer every 4 hours PRN Shortness of Breath and/or Wheezing  aluminum hydroxide/magnesium hydroxide/simethicone Suspension 30 milliLiter(s) Oral every 4 hours PRN Dyspepsia  guaifenesin/dextromethorphan Oral Liquid 10 milliLiter(s) Oral every 6 hours PRN Cough  melatonin 3 milliGRAM(s) Oral at bedtime PRN Insomnia  ondansetron Injectable 4 milliGRAM(s) IV Push every 8 hours PRN Nausea and/or Vomiting         Vitals log        ICU Vital Signs Last 24 Hrs  T(C): 36.3 (18 Jun 2022 05:24), Max: 36.8 (17 Jun 2022 22:01)  T(F): 97.4 (18 Jun 2022 05:24), Max: 98.2 (17 Jun 2022 22:01)  HR: 103 (18 Jun 2022 05:24) (92 - 122)  BP: 102/77 (18 Jun 2022 05:24) (87/62 - 108/73)  BP(mean): --  ABP: --  ABP(mean): --  RR: 18 (18 Jun 2022 05:24) (17 - 19)  SpO2: 94% (18 Jun 2022 05:24) (90% - 94%)           Input and Output:  I&O's Detail    17 Jun 2022 07:01  -  18 Jun 2022 06:31  --------------------------------------------------------  IN:    Diltiazem: 75 mL  Total IN: 75 mL    OUT:  Total OUT: 0 mL    Total NET: 75 mL          Lab Data                        13.8   6.96  )-----------( 214      ( 17 Jun 2022 07:59 )             41.4     06-17    142  |  105  |  42<H>  ----------------------------<  108<H>  4.0   |  31  |  1.10    Ca    8.8      17 Jun 2022 07:59  Phos  2.6     06-17  Mg     2.5     06-17    TPro  6.7  /  Alb  2.5<L>  /  TBili  1.8<H>  /  DBili  x   /  AST  37  /  ALT  74  /  AlkPhos  136<H>  06-17            Review of Systems	      Objective     Physical Examination    heart s1s2  lung dc BS  abd soft      Pertinent Lab findings & Imaging      Ramírez:  NO   Adequate UO     I&O's Detail    17 Jun 2022 07:01  -  18 Jun 2022 06:31  --------------------------------------------------------  IN:    Diltiazem: 75 mL  Total IN: 75 mL    OUT:  Total OUT: 0 mL    Total NET: 75 mL               Discussed with:     Cultures:	        Radiology

## 2022-06-18 NOTE — PROGRESS NOTE ADULT - ASSESSMENT
89y old  Male PMH Afib on ac htn hld chief complaint of weakness. Patient was seen in office 6/6 by Dr Allen for decreased appetite and weakness, digoxin was discontinued and increased lopressor to 100mg Po BID     - Tachycardia multifactorial in setting of underlying infection, poor po intake and medication noncompliance     AFIB with RVR  - Remains in Afib but rates has been mostly controlled, though, had short episodes of tachycadia at 120's overnight; reactive to underlying respiratory infection  - Continue Cardizem 90 mg q6H and Lopressor 50 mg q12H with parameter.  - Continue Dig every other day  - Continue Eliquis   - Had a hypotensive episode to systolic 80's s/p Midodrine 5 mg x 1.  BP now improved. If needed, can start q8H  - Echo 2019 MAC aortic sclerosis normal Lv function, no pulmonary htn, normal LA size, mild MR ef 64%  - Serum Pro-Brain Natriuretic Peptide: 94050, improved to ~4000.  Continue Lasix 20 mg daily  - CT shows moderate bilateral pleural effusions.  Pulm following.  No immediate need for thora per Pulm  - Does not appear clinically volume overloaded  - Monitor and replete lytes, keep K>4, Mg>2.    - Will continue to follow.    Deanna Nunn DNP, NP-C  Cardiology   Spectra #[phone]

## 2022-06-18 NOTE — PROGRESS NOTE ADULT - PROBLEM SELECTOR PLAN 6
- Serum Pro-Brain Natriuretic Peptide: 20347, improved to ~4000.    - CT shows Moderate bilateral pleural effusions, interlobular septal thickening in patchy ground glass opacities representing pulmonary edema.  - CXR  shows Bilateral lower lobe infiltrates.  - Continue home Lasix 20 mg daily with hold parameter    - f/u TTE  - Cardiology consulted Dr Lama following - Serum Pro-Brain Natriuretic Peptide: 68060, improved to ~4000.    - CT shows Moderate bilateral pleural effusions, interlobular septal thickening in patchy ground glass opacities representing pulmonary edema.  - CXR  shows Bilateral lower lobe infiltrates.  - Continue home Lasix 20 mg daily with hold parameter    - f/u TTE  - Cardiology recs apprecs

## 2022-06-18 NOTE — DISCHARGE NOTE PROVIDER - NSDCMRMEDTOKEN_GEN_ALL_CORE_FT
allopurinol 300 mg oral tablet: 1 tab(s) orally once a day (in the morning)  Eliquis 5 mg oral tablet: 1 tab(s) orally 2 times a day  erythromycin 0.5% ophthalmic ointment: 1 application to each affected eye once a day (at bedtime)  glucosamine 500 mg oral capsule: 1 cap(s) orally once a day  Lasix 20 mg oral tablet: 1 tab(s) orally once a day (in the morning)  Prolensa 0.07% ophthalmic solution: 1 drop(s) to each affected eye once a day  Toprol- mg oral tablet, extended release: 1 tab(s) orally 2 times a day  Vitamin B12 1000 mcg oral tablet: 1 tab(s) orally once a day  Vitamin C: 1 tab(s) orally once a day  Vitamin D3: 1 tab(s) orally once a day

## 2022-06-19 NOTE — PROGRESS NOTE ADULT - SUBJECTIVE AND OBJECTIVE BOX
· Subjective and Objective:   [facility]:    Vincenzo Wyman, Tiffany, Daina, Alex Akers, Ganesh Liriano      [phone]    CHIEF COMPLAINT: Patient is a 89y old  Male who presents with a chief complaint of weight loss (2022 07:15)    Follow Up: Afib with RVR     Subjective/Observations: Seen and evaluated, on NC at 4L/min.  +cough but unable to expectorate.  Denies chest pain or palpitations.      ROS otherwise negative unless noted        PAST MEDICAL & SURGICAL HISTORY:  Atrial fibrillation      HTN (hypertension)      HLD (hyperlipidemia)      Anemia      CHF (congestive heart failure)      DVT, lower extremity          MEDICATIONS  (STANDING):  apixaban 2.5 milliGRAM(s) Oral every 12 hours  digoxin     Tablet 125 MICROGram(s) Oral every other day  diltiazem    Tablet 90 milliGRAM(s) Oral every 6 hours  furosemide    Tablet 20 milliGRAM(s) Oral daily  metoprolol tartrate 50 milliGRAM(s) Oral two times a day  midodrine. 5 milliGRAM(s) Oral every 8 hours      Allergies    No Known Allergies    Intolerances                              12.8   6.80  )-----------( 181      ( 2022 06:46 )             38.9       06-19    143  |  108  |  29<H>  ----------------------------<  130<H>  4.0   |  32<H>  |  0.90    Ca    8.5      2022 06:46  Phos  2.1     06-  Mg     2.4     -    TPro  6.4  /  Alb  2.3<L>  /  TBili  1.8<H>  /  DBili  x   /  AST  24  /  ALT  46  /  AlkPhos  112  06-19      LIVER FUNCTIONS - ( 2022 06:46 )  Alb: 2.3 g/dL / Pro: 6.4 g/dL / ALK PHOS: 112 U/L / ALT: 46 U/L / AST: 24 U/L / GGT: x                                   Daily     Daily Weight in k.8 (2022 04:38)    I&O's Summary    2022 07:01  -  2022 07:00  --------------------------------------------------------  IN: 0 mL / OUT: 200 mL / NET: -200 mL        Vital Signs Last 24 Hrs  T(C): 36.4 (2022 04:38), Max: 36.4 (2022 12:25)  T(F): 97.5 (2022 04:38), Max: 97.5 (2022 12:25)  HR: 102 (2022 04:38) (101 - 114)  BP: 123/78 (2022 04:38) (99/66 - 123/78)  BP(mean): --  RR: 19 (2022 04:38) (19 - 20)  SpO2: 94% (2022 04:38) (94% - 95%)    PHYSICAL EXAM:   · Constitutional	Well-developed, well nourished  · Eyes	EOMI; PERRL; no drainage or redness  · ENMT	No oral lesions; no gross abnormalities  · Neck	No bruits; no thyromegaly or nodules  · Respiratory	Normal breath sounds b/l, No RRW  · Cardiovascular	Regular rate & rhythm, normal S1, S2; no murmurs, gallops or rubs; no S3, S4  · Gastrointestinal	Soft, non-tender, no hepatosplenomegaly, normal bowel sounds  · Extremities	No cyanosis, clubbing or edema  · Vascular	Equal and normal pulses (carotid, femoral, dorsalis pedis)  · Neurological	Alert & oriented; no sensory, motor or coordination deficits, normal reflexes

## 2022-06-19 NOTE — SWALLOW BEDSIDE ASSESSMENT ADULT - ASR SWALLOW REFERRAL
Consider ENT consult to further assess laryngeal function if concerned for abovementioned dysphonia/ENT

## 2022-06-19 NOTE — SWALLOW BEDSIDE ASSESSMENT ADULT - SWALLOW EVAL: PROGNOSIS
DIAGNOSIS CONTINUED: at this time. MD in agreement with POC.
DIAGNOSIS CONTINUED: pharyngeal stasis. 5. Recommend pureed and moderately thick liquids, with aspiration precautions, as tolerated. Recommend formal swallow study to assess safest diet level and r/o baseline coughing vs. aspiration and d/t patient's c/o difficulty swallowing. Facilitate upright position, slow pacing, single/small bites/sips, and alternate solids with liquids.  PROGNOSIS: fair; continue to monitor and notify SLP if changes occur.

## 2022-06-19 NOTE — PROGRESS NOTE ADULT - PROBLEM SELECTOR PLAN 2
- BP improved overnight   - BP soft on 6/18, s/p 2x midodrine 5mg, ICU consulted for hypotension, continue to monitor on floor   - continue midodrine 5mg Q8H  - Monitor routine hemodynamics

## 2022-06-19 NOTE — PROGRESS NOTE ADULT - PROBLEM SELECTOR PLAN 4
- symptomatic treatment, APAP PRN fever and guaifenesin-DM PRN cough  - hypoxic desat 89% on RA, now on 4 L nasal cannula sat >90%  - Diet bite size/thin fluid, aspiration precaution  - Blood cx x2: NGTD , urine cx : <10,000 normal urogenital lupe   - Consulted ID Dr Goldman. supportive management

## 2022-06-19 NOTE — SWALLOW BEDSIDE ASSESSMENT ADULT - COMMENTS
Consult received and chart reviewed. Consult received and chart reviewed. Patient seen at chairside this PM for re-assessment of swallow function, as per MD request. Patient is familiar to this department and was last seen on 6/16/22, at which time a soft & bite-sized and mildly thick liquid diet level was recommended (see report for details). Patient is currently on a soft & bite-sized and mildly thick liquid diet level, as per MD order.  As per discussion with Dr. Faulkner, patient noted with more somnolence today and MD concerned for heightened aspiration risk.  Patient was alert, cooperative, and denied pain. Patient on supplemental O2 via NC, 4L. Patient demonstrates ability to follow simple commands. Vocal quality noted as hoarse, which the patient/patient's family noted is a recent change. Wet congestive coughing noted at baseline. Per discussion with the patient's family, patient with c/o intermittent swallowing difficulty with solids for about a week.     Per charting, the patient is a "Patient is a 89 yo M with PMH of chronic Afib, HTN, DVT, HLD, GOUT (on allopurinol and Colchicine), CHF (TTE 2019 LVED 64%) and macular degeneration who was brought in by family for complaint of cough, weakness for the last few days. Imaging shows moderate bilateral pleural effusions Admitted with acute on chronic HFpEF A fib with RVR and management of pleural effusion, + hMPv Virus "    WBC WFL.  CXR 6/15/22 revealed "Moderate bilateral pleural effusions, interlobular septal thickening in patchy groundglass opacities representing pulmonary edema. Multiple bilateral small lung nodules, indeterminant etiology. Metastatic disease is a possibility. Recommend follow-up chest CT in 4 weeks to determine the stability. Further evaluation can be performed with PET CT."    Discussed results with the patient/patient's family, RN, and called out to MD.

## 2022-06-19 NOTE — PROGRESS NOTE ADULT - SUBJECTIVE AND OBJECTIVE BOX
Date/Time Patient Seen:  		  Referring MD:   Data Reviewed	       Patient is a 89y old  Male who presents with a chief complaint of weight loss (18 Jun 2022 20:52)      Subjective/HPI     PAST MEDICAL & SURGICAL HISTORY:  Atrial fibrillation    HTN (hypertension)    HLD (hyperlipidemia)    Anemia    CHF (congestive heart failure)    DVT, lower extremity          Medication list         MEDICATIONS  (STANDING):  apixaban 2.5 milliGRAM(s) Oral every 12 hours  digoxin     Tablet 125 MICROGram(s) Oral every other day  diltiazem    Tablet 90 milliGRAM(s) Oral every 6 hours  furosemide    Tablet 20 milliGRAM(s) Oral daily  metoprolol tartrate 50 milliGRAM(s) Oral two times a day  midodrine. 5 milliGRAM(s) Oral every 8 hours    MEDICATIONS  (PRN):  acetaminophen     Tablet .. 650 milliGRAM(s) Oral every 6 hours PRN Temp greater or equal to 38C (100.4F), Mild Pain (1 - 3)  ALBUTerol    0.083% 2.5 milliGRAM(s) Nebulizer every 4 hours PRN Shortness of Breath and/or Wheezing  aluminum hydroxide/magnesium hydroxide/simethicone Suspension 30 milliLiter(s) Oral every 4 hours PRN Dyspepsia  guaifenesin/dextromethorphan Oral Liquid 10 milliLiter(s) Oral every 6 hours PRN Cough  melatonin 3 milliGRAM(s) Oral at bedtime PRN Insomnia  ondansetron Injectable 4 milliGRAM(s) IV Push every 8 hours PRN Nausea and/or Vomiting         Vitals log        ICU Vital Signs Last 24 Hrs  T(C): 36.4 (19 Jun 2022 04:38), Max: 36.4 (18 Jun 2022 12:25)  T(F): 97.5 (19 Jun 2022 04:38), Max: 97.5 (18 Jun 2022 12:25)  HR: 102 (19 Jun 2022 04:38) (90 - 114)  BP: 123/78 (19 Jun 2022 04:38) (99/66 - 123/78)  BP(mean): --  ABP: --  ABP(mean): --  RR: 19 (19 Jun 2022 04:38) (19 - 20)  SpO2: 94% (19 Jun 2022 04:38) (94% - 95%)           Input and Output:  I&O's Detail    18 Jun 2022 07:01  -  19 Jun 2022 07:00  --------------------------------------------------------  IN:  Total IN: 0 mL    OUT:    Voided (mL): 200 mL  Total OUT: 200 mL    Total NET: -200 mL          Lab Data                        12.8   6.80  )-----------( 181      ( 19 Jun 2022 06:46 )             38.9     06-18    143  |  106  |  37<H>  ----------------------------<  108<H>  3.9   |  32<H>  |  1.00    Ca    8.7      18 Jun 2022 07:29  Phos  2.5     06-18  Mg     2.5     06-17    TPro  6.5  /  Alb  2.5<L>  /  TBili  1.8<H>  /  DBili  x   /  AST  28  /  ALT  55  /  AlkPhos  108  06-18            Review of Systems	      Objective     Physical Examination    heart s1s2  lung dec BS  abd soft      Pertinent Lab findings & Imaging      Ramírez:  NO   Adequate UO     I&O's Detail    18 Jun 2022 07:01  -  19 Jun 2022 07:00  --------------------------------------------------------  IN:  Total IN: 0 mL    OUT:    Voided (mL): 200 mL  Total OUT: 200 mL    Total NET: -200 mL               Discussed with:     Cultures:	        Radiology

## 2022-06-19 NOTE — PROGRESS NOTE ADULT - PROBLEM SELECTOR PLAN 8
Chronic   - At home on allopurinol 300mg 1 tablet daily and colchicine 0.6mg 1 tablet daily  - continue home allopurinol  - Uric acid wnl

## 2022-06-19 NOTE — PROGRESS NOTE ADULT - ASSESSMENT
89y old  Male PMH Afib on ac htn hld chief complaint of weakness. Patient was seen in office 6/6 by Dr Allen for decreased appetite and weakness, digoxin was discontinued and increased lopressor to 100mg Po BID     - Tachycardia multifactorial in setting of underlying infection, poor po intake and medication noncompliance     AFIB with RVR  - Remains in Afib but rates has been mostly controlled  - Continue Cardizem 90 mg q6H and Lopressor 50 mg q12H with parameter.  - Continue Dig every other day  - Continue Eliquis   - Had a hypotensive episode to systolic 80's s/p Midodrine 5 mg x 1.  BP now improved. If needed, can start q8H  - Echo 2019 MAC aortic sclerosis normal Lv function, no pulmonary htn, normal LA size, mild MR ef 64%  - Serum Pro-Brain Natriuretic Peptide: 37206, improved to ~4000.  Continue Lasix 20 mg daily  - CT shows moderate bilateral pleural effusions.  Pulm following.  No immediate need for thora per Pulm  - Does not appear clinically volume overloaded  - Monitor and replete lytes, keep K>4, Mg>2.    - Will continue to follow.

## 2022-06-19 NOTE — SWALLOW BEDSIDE ASSESSMENT ADULT - DATE
[FreeTextEntry3] : [place]:  Gross Motor  14-3     Fine Motor    13-3 Psychosocial  14      [id number]\par 
19-Jun-2022
16-Jun-2022

## 2022-06-19 NOTE — PROGRESS NOTE ADULT - PROBLEM SELECTOR PLAN 7
- Multiple differentials possible. May be adverse effect of digoxin, recently discontinued last week  - TSH wnl    - CT shows Multiple bilateral small lung nodules, indeterminant etiology. Metastatic disease is a possibility. - Recommend follow-up chest CT in 4 weeks to determine the stability. Further evaluation can be performed with PET CT. will consider repeat imaging as outpatient or PET  -apprec pulm recs

## 2022-06-19 NOTE — PROGRESS NOTE ADULT - PROBLEM SELECTOR PLAN 3
- CT shows Moderate bilateral pleural effusions, interlobular septal thickening in patchy ground glass opacities representing pulmonary edema.  - CXR shows bilateral lower lobe infiltrates.  - Incentive spirometer  - continue home Lasix 20 mg daily with hold parameter    - Consulted Dr. Carpio, no immediate need for thoracentesis

## 2022-06-19 NOTE — SWALLOW BEDSIDE ASSESSMENT ADULT - SWALLOW EVAL: DIAGNOSIS
1. Patient demonstrates a mild oral dysphagia for pureed, moderately thick, mildly thick, and thin liquids marked by prolonged manipulation resulting in delayed collection, transfer, and transport. 2. Patient demonstrates a mild-moderate oral dysphagia for soft & bite-sized solids marked by prolonged mastication resulting in delayed collection, transfer, and transport with trace stasis observed post swallow. 3. Patient demonstrates a mild pharyngeal dysphagia for pureed and moderately thick liquids marked by suspected delayed pharyngeal swallow trigger and hyolaryngeal elevation noted by digital palpation without evidence of airway penetration/aspiration. 4. Patient demonstrates a moderate-severe pharyngeal dysphagia for soft & bite-sized solids, mildly thick, and thin liquids marked by suspected delayed pharyngeal swallow trigger and immediate and delayed coughing observed post swallow. Patient with c/o of swallowing feeling "effortful" x1 with solids; however, denied any feelings of
Pt p/w baseline cough prior to administration of PO trials. Pt p/w a mild oral dysphagia when given soft and bite sized marked by adequate retrieval and containment, timely manipulation and transfer, and adequate clearance post swallow. Mastication was mildly prolonged 2/2 incomplete dentition, though complete and functional. Functional oral phase marked by adequate retrieval and containment, timely manipulation and transfer, and adequate clearance post swallow. Pharyngeal phase marked by suspected timely swallow onset, +laryngeal elevation to palpation. Pt's baseline cough did not persist during PO trials, no overt s/sx of aspiration noted. Recommend solid downgrade to soft and bite sized with continuation of thin liquids +aspiration precautions. Continue to monitor respiratory status closely; if there is a decline in status, please hold PO and notify SLP. Per discussion with Dr. Faulkner, no concern for aspiration component at this time; therefore, no objective assessment warranted

## 2022-06-19 NOTE — SWALLOW BEDSIDE ASSESSMENT ADULT - SWALLOW EVAL: RECOMMENDED FEEDING/EATING TECHNIQUES
alternate food with liquid/crush medication (when feasible)/maintain upright posture during/after eating for 30 mins/oral hygiene/position upright (90 degrees)/small sips/bites
allow for swallow between intakes/alternate food with liquid/check mouth frequently for oral residue/pocketing/crush medication (when feasible)/maintain upright posture during/after eating for 30 mins/no straws/oral hygiene/position upright (90 degrees)/small sips/bites

## 2022-06-19 NOTE — PROGRESS NOTE ADULT - PROBLEM SELECTOR PLAN 6
- Serum Pro-Brain Natriuretic Peptide: 63318, improved to ~4000.    - CT shows Moderate bilateral pleural effusions, interlobular septal thickening in patchy ground glass opacities representing pulmonary edema.  - CXR  shows Bilateral lower lobe infiltrates.  - Continue home Lasix 20 mg daily with hold parameter    - f/u TTE  - Cardiology recs apprecs

## 2022-06-19 NOTE — SWALLOW BEDSIDE ASSESSMENT ADULT - ASR SWALLOW RECOMMEND DIAG
to assess safest diet level and r/o baseline coughing vs aspiration/VFSS/MBS
Not warranted at this time, as Dr. Faulkner reported no concern for aspiration at this time. Will continue to monitor diet tolerance at bedside.

## 2022-06-19 NOTE — PROGRESS NOTE ADULT - PROBLEM SELECTOR PLAN 1
- Over night, Heart rate B/W   - Continue Cardizem 90mg q6 hours, digoxin 125 mcg qod and  lopressor 50mg PO BID   - Likely multifactorial in setting of underlying infection, poor po intake and med noncompliance+ hMPv  - Continue home Eliquis 2.5mg  BID  - Cardiology recs apprec

## 2022-06-19 NOTE — SWALLOW BEDSIDE ASSESSMENT ADULT - PHARYNGEAL PHASE
Delayed pharyngeal swallow/Decreased laryngeal elevation Delayed pharyngeal swallow/Decreased laryngeal elevation/Delayed cough post oral intake Delayed pharyngeal swallow/Decreased laryngeal elevation/Cough post oral intake/Delayed cough post oral intake

## 2022-06-19 NOTE — SWALLOW BEDSIDE ASSESSMENT ADULT - SWALLOW EVAL: RECOMMENDED DIET
soft and bite sized with thin liquids +aspiration precautions
pureed and moderately thick liquids, aspiration precautions, as tolerated

## 2022-06-19 NOTE — PROGRESS NOTE ADULT - ASSESSMENT
89y old  Male pmg afib on ac htn hld chief complaint of weakness    proBNP noted  VBG noted  diuresis  AF management in progress  VS noted    AF management - rate and rhythm control  AC for AF  I and O  cvs rx regimen and BP control  hMPV - resp viral illness - supportive measures - Albuterol PRN for sob and or wheezing  monitor VS and HD and Sat  Pleural Eff - Atelectasis - I ze if able to tolerate - no immediate need for thoracentesis - medical management  diuresis as per CARDIO recs  GOC discussion  isolation precs for hMPV  cough rx regimen  replmelinda liu

## 2022-06-19 NOTE — PROGRESS NOTE ADULT - ATTENDING COMMENTS
89 yo M with PMH of chronic Afib, HTN, DVT, HLD, GOUT (on allopurinol and Colchicine), CHF (TTE 2019 LVED 64%) and macular degeneration who was brought in by family for complaint of cough, weakness for the last few days. Imaging shows moderate bilateral pleural effusions Admitted with acute on chronic HFpEF A fib with RVR and management of pleural effusion, + hMPv Virus Plan: apprec s/s eval, cont supportive care, apprec cardio and pulm collaboration in optimizing care, monitor clinical course

## 2022-06-20 NOTE — SWALLOW VFSS/MBS ASSESSMENT ADULT - SLP GENERAL OBSERVATIONS
Pt received sitting upright in MBS chair, +lateral view. Pt was awake/cooperative and agreeable to study. Pt on supplemental O2 via 4L NC. Pt with baseline congested. Pt demonstrated difficulty following low level directives, but was receptive to PO trials. Pt denied pain pre and post study. Pt received sitting upright in MBS chair, +lateral view. Pt was awake/cooperative and agreeable to study. Pt on supplemental O2 via 4L NC. Pt with baseline congested cough. Pt demonstrated difficulty following low level directives, but was receptive to PO trials. Pt denied pain pre and post study.

## 2022-06-20 NOTE — CHART NOTE - NSCHARTNOTEFT_GEN_A_CORE
Called by RN @ 21:00 for 6 beats of V tach on tele within 30 second time frame. Asymptomatic. Patient took off Nasal cannula sat <88%, back on 4 litre NC sat >90%. Patient examined at bedside. No complaints.   - STAT mag and phos ordered  - RN to call if any changes

## 2022-06-20 NOTE — SWALLOW VFSS/MBS ASSESSMENT ADULT - SLP PERTINENT HISTORY OF CURRENT PROBLEM
Per charting, " 89 yo M with PMH of chronic Afib, HTN, DVT, HLD, GOUT (on allopurinol and Colchicine), CHF (TTE 2019 LVED 64%) and macular degeneration who was brought in by family for complaint of cough, weakness for the last few days. Imaging shows moderate bilateral pleural effusions Admitted with acute on chronic HFpEF A fib with RVR and management of pleural effusion, + hMPv Virus"

## 2022-06-20 NOTE — PROGRESS NOTE ADULT - SUBJECTIVE AND OBJECTIVE BOX
CHARTING IN PROGRESS     Patient is a 89y old  Male who presents with a chief complaint of weight loss (20 Jun 2022 08:48)      INTERVAL HPI/OVERNIGHT EVENTS:    MEDICATIONS  (STANDING):  apixaban 2.5 milliGRAM(s) Oral every 12 hours  digoxin     Tablet 125 MICROGram(s) Oral every other day  diltiazem    Tablet 90 milliGRAM(s) Oral every 6 hours  furosemide    Tablet 20 milliGRAM(s) Oral daily  metoprolol tartrate 50 milliGRAM(s) Oral two times a day  midodrine. 5 milliGRAM(s) Oral every 8 hours    MEDICATIONS  (PRN):  acetaminophen     Tablet .. 650 milliGRAM(s) Oral every 6 hours PRN Temp greater or equal to 38C (100.4F), Mild Pain (1 - 3)  ALBUTerol    0.083% 2.5 milliGRAM(s) Nebulizer every 4 hours PRN Shortness of Breath and/or Wheezing  aluminum hydroxide/magnesium hydroxide/simethicone Suspension 30 milliLiter(s) Oral every 4 hours PRN Dyspepsia  guaifenesin/dextromethorphan Oral Liquid 10 milliLiter(s) Oral every 6 hours PRN Cough  melatonin 3 milliGRAM(s) Oral at bedtime PRN Insomnia  ondansetron Injectable 4 milliGRAM(s) IV Push every 8 hours PRN Nausea and/or Vomiting      Allergies    No Known Allergies    Intolerances        REVIEW OF SYSTEMS:  CONSTITUTIONAL: No fever or chills  HEENT:  No headache, no sore throat  RESPIRATORY: No cough, wheezing, or shortness of breath  CARDIOVASCULAR: No chest pain, palpitations  GASTROINTESTINAL: No abd pain, nausea, vomiting, or diarrhea  GENITOURINARY: No dysuria, frequency, or hematuria  NEUROLOGICAL: no focal weakness or dizziness  MUSCULOSKELETAL: no myalgias     Vital Signs Last 24 Hrs  T(C): 36.4 (20 Jun 2022 04:43), Max: 36.4 (19 Jun 2022 12:20)  T(F): 97.6 (20 Jun 2022 04:43), Max: 97.6 (20 Jun 2022 04:43)  HR: 107 (20 Jun 2022 04:43) (76 - 107)  BP: 111/72 (20 Jun 2022 04:43) (100/68 - 118/76)  BP(mean): --  RR: 16 (19 Jun 2022 23:14) (16 - 16)  SpO2: 93% (20 Jun 2022 04:43) (93% - 95%)    PHYSICAL EXAM:  GENERAL: NAD  HEENT:  anicteric, moist mucous membranes  CHEST/LUNG:  CTA b/l, no rales, wheezes, or rhonchi  HEART:  RRR, S1, S2  ABDOMEN:  BS+, soft, nontender, nondistended  EXTREMITIES: no edema, cyanosis, or calf tenderness  NERVOUS SYSTEM: answers questions and follows commands appropriately    LABS:                        12.6   7.99  )-----------( 186      ( 20 Jun 2022 08:40 )             39.2     CBC Full  -  ( 20 Jun 2022 08:40 )  WBC Count : 7.99 K/uL  Hemoglobin : 12.6 g/dL  Hematocrit : 39.2 %  Platelet Count - Automated : 186 K/uL  Mean Cell Volume : 102.3 fl  Mean Cell Hemoglobin : 32.9 pg  Mean Cell Hemoglobin Concentration : 32.1 gm/dL  Auto Neutrophil # : x  Auto Lymphocyte # : x  Auto Monocyte # : x  Auto Eosinophil # : x  Auto Basophil # : x  Auto Neutrophil % : x  Auto Lymphocyte % : x  Auto Monocyte % : x  Auto Eosinophil % : x  Auto Basophil % : x    20 Jun 2022 08:40    142    |  105    |  30     ----------------------------<  92     4.2     |  32     |  0.93     Ca    8.6        20 Jun 2022 08:40          CAPILLARY BLOOD GLUCOSE            Culture - Urine (collected 06-15-22 @ 22:25)  Source: Clean Catch Clean Catch (Midstream)  Final Report (06-16-22 @ 22:56):    <10,000 CFU/mL Normal Urogenital Renae    Culture - Blood (collected 06-15-22 @ 18:31)  Source: .Blood Blood-Peripheral  Preliminary Report (06-16-22 @ 19:02):    No growth to date.    Culture - Blood (collected 06-15-22 @ 18:31)  Source: .Blood Blood-Peripheral  Preliminary Report (06-16-22 @ 19:02):    No growth to date.        RADIOLOGY & ADDITIONAL TESTS:    Personally reviewed.     Consultant(s) Notes Reviewed:  [x] YES  [ ] NO     Patient is a 89y old  Male who presents with a chief complaint of weight loss (20 Jun 2022 08:48)      INTERVAL HPI/OVERNIGHT EVENTS: Per tele tech, patient had -140s with coughing overnight, non-sustained. Patient seen and examined at bedside. Denies dizziness, shortness of breath, abdominal pain.     MEDICATIONS  (STANDING):  apixaban 2.5 milliGRAM(s) Oral every 12 hours  digoxin     Tablet 125 MICROGram(s) Oral every other day  diltiazem    Tablet 90 milliGRAM(s) Oral every 6 hours  furosemide    Tablet 20 milliGRAM(s) Oral daily  metoprolol tartrate 50 milliGRAM(s) Oral two times a day  midodrine. 5 milliGRAM(s) Oral every 8 hours    MEDICATIONS  (PRN):  acetaminophen     Tablet .. 650 milliGRAM(s) Oral every 6 hours PRN Temp greater or equal to 38C (100.4F), Mild Pain (1 - 3)  ALBUTerol    0.083% 2.5 milliGRAM(s) Nebulizer every 4 hours PRN Shortness of Breath and/or Wheezing  aluminum hydroxide/magnesium hydroxide/simethicone Suspension 30 milliLiter(s) Oral every 4 hours PRN Dyspepsia  guaifenesin/dextromethorphan Oral Liquid 10 milliLiter(s) Oral every 6 hours PRN Cough  melatonin 3 milliGRAM(s) Oral at bedtime PRN Insomnia  ondansetron Injectable 4 milliGRAM(s) IV Push every 8 hours PRN Nausea and/or Vomiting      Allergies    No Known Allergies    Intolerances        REVIEW OF SYSTEMS:  RESPIRATORY: No shortness of breath  GASTROINTESTINAL: No abd pain  NEUROLOGICAL: no dizziness      Vital Signs Last 24 Hrs  T(C): 36.4 (20 Jun 2022 04:43), Max: 36.4 (19 Jun 2022 12:20)  T(F): 97.6 (20 Jun 2022 04:43), Max: 97.6 (20 Jun 2022 04:43)  HR: 107 (20 Jun 2022 04:43) (76 - 107)  BP: 111/72 (20 Jun 2022 04:43) (100/68 - 118/76)  BP(mean): --  RR: 16 (19 Jun 2022 23:14) (16 - 16)  SpO2: 93% (20 Jun 2022 04:43) (93% - 95%)    PHYSICAL EXAM:  GENERAL: elderly, frail  HEENT: dry mucus membranes  CHEST/LUNG: crackles in b/l lower lobes, intermittent wet cough, no wheezing, unlabored respirations  HEART:  RRR, S1, S2  ABDOMEN:  BS+, soft, nontender, nondistended  EXTREMITIES: no edema or cyanosis  NERVOUS SYSTEM: answers questions and follows commands appropriately    LABS:                        12.6   7.99  )-----------( 186      ( 20 Jun 2022 08:40 )             39.2     CBC Full  -  ( 20 Jun 2022 08:40 )  WBC Count : 7.99 K/uL  Hemoglobin : 12.6 g/dL  Hematocrit : 39.2 %  Platelet Count - Automated : 186 K/uL  Mean Cell Volume : 102.3 fl  Mean Cell Hemoglobin : 32.9 pg  Mean Cell Hemoglobin Concentration : 32.1 gm/dL  Auto Neutrophil # : x  Auto Lymphocyte # : x  Auto Monocyte # : x  Auto Eosinophil # : x  Auto Basophil # : x  Auto Neutrophil % : x  Auto Lymphocyte % : x  Auto Monocyte % : x  Auto Eosinophil % : x  Auto Basophil % : x    20 Jun 2022 08:40    142    |  105    |  30     ----------------------------<  92     4.2     |  32     |  0.93     Ca    8.6        20 Jun 2022 08:40          CAPILLARY BLOOD GLUCOSE            Culture - Urine (collected 06-15-22 @ 22:25)  Source: Clean Catch Clean Catch (Midstream)  Final Report (06-16-22 @ 22:56):    <10,000 CFU/mL Normal Urogenital Renae    Culture - Blood (collected 06-15-22 @ 18:31)  Source: .Blood Blood-Peripheral  Preliminary Report (06-16-22 @ 19:02):    No growth to date.    Culture - Blood (collected 06-15-22 @ 18:31)  Source: .Blood Blood-Peripheral  Preliminary Report (06-16-22 @ 19:02):    No growth to date.        RADIOLOGY & ADDITIONAL TESTS:    Personally reviewed.     Consultant(s) Notes Reviewed:  [x] YES  [ ] NO     Patient is a 89y old  Male who presents with a chief complaint of weight loss (20 Jun 2022 08:48)      INTERVAL HPI/OVERNIGHT EVENTS: Per tele tech, patient had -140s, non-sustained. Per discussion with RN, episodes of increased heart rate coincided with coughing overnight. Patient seen and examined at bedside. Denies dizziness, shortness of breath, abdominal pain.     MEDICATIONS  (STANDING):  apixaban 2.5 milliGRAM(s) Oral every 12 hours  digoxin     Tablet 125 MICROGram(s) Oral every other day  diltiazem    Tablet 90 milliGRAM(s) Oral every 6 hours  furosemide    Tablet 20 milliGRAM(s) Oral daily  metoprolol tartrate 50 milliGRAM(s) Oral two times a day  midodrine. 5 milliGRAM(s) Oral every 8 hours    MEDICATIONS  (PRN):  acetaminophen     Tablet .. 650 milliGRAM(s) Oral every 6 hours PRN Temp greater or equal to 38C (100.4F), Mild Pain (1 - 3)  ALBUTerol    0.083% 2.5 milliGRAM(s) Nebulizer every 4 hours PRN Shortness of Breath and/or Wheezing  aluminum hydroxide/magnesium hydroxide/simethicone Suspension 30 milliLiter(s) Oral every 4 hours PRN Dyspepsia  guaifenesin/dextromethorphan Oral Liquid 10 milliLiter(s) Oral every 6 hours PRN Cough  melatonin 3 milliGRAM(s) Oral at bedtime PRN Insomnia  ondansetron Injectable 4 milliGRAM(s) IV Push every 8 hours PRN Nausea and/or Vomiting      Allergies    No Known Allergies    Intolerances        REVIEW OF SYSTEMS:  RESPIRATORY: No shortness of breath  GASTROINTESTINAL: No abd pain  NEUROLOGICAL: no dizziness      Vital Signs Last 24 Hrs  T(C): 36.4 (20 Jun 2022 04:43), Max: 36.4 (19 Jun 2022 12:20)  T(F): 97.6 (20 Jun 2022 04:43), Max: 97.6 (20 Jun 2022 04:43)  HR: 107 (20 Jun 2022 04:43) (76 - 107)  BP: 111/72 (20 Jun 2022 04:43) (100/68 - 118/76)  BP(mean): --  RR: 16 (19 Jun 2022 23:14) (16 - 16)  SpO2: 93% (20 Jun 2022 04:43) (93% - 95%)    PHYSICAL EXAM:  GENERAL: elderly, frail  HEENT: dry mucus membranes  CHEST/LUNG: crackles in b/l lower lobes, intermittent wet cough, no wheezing, unlabored respirations  HEART:  RRR, S1, S2  ABDOMEN:  BS+, soft, nontender, nondistended  EXTREMITIES: no edema or cyanosis  NERVOUS SYSTEM: answers questions and follows commands appropriately    LABS:                        12.6   7.99  )-----------( 186      ( 20 Jun 2022 08:40 )             39.2     CBC Full  -  ( 20 Jun 2022 08:40 )  WBC Count : 7.99 K/uL  Hemoglobin : 12.6 g/dL  Hematocrit : 39.2 %  Platelet Count - Automated : 186 K/uL  Mean Cell Volume : 102.3 fl  Mean Cell Hemoglobin : 32.9 pg  Mean Cell Hemoglobin Concentration : 32.1 gm/dL  Auto Neutrophil # : x  Auto Lymphocyte # : x  Auto Monocyte # : x  Auto Eosinophil # : x  Auto Basophil # : x  Auto Neutrophil % : x  Auto Lymphocyte % : x  Auto Monocyte % : x  Auto Eosinophil % : x  Auto Basophil % : x    20 Jun 2022 08:40    142    |  105    |  30     ----------------------------<  92     4.2     |  32     |  0.93     Ca    8.6        20 Jun 2022 08:40          CAPILLARY BLOOD GLUCOSE            Culture - Urine (collected 06-15-22 @ 22:25)  Source: Clean Catch Clean Catch (Midstream)  Final Report (06-16-22 @ 22:56):    <10,000 CFU/mL Normal Urogenital Renae    Culture - Blood (collected 06-15-22 @ 18:31)  Source: .Blood Blood-Peripheral  Preliminary Report (06-16-22 @ 19:02):    No growth to date.    Culture - Blood (collected 06-15-22 @ 18:31)  Source: .Blood Blood-Peripheral  Preliminary Report (06-16-22 @ 19:02):    No growth to date.        RADIOLOGY & ADDITIONAL TESTS:    Personally reviewed.     Consultant(s) Notes Reviewed:  [x] YES  [ ] NO

## 2022-06-20 NOTE — SWALLOW VFSS/MBS ASSESSMENT ADULT - ORAL PHASE COMMENTS
frequent anterior to posterior repetitive head movement with bolus in anterior portion of oral cavity for prolonged period of time

## 2022-06-20 NOTE — SWALLOW VFSS/MBS ASSESSMENT ADULT - ORAL PHASE
Delayed oral transit time/Reduced anterior - posterior transport/Incomplete tongue to palate contact/Uncontrolled bolus / spillover in hypopharynx Delayed oral transit time/Reduced anterior - posterior transport/Incomplete tongue to palate contact/Uncontrolled bolus / spillover in fabiana-pharynx

## 2022-06-20 NOTE — SWALLOW VFSS/MBS ASSESSMENT ADULT - RECOMMENDED FEEDING/EATING TECHNIQUES
all liquids via teaspoon!/allow for swallow between intakes/alternate food with liquid/check mouth frequently for oral residue/pocketing/crush medication (when feasible)/maintain upright posture during/after eating for 30 mins/no straws/oral hygiene/position upright (90 degrees)/provide rest periods between swallows/small sips/bites

## 2022-06-20 NOTE — CHART NOTE - NSCHARTNOTEFT_GEN_A_CORE
Assessment: patient seen for follow up malnutrition  89y old  Male who presents with a chief complaint of weight loss   speech recommends puree moderately thick liquids MBS now ordered  6/20 BM   patient seen in room taking breakfast well this AM  RD spoke with daughter who states Ht 6'1" estimated Ht for initial assessment BMI 17.5 based on given ht per family   Phos low Phos Na K supplemented         Factors impacting intake: [ ] none [ ] nausea  [ ] vomiting [ ] diarrhea [ ] constipation  [ ]chewing problems [ x] swallowing issues  [ ] other: diet per speech puree moderately thick awaiting MBS    Diet Prescription: Diet, Pureed:   Moderately Thick Liquids (MODTHICKLIQS)  Low Sodium (06-19-22 @ 14:38)    Intake: good PO this AM    Current Weight: Weight 6/20 wt 133.1# no edema      Pertinent Medications: MEDICATIONS  (STANDING):  apixaban 2.5 milliGRAM(s) Oral every 12 hours  digoxin     Tablet 125 MICROGram(s) Oral every other day  diltiazem    Tablet 90 milliGRAM(s) Oral every 6 hours  furosemide    Tablet 20 milliGRAM(s) Oral daily  metoprolol tartrate 50 milliGRAM(s) Oral two times a day  midodrine. 5 milliGRAM(s) Oral every 8 hours    MEDICATIONS  (PRN):  acetaminophen     Tablet .. 650 milliGRAM(s) Oral every 6 hours PRN Temp greater or equal to 38C (100.4F), Mild Pain (1 - 3)  ALBUTerol    0.083% 2.5 milliGRAM(s) Nebulizer every 4 hours PRN Shortness of Breath and/or Wheezing  aluminum hydroxide/magnesium hydroxide/simethicone Suspension 30 milliLiter(s) Oral every 4 hours PRN Dyspepsia  guaifenesin/dextromethorphan Oral Liquid 10 milliLiter(s) Oral every 6 hours PRN Cough  melatonin 3 milliGRAM(s) Oral at bedtime PRN Insomnia  ondansetron Injectable 4 milliGRAM(s) IV Push every 8 hours PRN Nausea and/or Vomiting    Pertinent Labs: Phos 2.1  Skin: no pressure injury     Estimated Needs:   [x ] no change since previous assessment [id number] and 98-110gms protein   [ ] recalculated:     Previous Nutrition Diagnosis:      [X) swallow difficulty malnutrition   Nutrition Diagnosis is [x ] ongoing  [ ] resolved [ ] not applicable     New Nutrition Diagnosis: [x ] not applicable       Interventions:   Recommend  [ ] Change Diet To:  [ ] Nutrition Supplement  [ ] Nutrition Support  [x ] Other: follow for speech MBS , recommend add ensure enlive BID , recommend add MVI    Monitoring and Evaluation:   [x ] PO intake [ x ] Tolerance to diet prescription [ x ] weights [ x ] labs[ x ] follow up per protocol  [ ] other:

## 2022-06-20 NOTE — PROGRESS NOTE ADULT - ATTENDING COMMENTS
89 yo M with PMH of chronic Afib, HTN, DVT, HLD, GOUT (on allopurinol and Colchicine), CHF (TTE 2019 LVED 64%) and macular degeneration who was brought in by family for complaint of cough, weakness for the last few days. Imaging shows moderate bilateral pleural effusions Admitted with acute on chronic HFpEF A fib with RVR and management of pleural effusion, + hMPv Virus PLan: cont supportive care, apprec MBS and s/s mandy guerrero would like family meeting thurs [phone] to discuss advance directives, plan of care dc planning and code status as well as elect hcp amongst children will discuss with oncoming dr tamayo hospitalist, wean off O2 as tolerated

## 2022-06-20 NOTE — PROGRESS NOTE ADULT - PROBLEM SELECTOR PLAN 4
- symptomatic treatment, APAP PRN fever and guaifenesin-DM PRN cough  - hypoxic desat 89% on RA, now on 4 L nasal cannula sat >90%  - Diet bite size/thin fluid, aspiration precaution  - Blood cx x2: NGTD , urine cx : <10,000 normal urogenital lupe   - Consulted ID Dr Goldman. supportive management - Symptomatic treatment  - Tylenol PRN fever and guaifenesin-DM PRN cough  - Supp O2 prn  - MBS today: Pureed with mildly thick liquids. Aspiration precautions.  - Blood cx x2 and urine cx negative  - ID (Dr. Goldman) consulted: supportive management

## 2022-06-20 NOTE — SWALLOW VFSS/MBS ASSESSMENT ADULT - COMMENTS
Repeat clinical swallow assessment completed 6/19, at which time pt was recommended puree with moderately thick liquids. MBS was recommended to assess safest diet level and r/o baseline coughing vs aspiration.    CT chest 6/15: "Moderate bilateral pleural effusions, interlobular septal thickening in patchy groundglass opacities representing pulmonary edema. Multiple bilateral small lung nodules, indeterminant etiology. Metastatic disease is a possibility. Recommend follow-up chest CT in 4 weeks to determine the stability. Further evaluation can be performed with PET CT."

## 2022-06-20 NOTE — PROGRESS NOTE ADULT - PROBLEM SELECTOR PLAN 9
- continue ELIQUIS 2.5mg BID    PT EVAL: rehabilitation facility; [facility] Eliquis 2.5mg bid for AC    PT EVAL: rehabilitation facility; [facility]

## 2022-06-20 NOTE — PROGRESS NOTE ADULT - SUBJECTIVE AND OBJECTIVE BOX
Date/Time Patient Seen:  		  Referring MD:   Data Reviewed	       Patient is a 89y old  Male who presents with a chief complaint of weight loss (19 Jun 2022 11:19)      Subjective/HPI     PAST MEDICAL & SURGICAL HISTORY:  Atrial fibrillation    HTN (hypertension)    HLD (hyperlipidemia)    Anemia    CHF (congestive heart failure)    DVT, lower extremity          Medication list         MEDICATIONS  (STANDING):  apixaban 2.5 milliGRAM(s) Oral every 12 hours  digoxin     Tablet 125 MICROGram(s) Oral every other day  diltiazem    Tablet 90 milliGRAM(s) Oral every 6 hours  furosemide    Tablet 20 milliGRAM(s) Oral daily  metoprolol tartrate 50 milliGRAM(s) Oral two times a day  midodrine. 5 milliGRAM(s) Oral every 8 hours    MEDICATIONS  (PRN):  acetaminophen     Tablet .. 650 milliGRAM(s) Oral every 6 hours PRN Temp greater or equal to 38C (100.4F), Mild Pain (1 - 3)  ALBUTerol    0.083% 2.5 milliGRAM(s) Nebulizer every 4 hours PRN Shortness of Breath and/or Wheezing  aluminum hydroxide/magnesium hydroxide/simethicone Suspension 30 milliLiter(s) Oral every 4 hours PRN Dyspepsia  guaifenesin/dextromethorphan Oral Liquid 10 milliLiter(s) Oral every 6 hours PRN Cough  melatonin 3 milliGRAM(s) Oral at bedtime PRN Insomnia  ondansetron Injectable 4 milliGRAM(s) IV Push every 8 hours PRN Nausea and/or Vomiting         Vitals log        ICU Vital Signs Last 24 Hrs  T(C): 36.4 (20 Jun 2022 04:43), Max: 36.4 (19 Jun 2022 12:20)  T(F): 97.6 (20 Jun 2022 04:43), Max: 97.6 (20 Jun 2022 04:43)  HR: 107 (20 Jun 2022 04:43) (76 - 107)  BP: 111/72 (20 Jun 2022 04:43) (100/68 - 118/76)  BP(mean): --  ABP: --  ABP(mean): --  RR: 16 (19 Jun 2022 23:14) (16 - 16)  SpO2: 93% (20 Jun 2022 04:43) (93% - 95%)           Input and Output:  I&O's Detail    18 Jun 2022 07:01  -  19 Jun 2022 07:00  --------------------------------------------------------  IN:  Total IN: 0 mL    OUT:    Voided (mL): 200 mL  Total OUT: 200 mL    Total NET: -200 mL      19 Jun 2022 07:01  -  20 Jun 2022 06:10  --------------------------------------------------------  IN:  Total IN: 0 mL    OUT:    Voided (mL): 450 mL  Total OUT: 450 mL    Total NET: -450 mL          Lab Data                        12.8   6.80  )-----------( 181      ( 19 Jun 2022 06:46 )             38.9     06-19    143  |  108  |  29<H>  ----------------------------<  130<H>  4.0   |  32<H>  |  0.90    Ca    8.5      19 Jun 2022 06:46  Phos  2.1     06-19  Mg     2.4     06-19    TPro  6.4  /  Alb  2.3<L>  /  TBili  1.8<H>  /  DBili  x   /  AST  24  /  ALT  46  /  AlkPhos  112  06-19            Review of Systems	      Objective     Physical Examination    heart s1s2  lung dc BS  abd soft  head nc      Pertinent Lab findings & Imaging      Ramírez:  NO   Adequate UO     I&O's Detail    18 Jun 2022 07:01  -  19 Jun 2022 07:00  --------------------------------------------------------  IN:  Total IN: 0 mL    OUT:    Voided (mL): 200 mL  Total OUT: 200 mL    Total NET: -200 mL      19 Jun 2022 07:01  -  20 Jun 2022 06:10  --------------------------------------------------------  IN:  Total IN: 0 mL    OUT:    Voided (mL): 450 mL  Total OUT: 450 mL    Total NET: -450 mL               Discussed with:     Cultures:	        Radiology

## 2022-06-20 NOTE — PROGRESS NOTE ADULT - PROBLEM SELECTOR PLAN 5
- likely prerenal azotemia from decreased PO intake cr RESOLVED   - Avoid nephrotoxic agents.  - Caution with IVF given hx of CHF with b/l pleural effusion   - F/u BMP in AM  - Will obtain renal consult if renal function worsens. Resolved  Likely prerenal azotemia from decreased PO intake  - Avoid nephrotoxic agents.  - Caution with IVF given hx of CHF with b/l pleural effusion   - Will obtain renal consult if renal function worsens.

## 2022-06-20 NOTE — SWALLOW VFSS/MBS ASSESSMENT ADULT - RECOMMENDED CONSISTENCY
IMPRESSIONS CONTINUED: to increase in severity as viscosity of PO increased. There was trace BOT and PPW residue, mild LPW and pyriform residue, and mild to moderate valleculae residue post swallow. Pt was able to intermittently complete additional dry swallow, which resulted in parital clearance of pharyngeal residue.     RECOMMENDATIONS:  1. Puree with mildly thick liquids.  2. Administer all liquids via TEASPOON to assist with lingual control and ensure small bolus volume.  3. Strict aspiration precautions.  4. 1:1 supervision/assistance during all meals.  5. Safe swallowing guidelines: feed only when fully awake/alert, position upright 90 degrees, all PO via teaspoon, alternate bite/sip, pace meal slowly given delayed oral and pharyngeal phases, and remain upright ~1 hour post meals.   6. Ongoing oral care.  7. Will monitor for liquid advancement at bedside, per SLP discretion.  8. Consider neurology consult given decline in mental status in comparison to previous assessments and overall nature of oral and pharyngeal dysphagia.  9. Swallowing therapy while in house and s/p d/c to maximize oropharyngeal swallow mechanism.  *Pt noted to have clinical decline in comparison to previous swallow assessments. MBS is indicative of pt's swallow profile during only one segment of time. If there is a continuous decline in pt's clinical presentation, please hold PO and notify SLP. Results were discussed at length with Dr. Faulkner.   .

## 2022-06-20 NOTE — PROGRESS NOTE ADULT - PROBLEM SELECTOR PLAN 2
- BP improved overnight   - BP soft on 6/18, s/p 2x midodrine 5mg, ICU consulted for hypotension, continue to monitor on floor   - continue midodrine 5mg Q8H  - Monitor routine hemodynamics - BP soft on 6/18, s/p 2x midodrine 5mg, ICU consulted for hypotension, continue to monitor on floor   - continue midodrine 5mg Q8H  - Monitor routine hemodynamics BPs persistently soft, stable  - Continue midodrine 5mg q8h  - Monitor routine hemodynamics

## 2022-06-20 NOTE — SWALLOW VFSS/MBS ASSESSMENT ADULT - ROSENBEK'S PENETRATION ASPIRATION SCALE
(1) no aspiration, contrast does not enter airway occasional/(2) contrast enters airway, remains above the vocal cords, no residue remains (penetration)

## 2022-06-20 NOTE — PROGRESS NOTE ADULT - PROBLEM SELECTOR PLAN 6
- Serum Pro-Brain Natriuretic Peptide: [id number], improved to ~4000.    - CT shows Moderate bilateral pleural effusions, interlobular septal thickening in patchy ground glass opacities representing pulmonary edema.  - CXR  shows Bilateral lower lobe infiltrates.  - Continue home Lasix 20 mg daily with hold parameter    - f/u TTE  - Cardiology recs apprecs - proBNP 27085 on admission, improved to ~4000  - CT chest: Moderate bilateral pleural effusions, interlobular septal thickening in patchy ground glass opacities representing pulmonary edema.  - CXR: Bilateral lower lobe infiltrates.  - Continue home Lasix 20mg qd with hold parameter    - TTE: EF of 60%, biatrial enlargement, calcified trileaflet aortic valve with decreased opening, mitral annular calcification, moderate MR, moderate TR

## 2022-06-20 NOTE — SWALLOW VFSS/MBS ASSESSMENT ADULT - DIAGNOSTIC IMPRESSIONS
1. Moderate oral dysphagia when given puree. There was adequate retrieval and containment. Bolus was in anterior portion of oral cavity with minimal lingual attempts to propel bolus posteriorly. Pt then demonstrated frequent anterior to posterior repetitive head movements with occasional lingual pumping. Posterior transfer was then initiated, which was reduced and with delayed oral transit time. Pt with intermittent piecemeal swallow x2-3. There was grossly adequate clearance post swallow.  2. Mild to moderate oral dysphagia when given moderately thick liquids, mildly thick liquids, and thin liquids. There was adequate bolus retrieval and containment. Bolus was held in anterior portion of oral cavity for a prolonged period of time, though noted to decrease in time in comparison to puree. Pt continued to p/w frequent anterior to posterior repetitive head movements with occasional lingual pumping. There was reduced bolus cohesion with premature spillage to the hypopharynx (most prominent with thin liquids). There was reduced posterior transfer and oral transit time. There was grossly adequate clearance post swallow.  *It should be noted that pt was cued to complete head tilt to assist with posterior propulsion of bolus across all consistencies; however, did not improve aforementioned deficits.  3. Mild to moderate pharyngeal dysphagia when given puree, moderately thick liquids, mildly thick liquids, and thin liquids marked by delayed pharyngeal swallow trigger (at the level of the valleculae, at the level of pyriforms across all liquids) with resultant pooling prior to swallow trigger (most prominent with thin liquids), reduced BOT retraction, reduced hyolaryngeal elevation/excursion, and mainly complete epiglottic retroflexion. There was occasional transient flash penetration with complete retrieval when given thin liquids. There was no penetration and/or aspiration pre/during/post swallow with remainder of PO. There was pharyngeal residue post swallow, which noted

## 2022-06-20 NOTE — PROGRESS NOTE ADULT - TIME BILLING
care coordination, plan of care discussed with patient face to face, 3E IDR team, daughter, Mitzi PEREZ

## 2022-06-20 NOTE — PROGRESS NOTE ADULT - SUBJECTIVE AND OBJECTIVE BOX
[facility] -- Vincenzo Wyman, Daina Allen, Alex Akers Savella, [place]  Office # [phone]    Follow Up:   Afib with RVR     Subjective/Observations: Asleep but arousable to name-calling.  NC in use but not in any form of respiratory discomfort.  Denies chest pain or palpitation.  Had very brief episodes of RVR overnight    REVIEW OF SYSTEMS: All other review of systems is negative unless indicated above  PAST MEDICAL & SURGICAL HISTORY:  Atrial fibrillation    HTN (hypertension)    HLD (hyperlipidemia)    Anemia    CHF (congestive heart failure)    DVT, lower extremity    MEDICATIONS  (STANDING):  apixaban 2.5 milliGRAM(s) Oral every 12 hours  digoxin     Tablet 125 MICROGram(s) Oral every other day  diltiazem    Tablet 90 milliGRAM(s) Oral every 6 hours  furosemide    Tablet 20 milliGRAM(s) Oral daily  metoprolol tartrate 50 milliGRAM(s) Oral two times a day  midodrine. 5 milliGRAM(s) Oral every 8 hours    MEDICATIONS  (PRN):  acetaminophen     Tablet .. 650 milliGRAM(s) Oral every 6 hours PRN Temp greater or equal to 38C (100.4F), Mild Pain (1 - 3)  ALBUTerol    0.083% 2.5 milliGRAM(s) Nebulizer every 4 hours PRN Shortness of Breath and/or Wheezing  aluminum hydroxide/magnesium hydroxide/simethicone Suspension 30 milliLiter(s) Oral every 4 hours PRN Dyspepsia  guaifenesin/dextromethorphan Oral Liquid 10 milliLiter(s) Oral every 6 hours PRN Cough  melatonin 3 milliGRAM(s) Oral at bedtime PRN Insomnia  ondansetron Injectable 4 milliGRAM(s) IV Push every 8 hours PRN Nausea and/or Vomiting    Allergies    No Known Allergies    Intolerances    Vital Signs Last 24 Hrs  T(C): 36.4 (20 Jun 2022 04:43), Max: 36.4 (19 Jun 2022 12:20)  T(F): 97.6 (20 Jun 2022 04:43), Max: 97.6 (20 Jun 2022 04:43)  HR: 107 (20 Jun 2022 04:43) (76 - 107)  BP: 111/72 (20 Jun 2022 04:43) (100/68 - 118/76)  BP(mean): --  RR: 16 (19 Jun 2022 23:14) (16 - 16)  SpO2: 93% (20 Jun 2022 04:43) (93% - 95%)  I&O's Summary    19 Jun 2022 07:01  -  20 Jun 2022 07:00  --------------------------------------------------------  IN: 0 mL / OUT: 450 mL / NET: -450 mL    PHYSICAL EXAM:  TELE: Afib  Constitutional: NAD, awake but lethargic, frail  HEENT: Moist Mucous Membranes, Anicteric  Pulmonary: Non-labored, breath sounds are diminished bilaterally, No wheezing, rales + rhonchi  Cardiovascular: IRRR, S1 and S2, No murmurs, rubs, gallops or clicks  Gastrointestinal: Bowel Sounds present, soft, nontender.   Lymph: No peripheral edema. No lymphadenopathy.  Skin: No visible rashes or ulcers.  Psych:  Mood & affect flat    LABS: All Labs Reviewed:                        12.6   7.99  )-----------( 186      ( 20 Jun 2022 08:40 )             39.2                         12.8   6.80  )-----------( 181      ( 19 Jun 2022 06:46 )             38.9                         12.4   6.26  )-----------( 184      ( 18 Jun 2022 07:29 )             37.9     19 Jun 2022 06:46    143    |  108    |  29     ----------------------------<  130    4.0     |  32     |  0.90   18 Jun 2022 07:29    143    |  106    |  37     ----------------------------<  108    3.9     |  32     |  1.00     Ca    8.5        19 Jun 2022 06:46  Ca    8.7        18 Jun 2022 07:29  Phos  2.1       19 Jun 2022 06:46  Phos  2.5       18 Jun 2022 07:29  Mg     2.4       19 Jun 2022 06:46  Mg     2.5       17 Jun 2022 20:47    TPro  6.4    /  Alb  2.3    /  TBili  1.8    /  DBili  x      /  AST  24     /  ALT  46     /  AlkPhos  112    19 Jun 2022 06:46  TPro  6.5    /  Alb  2.5    /  TBili  1.8    /  DBili  x      /  AST  28     /  ALT  55     /  AlkPhos  108    18 Jun 2022 07:29  ACC: [id number] EXAM:  CT CHEST                          PROCEDURE DATE:  06/15/2022          INTERPRETATION:  INDICATION: Shortness of breath  TECHNIQUE: Unenhanced CT of the chest. Coronal, sagittal, and MIP images   were reconstructed and reviewed.  COMPARISON: No prior chest CT.    FINDINGS:    AIRWAYS, LUNGS and PLEURA: Patent central airways. Moderate bilateral   pleural effusions, interlobular septal thickening in patchy groundglass   opacities representing pulmonary edema. Multiple bilateral smaller than 6   mm nodules, indeterminant etiology.    MEDIASTINUM AND RENZO: Borderline/mildly enlarged mediastinal lymph nodes   including AP window lymph node measuring 1.1 cm short axis.    HEART AND VESSELS: Cardiomegaly. The left atrium is severely dilated.   Mitral annulus and coronary calcifications. No pericardial effusion.   Thoracic aorta and pulmonary artery normal in diameter.    VISUALIZED UPPER ABDOMEN: Small exophytic cyst within the left kidney.    CHEST WALL AND BONES: No aggressive osseous lesion.    LOWER NECK: Within normal limits.    IMPRESSION:    Moderate bilateral pleural effusions, interlobular septal thickening in   patchy groundglass opacities representing pulmonary edema.    Multiple bilateral small lung nodules, indeterminant etiology. Metastatic   disease is a possibility. Recommend follow-up chest CT in 4 weeks to   determine the stability. Further evaluation can be performed with PET CT.    --- End of Report ---    JADEN RODRÍGUEZ MD; Attending Radiologist  This document has been electronically signed. Joelle 15 2022  4:10PM    Ventricular Rate 130 BPM    QRS Duration 78 ms    Q-T Interval 288 ms    QTC Calculation(Bazett) 423 ms    R Axis 78 degrees    T Axis 269 degrees    Diagnosis Line Atrial fibrillation with rapid ventricular response  ST & T wave abnormality, consider inferior ischemia  ST & T wave abnormality, consider anterolateral ischemia  Abnormal ECG  When compared with ECG of 15-JOELLE-2022 12:46, (Unconfirmed)  No significant change was found  Confirmed by TOMASZ HEBERT ([id number]) on 6/15/2022 3:04:33 PM    TTE pending

## 2022-06-20 NOTE — PROGRESS NOTE ADULT - ASSESSMENT
89y old  Male PMH Afib on ac htn hld chief complaint of weakness. Patient was seen in office 6/6 by Dr Allen for decreased appetite and weakness, digoxin was discontinued and increased lopressor to 100mg Po BID     - Tachycardia multifactorial in setting of underlying infection, poor po intake and medication noncompliance     AFIB with RVR  - Remains in Afib but rates has been mostly controlled, though, had several episodes of very brief tachycadia at 120's overnight  - Continue Cardizem 90 mg q6H and Lopressor 50 mg q12H with parameter.  - Continue Dig every other day  - Continue Eliquis   - Started on Midodrine 5 mg q8H for hypotension.  BP now improved  - Echo 2019 MAC aortic sclerosis normal Lv function, no pulmonary htn, normal LA size, mild MR ef 64%.  Follow up repeat  - Compensated from HF standpoint.  Continue Lasix 20 mg daily  - CT shows moderate bilateral pleural effusions.  Pulm following.  No immediate need for thora per Pulm.  Recommend IP if able  - Monitor and replete lytes, keep K>4, Mg>2.    - Will continue to follow.    Deanna Nunn DNP, NP-C  Cardiology   Spectra #[phone]

## 2022-06-20 NOTE — PROGRESS NOTE ADULT - PROBLEM SELECTOR PLAN 3
- CT shows Moderate bilateral pleural effusions, interlobular septal thickening in patchy ground glass opacities representing pulmonary edema.  - CXR shows bilateral lower lobe infiltrates.  - Incentive spirometer  - continue home Lasix 20 mg daily with hold parameter    - Consulted Dr. Carpio, no immediate need for thoracentesis - CT shows moderate bilateral pleural effusions, interlobular septal thickening in patchy ground glass opacities representing pulmonary edema.  - CXR shows bilateral lower lobe infiltrates.  - Continue home Lasix 20 mg qd with hold parameters   - Pulm (Dr. Carpio) consulted, recs appreciated: no immediate need for thoracentesis

## 2022-06-20 NOTE — PROGRESS NOTE ADULT - PROBLEM SELECTOR PLAN 1
- Over night, Heart rate B/W   - Continue Cardizem 90mg q6 hours, digoxin 125 mcg qod and  lopressor 50mg PO BID   - Likely multifactorial in setting of underlying infection, poor po intake and med noncompliance+ hMPv  - Continue home Eliquis 2.5mg  BID  - Cardiology recs apprec Likely multifactorial in setting of underlying human metapneumovirus infection, poor po intake and med noncompliance  - -140s overnight, non-sustained  - Continue Cardizem, Digoxin and Lopressor  - Continue home Eliquis  - Cardio ([facility] following) Likely multifactorial in setting of human metapneumovirus infection, poor po intake and med noncompliance  - -140s overnight, non-sustained, coincided with episodes of coughing  - Continue Cardizem, Digoxin and Lopressor  - Continue home Eliquis  - Cardio ([facility] following)

## 2022-06-20 NOTE — PROGRESS NOTE ADULT - PROBLEM SELECTOR PLAN 7
- Multiple differentials possible. May be adverse effect of digoxin, recently discontinued last week  - TSH wnl    - CT shows Multiple bilateral small lung nodules, indeterminant etiology. Metastatic disease is a possibility. - Recommend follow-up chest CT in 4 weeks to determine the stability. Further evaluation can be performed with PET CT. will consider repeat imaging as outpatient or PET  -apprec pulm recs - Multiple differentials possible. May be adverse effect of digoxin, recently discontinued  - TSH wnl    - CT shows multiple bilateral small lung nodules, indeterminant etiology. Metastatic disease is a possibility. Recommend follow-up chest CT in 4 weeks to determine the stability. Further evaluation can be performed with PET CT. Will consider repeat imaging as outpatient or PET

## 2022-06-21 NOTE — PROGRESS NOTE ADULT - PROBLEM SELECTOR PLAN 2
BPs persistently soft, stable  - Continue midodrine 5mg q8h  - Monitor routine hemodynamics - Symptomatic treatment; acute hypoxic resp failure on admission, wean off O2  - Tylenol PRN fever and guaifenesin-DM PRN cough  - Supp O2 prn  - MBS today: Pureed with mildly thick liquids. Aspiration precautions. May need to re-evaluate as viral syndrome resolves  - Blood cx x2 and urine cx negative  - ID (Dr. Goldman) consulted: supportive management

## 2022-06-21 NOTE — PROGRESS NOTE ADULT - SUBJECTIVE AND OBJECTIVE BOX
[facility] -- Vincenzo Wyman, Daina Allen, Alex Akers, Ganesh Liriano: Office # [phone]    Follow Up:   Afib with RVR     Subjective/Observations: Patient seen and examined, awake, alert, resting comfortably in bed, no complaints of chest pain, dyspnea, palpitations or dizziness, orthopnea and PND. Tolerating room air. IVF / ABX infusing     REVIEW OF SYSTEMS: All review of systems is negative for eye, ENT, GI, , allergic, dermatologic, musculoskeletal and neurologic except as described above    PAST MEDICAL & SURGICAL HISTORY:  Atrial fibrillation      HTN (hypertension)      HLD (hyperlipidemia)      Anemia      CHF (congestive heart failure)      DVT, lower extremity          MEDICATIONS  (STANDING):  apixaban 2.5 milliGRAM(s) Oral every 12 hours  digoxin     Tablet 125 MICROGram(s) Oral every other day  diltiazem    Tablet 90 milliGRAM(s) Oral every 6 hours  furosemide    Tablet 20 milliGRAM(s) Oral daily  metoprolol tartrate 50 milliGRAM(s) Oral two times a day  midodrine. 5 milliGRAM(s) Oral every 8 hours    MEDICATIONS  (PRN):  acetaminophen     Tablet .. 650 milliGRAM(s) Oral every 6 hours PRN Temp greater or equal to 38C (100.4F), Mild Pain (1 - 3)  ALBUTerol    0.083% 2.5 milliGRAM(s) Nebulizer every 4 hours PRN Shortness of Breath and/or Wheezing  aluminum hydroxide/magnesium hydroxide/simethicone Suspension 30 milliLiter(s) Oral every 4 hours PRN Dyspepsia  guaifenesin/dextromethorphan Oral Liquid 10 milliLiter(s) Oral every 6 hours PRN Cough  melatonin 3 milliGRAM(s) Oral at bedtime PRN Insomnia  ondansetron Injectable 4 milliGRAM(s) IV Push every 8 hours PRN Nausea and/or Vomiting    Allergies    No Known Allergies    Intolerances      Vital Signs Last 24 Hrs  T(C): 36.5 (21 Jun 2022 05:12), Max: 36.7 (20 Jun 2022 20:58)  T(F): 97.7 (21 Jun 2022 05:12), Max: 98 (20 Jun 2022 20:58)  HR: 110 (21 Jun 2022 05:12) (85 - 110)  BP: 117/80 (21 Jun 2022 05:12) (115/76 - 119/74)  BP(mean): --  RR: 18 (21 Jun 2022 05:12) (18 - 18)  SpO2: 90% (21 Jun 2022 05:12) (90% - 93%)  I&O's Summary    20 Jun 2022 07:01  -  21 Jun 2022 07:00  --------------------------------------------------------  IN: 0 mL / OUT: 1550 mL / NET: -1550 mL          TELE: Afib 87- 118's, 6 bts NSVT   PHYSICAL EXAM:  Constitutional: NAD, awake and alert, frail  HEENT: Moist Mucous Membranes, Anicteric  Pulmonary: Non-labored, breath sounds are clear bilaterally, No wheezing, rales or rhonchi  Cardiovascular: irregular, S1 and S2, No murmurs, rubs, gallops or clicks  Gastrointestinal: Bowel Sounds present, soft, nontender.   Lymph: No peripheral edema. No lymphadenopathy.  Skin: No visible rashes or ulcers.  Psych:  Mood & affect appropriate for situation    LABS: All Labs Reviewed:                        12.4   7.56  )-----------( 176      ( 21 Jun 2022 06:29 )             39.3                         12.6   7.99  )-----------( 186      ( 20 Jun 2022 08:40 )             39.2                         12.8   6.80  )-----------( 181      ( 19 Jun 2022 06:46 )             38.9     21 Jun 2022 06:29    143    |  105    |  28     ----------------------------<  92     4.1     |  34     |  0.85   20 Jun 2022 08:40    142    |  105    |  30     ----------------------------<  92     4.2     |  32     |  0.93   19 Jun 2022 06:46    143    |  108    |  29     ----------------------------<  130    4.0     |  32     |  0.90     Ca    8.5        21 Jun 2022 06:29  Ca    8.6        20 Jun 2022 08:40  Ca    8.5        19 Jun 2022 06:46  Phos  2.8       21 Jun 2022 06:29  Phos  2.5       20 Jun 2022 22:43  Phos  2.1       19 Jun 2022 06:46  Mg     2.1       21 Jun 2022 06:29  Mg     2.1       20 Jun 2022 22:43  Mg     2.4       19 Jun 2022 06:46    TPro  6.8    /  Alb  2.2    /  TBili  1.8    /  DBili  x      /  AST  28     /  ALT  41     /  AlkPhos  130    21 Jun 2022 06:29  TPro  6.4    /  Alb  2.3    /  TBili  1.8    /  DBili  x      /  AST  24     /  ALT  46     /  AlkPhos  112    19 Jun 2022 06:46              Triiodothyronine, Total (T3 Total): 51 ng/dL (06-20-22 @ 23:41)    Cholesterol, Serum: 173 mg/dL (06-17-22 @ 10:57)  HDL Cholesterol, Serum: 32 mg/dL (06-17-22 @ 10:57)  Triglycerides, Serum: 110 mg/dL (06-17-22 @ 10:57)      12 Lead ECG:   Ventricular Rate 130 BPM    QRS Duration 78 ms    Q-T Interval 288 ms    QTC Calculation(Bazett) 423 ms    R Axis 78 degrees    T Axis 269 degrees    Diagnosis Line Atrial fibrillation with rapid ventricular response  ST & T wave abnormality, consider inferior ischemia  ST & T wave abnormality, consider anterolateral ischemia  Abnormal ECG  When compared with ECG of 15-RUI-2022 12:46, (Unconfirmed)  No significant change was found  Confirmed by TOMASZ HEBERT ([id number]) on 6/15/2022 3:04:33 PM (06-15-22 @ 12:48)    < from: Xray Chest 1 View- PORTABLE-Urgent (06.15.22 @ 13:35) >    ACC: [id number] EXAM:  XR CHEST PORTABLE URGENT 1V                          PROCEDURE DATE:  06/15/2022          INTERPRETATION:  AP semierect chest on Deepali 15, 2022 at 1:19 PM. Patient   has sepsis.    COMPARISON: None available.    Heart magnified by technique.    There are bilateral lower lobe infiltrates.    IMPRESSION: Bilateral lower lobe infiltrates.    --- End of Report ---            HASEEB BRAUN MD; Attending Radiologist  This document has been electronically signed. Rui 15 2022  1:56PM    < end of copied text >  < from: TTE Echo Complete w/o Contrast w/ Doppler (06.19.22 @ 10:25) >    ACC: [id number] EXAM:  ECHO TTE WO CON COMP W DOPP                          PROCEDURE DATE:  06/19/2022          INTERPRETATION:  INDICATION: Dyspnea  Sonographer LK    Blood Pressure 123/78    Height 182.9 cm     Weight 70.3 kg       BSA 1.9   sq m    Dimensions:  LA 5.2       Normal Values: 2.0 - 4.0 cm  Ao 2.8        Normal Values: 2.0 - 3.8 cm  SEPTUM 1.4       Normal Values: 0.6 - 1.2 cm  PWT 1.1       Normal Values: 0.6 - 1.1 cm  LVIDd 4.2         Normal Values: 3.0 - 5.6 cm  LVIDs 3.0   Normal Values: 1.8 - 4.0 cm      OBSERVATIONS:  Technically difficult and limited study  Mitral Valve: Mitral annular calcification, calcified leaflets with an   undetermined degree of stenosis, moderate MR.  Aortic Valve/Aorta: Calcified trileaflet aortic valve with decreased   opening. Doppler interrogation was not performed  Tricuspid Valve: Moderate TR.  Pulmonic Valve: Mild PI  Left Atrium: Enlarged  Right Atrium: Enlarged  Left Ventricle: The left ventricular endocardium is not well-visualized.   Overall grossly normal LV size and systolic function, estimated LVEF of   60%.  Right Ventricle: Not well-visualized  Pericardium: no significant pericardial effusion.        IMPRESSION:  Technically difficult and limited study  The left ventricular endocardium is not well-visualized. Overall grossly   normal LV size and systolic function, estimated LVEF of 60%.  The right ventricle is not well-visualized  Biatrial enlargement  Calcified trileaflet aortic valve with decreased opening. Doppler   interrogation was not performed  Mitral annular calcification, calcified leaflets with an undetermined   degree of stenosis, moderate MR.  Moderate TR.    --- End of Report ---            FATOUMATA HAND MD; Attending Cardiologist  This document has been electronically signed. Jun 20 2022  1:51PM    < end of copied text >     [facility] -- Vincenzo Wyman Grossman, Daina, Alex Akers Savella, Goodger: Office # [phone]    Follow Up:   Afib with RVR     Subjective/Observations: Patient seen and examined, awake, alert, resting comfortably in bed, no complaints of chest pain, dyspnea, palpitations or dizziness, orthopnea and PND. Tolerating room air.     REVIEW OF SYSTEMS: All review of systems is negative for eye, ENT, GI, , allergic, dermatologic, musculoskeletal and neurologic except as described above    PAST MEDICAL & SURGICAL HISTORY:  Atrial fibrillation      HTN (hypertension)      HLD (hyperlipidemia)      Anemia      CHF (congestive heart failure)      DVT, lower extremity          MEDICATIONS  (STANDING):  apixaban 2.5 milliGRAM(s) Oral every 12 hours  digoxin     Tablet 125 MICROGram(s) Oral every other day  diltiazem    Tablet 90 milliGRAM(s) Oral every 6 hours  furosemide    Tablet 20 milliGRAM(s) Oral daily  metoprolol tartrate 50 milliGRAM(s) Oral two times a day  midodrine. 5 milliGRAM(s) Oral every 8 hours    MEDICATIONS  (PRN):  acetaminophen     Tablet .. 650 milliGRAM(s) Oral every 6 hours PRN Temp greater or equal to 38C (100.4F), Mild Pain (1 - 3)  ALBUTerol    0.083% 2.5 milliGRAM(s) Nebulizer every 4 hours PRN Shortness of Breath and/or Wheezing  aluminum hydroxide/magnesium hydroxide/simethicone Suspension 30 milliLiter(s) Oral every 4 hours PRN Dyspepsia  guaifenesin/dextromethorphan Oral Liquid 10 milliLiter(s) Oral every 6 hours PRN Cough  melatonin 3 milliGRAM(s) Oral at bedtime PRN Insomnia  ondansetron Injectable 4 milliGRAM(s) IV Push every 8 hours PRN Nausea and/or Vomiting    Allergies    No Known Allergies    Intolerances      Vital Signs Last 24 Hrs  T(C): 36.5 (21 Jun 2022 05:12), Max: 36.7 (20 Jun 2022 20:58)  T(F): 97.7 (21 Jun 2022 05:12), Max: 98 (20 Jun 2022 20:58)  HR: 110 (21 Jun 2022 05:12) (85 - 110)  BP: 117/80 (21 Jun 2022 05:12) (115/76 - 119/74)  BP(mean): --  RR: 18 (21 Jun 2022 05:12) (18 - 18)  SpO2: 90% (21 Jun 2022 05:12) (90% - 93%)  I&O's Summary    20 Jun 2022 07:01  -  21 Jun 2022 07:00  --------------------------------------------------------  IN: 0 mL / OUT: 1550 mL / NET: -1550 mL          TELE: Afib 87- 118's, 6 bts NSVT   PHYSICAL EXAM:  Constitutional: NAD, awake and alert, frail  HEENT: Moist Mucous Membranes, Anicteric  Pulmonary: Non-labored, breath sounds are clear bilaterally, No wheezing, rales or rhonchi  Cardiovascular: irregular, S1 and S2, No murmurs, rubs, gallops or clicks  Gastrointestinal: Bowel Sounds present, soft, nontender.   Lymph: No peripheral edema. No lymphadenopathy.  Skin: No visible rashes or ulcers.  Psych:  Mood & affect appropriate for situation    LABS: All Labs Reviewed:                        12.4   7.56  )-----------( 176      ( 21 Jun 2022 06:29 )             39.3                         12.6   7.99  )-----------( 186      ( 20 Jun 2022 08:40 )             39.2                         12.8   6.80  )-----------( 181      ( 19 Jun 2022 06:46 )             38.9     21 Jun 2022 06:29    143    |  105    |  28     ----------------------------<  92     4.1     |  34     |  0.85   20 Jun 2022 08:40    142    |  105    |  30     ----------------------------<  92     4.2     |  32     |  0.93   19 Jun 2022 06:46    143    |  108    |  29     ----------------------------<  130    4.0     |  32     |  0.90     Ca    8.5        21 Jun 2022 06:29  Ca    8.6        20 Jun 2022 08:40  Ca    8.5        19 Jun 2022 06:46  Phos  2.8       21 Jun 2022 06:29  Phos  2.5       20 Jun 2022 22:43  Phos  2.1       19 Jun 2022 06:46  Mg     2.1       21 Jun 2022 06:29  Mg     2.1       20 Jun 2022 22:43  Mg     2.4       19 Jun 2022 06:46    TPro  6.8    /  Alb  2.2    /  TBili  1.8    /  DBili  x      /  AST  28     /  ALT  41     /  AlkPhos  130    21 Jun 2022 06:29  TPro  6.4    /  Alb  2.3    /  TBili  1.8    /  DBili  x      /  AST  24     /  ALT  46     /  AlkPhos  112    19 Jun 2022 06:46              Triiodothyronine, Total (T3 Total): 51 ng/dL (06-20-22 @ 23:41)    Cholesterol, Serum: 173 mg/dL (06-17-22 @ 10:57)  HDL Cholesterol, Serum: 32 mg/dL (06-17-22 @ 10:57)  Triglycerides, Serum: 110 mg/dL (06-17-22 @ 10:57)      12 Lead ECG:   Ventricular Rate 130 BPM    QRS Duration 78 ms    Q-T Interval 288 ms    QTC Calculation(Bazett) 423 ms    R Axis 78 degrees    T Axis 269 degrees    Diagnosis Line Atrial fibrillation with rapid ventricular response  ST & T wave abnormality, consider inferior ischemia  ST & T wave abnormality, consider anterolateral ischemia  Abnormal ECG  When compared with ECG of 15-RUI-2022 12:46, (Unconfirmed)  No significant change was found  Confirmed by TOMASZ HEBERT ([id number]) on 6/15/2022 3:04:33 PM (06-15-22 @ 12:48)    < from: Xray Chest 1 View- PORTABLE-Urgent (06.15.22 @ 13:35) >    ACC: [id number] EXAM:  XR CHEST PORTABLE URGENT 1V                          PROCEDURE DATE:  06/15/2022          INTERPRETATION:  AP semierect chest on Deepali 15, 2022 at 1:19 PM. Patient   has sepsis.    COMPARISON: None available.    Heart magnified by technique.    There are bilateral lower lobe infiltrates.    IMPRESSION: Bilateral lower lobe infiltrates.    --- End of Report ---            HASEEB BRAUN MD; Attending Radiologist  This document has been electronically signed. Rui 15 2022  1:56PM    < end of copied text >  < from: TTE Echo Complete w/o Contrast w/ Doppler (06.19.22 @ 10:25) >    ACC: [id number] EXAM:  ECHO TTE WO CON COMP W DOPP                          PROCEDURE DATE:  06/19/2022          INTERPRETATION:  INDICATION: Dyspnea  Sonographer LK    Blood Pressure 123/78    Height 182.9 cm     Weight 70.3 kg       BSA 1.9   sq m    Dimensions:  LA 5.2       Normal Values: 2.0 - 4.0 cm  Ao 2.8        Normal Values: 2.0 - 3.8 cm  SEPTUM 1.4       Normal Values: 0.6 - 1.2 cm  PWT 1.1       Normal Values: 0.6 - 1.1 cm  LVIDd 4.2         Normal Values: 3.0 - 5.6 cm  LVIDs 3.0   Normal Values: 1.8 - 4.0 cm      OBSERVATIONS:  Technically difficult and limited study  Mitral Valve: Mitral annular calcification, calcified leaflets with an   undetermined degree of stenosis, moderate MR.  Aortic Valve/Aorta: Calcified trileaflet aortic valve with decreased   opening. Doppler interrogation was not performed  Tricuspid Valve: Moderate TR.  Pulmonic Valve: Mild PI  Left Atrium: Enlarged  Right Atrium: Enlarged  Left Ventricle: The left ventricular endocardium is not well-visualized.   Overall grossly normal LV size and systolic function, estimated LVEF of   60%.  Right Ventricle: Not well-visualized  Pericardium: no significant pericardial effusion.        IMPRESSION:  Technically difficult and limited study  The left ventricular endocardium is not well-visualized. Overall grossly   normal LV size and systolic function, estimated LVEF of 60%.  The right ventricle is not well-visualized  Biatrial enlargement  Calcified trileaflet aortic valve with decreased opening. Doppler   interrogation was not performed  Mitral annular calcification, calcified leaflets with an undetermined   degree of stenosis, moderate MR.  Moderate TR.    --- End of Report ---            FATOUMATA HAND MD; Attending Cardiologist  This document has been electronically signed. Jun 20 2022  1:51PM    < end of copied text >

## 2022-06-21 NOTE — PROGRESS NOTE ADULT - PROBLEM SELECTOR PLAN 7
- Multiple differentials possible. May be adverse effect of digoxin, recently discontinued  - TSH wnl    - CT shows multiple bilateral small lung nodules, indeterminant etiology. Metastatic disease is a possibility. Recommend follow-up chest CT in 4 weeks to determine the stability. Further evaluation can be performed with PET CT. Will consider repeat imaging as outpatient or PET  - Palliative care consulted for GOC

## 2022-06-21 NOTE — PROGRESS NOTE ADULT - ATTENDING COMMENTS
89 yo M with PMH of chronic Afib, HTN, DVT, HLD, GOUT (on allopurinol and Colchicine), CHF (TTE 2019 LVED 64%) and macular degeneration who was brought in by family for complaint of cough, weakness for the last few days. Imaging shows moderate bilateral pleural effusions Admitted with acute on chronic HFpEF A fib with RVR and management of pleural effusion, + hMPv Virus Plan: cont supportive care, apprec cardio and pulm recs for care optimization, apprec s/s eval, palliative encounter noted, family meeting for this thursday 530pm with daughters and pt to determine hcp and advance care planning, apprec sw/cm collaboration in dc planning

## 2022-06-21 NOTE — PROGRESS NOTE ADULT - ASSESSMENT
Patient is a 89 yo M with PMH of chronic Afib, HTN, DVT, HLD, GOUT (on allopurinol and Colchicine), CHF (TTE 2019 LVED 64%) and macular degeneration who was brought in by family for complaint of cough, weakness for the last few days. Imaging shows moderate bilateral pleural effusions Admitted with acute on chronic HFpEF A fib with RVR and management of pleural effusion, + hMPv Virus

## 2022-06-21 NOTE — PROGRESS NOTE ADULT - ASSESSMENT
89y old  Male PMH Afib on ac htn hld chief complaint of weakness. Patient was seen in office 6/6 by Dr Allen for decreased appetite and weakness, digoxin was discontinued and increased lopressor to 100mg Po BID     - Tachycardia multifactorial in setting of underlying infection, poor po intake and medication noncompliance     AFIB with RVR  - rates improving now Afib 's overnight with 6bts NSVT   - would increase Lopressor 50 mg q8hr with hold parameter.  - Continue Cardizem 90 mg q6H   - Continue Dig every other day  - Continue Eliquis     - BP improving   - continue Midodrine    - Echo 2019 MAC aortic sclerosis normal LV function, no pulmonary htn, normal LA size, mild MR ef 64%.  Follow up repeat  - Continue Lasix 20 mg daily  - Compensated from HF standpoint.      - CT shows moderate bilateral pleural effusions.  Pulm following.  No immediate need for thoracentesis per Pulm.   - Monitor and replete lytes, keep K>4, Mg>2.  - Will continue to follow.    Apple Martinez, [facility]  Nurse Practitioner - Cardiology   Spectra #[phone] 89y old  Male PMH Afib on ac htn hld chief complaint of weakness. Patient was seen in office 6/6 by Dr Allen for decreased appetite and weakness, digoxin was discontinued and increased lopressor to 100mg Po BID       AFIB with RVR  - Tachycardia multifactorial in setting of underlying infection, poor po intake and medication noncompliance   - rates improving now Afib 's overnight with 6bts NSVT   - would increase Lopressor 50 mg q8hr with hold parameter.  - Continue Cardizem 90 mg q6H   - Continue Dig every other day  - Continue Eliquis     - BP improving   - continue Midodrine    - Echo 2019 MAC aortic sclerosis normal LV function, no pulmonary htn, normal LA size, mild MR ef 64%.  Follow up repeat  - Continue Lasix 20 mg daily  - Compensated from HF standpoint.      - CT shows moderate bilateral pleural effusions.  Pulm following.  No immediate need for thoracentesis per Pulm.   - Monitor and replete lytes, keep K>4, Mg>2.  - Will continue to follow.    Apple Martinez, [facility]  Nurse Practitioner - Cardiology   Spectra #[phone]

## 2022-06-21 NOTE — PROGRESS NOTE ADULT - PROBLEM SELECTOR PLAN 9
Eliquis 2.5mg bid for AC    PT EVAL: rehabilitation facility; [facility] Eliquis 2.5mg bid for AC    PT EVAL: rehabilitation facility; [facility]    Attempted to reach daughter Leigh over phone, went to voicemail; left voice message.

## 2022-06-21 NOTE — PROGRESS NOTE ADULT - SUBJECTIVE AND OBJECTIVE BOX
Date/Time Patient Seen:  		  Referring MD:   Data Reviewed	       Patient is a 89y old  Male who presents with a chief complaint of weight loss (20 Jun 2022 09:30)      Subjective/HPI     PAST MEDICAL & SURGICAL HISTORY:  Atrial fibrillation    HTN (hypertension)    HLD (hyperlipidemia)    Anemia    CHF (congestive heart failure)    DVT, lower extremity          Medication list         MEDICATIONS  (STANDING):  apixaban 2.5 milliGRAM(s) Oral every 12 hours  digoxin     Tablet 125 MICROGram(s) Oral every other day  diltiazem    Tablet 90 milliGRAM(s) Oral every 6 hours  furosemide    Tablet 20 milliGRAM(s) Oral daily  metoprolol tartrate 50 milliGRAM(s) Oral two times a day  midodrine. 5 milliGRAM(s) Oral every 8 hours    MEDICATIONS  (PRN):  acetaminophen     Tablet .. 650 milliGRAM(s) Oral every 6 hours PRN Temp greater or equal to 38C (100.4F), Mild Pain (1 - 3)  ALBUTerol    0.083% 2.5 milliGRAM(s) Nebulizer every 4 hours PRN Shortness of Breath and/or Wheezing  aluminum hydroxide/magnesium hydroxide/simethicone Suspension 30 milliLiter(s) Oral every 4 hours PRN Dyspepsia  guaifenesin/dextromethorphan Oral Liquid 10 milliLiter(s) Oral every 6 hours PRN Cough  melatonin 3 milliGRAM(s) Oral at bedtime PRN Insomnia  ondansetron Injectable 4 milliGRAM(s) IV Push every 8 hours PRN Nausea and/or Vomiting         Vitals log        ICU Vital Signs Last 24 Hrs  T(C): 36.5 (21 Jun 2022 05:12), Max: 36.7 (20 Jun 2022 20:58)  T(F): 97.7 (21 Jun 2022 05:12), Max: 98 (20 Jun 2022 20:58)  HR: 110 (21 Jun 2022 05:12) (85 - 110)  BP: 117/80 (21 Jun 2022 05:12) (115/76 - 119/74)  BP(mean): --  ABP: --  ABP(mean): --  RR: 18 (21 Jun 2022 05:12) (18 - 18)  SpO2: 90% (21 Jun 2022 05:12) (90% - 93%)           Input and Output:  I&O's Detail    19 Jun 2022 07:01  -  20 Jun 2022 07:00  --------------------------------------------------------  IN:  Total IN: 0 mL    OUT:    Voided (mL): 450 mL  Total OUT: 450 mL    Total NET: -450 mL      20 Jun 2022 07:01  -  21 Jun 2022 06:29  --------------------------------------------------------  IN:  Total IN: 0 mL    OUT:    Voided (mL): 1550 mL  Total OUT: 1550 mL    Total NET: -1550 mL          Lab Data                        12.6   7.99  )-----------( 186      ( 20 Jun 2022 08:40 )             39.2     06-20    142  |  105  |  30<H>  ----------------------------<  92  4.2   |  32<H>  |  0.93    Ca    8.6      20 Jun 2022 08:40  Phos  2.5     06-20  Mg     2.1     06-20    TPro  6.4  /  Alb  2.3<L>  /  TBili  1.8<H>  /  DBili  x   /  AST  24  /  ALT  46  /  AlkPhos  112  06-19            Review of Systems	      Objective     Physical Examination    heart s1s2  lung dec BS  abd soft      Pertinent Lab findings & Imaging      Ramírez:  NO   Adequate UO     I&O's Detail    19 Jun 2022 07:01  -  20 Jun 2022 07:00  --------------------------------------------------------  IN:  Total IN: 0 mL    OUT:    Voided (mL): 450 mL  Total OUT: 450 mL    Total NET: -450 mL      20 Jun 2022 07:01  -  21 Jun 2022 06:29  --------------------------------------------------------  IN:  Total IN: 0 mL    OUT:    Voided (mL): 1550 mL  Total OUT: 1550 mL    Total NET: -1550 mL               Discussed with:     Cultures:	        Radiology

## 2022-06-21 NOTE — PROGRESS NOTE ADULT - SUBJECTIVE AND OBJECTIVE BOX
Patient is a 89y old  Male who presents with a chief complaint of weight loss (21 Jun 2022 11:09)    INTERVAL HPI/OVERNIGHT EVENTS: Overnight, pt had 6 beats of VTach- electrolytes were normal, and pt was put on nasal cannula for desaturation to 88% on RA, with improvement in SpO2. Pt seen and examined at the bedside this AM. Pt has no complaints or concerns today, is intermittently confused but denies any fevers, chills, chest pain, shortness of breath.    MEDICATIONS  (STANDING):  apixaban 2.5 milliGRAM(s) Oral every 12 hours  digoxin     Tablet 125 MICROGram(s) Oral every other day  diltiazem    Tablet 90 milliGRAM(s) Oral every 6 hours  furosemide    Tablet 20 milliGRAM(s) Oral daily  metoprolol tartrate 50 milliGRAM(s) Oral two times a day  midodrine. 5 milliGRAM(s) Oral every 8 hours    MEDICATIONS  (PRN):  acetaminophen     Tablet .. 650 milliGRAM(s) Oral every 6 hours PRN Temp greater or equal to 38C (100.4F), Mild Pain (1 - 3)  ALBUTerol    0.083% 2.5 milliGRAM(s) Nebulizer every 4 hours PRN Shortness of Breath and/or Wheezing  aluminum hydroxide/magnesium hydroxide/simethicone Suspension 30 milliLiter(s) Oral every 4 hours PRN Dyspepsia  guaifenesin/dextromethorphan Oral Liquid 10 milliLiter(s) Oral every 6 hours PRN Cough  melatonin 3 milliGRAM(s) Oral at bedtime PRN Insomnia  ondansetron Injectable 4 milliGRAM(s) IV Push every 8 hours PRN Nausea and/or Vomiting    Allergies  No Known Allergies    Intolerances    REVIEW OF SYSTEMS:  CONSTITUTIONAL: No fever or chills  HEENT:  No headache, no sore throat  RESPIRATORY: No cough, wheezing, or shortness of breath  CARDIOVASCULAR: No chest pain, palpitations  GASTROINTESTINAL: No abd pain, nausea, vomiting, or diarrhea  GENITOURINARY: No dysuria, frequency, or hematuria  NEUROLOGICAL: no focal weakness or dizziness  MUSCULOSKELETAL: no myalgias     Vital Signs Last 24 Hrs  T(C): 36.6 (21 Jun 2022 11:20), Max: 36.7 (20 Jun 2022 20:58)  T(F): 97.9 (21 Jun 2022 11:20), Max: 98 (20 Jun 2022 20:58)  HR: 96 (21 Jun 2022 11:20) (85 - 110)  BP: 112/67 (21 Jun 2022 11:20) (112/67 - 119/74)  RR: 18 (21 Jun 2022 11:20) (18 - 18)  SpO2: 93% (21 Jun 2022 11:20) (90% - 93%)    PHYSICAL EXAM:  GENERAL: NAD, frail  HEENT:  anicteric, moist mucous membranes  CHEST/LUNG:  Bibasilar crackles, diminished breath sounds  HEART:  RRR, S1, S2  ABDOMEN:  BS+, soft, nontender, nondistended  EXTREMITIES: no edema, cyanosis, or calf tenderness  NERVOUS SYSTEM: answers most questions and follows most commands appropriately    LABS:                        12.4   7.56  )-----------( 176      ( 21 Jun 2022 06:29 )             39.3     CBC Full  -  ( 21 Jun 2022 06:29 )  WBC Count : 7.56 K/uL  Hemoglobin : 12.4 g/dL  Hematocrit : 39.3 %  Platelet Count - Automated : 176 K/uL  Mean Cell Volume : 103.1 fl  Mean Cell Hemoglobin : 32.5 pg  Mean Cell Hemoglobin Concentration : 31.6 gm/dL  Auto Neutrophil # : 5.46 K/uL  Auto Lymphocyte # : 0.63 K/uL  Auto Monocyte # : 0.97 K/uL  Auto Eosinophil # : 0.40 K/uL  Auto Basophil # : 0.05 K/uL  Auto Neutrophil % : 72.2 %  Auto Lymphocyte % : 8.3 %  Auto Monocyte % : 12.8 %  Auto Eosinophil % : 5.3 %  Auto Basophil % : 0.7 %    21 Jun 2022 06:29    143    |  105    |  28     ----------------------------<  92     4.1     |  34     |  0.85     Ca    8.5        21 Jun 2022 06:29  Phos  2.8       21 Jun 2022 06:29  Mg     2.1       21 Jun 2022 06:29    TPro  6.8    /  Alb  2.2    /  TBili  1.8    /  DBili  x      /  AST  28     /  ALT  41     /  AlkPhos  130    21 Jun 2022 06:29    CAPILLARY BLOOD GLUCOSE    Culture - Urine (collected 06-15-22 @ 22:25)  Source: Clean Catch Clean Catch (Midstream)  Final Report (06-16-22 @ 22:56):    <10,000 CFU/mL Normal Urogenital Renae    Culture - Blood (collected 06-15-22 @ 18:31)  Source: .Blood Blood-Peripheral  Final Report (06-20-22 @ 19:00):    No Growth Final    Culture - Blood (collected 06-15-22 @ 18:31)  Source: .Blood Blood-Peripheral  Final Report (06-20-22 @ 19:00):    No Growth Final    RADIOLOGY & ADDITIONAL TESTS:  Personally reviewed.     Consultant(s) Notes Reviewed:  [x] YES  [ ] NO

## 2022-06-21 NOTE — PROGRESS NOTE ADULT - PROBLEM SELECTOR PLAN 1
Likely multifactorial in setting of human metapneumovirus infection, poor po intake and med noncompliance  - -140s overnight, non-sustained, coincided with episodes of coughing  - Continue Cardizem, Digoxin and Lopressor  - Continue home Eliquis  - Cardio ([facility] following) Likely multifactorial in setting of human metapneumovirus infection, poor po intake and med noncompliance  - -140s overnight, non-sustained, coincided with episodes of coughing  - Continue Cardizem, Digoxin  - Increased Lopressor frequency to q8h with hold parameters  - Continue home Eliquis  - Cardio ([facility] following)

## 2022-06-21 NOTE — PROGRESS NOTE ADULT - PROBLEM SELECTOR PLAN 5
Resolved  Likely prerenal azotemia from decreased PO intake  - Avoid nephrotoxic agents.  - Caution with IVF given hx of CHF with b/l pleural effusion   - Will obtain renal consult if renal function worsens.

## 2022-06-21 NOTE — PROGRESS NOTE ADULT - PROBLEM SELECTOR PLAN 6
- proBNP 35135 on admission, improved to ~4000  - CT chest: Moderate bilateral pleural effusions, interlobular septal thickening in patchy ground glass opacities representing pulmonary edema.  - CXR: Bilateral lower lobe infiltrates.  - Continue home Lasix 20mg qd with hold parameter    - TTE: EF of 60%, biatrial enlargement, calcified trileaflet aortic valve with decreased opening, mitral annular calcification, moderate MR, moderate TR

## 2022-06-21 NOTE — PROGRESS NOTE ADULT - PROBLEM SELECTOR PLAN 4
- Symptomatic treatment  - Tylenol PRN fever and guaifenesin-DM PRN cough  - Supp O2 prn  - MBS today: Pureed with mildly thick liquids. Aspiration precautions. May need to re-evaluate as viral syndrome resolves  - Blood cx x2 and urine cx negative  - ID (Dr. Goldman) consulted: supportive management - CT shows moderate bilateral pleural effusions, interlobular septal thickening in patchy ground glass opacities representing pulmonary edema.  - CXR shows bilateral lower lobe infiltrates.  - Continue home Lasix 20 mg qd with hold parameters   - Pulm (Dr. Carpio) consulted, recs appreciated: no immediate need for thoracentesis

## 2022-06-21 NOTE — PROGRESS NOTE ADULT - ASSESSMENT
89y old  Male pmg afib on ac htn hld chief complaint of weakness    proBNP noted  VBG noted  diuresis  AF management in progress  VS noted  MBS noted - Pureed diet -     AF management - rate and rhythm control  AC for AF  I and O  cvs rx regimen and BP control  hMPV - resp viral illness - supportive measures - Albuterol PRN for sob and or wheezing  monitor VS and HD and Sat  Pleural Eff - Atelectasis - I ze if able to tolerate - no immediate need for thoracentesis - medical management  diuresis as per CARDIO recs  GOC discussion  isolation precs for hMPV  cough rx regimen  replete noe

## 2022-06-21 NOTE — PROGRESS NOTE ADULT - PROBLEM SELECTOR PLAN 3
- CT shows moderate bilateral pleural effusions, interlobular septal thickening in patchy ground glass opacities representing pulmonary edema.  - CXR shows bilateral lower lobe infiltrates.  - Continue home Lasix 20 mg qd with hold parameters   - Pulm (Dr. Carpio) consulted, recs appreciated: no immediate need for thoracentesis BPs persistently soft, stable  - Continue midodrine 5mg q8h  - Monitor routine hemodynamics

## 2022-06-22 NOTE — PROGRESS NOTE ADULT - ASSESSMENT
89y old  Male pmg afib on ac htn hld chief complaint of weakness    GOC documented - FULL CODE    AF management - rate and rhythm control  AC for AF  I and O  cvs rx regimen and BP control  hMPV - resp viral illness - supportive measures - Albuterol PRN for sob and or wheezing  monitor VS and HD and Sat  Pleural Eff - Atelectasis - I ze if able to tolerate - no immediate need for thoracentesis - medical management  diuresis as per CARDIO recs  GOC discussion  isolation precs for hMPV  cough rx regimen  replmelinda liu

## 2022-06-22 NOTE — PROGRESS NOTE ADULT - ASSESSMENT
89y old  Male PMH Afib on ac htn hld chief complaint of weakness. Patient was seen in office 6/6 by Dr Allen for decreased appetite and weakness, digoxin was discontinued and increased lopressor to 100mg Po BID     AFIB with RVR  - Remains in Afib but rates has been mostly controlled, though, had 2 episodes of unsustained tachycardia at 120's overnight per tele  - Continue Cardizem 90 mg q6H, Lopressor 50 mg q12H, and Dig every other day.  Can switch BB and CCB to long-acting if able   - Continue Eliquis   - Continue Midodrine 5 mg q8H for hypotension.  Can increase as necessary to allow AVN uptitration if HR persistently elevated  - Repeat TTE showed EF 60%, TROY, mod MR/TR  - Compensated from HF standpoint.  Continue Lasix 20 mg daily.  Monitor volume status   - CT showed moderate bilateral pleural effusions.  Pulm following.  No immediate need for thora per Pulm.  Recommend IP if able  - Monitor and replete lytes, keep K>4, Mg>2.    - Will continue to follow.    Deanna Nunn DNP, NP-C  Cardiology   Spectra #[phone]

## 2022-06-22 NOTE — PROGRESS NOTE ADULT - SUBJECTIVE AND OBJECTIVE BOX
Date/Time Patient Seen:  		  Referring MD:   Data Reviewed	       Patient is a 89y old  Male who presents with a chief complaint of weight loss (21 Jun 2022 13:40)      Subjective/HPI     PAST MEDICAL & SURGICAL HISTORY:  Atrial fibrillation    HTN (hypertension)    HLD (hyperlipidemia)    Anemia    CHF (congestive heart failure)    DVT, lower extremity          Medication list         MEDICATIONS  (STANDING):  apixaban 2.5 milliGRAM(s) Oral every 12 hours  digoxin     Tablet 125 MICROGram(s) Oral every other day  diltiazem    Tablet 90 milliGRAM(s) Oral every 6 hours  furosemide    Tablet 20 milliGRAM(s) Oral daily  metoprolol tartrate 50 milliGRAM(s) Oral every 8 hours  midodrine. 5 milliGRAM(s) Oral every 8 hours    MEDICATIONS  (PRN):  acetaminophen     Tablet .. 650 milliGRAM(s) Oral every 6 hours PRN Temp greater or equal to 38C (100.4F), Mild Pain (1 - 3)  ALBUTerol    0.083% 2.5 milliGRAM(s) Nebulizer every 4 hours PRN Shortness of Breath and/or Wheezing  aluminum hydroxide/magnesium hydroxide/simethicone Suspension 30 milliLiter(s) Oral every 4 hours PRN Dyspepsia  guaifenesin/dextromethorphan Oral Liquid 10 milliLiter(s) Oral every 6 hours PRN Cough  melatonin 3 milliGRAM(s) Oral at bedtime PRN Insomnia  ondansetron Injectable 4 milliGRAM(s) IV Push every 8 hours PRN Nausea and/or Vomiting         Vitals log        ICU Vital Signs Last 24 Hrs  T(C): 36.6 (22 Jun 2022 05:09), Max: 37.2 (21 Jun 2022 18:57)  T(F): 97.8 (22 Jun 2022 05:09), Max: 99 (21 Jun 2022 18:57)  HR: 70 (22 Jun 2022 05:09) (70 - 96)  BP: 124/87 (22 Jun 2022 05:09) (100/60 - 124/87)  BP(mean): --  ABP: --  ABP(mean): --  RR: 18 (22 Jun 2022 05:09) (18 - 18)  SpO2: 92% (22 Jun 2022 05:09) (92% - 95%)           Input and Output:  I&O's Detail    20 Jun 2022 07:01  -  21 Jun 2022 07:00  --------------------------------------------------------  IN:  Total IN: 0 mL    OUT:    Voided (mL): 1550 mL  Total OUT: 1550 mL    Total NET: -1550 mL      21 Jun 2022 07:01  -  22 Jun 2022 05:49  --------------------------------------------------------  IN:    Oral Fluid: 320 mL  Total IN: 320 mL    OUT:  Total OUT: 0 mL    Total NET: 320 mL          Lab Data                        12.4   7.56  )-----------( 176      ( 21 Jun 2022 06:29 )             39.3     06-21    143  |  105  |  28<H>  ----------------------------<  92  4.1   |  34<H>  |  0.85    Ca    8.5      21 Jun 2022 06:29  Phos  2.8     06-21  Mg     2.1     06-21    TPro  6.8  /  Alb  2.2<L>  /  TBili  1.8<H>  /  DBili  x   /  AST  28  /  ALT  41  /  AlkPhos  130<H>  06-21            Review of Systems	      Objective     Physical Examination    heart s1s2  lung dec BS  abd soft      Pertinent Lab findings & Imaging      Ramírez:  NO   Adequate UO     I&O's Detail    20 Jun 2022 07:01  -  21 Jun 2022 07:00  --------------------------------------------------------  IN:  Total IN: 0 mL    OUT:    Voided (mL): 1550 mL  Total OUT: 1550 mL    Total NET: -1550 mL      21 Jun 2022 07:01  -  22 Jun 2022 05:49  --------------------------------------------------------  IN:    Oral Fluid: 320 mL  Total IN: 320 mL    OUT:  Total OUT: 0 mL    Total NET: 320 mL               Discussed with:     Cultures:	        Radiology

## 2022-06-22 NOTE — PROGRESS NOTE ADULT - PROBLEM SELECTOR PLAN 2
- Symptomatic treatment; acute hypoxic resp failure on admission, wean off O2  - Tylenol PRN fever and guaifenesin-DM PRN cough  - Supp O2 prn  - MBS today: Pureed with mildly thick liquids. Aspiration precautions. May need to re-evaluate as viral syndrome resolves  - Blood cx x2 and urine cx negative  - ID (Dr. Goldman) consulted: supportive management

## 2022-06-22 NOTE — PROGRESS NOTE ADULT - PROBLEM SELECTOR PLAN 4
- CT shows moderate bilateral pleural effusions, interlobular septal thickening in patchy ground glass opacities representing pulmonary edema.  - CXR shows bilateral lower lobe infiltrates.  - Continue home Lasix 20 mg qd with hold parameters   - Pulm (Dr. Caprio) consulted, recs appreciated: no immediate need for thoracentesis

## 2022-06-22 NOTE — PROGRESS NOTE ADULT - PROBLEM SELECTOR PLAN 1
Likely multifactorial in setting of human metapneumovirus infection, poor po intake and med noncompliance  - -140s overnight, non-sustained, coincided with episodes of coughing  - Continue Cardizem, Digoxin  - Increased Lopressor frequency to q8h with hold parameters  - Pt's HR continues to be elevated to 120s intermittently (non-sustained); may need to further uptitrate rate-control meds  - Continue home Eliquis  - Cardio ([facility] following)

## 2022-06-22 NOTE — PROGRESS NOTE ADULT - SUBJECTIVE AND OBJECTIVE BOX
[facility] -- Vincenzo Wyman, Daina Allen, Alex Akers Savella, Goodger  Office # [phone]    Follow Up:  Afib with RVR     Subjective/Observations: More awake this time, denies chest pain or palpitations.  No sinificant tele events overnight.  NC in use but not in any form of respiratory distress.    REVIEW OF SYSTEMS: All other review of systems is negative unless indicated above  PAST MEDICAL & SURGICAL HISTORY:  Atrial fibrillation    HTN (hypertension    HLD (hyperlipidemia)    Anemia    CHF (congestive heart failure)    DVT, lower extremity    MEDICATIONS  (STANDING):  apixaban 2.5 milliGRAM(s) Oral every 12 hours  digoxin     Tablet 125 MICROGram(s) Oral every other day  diltiazem    Tablet 90 milliGRAM(s) Oral every 6 hours  furosemide    Tablet 20 milliGRAM(s) Oral daily  metoprolol tartrate 50 milliGRAM(s) Oral every 8 hours  midodrine. 5 milliGRAM(s) Oral every 8 hours    MEDICATIONS  (PRN):  acetaminophen     Tablet .. 650 milliGRAM(s) Oral every 6 hours PRN Temp greater or equal to 38C (100.4F), Mild Pain (1 - 3)  ALBUTerol    0.083% 2.5 milliGRAM(s) Nebulizer every 4 hours PRN Shortness of Breath and/or Wheezing  aluminum hydroxide/magnesium hydroxide/simethicone Suspension 30 milliLiter(s) Oral every 4 hours PRN Dyspepsia  guaifenesin/dextromethorphan Oral Liquid 10 milliLiter(s) Oral every 6 hours PRN Cough  melatonin 3 milliGRAM(s) Oral at bedtime PRN Insomnia  ondansetron Injectable 4 milliGRAM(s) IV Push every 8 hours PRN Nausea and/or Vomiting    Allergies    No Known Allergies    Intolerances    Vital Signs Last 24 Hrs  T(C): 36.6 (22 Jun 2022 05:09), Max: 37.2 (21 Jun 2022 18:57)  T(F): 97.8 (22 Jun 2022 05:09), Max: 99 (21 Jun 2022 18:57)  HR: 70 (22 Jun 2022 05:09) (70 - 96)  BP: 124/87 (22 Jun 2022 05:09) (100/60 - 124/87)  BP(mean): --  RR: 18 (22 Jun 2022 05:09) (18 - 18)  SpO2: 92% (22 Jun 2022 05:09) (92% - 95%)  I&O's Summary    21 Jun 2022 07:01  -  22 Jun 2022 07:00  --------------------------------------------------------  IN: 320 mL / OUT: 0 mL / NET: 320 mL    PHYSICAL EXAM:  TELE: Afib  Constitutional: NAD, awake and verbally responsive, frail  HEENT: Moist Mucous Membranes, Anicteric  Pulmonary: Non-labored, breath sounds are diminished bilaterally, No wheezing, rales + rhonchi  Cardiovascular: IRRR, S1 and S2, + murmurs, no rubs, gallops or clicks  Gastrointestinal: Bowel Sounds present, soft, nontender.   Lymph: No peripheral edema. No lymphadenopathy.  Skin: No visible rashes or ulcers.  Psych:  Mood & affect flat    LABS: All Labs Reviewed:                        12.4   7.56  )-----------( 176      ( 21 Jun 2022 06:29 )             39.3                         12.6   7.99  )-----------( 186      ( 20 Jun 2022 08:40 )             39.2     21 Jun 2022 06:29    143    |  105    |  28     ----------------------------<  92     4.1     |  34     |  0.85   20 Jun 2022 08:40    142    |  105    |  30     ----------------------------<  92     4.2     |  32     |  0.93     Ca    8.5        21 Jun 2022 06:29  Ca    8.6        20 Jun 2022 08:40  Phos  2.8       21 Jun 2022 06:29  Phos  2.5       20 Jun 2022 22:43  Mg     2.1       21 Jun 2022 06:29  Mg     2.1       20 Jun 2022 22:43    TPro  6.8    /  Alb  2.2    /  TBili  1.8    /  DBili  x      /  AST  28     /  ALT  41     /  AlkPhos  130    21 Jun 2022 06:29      ACC: [id number] EXAM:  ECHO TTE WO CON COMP W DOPP                          PROCEDURE DATE:  06/19/2022          INTERPRETATION:  INDICATION: Dyspnea  Sonographer LK    Blood Pressure 123/78    Height 182.9 cm     Weight 70.3 kg       BSA 1.9   sq m    Dimensions:  LA 5.2       Normal Values: 2.0 - 4.0 cm  Ao 2.8        Normal Values: 2.0 - 3.8 cm  SEPTUM 1.4       Normal Values: 0.6 - 1.2 cm  PWT 1.1       Normal Values: 0.6 - 1.1 cm  LVIDd 4.2         Normal Values: 3.0 - 5.6 cm  LVIDs 3.0   Normal Values: 1.8 - 4.0 cm    OBSERVATIONS:  Technically difficult and limited study  Mitral Valve: Mitral annular calcification, calcified leaflets with an   undetermined degree of stenosis, moderate MR.  Aortic Valve/Aorta: Calcified trileaflet aortic valve with decreased   opening. Doppler interrogation was not performed  Tricuspid Valve: Moderate TR.  Pulmonic Valve: Mild PI  Left Atrium: Enlarged  Right Atrium: Enlarged  Left Ventricle: The left ventricular endocardium is not well-visualized.   Overall grossly normal LV size and systolic function, estimated LVEF of   60%.  Right Ventricle: Not well-visualized  Pericardium: no significant pericardial effusion.    IMPRESSION:  Technically difficult and limited study  The left ventricular endocardium is not well-visualized. Overall grossly   normal LV size and systolic function, estimated LVEF of 60%.  The right ventricle is not well-visualized  Biatrial enlargement  Calcified trileaflet aortic valve with decreased opening. Doppler   interrogation was not performed  Mitral annular calcification, calcified leaflets with an undetermined   degree of stenosis, moderate MR.  Moderate TR.    --- End of Report ---    FATOUMATA HAND MD; Attending Cardiologist  This document has been electronically signed. Jun 20 2022  1:51PM    ACC: [id number] EXAM:  CT CHEST                          PROCEDURE DATE:  06/15/2022      INTERPRETATION:  INDICATION: Shortness of breath  TECHNIQUE: Unenhanced CT of the chest. Coronal, sagittal, and MIP images   were reconstructed and reviewed.  COMPARISON: No prior chest CT.    FINDINGS:    AIRWAYS, LUNGS and PLEURA: Patent central airways. Moderate bilateral   pleural effusions, interlobular septal thickening in patchy groundglass   opacities representing pulmonary edema. Multiple bilateral smaller than 6   mm nodules, indeterminant etiology.    MEDIASTINUM AND RENZO: Borderline/mildly enlarged mediastinal lymph nodes   including AP window lymph node measuring 1.1 cm short axis.    HEART AND VESSELS: Cardiomegaly. The left atrium is severely dilated.   Mitral annulus and coronary calcifications. No pericardial effusion.   Thoracic aorta and pulmonary artery normal in diameter.    VISUALIZED UPPER ABDOMEN: Small exophytic cyst within the left kidney.    CHEST WALL AND BONES: No aggressive osseous lesion.    LOWER NECK: Within normal limits.    IMPRESSION:    Moderate bilateral pleural effusions, interlobular septal thickening in   patchy groundglass opacities representing pulmonary edema.    Multiple bilateral small lung nodules, indeterminant etiology. Metastatic   disease is a possibility. Recommend follow-up chest CT in 4 weeks to   determine the stability. Further evaluation can be performed with PET CT.    --- End of Report ---    JADEN RODRÍGUEZ MD; Attending Radiologist  This document has been electronically signed. Joelle 15 2022  4:10PM    Ventricular Rate 130 BPM    QRS Duration 78 ms    Q-T Interval 288 ms    QTC Calculation(Bazett) 423 ms    R Axis 78 degrees    T Axis 269 degrees    Diagnosis Line Atrial fibrillation with rapid ventricular response  ST & T wave abnormality, consider inferior ischemia  ST & T wave abnormality, consider anterolateral ischemia  Abnormal ECG  When compared with ECG of 15-JOELLE-2022 12:46, (Unconfirmed)  No significant change was found  Confirmed by TOMASZ HEBERT ([id number]) on 6/15/2022 3:04:33 PM

## 2022-06-22 NOTE — PROGRESS NOTE ADULT - PROBLEM SELECTOR PLAN 6
- proBNP 69667 on admission, improved to ~4000  - CT chest: Moderate bilateral pleural effusions, interlobular septal thickening in patchy ground glass opacities representing pulmonary edema.  - CXR: Bilateral lower lobe infiltrates.  - Continue home Lasix 20mg qd with hold parameter    - TTE: EF of 60%, biatrial enlargement, calcified trileaflet aortic valve with decreased opening, mitral annular calcification, moderate MR, moderate TR

## 2022-06-22 NOTE — PROGRESS NOTE ADULT - SUBJECTIVE AND OBJECTIVE BOX
Patient is a 89y old  Male who presents with a chief complaint of weight loss (22 Jun 2022 08:34)    INTERVAL HPI/OVERNIGHT EVENTS: No acute overnight events. Pt seen and examined at the bedside this AM. Pt is intermittently confused, but has no complaints today, denies any fever, cough, shortness of breath, chest pain, palpitations. Pt seems to understand plan for disposition to rehab once his heart rates are under better control.    MEDICATIONS  (STANDING):  apixaban 2.5 milliGRAM(s) Oral every 12 hours  digoxin     Tablet 125 MICROGram(s) Oral every other day  diltiazem    Tablet 90 milliGRAM(s) Oral every 6 hours  furosemide    Tablet 20 milliGRAM(s) Oral daily  metoprolol tartrate 50 milliGRAM(s) Oral every 8 hours  midodrine. 5 milliGRAM(s) Oral every 8 hours    MEDICATIONS  (PRN):  acetaminophen     Tablet .. 650 milliGRAM(s) Oral every 6 hours PRN Temp greater or equal to 38C (100.4F), Mild Pain (1 - 3)  ALBUTerol    0.083% 2.5 milliGRAM(s) Nebulizer every 4 hours PRN Shortness of Breath and/or Wheezing  aluminum hydroxide/magnesium hydroxide/simethicone Suspension 30 milliLiter(s) Oral every 4 hours PRN Dyspepsia  guaifenesin/dextromethorphan Oral Liquid 10 milliLiter(s) Oral every 6 hours PRN Cough  melatonin 3 milliGRAM(s) Oral at bedtime PRN Insomnia  ondansetron Injectable 4 milliGRAM(s) IV Push every 8 hours PRN Nausea and/or Vomiting    Allergies  No Known Allergies    Intolerances    REVIEW OF SYSTEMS:  CONSTITUTIONAL: No fever or chills  HEENT:  No headache, no sore throat  RESPIRATORY: No cough, wheezing, or shortness of breath  CARDIOVASCULAR: No chest pain, palpitations  GASTROINTESTINAL: No abd pain, nausea, vomiting, or diarrhea  GENITOURINARY: No dysuria, frequency, or hematuria  NEUROLOGICAL: no focal weakness or dizziness  MUSCULOSKELETAL: no myalgias     Vital Signs Last 24 Hrs  T(C): 36.8 (22 Jun 2022 10:07), Max: 37.2 (21 Jun 2022 18:57)  T(F): 98.3 (22 Jun 2022 10:07), Max: 99 (21 Jun 2022 18:57)  HR: 93 (22 Jun 2022 10:07) (70 - 93)  BP: 115/70 (22 Jun 2022 10:07) (100/60 - 124/87)  RR: 19 (22 Jun 2022 10:07) (18 - 19)  SpO2: 91% (22 Jun 2022 10:07) (91% - 95%)    PHYSICAL EXAM:  GENERAL: NAD  HEENT:  anicteric, moist mucous membranes  CHEST/LUNG: Diminished breath sounds b/l, some bibasilar crackles  HEART:  RRR, S1, S2  ABDOMEN:  BS+, soft, nontender, nondistended  EXTREMITIES: no edema, cyanosis, or calf tenderness  NERVOUS SYSTEM: AAO x 2 (self, year); answers most questions and follows most commands appropriately    LABS:                        13.5   8.03  )-----------( 166      ( 22 Jun 2022 08:41 )             42.1     CBC Full  -  ( 22 Jun 2022 08:41 )  WBC Count : 8.03 K/uL  Hemoglobin : 13.5 g/dL  Hematocrit : 42.1 %  Platelet Count - Automated : 166 K/uL  Mean Cell Volume : 102.9 fl  Mean Cell Hemoglobin : 33.0 pg  Mean Cell Hemoglobin Concentration : 32.1 gm/dL    22 Jun 2022 08:41    141    |  105    |  28     ----------------------------<  111    4.4     |  34     |  0.84     Ca    8.9        22 Jun 2022 08:41  Phos  2.4       22 Jun 2022 08:41  Mg     2.3       22 Jun 2022 08:41    CAPILLARY BLOOD GLUCOSE    Culture - Urine (collected 06-15-22 @ 22:25)  Source: Clean Catch Clean Catch (Midstream)  Final Report (06-16-22 @ 22:56):    <10,000 CFU/mL Normal Urogenital Renae    Culture - Blood (collected 06-15-22 @ 18:31)  Source: .Blood Blood-Peripheral  Final Report (06-20-22 @ 19:00):    No Growth Final    Culture - Blood (collected 06-15-22 @ 18:31)  Source: .Blood Blood-Peripheral  Final Report (06-20-22 @ 19:00):    No Growth Final    RADIOLOGY & ADDITIONAL TESTS:  Personally reviewed.     Consultant(s) Notes Reviewed:  [x] YES  [ ] NO Patient is a 89y old  Male who presents with a chief complaint of weight loss (22 Jun 2022 08:34)    INTERVAL HPI/OVERNIGHT EVENTS: No acute overnight events. Pt seen and examined at the bedside this AM. Pt is intermittently confused, but has no complaints today, denies any fever, cough, shortness of breath, chest pain, palpitations. Pt seems to understand plan for disposition to rehab once his heart rates are under better control.    MEDICATIONS  (STANDING):  apixaban 2.5 milliGRAM(s) Oral every 12 hours  digoxin     Tablet 125 MICROGram(s) Oral every other day  diltiazem    Tablet 90 milliGRAM(s) Oral every 6 hours  furosemide    Tablet 20 milliGRAM(s) Oral daily  metoprolol tartrate 50 milliGRAM(s) Oral every 8 hours  midodrine. 5 milliGRAM(s) Oral every 8 hours    MEDICATIONS  (PRN):  acetaminophen     Tablet .. 650 milliGRAM(s) Oral every 6 hours PRN Temp greater or equal to 38C (100.4F), Mild Pain (1 - 3)  ALBUTerol    0.083% 2.5 milliGRAM(s) Nebulizer every 4 hours PRN Shortness of Breath and/or Wheezing  aluminum hydroxide/magnesium hydroxide/simethicone Suspension 30 milliLiter(s) Oral every 4 hours PRN Dyspepsia  guaifenesin/dextromethorphan Oral Liquid 10 milliLiter(s) Oral every 6 hours PRN Cough  melatonin 3 milliGRAM(s) Oral at bedtime PRN Insomnia  ondansetron Injectable 4 milliGRAM(s) IV Push every 8 hours PRN Nausea and/or Vomiting    Allergies  No Known Allergies    Intolerances    REVIEW OF SYSTEMS:  CONSTITUTIONAL: No fever or chills  HEENT:  No headache, no sore throat  RESPIRATORY: No cough, wheezing, or shortness of breath  CARDIOVASCULAR: No chest pain, palpitations  GASTROINTESTINAL: No abd pain, nausea, vomiting, or diarrhea  GENITOURINARY: No dysuria, frequency, or hematuria  NEUROLOGICAL: no focal weakness or dizziness  MUSCULOSKELETAL: no myalgias     Vital Signs Last 24 Hrs  T(C): 36.8 (22 Jun 2022 10:07), Max: 37.2 (21 Jun 2022 18:57)  T(F): 98.3 (22 Jun 2022 10:07), Max: 99 (21 Jun 2022 18:57)  HR: 93 (22 Jun 2022 10:07) (70 - 93)  BP: 115/70 (22 Jun 2022 10:07) (100/60 - 124/87)  RR: 19 (22 Jun 2022 10:07) (18 - 19)  SpO2: 91% (22 Jun 2022 10:07) (91% - 95%)    PHYSICAL EXAM:  GENERAL: NAD  HEENT:  anicteric, moist mucous membranes  CHEST/LUNG: Diminished breath sounds b/l, some bibasilar crackles  HEART:  irregularly irregular, normal rate, S1, S2  ABDOMEN:  BS+, soft, nontender, nondistended  EXTREMITIES: no edema, cyanosis, or calf tenderness  NERVOUS SYSTEM: AAO x 2 (self, year); answers most questions and follows most commands appropriately    LABS:                        13.5   8.03  )-----------( 166      ( 22 Jun 2022 08:41 )             42.1     CBC Full  -  ( 22 Jun 2022 08:41 )  WBC Count : 8.03 K/uL  Hemoglobin : 13.5 g/dL  Hematocrit : 42.1 %  Platelet Count - Automated : 166 K/uL  Mean Cell Volume : 102.9 fl  Mean Cell Hemoglobin : 33.0 pg  Mean Cell Hemoglobin Concentration : 32.1 gm/dL    22 Jun 2022 08:41    141    |  105    |  28     ----------------------------<  111    4.4     |  34     |  0.84     Ca    8.9        22 Jun 2022 08:41  Phos  2.4       22 Jun 2022 08:41  Mg     2.3       22 Jun 2022 08:41    CAPILLARY BLOOD GLUCOSE    Culture - Urine (collected 06-15-22 @ 22:25)  Source: Clean Catch Clean Catch (Midstream)  Final Report (06-16-22 @ 22:56):    <10,000 CFU/mL Normal Urogenital Renae    Culture - Blood (collected 06-15-22 @ 18:31)  Source: .Blood Blood-Peripheral  Final Report (06-20-22 @ 19:00):    No Growth Final    Culture - Blood (collected 06-15-22 @ 18:31)  Source: .Blood Blood-Peripheral  Final Report (06-20-22 @ 19:00):    No Growth Final    RADIOLOGY & ADDITIONAL TESTS:  Personally reviewed.     Consultant(s) Notes Reviewed:  [x] YES  [ ] NO Patient is a 89y old  Male who presents with a chief complaint of weight loss (22 Jun 2022 08:34)    INTERVAL HPI/OVERNIGHT EVENTS: No acute overnight events. Pt seen and examined at the bedside this AM. Pt is intermittently confused, but has no complaints today, denies any fever, cough, shortness of breath, chest pain, palpitations. Pt seems to understand plan for disposition to rehab once his heart rates are under better control.    MEDICATIONS  (STANDING):  apixaban 2.5 milliGRAM(s) Oral every 12 hours  digoxin     Tablet 125 MICROGram(s) Oral every other day  diltiazem    Tablet 90 milliGRAM(s) Oral every 6 hours  furosemide    Tablet 20 milliGRAM(s) Oral daily  metoprolol tartrate 50 milliGRAM(s) Oral every 8 hours  midodrine. 5 milliGRAM(s) Oral every 8 hours    MEDICATIONS  (PRN):  acetaminophen     Tablet .. 650 milliGRAM(s) Oral every 6 hours PRN Temp greater or equal to 38C (100.4F), Mild Pain (1 - 3)  ALBUTerol    0.083% 2.5 milliGRAM(s) Nebulizer every 4 hours PRN Shortness of Breath and/or Wheezing  aluminum hydroxide/magnesium hydroxide/simethicone Suspension 30 milliLiter(s) Oral every 4 hours PRN Dyspepsia  guaifenesin/dextromethorphan Oral Liquid 10 milliLiter(s) Oral every 6 hours PRN Cough  melatonin 3 milliGRAM(s) Oral at bedtime PRN Insomnia  ondansetron Injectable 4 milliGRAM(s) IV Push every 8 hours PRN Nausea and/or Vomiting    Allergies  No Known Allergies    Intolerances    REVIEW OF SYSTEMS:  CONSTITUTIONAL: No fever or chills  HEENT:  No headache, no sore throat  RESPIRATORY: No cough, wheezing, or shortness of breath  CARDIOVASCULAR: No chest pain, palpitations  GASTROINTESTINAL: No abd pain, nausea, vomiting, or diarrhea  GENITOURINARY: No dysuria, frequency, or hematuria  NEUROLOGICAL: no focal weakness or dizziness  MUSCULOSKELETAL: no myalgias     Vital Signs Last 24 Hrs  T(C): 36.8 (22 Jun 2022 10:07), Max: 37.2 (21 Jun 2022 18:57)  T(F): 98.3 (22 Jun 2022 10:07), Max: 99 (21 Jun 2022 18:57)  HR: 93 (22 Jun 2022 10:07) (70 - 93)  BP: 115/70 (22 Jun 2022 10:07) (100/60 - 124/87)  RR: 19 (22 Jun 2022 10:07) (18 - 19)  SpO2: 91% (22 Jun 2022 10:07) (91% - 95%)    PHYSICAL EXAM:  GENERAL: NAD, frail, chronically ill appearing  HEENT:  anicteric, moist mucous membranes  CHEST/LUNG: Diminished breath sounds b/l, some bibasilar crackles  HEART:  irregularly irregular, normal rate, S1, S2  ABDOMEN:  BS+, soft, nontender, nondistended  EXTREMITIES: no edema, cyanosis, or calf tenderness  NERVOUS SYSTEM: AAO x 2 (self, year); answers most questions and follows most commands appropriately    LABS:                        13.5   8.03  )-----------( 166      ( 22 Jun 2022 08:41 )             42.1     CBC Full  -  ( 22 Jun 2022 08:41 )  WBC Count : 8.03 K/uL  Hemoglobin : 13.5 g/dL  Hematocrit : 42.1 %  Platelet Count - Automated : 166 K/uL  Mean Cell Volume : 102.9 fl  Mean Cell Hemoglobin : 33.0 pg  Mean Cell Hemoglobin Concentration : 32.1 gm/dL    22 Jun 2022 08:41    141    |  105    |  28     ----------------------------<  111    4.4     |  34     |  0.84     Ca    8.9        22 Jun 2022 08:41  Phos  2.4       22 Jun 2022 08:41  Mg     2.3       22 Jun 2022 08:41    CAPILLARY BLOOD GLUCOSE    Culture - Urine (collected 06-15-22 @ 22:25)  Source: Clean Catch Clean Catch (Midstream)  Final Report (06-16-22 @ 22:56):    <10,000 CFU/mL Normal Urogenital Renae    Culture - Blood (collected 06-15-22 @ 18:31)  Source: .Blood Blood-Peripheral  Final Report (06-20-22 @ 19:00):    No Growth Final    Culture - Blood (collected 06-15-22 @ 18:31)  Source: .Blood Blood-Peripheral  Final Report (06-20-22 @ 19:00):    No Growth Final    RADIOLOGY & ADDITIONAL TESTS:  Personally reviewed.     Consultant(s) Notes Reviewed:  [x] YES  [ ] NO

## 2022-06-22 NOTE — PROGRESS NOTE ADULT - SUBJECTIVE AND OBJECTIVE BOX
Neurology follow up note    ROBERT JOHNSON89yMale      Interval History:    Patient feels ok no new complaints.    Allergies    No Known Allergies    Intolerances        MEDICATIONS    acetaminophen     Tablet .. 650 milliGRAM(s) Oral every 6 hours PRN  ALBUTerol    0.083% 2.5 milliGRAM(s) Nebulizer every 4 hours PRN  aluminum hydroxide/magnesium hydroxide/simethicone Suspension 30 milliLiter(s) Oral every 4 hours PRN  apixaban 2.5 milliGRAM(s) Oral every 12 hours  digoxin     Tablet 125 MICROGram(s) Oral every other day  diltiazem    Tablet 90 milliGRAM(s) Oral every 6 hours  furosemide    Tablet 20 milliGRAM(s) Oral daily  guaifenesin/dextromethorphan Oral Liquid 10 milliLiter(s) Oral every 6 hours PRN  melatonin 3 milliGRAM(s) Oral at bedtime PRN  metoprolol tartrate 50 milliGRAM(s) Oral every 8 hours  midodrine. 5 milliGRAM(s) Oral every 8 hours  ondansetron Injectable 4 milliGRAM(s) IV Push every 8 hours PRN              Vital Signs Last 24 Hrs  T(C): 36.6 (22 Jun 2022 05:09), Max: 37.2 (21 Jun 2022 18:57)  T(F): 97.8 (22 Jun 2022 05:09), Max: 99 (21 Jun 2022 18:57)  HR: 70 (22 Jun 2022 05:09) (70 - 96)  BP: 124/87 (22 Jun 2022 05:09) (100/60 - 124/87)  BP(mean): --  RR: 18 (22 Jun 2022 05:09) (18 - 18)  SpO2: 92% (22 Jun 2022 05:09) (92% - 95%)      REVIEW OF SYSTEMS:  Constitutionally, the patient denies fever, chills, or night sweats.  Head:  No headaches.  The patient has poor vision out of both eyes to begin with, which is his baseline, can only see periphery out of the one eye.  Ears:  No ringing in the ears.  Neck:  No neck pain.  Cardiovascular:  No chest pain.  Respiratory:  Positive history of cough but does not feel significantly short of breath.  Abdomen:  No nausea, vomiting, or abdominal pain.  Extremities/Neurological:  No numbness or tingling.  General:  The patient just feels weak all over, and history of poor oral intake.      PHYSICAL EXAMINATION:  GENERAL:  The patient does appear to be cachectic in appearance.   HEENT:  Head:  Normocephalic, atraumatic.  Eyes:  No scleral icterus.  Ears:  Hard of hearing.  NECK:  Supple.  CARDIOVASCULAR:  S1 and S2 heard.  RESPIRATORY:  Decreased breath sounds bilaterally.  ABDOMEN:  Soft, nontender.  EXTREMITIES:  No clubbing or cyanosis was noted.      NEUROLOGIC:  The patient is awake and alert.  [facility], year 2022, and month was June, was able to name simple objects.  Extraocular movements were intact.  The patient has poor vision out of both eyes, which is his baseline.  Speech was fluent.  No dysarthria.  Motor:  Bilateral upper 4/5, bilateral lower 3-/5.  No head tremors were noted.  No resting tremors were noted.  Upper extremities, no cogwheel rigidity was noted.  No significant bradykinesia was noted.              LABS:  CBC Full  -  ( 21 Jun 2022 06:29 )  WBC Count : 7.56 K/uL  RBC Count : 3.81 M/uL  Hemoglobin : 12.4 g/dL  Hematocrit : 39.3 %  Platelet Count - Automated : 176 K/uL  Mean Cell Volume : 103.1 fl  Mean Cell Hemoglobin : 32.5 pg  Mean Cell Hemoglobin Concentration : 31.6 gm/dL  Auto Neutrophil # : 5.46 K/uL  Auto Lymphocyte # : 0.63 K/uL  Auto Monocyte # : 0.97 K/uL  Auto Eosinophil # : 0.40 K/uL  Auto Basophil # : 0.05 K/uL  Auto Neutrophil % : 72.2 %  Auto Lymphocyte % : 8.3 %  Auto Monocyte % : 12.8 %  Auto Eosinophil % : 5.3 %  Auto Basophil % : 0.7 %      06-21    143  |  105  |  28<H>  ----------------------------<  92  4.1   |  34<H>  |  0.85    Ca    8.5      21 Jun 2022 06:29  Phos  2.8     06-21  Mg     2.1     06-21    TPro  6.8  /  Alb  2.2<L>  /  TBili  1.8<H>  /  DBili  x   /  AST  28  /  ALT  41  /  AlkPhos  130<H>  06-21    Hemoglobin A1C:     LIVER FUNCTIONS - ( 21 Jun 2022 06:29 )  Alb: 2.2 g/dL / Pro: 6.8 g/dL / ALK PHOS: 130 U/L / ALT: 41 U/L / AST: 28 U/L / GGT: x           Vitamin B12         RADIOLOGY  ANALYSIS AND PLAN:  A 89-year-old with episode of altered mental status in regards to lethargy and generalized weakness.  For generalized weakness, suspect most likely metabolic encephalopathy secondary to respiratory issues along with decreased oral intake, suspect less likely this is a primary central nervous system event.  Suspect the patient's episodes of head possibly falling forward secondary to generalized weakness.  The patient does complain of weakness in his neck muscles as well, suspect less likely this is a primary neurological event such as Parkinson's, no other symptoms at present to suggest an underlying cause of Parkinson's.  Monitor oral intake.  Aspiration precautions.  For history of atrial fibrillation, continue the patient on anticoagulation.    Spoke with daughter, Leigh, at [phone].    Greater than 45 minutes of time was spent with the patient, plan of care, reviewing data, with greater than 50% of the visit was spent counseling and/or coordinating care with multidisciplinary healthcare team   Neurology follow up note    ROBERT JOHNSON89yMale      Interval History:    Patient feels ok no new complaints.    Allergies    No Known Allergies    Intolerances        MEDICATIONS    acetaminophen     Tablet .. 650 milliGRAM(s) Oral every 6 hours PRN  ALBUTerol    0.083% 2.5 milliGRAM(s) Nebulizer every 4 hours PRN  aluminum hydroxide/magnesium hydroxide/simethicone Suspension 30 milliLiter(s) Oral every 4 hours PRN  apixaban 2.5 milliGRAM(s) Oral every 12 hours  digoxin     Tablet 125 MICROGram(s) Oral every other day  diltiazem    Tablet 90 milliGRAM(s) Oral every 6 hours  furosemide    Tablet 20 milliGRAM(s) Oral daily  guaifenesin/dextromethorphan Oral Liquid 10 milliLiter(s) Oral every 6 hours PRN  melatonin 3 milliGRAM(s) Oral at bedtime PRN  metoprolol tartrate 50 milliGRAM(s) Oral every 8 hours  midodrine. 5 milliGRAM(s) Oral every 8 hours  ondansetron Injectable 4 milliGRAM(s) IV Push every 8 hours PRN              Vital Signs Last 24 Hrs  T(C): 36.6 (22 Jun 2022 05:09), Max: 37.2 (21 Jun 2022 18:57)  T(F): 97.8 (22 Jun 2022 05:09), Max: 99 (21 Jun 2022 18:57)  HR: 70 (22 Jun 2022 05:09) (70 - 96)  BP: 124/87 (22 Jun 2022 05:09) (100/60 - 124/87)  BP(mean): --  RR: 18 (22 Jun 2022 05:09) (18 - 18)  SpO2: 92% (22 Jun 2022 05:09) (92% - 95%)      REVIEW OF SYSTEMS:  Constitutionally, the patient denies fever, chills, or night sweats.  Head:  No headaches.  The patient has poor vision out of both eyes to begin with, which is his baseline, can only see periphery out of the one eye.  Ears:  No ringing in the ears.  Neck:  No neck pain.  Cardiovascular:  No chest pain.  Respiratory:  Positive history of cough but does not feel significantly short of breath.  Abdomen:  No nausea, vomiting, or abdominal pain.  Extremities/Neurological:  No numbness or tingling.  General:  The patient just feels weak all over, and history of poor oral intake.      PHYSICAL EXAMINATION:  GENERAL:  The patient does appear to be cachectic in appearance.   HEENT:  Head:  Normocephalic, atraumatic.  Eyes:  No scleral icterus.  Ears:  Hard of hearing.  NECK:  Supple.  CARDIOVASCULAR:  S1 and S2 heard.  RESPIRATORY:  Decreased breath sounds bilaterally.  ABDOMEN:  Soft, nontender.  EXTREMITIES:  No clubbing or cyanosis was noted.     NEUROLOGIC:  The patient is awake and alert.  Location hospital, appear little forgetful today  Extraocular movements were intact.  The patient has poor vision out of both eyes, which is his baseline.  Speech was fluent.  No dysarthria.  Motor:  Bilateral upper 4/5, bilateral lower 3-/5.  No head tremors were noted.  No resting tremors were noted.  Upper extremities, no cogwheel rigidity was noted.  No significant bradykinesia was noted.              LABS:  CBC Full  -  ( 21 Jun 2022 06:29 )  WBC Count : 7.56 K/uL  RBC Count : 3.81 M/uL  Hemoglobin : 12.4 g/dL  Hematocrit : 39.3 %  Platelet Count - Automated : 176 K/uL  Mean Cell Volume : 103.1 fl  Mean Cell Hemoglobin : 32.5 pg  Mean Cell Hemoglobin Concentration : 31.6 gm/dL  Auto Neutrophil # : 5.46 K/uL  Auto Lymphocyte # : 0.63 K/uL  Auto Monocyte # : 0.97 K/uL  Auto Eosinophil # : 0.40 K/uL  Auto Basophil # : 0.05 K/uL  Auto Neutrophil % : 72.2 %  Auto Lymphocyte % : 8.3 %  Auto Monocyte % : 12.8 %  Auto Eosinophil % : 5.3 %  Auto Basophil % : 0.7 %      06-21    143  |  105  |  28<H>  ----------------------------<  92  4.1   |  34<H>  |  0.85    Ca    8.5      21 Jun 2022 06:29  Phos  2.8     06-21  Mg     2.1     06-21    TPro  6.8  /  Alb  2.2<L>  /  TBili  1.8<H>  /  DBili  x   /  AST  28  /  ALT  41  /  AlkPhos  130<H>  06-21    Hemoglobin A1C:     LIVER FUNCTIONS - ( 21 Jun 2022 06:29 )  Alb: 2.2 g/dL / Pro: 6.8 g/dL / ALK PHOS: 130 U/L / ALT: 41 U/L / AST: 28 U/L / GGT: x           Vitamin B12         RADIOLOGY  ANALYSIS AND PLAN:  A 89-year-old with episode of altered mental status in regards to lethargy and generalized weakness.  For generalized weakness, suspect most likely metabolic encephalopathy secondary to respiratory issues along with decreased oral intake, suspect less likely this is a primary central nervous system event.  Suspect the patient's episodes of head possibly falling forward secondary to generalized weakness.  The patient does complain of weakness in his neck muscles as well, suspect less likely this is a primary neurological event such as Parkinson's, no other symptoms at present to suggest an underlying cause of Parkinson's.  Monitor oral intake.  Aspiration precautions.  For history of atrial fibrillation, continue the patient on anticoagulation.   patient appears more forgetful today possible from hospital setting     Spoke with daughter, Leigh, at [phone] 6/21 called today went to voicemail     Greater than 45 minutes of time was spent with the patient, plan of care, reviewing data, with greater than 50% of the visit was spent counseling and/or coordinating care with multidisciplinary healthcare team

## 2022-06-22 NOTE — PROGRESS NOTE ADULT - ATTENDING COMMENTS
The patient was seen and evaluated independently by the attending physician.  - I have personally reviewed the pt's labs, imaging, micro data and consultant recommendations.    #chf exac  #af w/ rvr  #viral syndrome  #multiple comorbidities  #frail state  #advanced care planning    - now on po lasix  - change kellen agents to SR formulation  - monitor BP's  - family requests a family meeting tomorrow evening at 5:30  - the pt has made his own decisions up until this point but he's oriented to person/place currently and is not a reliable decision maker at this time  - will meet w/ family tomorrow and coordinate the discussion w/ palliative care

## 2022-06-22 NOTE — PROGRESS NOTE ADULT - TIME BILLING
above diagnoses as reviewed in the body of the note and the supplemental attestation authored by the attending physician

## 2022-06-23 NOTE — PROGRESS NOTE ADULT - PROBLEM SELECTOR PLAN 4
- CT shows moderate bilateral pleural effusions, interlobular septal thickening in patchy ground glass opacities representing pulmonary edema.  - CXR shows bilateral lower lobe infiltrates.  - Continue home Lasix 20 mg qd with hold parameters   - Pulm (Dr. Carpio) consulted, recs appreciated: no immediate need for thoracentesis

## 2022-06-23 NOTE — GOALS OF CARE CONVERSATION - ADVANCED CARE PLANNING - NSPROPTRIGHTCAREGIVER_GEN_A_NUR
information could not be obtained

## 2022-06-23 NOTE — PROGRESS NOTE ADULT - SUBJECTIVE AND OBJECTIVE BOX
Patient is a 89y old  Male who presents with a chief complaint of weight loss (23 Jun 2022 08:38)    INTERVAL HPI/OVERNIGHT EVENTS: No acute overnight events. Pt seen and examined at the bedside this AM. Pt is not fully oriented but denies any fevers, chills, cough, shortness of breath, chest pain, palpitations. He has no complaints or concerns today.    MEDICATIONS  (STANDING):  apixaban 2.5 milliGRAM(s) Oral every 12 hours  digoxin     Tablet 125 MICROGram(s) Oral every other day  diltiazem    milliGRAM(s) Oral daily  furosemide    Tablet 20 milliGRAM(s) Oral daily  metoprolol tartrate 100 milliGRAM(s) Oral two times a day  midodrine. 5 milliGRAM(s) Oral every 8 hours    MEDICATIONS  (PRN):  acetaminophen     Tablet .. 650 milliGRAM(s) Oral every 6 hours PRN Temp greater or equal to 38C (100.4F), Mild Pain (1 - 3)  ALBUTerol    0.083% 2.5 milliGRAM(s) Nebulizer every 4 hours PRN Shortness of Breath and/or Wheezing  aluminum hydroxide/magnesium hydroxide/simethicone Suspension 30 milliLiter(s) Oral every 4 hours PRN Dyspepsia  guaifenesin/dextromethorphan Oral Liquid 10 milliLiter(s) Oral every 6 hours PRN Cough  melatonin 3 milliGRAM(s) Oral at bedtime PRN Insomnia  ondansetron Injectable 4 milliGRAM(s) IV Push every 8 hours PRN Nausea and/or Vomiting    Allergies  No Known Allergies    Intolerances    REVIEW OF SYSTEMS:  CONSTITUTIONAL: No fever or chills  HEENT:  No headache, no sore throat  RESPIRATORY: No cough, wheezing, or shortness of breath  CARDIOVASCULAR: No chest pain, palpitations  GASTROINTESTINAL: No abd pain, nausea, vomiting, or diarrhea  GENITOURINARY: No dysuria, frequency, or hematuria  NEUROLOGICAL: no focal weakness or dizziness  MUSCULOSKELETAL: no myalgias     Vital Signs Last 24 Hrs  T(C): 36.6 (23 Jun 2022 09:40), Max: 36.8 (23 Jun 2022 04:21)  T(F): 97.8 (23 Jun 2022 09:40), Max: 98.2 (23 Jun 2022 04:21)  HR: 88 (23 Jun 2022 09:40) (84 - 100)  BP: 94/60 (23 Jun 2022 09:40) (94/60 - 119/77)  RR: 18 (23 Jun 2022 09:40) (17 - 18)  SpO2: 94% (23 Jun 2022 09:40) (94% - 97%)    PHYSICAL EXAM:  GENERAL: NAD  HEENT:  anicteric, moist mucous membranes  CHEST/LUNG:  Diminished breath sounds b/l, rales improving; Pt intermittently coughs  HEART:  RRR, S1, S2  ABDOMEN:  BS+, soft, nontender, nondistended  EXTREMITIES: no edema, cyanosis, or calf tenderness  NERVOUS SYSTEM: answers most questions and follows most commands appropriately    LABS:                        13.3   7.19  )-----------( 172      ( 23 Jun 2022 10:33 )             42.1     CBC Full  -  ( 23 Jun 2022 10:33 )  WBC Count : 7.19 K/uL  Hemoglobin : 13.3 g/dL  Hematocrit : 42.1 %  Platelet Count - Automated : 172 K/uL  Mean Cell Volume : 102.4 fl  Mean Cell Hemoglobin : 32.4 pg  Mean Cell Hemoglobin Concentration : 31.6 gm/dL    23 Jun 2022 10:33    141    |  105    |  35     ----------------------------<  169    4.1     |  35     |  0.98     Ca    8.8        23 Jun 2022 10:33  Phos  2.7       23 Jun 2022 10:33  Mg     2.3       23 Jun 2022 10:33    TPro  6.9    /  Alb  2.2    /  TBili  1.7    /  DBili  x      /  AST  26     /  ALT  35     /  AlkPhos  136    23 Jun 2022 10:33        CAPILLARY BLOOD GLUCOSE              RADIOLOGY & ADDITIONAL TESTS:  Personally reviewed.     Consultant(s) Notes Reviewed:  [x] YES  [ ] NO Patient is a 89y old  Male who presents with a chief complaint of weight loss (23 Jun 2022 08:38)    INTERVAL HPI/OVERNIGHT EVENTS: No acute overnight events. Pt seen and examined at the bedside this AM. Pt is not fully oriented but denies any fevers, chills, cough, shortness of breath, chest pain, palpitations. He has no complaints or concerns today.    MEDICATIONS  (STANDING):  apixaban 2.5 milliGRAM(s) Oral every 12 hours  digoxin     Tablet 125 MICROGram(s) Oral every other day  diltiazem    milliGRAM(s) Oral daily  furosemide    Tablet 20 milliGRAM(s) Oral daily  metoprolol tartrate 100 milliGRAM(s) Oral two times a day  midodrine. 5 milliGRAM(s) Oral every 8 hours    MEDICATIONS  (PRN):  acetaminophen     Tablet .. 650 milliGRAM(s) Oral every 6 hours PRN Temp greater or equal to 38C (100.4F), Mild Pain (1 - 3)  ALBUTerol    0.083% 2.5 milliGRAM(s) Nebulizer every 4 hours PRN Shortness of Breath and/or Wheezing  aluminum hydroxide/magnesium hydroxide/simethicone Suspension 30 milliLiter(s) Oral every 4 hours PRN Dyspepsia  guaifenesin/dextromethorphan Oral Liquid 10 milliLiter(s) Oral every 6 hours PRN Cough  melatonin 3 milliGRAM(s) Oral at bedtime PRN Insomnia  ondansetron Injectable 4 milliGRAM(s) IV Push every 8 hours PRN Nausea and/or Vomiting    Allergies  No Known Allergies    Intolerances    REVIEW OF SYSTEMS:  CONSTITUTIONAL: No fever or chills  HEENT:  No headache, no sore throat  RESPIRATORY: No cough, wheezing, or shortness of breath  CARDIOVASCULAR: No chest pain, palpitations  GASTROINTESTINAL: No abd pain, nausea, vomiting, or diarrhea  GENITOURINARY: No dysuria, frequency, or hematuria  NEUROLOGICAL: no focal weakness or dizziness  MUSCULOSKELETAL: no myalgias     Vital Signs Last 24 Hrs  T(C): 36.6 (23 Jun 2022 09:40), Max: 36.8 (23 Jun 2022 04:21)  T(F): 97.8 (23 Jun 2022 09:40), Max: 98.2 (23 Jun 2022 04:21)  HR: 88 (23 Jun 2022 09:40) (84 - 100)  BP: 94/60 (23 Jun 2022 09:40) (94/60 - 119/77)  RR: 18 (23 Jun 2022 09:40) (17 - 18)  SpO2: 94% (23 Jun 2022 09:40) (94% - 97%)    PHYSICAL EXAM:  GENERAL: NAD  HEENT:  anicteric, moist mucous membranes  CHEST/LUNG:  Diminished breath sounds b/l, rales improving; Pt intermittently coughs  HEART:  Regular rate, irregularly irregular rhythm, S1, S2  ABDOMEN:  BS+, soft, nontender, nondistended  EXTREMITIES: no edema, cyanosis, or calf tenderness  NERVOUS SYSTEM: answers most questions and follows most commands appropriately    LABS:                        13.3   7.19  )-----------( 172      ( 23 Jun 2022 10:33 )             42.1     CBC Full  -  ( 23 Jun 2022 10:33 )  WBC Count : 7.19 K/uL  Hemoglobin : 13.3 g/dL  Hematocrit : 42.1 %  Platelet Count - Automated : 172 K/uL  Mean Cell Volume : 102.4 fl  Mean Cell Hemoglobin : 32.4 pg  Mean Cell Hemoglobin Concentration : 31.6 gm/dL    23 Jun 2022 10:33    141    |  105    |  35     ----------------------------<  169    4.1     |  35     |  0.98     Ca    8.8        23 Jun 2022 10:33  Phos  2.7       23 Jun 2022 10:33  Mg     2.3       23 Jun 2022 10:33    TPro  6.9    /  Alb  2.2    /  TBili  1.7    /  DBili  x      /  AST  26     /  ALT  35     /  AlkPhos  136    23 Jun 2022 10:33        CAPILLARY BLOOD GLUCOSE              RADIOLOGY & ADDITIONAL TESTS:  Personally reviewed.     Consultant(s) Notes Reviewed:  [x] YES  [ ] NO Patient is a 89y old  Male who presents with a chief complaint of weight loss (23 Jun 2022 08:38)    INTERVAL HPI/OVERNIGHT EVENTS: No acute overnight events. Pt seen and examined at the bedside this AM. Pt is not fully oriented but denies any fevers, chills, cough, shortness of breath, chest pain, palpitations. He has no complaints or concerns today.    MEDICATIONS  (STANDING):  apixaban 2.5 milliGRAM(s) Oral every 12 hours  digoxin     Tablet 125 MICROGram(s) Oral every other day  diltiazem    milliGRAM(s) Oral daily  furosemide    Tablet 20 milliGRAM(s) Oral daily  metoprolol tartrate 100 milliGRAM(s) Oral two times a day  midodrine. 5 milliGRAM(s) Oral every 8 hours    MEDICATIONS  (PRN):  acetaminophen     Tablet .. 650 milliGRAM(s) Oral every 6 hours PRN Temp greater or equal to 38C (100.4F), Mild Pain (1 - 3)  ALBUTerol    0.083% 2.5 milliGRAM(s) Nebulizer every 4 hours PRN Shortness of Breath and/or Wheezing  aluminum hydroxide/magnesium hydroxide/simethicone Suspension 30 milliLiter(s) Oral every 4 hours PRN Dyspepsia  guaifenesin/dextromethorphan Oral Liquid 10 milliLiter(s) Oral every 6 hours PRN Cough  melatonin 3 milliGRAM(s) Oral at bedtime PRN Insomnia  ondansetron Injectable 4 milliGRAM(s) IV Push every 8 hours PRN Nausea and/or Vomiting    Allergies  No Known Allergies    Intolerances    REVIEW OF SYSTEMS:  CONSTITUTIONAL: No fever or chills  HEENT:  No headache, no sore throat  RESPIRATORY: No cough, wheezing, or shortness of breath  CARDIOVASCULAR: No chest pain, palpitations  GASTROINTESTINAL: No abd pain, nausea, vomiting, or diarrhea  GENITOURINARY: No dysuria, frequency, or hematuria  NEUROLOGICAL: no focal weakness or dizziness  MUSCULOSKELETAL: no myalgias or arthralgias    Vital Signs Last 24 Hrs  T(C): 36.6 (23 Jun 2022 09:40), Max: 36.8 (23 Jun 2022 04:21)  T(F): 97.8 (23 Jun 2022 09:40), Max: 98.2 (23 Jun 2022 04:21)  HR: 88 (23 Jun 2022 09:40) (84 - 100)  BP: 94/60 (23 Jun 2022 09:40) (94/60 - 119/77)  RR: 18 (23 Jun 2022 09:40) (17 - 18)  SpO2: 94% (23 Jun 2022 09:40) (94% - 97%)    PHYSICAL EXAM:  GENERAL: NAD, appears frail and chronically ill  HEENT:  anicteric, moist mucous membranes. temporal wasting  CHEST/LUNG:  Diminished breath sounds b/l, rales improving; Pt intermittently coughs  HEART:  Regular rate, irregularly irregular rhythm, S1, S2  ABDOMEN:  BS+, soft, nontender, nondistended  EXTREMITIES: no edema, cyanosis, or calf tenderness  NERVOUS SYSTEM: answers most questions and follows most commands appropriately    LABS:                        13.3   7.19  )-----------( 172      ( 23 Jun 2022 10:33 )             42.1     CBC Full  -  ( 23 Jun 2022 10:33 )  WBC Count : 7.19 K/uL  Hemoglobin : 13.3 g/dL  Hematocrit : 42.1 %  Platelet Count - Automated : 172 K/uL  Mean Cell Volume : 102.4 fl  Mean Cell Hemoglobin : 32.4 pg  Mean Cell Hemoglobin Concentration : 31.6 gm/dL    23 Jun 2022 10:33    141    |  105    |  35     ----------------------------<  169    4.1     |  35     |  0.98     Ca    8.8        23 Jun 2022 10:33  Phos  2.7       23 Jun 2022 10:33  Mg     2.3       23 Jun 2022 10:33    TPro  6.9    /  Alb  2.2    /  TBili  1.7    /  DBili  x      /  AST  26     /  ALT  35     /  AlkPhos  136    23 Jun 2022 10:33        CAPILLARY BLOOD GLUCOSE              RADIOLOGY & ADDITIONAL TESTS:  Personally reviewed.     Consultant(s) Notes Reviewed:  [x] YES  [ ] NO

## 2022-06-23 NOTE — PROGRESS NOTE ADULT - PROBLEM SELECTOR PLAN 2
- Symptomatic treatment; acute hypoxic resp failure on admission, wean off O2  - Tylenol PRN fever and guaifenesin-DM PRN cough  - Supp O2 prn  - MBS today: Pureed with mildly thick liquids. Aspiration precautions. May need to re-evaluate as viral syndrome resolves  - Blood cx x2 and urine cx negative  - ID (Dr. Goldman) consulted: supportive management - Symptomatic treatment- Tylenol PRN fever and guaifenesin-DM PRN cough, Supp O2 prn  - MBS: Pureed with mildly thick liquids. Aspiration precautions. May need to re-evaluate as viral syndrome resolves  - Blood cx x2 and urine cx negative  - ID (Dr. Goldman) consulted: supportive management  - removed isolation precautions

## 2022-06-23 NOTE — PROGRESS NOTE ADULT - ATTENDING COMMENTS
The patient was seen and evaluated independently by the attending physician.  - I have personally reviewed the pt's labs, imaging, micro data and consultant recommendations.    #chf exac  #af w/ rvr  #viral syndrome  #multiple comorbidities  #frail state  #advanced care planning    - now on po lasix  - change kellen agents to SR formulation  - monitor BP's  - conducted family meeting. addressed their many concerns  - pt is a limited historian but reuqests full code  - spoke w/ palliative care before the meeting  - full code

## 2022-06-23 NOTE — PROGRESS NOTE ADULT - SUBJECTIVE AND OBJECTIVE BOX
Date/Time Patient Seen:  		  Referring MD:   Data Reviewed	       Patient is a 89y old  Male who presents with a chief complaint of weight loss (22 Jun 2022 12:21)      Subjective/HPI     PAST MEDICAL & SURGICAL HISTORY:  Atrial fibrillation    HTN (hypertension)    HLD (hyperlipidemia)    Anemia    CHF (congestive heart failure)    DVT, lower extremity          Medication list         MEDICATIONS  (STANDING):  apixaban 2.5 milliGRAM(s) Oral every 12 hours  digoxin     Tablet 125 MICROGram(s) Oral every other day  diltiazem    milliGRAM(s) Oral daily  furosemide    Tablet 20 milliGRAM(s) Oral daily  metoprolol tartrate 100 milliGRAM(s) Oral two times a day  midodrine. 5 milliGRAM(s) Oral every 8 hours    MEDICATIONS  (PRN):  acetaminophen     Tablet .. 650 milliGRAM(s) Oral every 6 hours PRN Temp greater or equal to 38C (100.4F), Mild Pain (1 - 3)  ALBUTerol    0.083% 2.5 milliGRAM(s) Nebulizer every 4 hours PRN Shortness of Breath and/or Wheezing  aluminum hydroxide/magnesium hydroxide/simethicone Suspension 30 milliLiter(s) Oral every 4 hours PRN Dyspepsia  guaifenesin/dextromethorphan Oral Liquid 10 milliLiter(s) Oral every 6 hours PRN Cough  melatonin 3 milliGRAM(s) Oral at bedtime PRN Insomnia  ondansetron Injectable 4 milliGRAM(s) IV Push every 8 hours PRN Nausea and/or Vomiting         Vitals log        ICU Vital Signs Last 24 Hrs  T(C): 36.8 (23 Jun 2022 04:21), Max: 36.8 (22 Jun 2022 10:07)  T(F): 98.2 (23 Jun 2022 04:21), Max: 98.3 (22 Jun 2022 10:07)  HR: 100 (23 Jun 2022 04:21) (84 - 100)  BP: 119/77 (23 Jun 2022 04:21) (101/65 - 119/77)  BP(mean): --  ABP: --  ABP(mean): --  RR: 18 (23 Jun 2022 04:21) (17 - 19)  SpO2: 94% (23 Jun 2022 04:21) (91% - 97%)           Input and Output:  I&O's Detail    21 Jun 2022 07:01  -  22 Jun 2022 07:00  --------------------------------------------------------  IN:    Oral Fluid: 320 mL  Total IN: 320 mL    OUT:  Total OUT: 0 mL    Total NET: 320 mL          Lab Data                        13.5   8.03  )-----------( 166      ( 22 Jun 2022 08:41 )             42.1     06-22    141  |  105  |  28<H>  ----------------------------<  111<H>  4.4   |  34<H>  |  0.84    Ca    8.9      22 Jun 2022 08:41  Phos  2.4     06-22  Mg     2.3     06-22    TPro  6.8  /  Alb  2.2<L>  /  TBili  1.8<H>  /  DBili  x   /  AST  28  /  ALT  41  /  AlkPhos  130<H>  06-21            Review of Systems	      Objective     Physical Examination    heart s1s2  lung dec BS  abd soft      Pertinent Lab findings & Imaging      Ramírez:  NO   Adequate UO     I&O's Detail    21 Jun 2022 07:01  -  22 Jun 2022 07:00  --------------------------------------------------------  IN:    Oral Fluid: 320 mL  Total IN: 320 mL    OUT:  Total OUT: 0 mL    Total NET: 320 mL               Discussed with:     Cultures:	        Radiology

## 2022-06-23 NOTE — PROGRESS NOTE ADULT - PROBLEM SELECTOR PLAN 1
Likely multifactorial in setting of human metapneumovirus infection, poor po intake and med noncompliance  - -140s overnight, non-sustained, coincided with episodes of coughing  - Continue Digoxin  - Changed Cardizem to  mg daily and Lopressor to 100 mg BID, trend HRs on tele monitor  - Pt's HR continues to be elevated to 120s intermittently (non-sustained), re-eval tomorrow with new rate-control regimen  - Continue home Eliquis  - Cardio ([facility] following)

## 2022-06-23 NOTE — PROGRESS NOTE ADULT - PROBLEM SELECTOR PLAN 6
- proBNP 26103 on admission, improved to ~4000  - CT chest: Moderate bilateral pleural effusions, interlobular septal thickening in patchy ground glass opacities representing pulmonary edema.  - CXR: Bilateral lower lobe infiltrates.  - Continue home Lasix 20mg qd with hold parameter    - TTE: EF of 60%, biatrial enlargement, calcified trileaflet aortic valve with decreased opening, mitral annular calcification, moderate MR, moderate TR

## 2022-06-23 NOTE — PROGRESS NOTE ADULT - SUBJECTIVE AND OBJECTIVE BOX
[facility] -- Vincenzo Wyman, Daina Allen, Alex Akers Savella, Goodger  Office # [phone]    Follow Up:  Afib with RVR     Subjective/Observations: Asleep but arousable.  Remains in NC at 3L/min.  Denies any form of respiratory or cardiac discomfort.  No tele events overnight    REVIEW OF SYSTEMS: All other review of systems is negative unless indicated above  PAST MEDICAL & SURGICAL HISTORY:  Atrial fibrillation    HTN (hypertension)    HLD (hyperlipidemia)    Anemia    CHF (congestive heart failure)    DVT, lower extremity    MEDICATIONS  (STANDING):  apixaban 2.5 milliGRAM(s) Oral every 12 hours  digoxin     Tablet 125 MICROGram(s) Oral every other day  diltiazem    milliGRAM(s) Oral daily  furosemide    Tablet 20 milliGRAM(s) Oral daily  metoprolol tartrate 100 milliGRAM(s) Oral two times a day  midodrine. 5 milliGRAM(s) Oral every 8 hours    MEDICATIONS  (PRN):  acetaminophen     Tablet .. 650 milliGRAM(s) Oral every 6 hours PRN Temp greater or equal to 38C (100.4F), Mild Pain (1 - 3)  ALBUTerol    0.083% 2.5 milliGRAM(s) Nebulizer every 4 hours PRN Shortness of Breath and/or Wheezing  aluminum hydroxide/magnesium hydroxide/simethicone Suspension 30 milliLiter(s) Oral every 4 hours PRN Dyspepsia  guaifenesin/dextromethorphan Oral Liquid 10 milliLiter(s) Oral every 6 hours PRN Cough  melatonin 3 milliGRAM(s) Oral at bedtime PRN Insomnia  ondansetron Injectable 4 milliGRAM(s) IV Push every 8 hours PRN Nausea and/or Vomiting    Allergies    No Known Allergies    Intolerances    Vital Signs Last 24 Hrs  T(C): 36.8 (23 Jun 2022 04:21), Max: 36.8 (22 Jun 2022 10:07)  T(F): 98.2 (23 Jun 2022 04:21), Max: 98.3 (22 Jun 2022 10:07)  HR: 100 (23 Jun 2022 04:21) (84 - 100)  BP: 119/77 (23 Jun 2022 04:21) (101/65 - 119/77)  BP(mean): --  RR: 18 (23 Jun 2022 04:21) (17 - 19)  SpO2: 94% (23 Jun 2022 04:21) (91% - 97%)  I&O's Summary      PHYSICAL EXAM:  TELE: Afib  Constitutional: NAD, lethargic but verbally responsive  HEENT: Dry mucous Membranes, Anicteric  Pulmonary: Non-labored, breath sounds are diminished bilaterally, No wheezing, rales + rhonchi at bases +cough  Cardiovascular: IRRR, S1 and S2, + murmurs, no rubs, gallops or clicks  Gastrointestinal: Bowel Sounds present, soft, nontender.   Lymph: No peripheral edema. No lymphadenopathy.  Skin: No visible rashes or ulcers.  Psych:  Mood & affect; Lethargic    LABS: All Labs Reviewed:                        13.5   8.03  )-----------( 166      ( 22 Jun 2022 08:41 )             42.1                         12.4   7.56  )-----------( 176      ( 21 Jun 2022 06:29 )             39.3                         12.6   7.99  )-----------( 186      ( 20 Jun 2022 08:40 )             39.2     22 Jun 2022 08:41    141    |  105    |  28     ----------------------------<  111    4.4     |  34     |  0.84   21 Jun 2022 06:29    143    |  105    |  28     ----------------------------<  92     4.1     |  34     |  0.85   20 Jun 2022 08:40    142    |  105    |  30     ----------------------------<  92     4.2     |  32     |  0.93     Ca    8.9        22 Jun 2022 08:41  Ca    8.5        21 Jun 2022 06:29  Ca    8.6        20 Jun 2022 08:40  Phos  2.4       22 Jun 2022 08:41  Phos  2.8       21 Jun 2022 06:29  Phos  2.5       20 Jun 2022 22:43  Mg     2.3       22 Jun 2022 08:41  Mg     2.1       21 Jun 2022 06:29  Mg     2.1       20 Jun 2022 22:43    TPro  6.8    /  Alb  2.2    /  TBili  1.8    /  DBili  x      /  AST  28     /  ALT  41     /  AlkPhos  130    21 Jun 2022 06:29    PROCEDURE DATE:  06/19/2022          INTERPRETATION:  INDICATION: Dyspnea  Sonographer LK    Blood Pressure 123/78    Height 182.9 cm     Weight 70.3 kg       BSA 1.9   sq m    Dimensions:  LA 5.2       Normal Values: 2.0 - 4.0 cm  Ao 2.8        Normal Values: 2.0 - 3.8 cm  SEPTUM 1.4       Normal Values: 0.6 - 1.2 cm  PWT 1.1       Normal Values: 0.6 - 1.1 cm  LVIDd 4.2         Normal Values: 3.0 - 5.6 cm  LVIDs 3.0   Normal Values: 1.8 - 4.0 cm    OBSERVATIONS:  Technically difficult and limited study  Mitral Valve: Mitral annular calcification, calcified leaflets with an   undetermined degree of stenosis, moderate MR.  Aortic Valve/Aorta: Calcified trileaflet aortic valve with decreased   opening. Doppler interrogation was not performed  Tricuspid Valve: Moderate TR.  Pulmonic Valve: Mild PI  Left Atrium: Enlarged  Right Atrium: Enlarged  Left Ventricle: The left ventricular endocardium is not well-visualized.   Overall grossly normal LV size and systolic function, estimated LVEF of   60%.  Right Ventricle: Not well-visualized  Pericardium: no significant pericardial effusion.    IMPRESSION:  Technically difficult and limited study  The left ventricular endocardium is not well-visualized. Overall grossly   normal LV size and systolic function, estimated LVEF of 60%.  The right ventricle is not well-visualized  Biatrial enlargement  Calcified trileaflet aortic valve with decreased opening. Doppler   interrogation was not performed  Mitral annular calcification, calcified leaflets with an undetermined   degree of stenosis, moderate MR.  Moderate TR.    --- End of Report ---    FATOUMATA HAND MD; Attending Cardiologist  This document has been electronically signed. Jun 20 2022  1:51PM    ACC: [id number] EXAM:  CT CHEST                          PROCEDURE DATE:  06/15/2022      INTERPRETATION:  INDICATION: Shortness of breath  TECHNIQUE: Unenhanced CT of the chest. Coronal, sagittal, and MIP images   were reconstructed and reviewed.  COMPARISON: No prior chest CT.    FINDINGS:    AIRWAYS, LUNGS and PLEURA: Patent central airways. Moderate bilateral   pleural effusions, interlobular septal thickening in patchy groundglass   opacities representing pulmonary edema. Multiple bilateral smaller than 6   mm nodules, indeterminant etiology.    MEDIASTINUM AND RENZO: Borderline/mildly enlarged mediastinal lymph nodes   including AP window lymph node measuring 1.1 cm short axis.    HEART AND VESSELS: Cardiomegaly. The left atrium is severely dilated.   Mitral annulus and coronary calcifications. No pericardial effusion.   Thoracic aorta and pulmonary artery normal in diameter.    VISUALIZED UPPER ABDOMEN: Small exophytic cyst within the left kidney.    CHEST WALL AND BONES: No aggressive osseous lesion.    LOWER NECK: Within normal limits.    IMPRESSION:    Moderate bilateral pleural effusions, interlobular septal thickening in   patchy groundglass opacities representing pulmonary edema.    Multiple bilateral small lung nodules, indeterminant etiology. Metastatic   disease is a possibility. Recommend follow-up chest CT in 4 weeks to   determine the stability. Further evaluation can be performed with PET CT.    --- End of Report ---    JADEN RODRÍGUEZ MD; Attending Radiologist  This document has been electronically signed. Joelle 15 2022  4:10PM    Ventricular Rate 130 BPM    QRS Duration 78 ms    Q-T Interval 288 ms    QTC Calculation(Bazett) 423 ms    R Axis 78 degrees    T Axis 269 degrees    Diagnosis Line Atrial fibrillation with rapid ventricular response  ST & T wave abnormality, consider inferior ischemia  ST & T wave abnormality, consider anterolateral ischemia  Abnormal ECG  When compared with ECG of 15-JOELLE-2022 12:46, (Unconfirmed)  No significant change was found  Confirmed by TOMASZ HEBERT ([id number]) on 6/15/2022 3:04:33 PM

## 2022-06-23 NOTE — PROGRESS NOTE ADULT - PROBLEM SELECTOR PLAN 9
Eliquis 2.5mg bid for AC    PT EVAL: rehabilitation facility; [facility]    GODACIA discussion with family this evening

## 2022-06-23 NOTE — PROGRESS NOTE ADULT - SUBJECTIVE AND OBJECTIVE BOX
Neurology follow up note    ROBERT JOHNSON89yMale      Interval History:    Patient feels ok no new complaints.    Allergies    No Known Allergies    Intolerances        MEDICATIONS    acetaminophen     Tablet .. 650 milliGRAM(s) Oral every 6 hours PRN  ALBUTerol    0.083% 2.5 milliGRAM(s) Nebulizer every 4 hours PRN  aluminum hydroxide/magnesium hydroxide/simethicone Suspension 30 milliLiter(s) Oral every 4 hours PRN  apixaban 2.5 milliGRAM(s) Oral every 12 hours  digoxin     Tablet 125 MICROGram(s) Oral every other day  diltiazem    milliGRAM(s) Oral daily  furosemide    Tablet 20 milliGRAM(s) Oral daily  guaifenesin/dextromethorphan Oral Liquid 10 milliLiter(s) Oral every 6 hours PRN  melatonin 3 milliGRAM(s) Oral at bedtime PRN  metoprolol tartrate 100 milliGRAM(s) Oral two times a day  midodrine. 5 milliGRAM(s) Oral every 8 hours  ondansetron Injectable 4 milliGRAM(s) IV Push every 8 hours PRN              Vital Signs Last 24 Hrs  T(C): 36.8 (23 Jun 2022 04:21), Max: 36.8 (22 Jun 2022 10:07)  T(F): 98.2 (23 Jun 2022 04:21), Max: 98.3 (22 Jun 2022 10:07)  HR: 100 (23 Jun 2022 04:21) (84 - 100)  BP: 119/77 (23 Jun 2022 04:21) (101/65 - 119/77)  BP(mean): --  RR: 18 (23 Jun 2022 04:21) (17 - 19)  SpO2: 94% (23 Jun 2022 04:21) (91% - 97%)    REVIEW OF SYSTEMS:  Constitutionally, the patient denies fever, chills, or night sweats.  Head:  No headaches.  The patient has poor vision out of both eyes to begin with, which is his baseline, can only see periphery out of the one eye.  Ears:  No ringing in the ears.  Neck:  No neck pain.  Cardiovascular:  No chest pain.  Respiratory:  Positive history of cough but does not feel significantly short of breath.  Abdomen:  No nausea, vomiting, or abdominal pain.  Extremities/Neurological:  No numbness or tingling.  General:  The patient just feels weak all over, and history of poor oral intake.      PHYSICAL EXAMINATION:  GENERAL:  The patient does appear to be cachectic in appearance.   HEENT:  Head:  Normocephalic, atraumatic.  Eyes:  No scleral icterus.  Ears:  Hard of hearing.  NECK:  Supple.  CARDIOVASCULAR:  S1 and S2 heard.  RESPIRATORY:  Decreased breath sounds bilaterally.  ABDOMEN:  Soft, nontender.  EXTREMITIES:  No clubbing or cyanosis was noted.     NEUROLOGIC:  The patient is awake and alert.  Location hospital, appear little forgetful today  Extraocular movements were intact.  The patient has poor vision out of both eyes, which is his baseline.  Speech was fluent.  No dysarthria.  Motor:  Bilateral upper 4/5, bilateral lower 3-/5.  No head tremors were noted.  No resting tremors were noted.  Upper extremities, no cogwheel rigidity was noted.  No significant bradykinesia was noted.                   LABS:  CBC Full  -  ( 22 Jun 2022 08:41 )  WBC Count : 8.03 K/uL  RBC Count : 4.09 M/uL  Hemoglobin : 13.5 g/dL  Hematocrit : 42.1 %  Platelet Count - Automated : 166 K/uL  Mean Cell Volume : 102.9 fl  Mean Cell Hemoglobin : 33.0 pg  Mean Cell Hemoglobin Concentration : 32.1 gm/dL  Auto Neutrophil # : x  Auto Lymphocyte # : x  Auto Monocyte # : x  Auto Eosinophil # : x  Auto Basophil # : x  Auto Neutrophil % : x  Auto Lymphocyte % : x  Auto Monocyte % : x  Auto Eosinophil % : x  Auto Basophil % : x      06-22    141  |  105  |  28<H>  ----------------------------<  111<H>  4.4   |  34<H>  |  0.84    Ca    8.9      22 Jun 2022 08:41  Phos  2.4     06-22  Mg     2.3     06-22      Hemoglobin A1C:       Vitamin B12         RADIOLOGY      ANALYSIS AND PLAN:  A 89-year-old with episode of altered mental status in regards to lethargy and generalized weakness.  For generalized weakness, suspect most likely metabolic encephalopathy secondary to respiratory issues along with decreased oral intake, suspect less likely this is a primary central nervous system event.  Suspect the patient's episodes of head possibly falling forward secondary to generalized weakness.  The patient does complain of weakness in his neck muscles as well, suspect less likely this is a primary neurological event such as Parkinson's, no other symptoms at present to suggest an underlying cause of Parkinson's.  Monitor oral intake.  Aspiration precautions.  For history of atrial fibrillation, continue the patient on anticoagulation.   patient appears more forgetful today possible from hospital setting     Spoke with daughter, Leigh, at [phone] 6/21 called today went to voicemail     Greater than 45 minutes of time was spent with the patient, plan of care, reviewing data, with greater than 50% of the visit was spent counseling and/or coordinating care with multidisciplinary healthcare team         Neurology follow up note    ROBERT JOHNSON89yMale      Interval History:    Patient feels ok no new complaints.    Allergies    No Known Allergies    Intolerances        MEDICATIONS    acetaminophen     Tablet .. 650 milliGRAM(s) Oral every 6 hours PRN  ALBUTerol    0.083% 2.5 milliGRAM(s) Nebulizer every 4 hours PRN  aluminum hydroxide/magnesium hydroxide/simethicone Suspension 30 milliLiter(s) Oral every 4 hours PRN  apixaban 2.5 milliGRAM(s) Oral every 12 hours  digoxin     Tablet 125 MICROGram(s) Oral every other day  diltiazem    milliGRAM(s) Oral daily  furosemide    Tablet 20 milliGRAM(s) Oral daily  guaifenesin/dextromethorphan Oral Liquid 10 milliLiter(s) Oral every 6 hours PRN  melatonin 3 milliGRAM(s) Oral at bedtime PRN  metoprolol tartrate 100 milliGRAM(s) Oral two times a day  midodrine. 5 milliGRAM(s) Oral every 8 hours  ondansetron Injectable 4 milliGRAM(s) IV Push every 8 hours PRN              Vital Signs Last 24 Hrs  T(C): 36.8 (23 Jun 2022 04:21), Max: 36.8 (22 Jun 2022 10:07)  T(F): 98.2 (23 Jun 2022 04:21), Max: 98.3 (22 Jun 2022 10:07)  HR: 100 (23 Jun 2022 04:21) (84 - 100)  BP: 119/77 (23 Jun 2022 04:21) (101/65 - 119/77)  BP(mean): --  RR: 18 (23 Jun 2022 04:21) (17 - 19)  SpO2: 94% (23 Jun 2022 04:21) (91% - 97%)    REVIEW OF SYSTEMS:  Constitutionally, the patient denies fever, chills, or night sweats.  Head:  No headaches.  The patient has poor vision out of both eyes to begin with, which is his baseline, can only see periphery out of the one eye.  Ears:  No ringing in the ears.  Neck:  No neck pain.  Cardiovascular:  No chest pain.  Respiratory:  Positive history of cough but does not feel significantly short of breath.  Abdomen:  No nausea, vomiting, or abdominal pain.  Extremities/Neurological:  No numbness or tingling.  General:  The patient just feels weak all over, and history of poor oral intake.      PHYSICAL EXAMINATION:  GENERAL:  The patient does appear to be cachectic in appearance.   HEENT:  Head:  Normocephalic, atraumatic.  Eyes:  No scleral icterus.  Ears:  Hard of hearing.  NECK:  Supple.  CARDIOVASCULAR:  S1 and S2 heard.  RESPIRATORY:  Decreased breath sounds bilaterally.  ABDOMEN:  Soft, nontender.  EXTREMITIES:  No clubbing or cyanosis was noted.     NEUROLOGIC:  The patient is awake and alert.  Location hospital, appear little forgetful today  Extraocular movements were intact.  The patient has poor vision out of both eyes, which is his baseline.  Speech was fluent.  No dysarthria.  Motor:  Bilateral upper 4/5, bilateral lower 3-/5.  No head tremors were noted.  No resting tremors were noted.  Upper extremities, no cogwheel rigidity was noted.  No significant bradykinesia was noted.                   LABS:  CBC Full  -  ( 22 Jun 2022 08:41 )  WBC Count : 8.03 K/uL  RBC Count : 4.09 M/uL  Hemoglobin : 13.5 g/dL  Hematocrit : 42.1 %  Platelet Count - Automated : 166 K/uL  Mean Cell Volume : 102.9 fl  Mean Cell Hemoglobin : 33.0 pg  Mean Cell Hemoglobin Concentration : 32.1 gm/dL  Auto Neutrophil # : x  Auto Lymphocyte # : x  Auto Monocyte # : x  Auto Eosinophil # : x  Auto Basophil # : x  Auto Neutrophil % : x  Auto Lymphocyte % : x  Auto Monocyte % : x  Auto Eosinophil % : x  Auto Basophil % : x      06-22    141  |  105  |  28<H>  ----------------------------<  111<H>  4.4   |  34<H>  |  0.84    Ca    8.9      22 Jun 2022 08:41  Phos  2.4     06-22  Mg     2.3     06-22      Hemoglobin A1C:       Vitamin B12         RADIOLOGY      ANALYSIS AND PLAN:  A 89-year-old with episode of altered mental status in regards to lethargy and generalized weakness.  For generalized weakness, suspect most likely metabolic encephalopathy secondary to respiratory issues along with decreased oral intake, suspect less likely this is a primary central nervous system event.  Suspect the patient's episodes of head possibly falling forward secondary to generalized weakness.  The patient does complain of weakness in his neck muscles as well, suspect less likely this is a primary neurological event such as Parkinson's, no other symptoms at present to suggest an underlying cause of Parkinson's.  Monitor oral intake.  Aspiration precautions.  For history of atrial fibrillation, continue the patient on anticoagulation.   patient appears more forgetful today possible from hospital setting today answer more questions accordingly   was able to drink ensure with me no coughing     Spoke with daughter, Leigh, at [phone] 6/23     Greater than 34 minutes of time was spent with the patient, plan of care, reviewing data, with greater than 50% of the visit was spent counseling and/or coordinating care with multidisciplinary healthcare team

## 2022-06-23 NOTE — PROGRESS NOTE ADULT - ASSESSMENT
89y old  Male PMH Afib on ac htn hld chief complaint of weakness. Patient was seen in office 6/6 by Dr Allen for decreased appetite and weakness, digoxin was discontinued and increased lopressor to 100mg Po BID     AFIB with RVR  - Remains in Afib, rates controlled.  No further tachycardia episodes overnight  - Continue Cardizem 90 mg q6H, Lopressor 50 mg q12H, and Dig every other day.  Can switch BB and CCB to long-acting if able   - Per staff. patient very lethargic, may not be able to swallow whole pills.  May switch back to short-acting if necessary  - Continue Eliquis, though, now he appears to be a poor candidate for long-term AC  - Continue Midodrine 5 mg q8H for hypotension.  Can increase as necessary to allow AVN uptitration if HR persistently elevated  - Repeat TTE showed EF 60%, TROY, mod MR/TR  - Compensated from HF standpoint.  Continue Lasix 20 mg daily.  Monitor volume status   - CT showed moderate bilateral pleural effusions.  Pulm following.  No immediate need for thora per Pulm.  Recommend IP if able  - Monitor and replete lytes, keep K>4, Mg>2.    AMS not improving, CT head negative of acute pathology.  Aspiration precaution    - Will continue to follow.    Deanna Nunn DNP, NP-C  Cardiology   Spectra #[phone]           89y old  Male PMH Afib on ac htn hld chief complaint of weakness. Patient was seen in office 6/6 by Dr Allen for decreased appetite and weakness, digoxin was discontinued and increased lopressor to 100mg Po BID     AFIB with RVR  - Remains in Afib, rates controlled.  No further tachycardia episodes overnight  - Continue Cardizem 90 mg q6H, Lopressor 50 mg q12H, and Dig every other day.  Can switch BB and CCB to long-acting if able   - Per staff. patient very lethargic, may not be able to swallow whole pills.  May switch back to short-acting if necessary  - Continue Eliquis, though, now he appears to be a poor candidate for long-term AC  - Continue Midodrine 5 mg q8H for hypotension.  Can increase as necessary to allow AVN uptitration if HR persistently elevated  - Repeat TTE showed EF 60%, TROY, mod MR/TR  - Compensated from HF standpoint.  Continue Lasix 20 mg daily.  Monitor volume status   - CT showed moderate bilateral pleural effusions.  Pulm following.  No immediate need for thora per Pulm.  Recommend IP if able  - Monitor and replete lytes, keep K>4, Mg>2.    AMS not improving, CT head negative of acute pathology.  Aspiration precaution  Poor prognosis.  [facility] discussion recommended    - Will continue to follow.    Deanna Nunn DNP, NP-C  Cardiology   Spectra #[phone]

## 2022-06-23 NOTE — GOALS OF CARE CONVERSATION - ADVANCED CARE PLANNING - CONVERSATION/DISCUSSION
Diagnosis/Prognosis/MOLST Discussed/Treatment Options
Diagnosis/Prognosis/MOLST Discussed

## 2022-06-23 NOTE — PROGRESS NOTE ADULT - ASSESSMENT
89y old  Male pmg afib on ac htn hld chief complaint of weakness    GOC documented - FULL CODE  VS noted  CM following    AF management - rate and rhythm control  AC for AF  I and O  cvs rx regimen and BP control  hMPV - resp viral illness - supportive measures - Albuterol PRN for sob and or wheezing  monitor VS and HD and Sat  Pleural Eff - Atelectasis - I ze if able to tolerate - no immediate need for thoracentesis - medical management  diuresis as per CARDIO recs  GOC discussion  isolation precs for hMPV  cough rx regimen  replmelinda liu

## 2022-06-23 NOTE — GOALS OF CARE CONVERSATION - ADVANCED CARE PLANNING - CONVERSATION DETAILS
met w/ 4 children in [facility]. reviewed prognosis. provided extensive counseling. addressed the various concerns that they had. pt verbalized to them he requests full code without any limitations to medical tx provided.   majority of encounter was spent educating them on expected prognosis, hospital course, clinical tx course and medical tx options available. all in agreement at this time.
Writer met with pt at bedside. Reviewed patient's medical and social history as well as events leading to patient's hospitalization. Writer discussed patient's current diagnosis (A/F, HTN, DLD, DVT, CHF, pleural effusion), medical condition and management, prognosis, and life expectancy. Inquired about patient's wishes regarding extent of medical care to be provided including escalation of medical care into the ICU and use of vasopressor support. In addition, the writer inquired about thoughts regarding cardiopulmonary resuscitation, artificial nutrition and hydration including use of feeding tubes and IVF, antibiotics, and further investigative studies such as blood draws and radiology. Pt.  showed some  insight into medical condition. Pt wants resuscitation he will discuss with his dtr, pt to also discuss HCP. All questions answered.  Psychosocial support provided.
Writer spoke to pt daughter Leigh cosby at bedside .pt now confused  Reviewed patient's medical and social history as well as events leading to patient's hospitalization. Writer discussed patient's current diagnosis (A/F, HTN, DLD, DVT, CHF, pleural effusion), medical condition and management, prognosis, and life expectancy. This was follow up to yesterdays conversation .Daughter spoke to the pt that states he does want CPR attempted
Writer spoke to pt daughter Leigh cosby at bedside .pt now confused  Reviewed patient's medical and social history as well as events leading to patient's hospitalization. Writer discussed patient's current diagnosis (A/F, HTN, DLD, DVT, CHF, pleural effusion), medical condition and management, prognosis, and life expectancy. This was follow up to yesterdays conversation. Daughter will attempt to speak to pt tonight as she states he is not confused when she sees him. PC will follow up for decision tomorrow

## 2022-06-24 NOTE — PROGRESS NOTE ADULT - ASSESSMENT
89y old  Male pmg afib on ac htn hld chief complaint of weakness    goc follow up noted  cardio rx regimen changes noted  remains with tachy arrhythmia    AF management - rate and rhythm control  AC for AF  I and O  cvs rx regimen and BP control  hMPV - resp viral illness - supportive measures - Albuterol PRN for sob and or wheezing  monitor VS and HD and Sat  Pleural Eff - Atelectasis - I ze if able to tolerate - no immediate need for thoracentesis - medical management  diuresis as per CARDIO recs  GOC discussion  isolation precs for hMPV  cough rx regimen  replete noe

## 2022-06-24 NOTE — CHART NOTE - NSCHARTNOTEFT_GEN_A_CORE
89M PMH A-Fib on apixaban, HTN, HLD, and chronic diastolic heart failure who presents with cough, weakness. loss of appetite, and significant weight loss. He was found on admission to have cardiogenic pulmonary edema with bilateral pleural effusions and RVP positive for human metapneumovirus. 89M PMH A-Fib on apixaban, HTN, HLD, and chronic diastolic heart failure who presents with cough, weakness. loss of appetite, and significant weight loss. He was found on admission to have cardiogenic pulmonary edema with bilateral pleural effusions and RVP positive for human metapneumovirus. Per son Rafal (who lives with patient), there has been a significant decline in functional status and appetite over the past few months. He was found on echo to have significant diastolic heart failure with enlarged LA, moderate MR, moderate TR. Today, found to be unresponsive with agonal respiration. RRT was called and patient was prepared for intubation. However, after BVM ventilation, he had improved wakefulness and was more responsive, although just nodding head and not able to phonate currently. Found on bedside ultrasound to have cardiogenic pulmonary edema with bilateral pleural effusions, marked LA enlargement, mitral regurgitation, tricuspid regurgitation. Labs today notable for hypercapnia, mild MARISOL, lactic acidosis, negative procalcitonin, and mild transaminitis with hyperbilirubinemia. Presentation consistent with cardiac cachexia with acute hypercapnic respiratory failure in the setting of acute decompensated diastolic heart failure.    1. Neuro: acute encephalopathy likely multifactorial due to hypercapnia + ADHF + lactic acidosis. Close monitoring of neuro status. Consider starting mirtazapine if mental status improves and for his FARIDA/weight loss  2. CV: ADHF, although hemodynamically stable. Diuresis with furosemide. Monitor lactate, improving with diuresis and BiPAP. Hold oral diltiazem, metoprolol, digoxin, and furosemide. Give digoxin 0.125 mcg IV daily, metoprolol 5 mg IV q6h, and diltiazem 10 mg IV q6h. Restart oral when he is more awake and able to take oral  3. Pulm: acute hypercapnic respiratory failure due to ADHF, now improved with BiPAP although without significant improvement in wakefulness. Close monitoring  4. GI: keep NPO while on BiPAP. Mild transaminitis and hyperbilirubinemia likely due to congestive hepatopathy, continue to monitor  5. Renal: mild MARISOL due to cardiorenal syndrome. Strict I/O's, close monitoring of kidney function and lytes. Continue diuresis  6. ID: no evidence of active infection, no fevers or leukocytosis, procalcitonin is 0.1. Lactic acidosis improving with diuresis/bipap. Observe off abx  7. Heme: on apixaban for A-Fib, resume when takes oral  8. Endo: no active issues, TSH normal, check FSG q6h  9. Skin: no lines, has mead 6/24  10. Dispo: full code, discussed with children (Leigh El, [place], Rafal). Clinical picture worrisome for cardiac cachexia approaching end of life  CC time spent: 50 min

## 2022-06-24 NOTE — PROGRESS NOTE ADULT - PROBLEM SELECTOR PLAN 2
Likely multifactorial in setting of human metapneumovirus infection, poor po intake and med noncompliance  - -140s overnight, non-sustained, coincided with episodes of coughing  - Continue Digoxin  - Changed Cardizem to  mg daily and Lopressor to 100 mg BID, trend HRs on tele monitor  - Pt's HR continues to be elevated to 120s intermittently (non-sustained)  - Continue home Eliquis  - Cardio ([facility] following)

## 2022-06-24 NOTE — CONSULT NOTE ADULT - ASSESSMENT
Patient is a 89 yo male with a pmh of a-fib (on eliquis), HTN, HLD, and diastolic HF who was admitted with HMPV PNA, b/l pleural effusions, a-fib rvr, decompensated HF who is now in acute resp failure requiring ICU admission.     Transfer to ICU     Problem:  - Acute hypoxic / Hypercapnic resp failure  - AMS / obtunded  - HMPV / PNA  - Pleural effusions  - Afib with RVR    Plan:  Neuro: AMS/ Obtunded, likely from hypercapnia, though intracranial etiology can not be r/o. Will obtained stat head CT.   Respiratory: Hypoxic/hypercapnia. Place on NIV with BIPAP. Currently 16/6 50%, pulling volumes of 350-500. Obtain ABG x1 hr. Will obtain chest xray to r/o acute change. Able to protect airway  at this time, frequent airway assessment, at risk for further deterioration requiring intubation.  Cardiac: Boarder line hypotensive with SBP . Goal MAP >65. Will give colloid with albumin to promote diffusion of pleural effusion and intravascular repletion. Caution with IVF administration as with diastolic HF, exacerbated at this time. TTE completed on 06/19/22 showing EF 60%, bi atrial enlargement, mod MR, mod TR. Given acute A-fib rvr, will continue with cardizem, lopressor, and dig given BP remains stable. Patient is noted to be on midodrine -> will increase if hypotensive. Currently on PO 20mg lasix daily, low threshold for increasing diuresis d/t HF exacerbation/pleural effusions -> will POCUS with ICU attending.   GI: NPO except meds while on bipap.   /Renal: Strict I&O via Elmore catheter d/t planned diuresis, trend creat. Goal UO 0.5-1.5cc/kg/hr. Trend electrolytes and replace as needed. Goal K >4, mag >2.   ID: No active bacterial infection, appears viral at this time. No need for abx. Trend WBC, assess for fevers.   Hem/Onc: On Eliquis for a-fib, c/w at this time if head CT negative. Trend CBC, assess for s/s of bleeding, transfuse if <7.  Endo: No active issues, goal -180.  Dispo: Full code. ICU.     Patient is a 89 yo male with a pmh of a-fib (on eliquis), HTN, HLD, and diastolic HF who was admitted with HMPV PNA, b/l pleural effusions, a-fib rvr, decompensated HF who is now in acute resp failure requiring ICU admission.     Transfer to ICU     Problem:  - Acute hypoxic / Hypercapnic resp failure  - AMS / obtunded  - HMPV / PNA  - Pleural effusions  - Afib with RVR    Plan:  Neuro: AMS/ Obtunded, likely from hypercapnia, though intracranial etiology can not be r/o. Will obtained stat head CT. Concern of encephelopathy from infectious stand point, though no clear source.   Respiratory: Hypoxic/hypercapnia. Place on NIV with BIPAP. Currently 16/6 50%, pulling volumes of 350-500. Obtain ABG x1 hr. Will obtain chest xray to r/o acute change. Able to protect airway  at this time, frequent airway assessment, at risk for further deterioration requiring intubation.  Cardiac: Boarder line hypotensive with SBP . Goal MAP >65. Will give colloid with albumin to promote diffusion of pleural effusion and intravascular repletion. Caution with IVF administration as with diastolic HF, exacerbated at this time. TTE completed on 06/19/22 showing EF 60%, bi atrial enlargement, mod MR, mod TR. Given acute A-fib rvr, will continue with cardizem, lopressor, and dig given BP remains stable. Patient is noted to be on midodrine -> will increase if hypotensive. Currently on PO 20mg lasix daily, low threshold for increasing diuresis d/t HF exacerbation/pleural effusions -> will POCUS with ICU attending.   GI: NPO except meds while on bipap.   /Renal: Strict I&O via Elmore catheter d/t planned diuresis, trend creat. Goal UO 0.5-1.5cc/kg/hr. Trend electrolytes and replace as needed. Goal K >4, mag >2.   ID: No active bacterial infection, appears viral at this time. No need for abx. Will obtain blood culture, UA and urine culture. Trend WBC and procal, assess for fevers.   Hem/Onc: On Eliquis for a-fib, c/w at this time if head CT negative. Trend CBC, assess for s/s of bleeding, transfuse if <7.  Endo: No active issues, goal -180.  Dispo: Full code. ICU.

## 2022-06-24 NOTE — PROGRESS NOTE ADULT - PROBLEM SELECTOR PLAN 7
- proBNP [id number] on admission, improved to ~4000  - CT chest: Moderate bilateral pleural effusions, interlobular septal thickening in patchy ground glass opacities representing pulmonary edema.  - CXR: Bilateral lower lobe infiltrates.  - Continue home Lasix 20mg qd with hold parameter    - TTE: EF of 60%, biatrial enlargement, calcified trileaflet aortic valve with decreased opening, mitral annular calcification, moderate MR, moderate TR

## 2022-06-24 NOTE — PROGRESS NOTE ADULT - NS ATTEND AMEND GEN_ALL_CORE FT
Chart reviewed    Patient seen and examined    Agree with plan as outlined above    89y old  Male PMH Afib on ac htn hld chief complaint of weakness. Patient was seen in office 6/6 by Dr Allen for decreased appetite and weakness, digoxin was discontinued and increased lopressor to 100mg Po BID     - Tachycardia multifactorial in setting of underlying infection, poor po intake and medication noncompliance     AFIB with RVR  - Remains in Afib but rates has been mostly controlled, though, had short episodes of tachycadia at 120's overnight; reactive to underlying respiratory infection  - Continue Cardizem 90 mg q6H and Lopressor 50 mg q12H with parameter.  - Continue Dig every other day  - Continue Eliquis   - Had a hypotensive episode to systolic 80's s/p Midodrine 5 mg x 1.  BP now improved. If needed, can start q8H  - Echo 2019 MAC aortic sclerosis normal Lv function, no pulmonary htn, normal LA size, mild MR ef 64%  - Serum Pro-Brain Natriuretic Peptide: 35496, improved to ~4000.  Continue Lasix 20 mg daily  - CT shows moderate bilateral pleural effusions.  Pulm following.  No immediate need for thora per Pulm  - Does not appear clinically volume overloaded  - Monitor and replete lytes, keep K>4, Mg>2.
AF mostly rate controlled. Can change kellen agents to long acting.  To follow closely while admitted.
AF controlled. reassess swallowing. if not able to take po will readjust meds. GOC discussions ongoing.
reactive tachycardia  nsvt overnight, brief  increase bb, cont dig, dilt and ac  bp 110s, cont midodrine
Chart reviewed    Patient seen and examined    Agree with plan as outlined above    89y old  Male PMH Afib on ac htn hld chief complaint of weakness. Patient was seen in office 6/6 by Dr Allen for decreased appetite and weakness, digoxin was discontinued and increased lopressor to 100mg Po BID     - Tachycardia multifactorial in setting of underlying infection, poor po intake and medication noncompliance     AFIB:  -rate is better controlled today  cardizem gtt discontinued, transitioned to 90mg of cardizem every 6 hours  -continue dig every other day  -continue lopressor 50 bid  - Continue Eliquis     - Echo 2019 MAC aortic sclerosis normal Lv function, no pulmonary htn, normal LA size, mild MR ef 64%  - Serum Pro-Brain Natriuretic Peptide: 40681  - CT shows moderate bilateral pleural effusions  - Does not appear volume overloaded on exam  - lasix 20mg po qd resumed
brief tachycardia, now on ccb/bb/digoxin  bp better on midodrine  cont to watch volume status
lethargic, with worsening hypercarbic resp failure  bipap and transferred to the ICU  cont av kellen blockers as tolerated by BP  watch volume status closely, would give iv lasix now and re-dose as needed  very high risk of decompensation

## 2022-06-24 NOTE — CHART NOTE - NSCHARTNOTEFT_GEN_A_CORE
Resident Rapid Response Note    Patient is a 89y old  Male who presents with a chief complaint of weight loss (24 Jun 2022 07:53)      Rapid response was called at __ on this 89y Male patient for agonal breating, AMS.     Patient was seen and examined at the bedside by the rapid response team and primary team.   ICU PA and  ___ at bedside.    Rapid Response Vital Signs:  BP: 88/  HR:  RR:  SpO2: 82% on NC   Temp:  FS:    Vital Signs Last 24 Hrs  T(C): 36.5 (24 Jun 2022 04:38), Max: 36.6 (23 Jun 2022 09:40)  T(F): 97.7 (24 Jun 2022 04:38), Max: 97.8 (23 Jun 2022 09:40)  HR: 115 (24 Jun 2022 05:18) (86 - 139)  BP: 100/65 (24 Jun 2022 05:18) (94/60 - 986/-)  BP(mean): --  RR: 18 (24 Jun 2022 04:38) (18 - 20)  SpO2: 91% (24 Jun 2022 04:38) (88% - 94%)    Physical Exam:  General: agonal breathing, cachetic  HEENT: NCAT, PERRL, EOMI bilaterally, moist mucous membranes   Neck: Supple, nontender  Neurology: nonresponsive to painful stimuli   Respiratory: Clear to auscultation bilaterally, no wheezing or rhonchi appreciated  CV: Regular rate and rhythm, +S1/S2, no murmurs, rubs or gallops  Abdominal: Soft, nontender, nondistended, normoactive bowel sounds  Extremities: No clubbing, no cyanosis, no edema, + peripheral pulses  Skin: Warm, dry, normal color    LABS:                        12.5   8.81  )-----------( 182      ( 24 Jun 2022 05:50 )             39.9     24 Jun 2022 05:50    x      |  x      |  39     ----------------------------<  148    x       |  36     |  0.98     Ca    8.9        24 Jun 2022 05:50  Phos  2.7       23 Jun 2022 10:33  Mg     2.3       23 Jun 2022 10:33    TPro  6.9    /  Alb  2.2    /  TBili  1.7    /  DBili  x      /  AST  26     /  ALT  35     /  AlkPhos  136    23 Jun 2022 10:33        CAPILLARY BLOOD GLUCOSE      POCT Blood Glucose.: 185 mg/dL (24 Jun 2022 08:19)        RADIOLOGY & ADDITIONAL TESTS:      Assessment/Plan:    - Pt placed on NRB   - STAT ABG ordered  - Pt intubated at bedside by ICU PA and Attending   - STAT EKG   - ICU PA and senior resident in agreement with plan.  - Will continue to follow, RN to call if any changes. Resident Rapid Response Note    Patient is a 89y old  Male who presents with a chief complaint of weight loss (24 Jun 2022 07:53)      Rapid response was called at __ on this 89y Male patient for agonal breathing, AMS.     Patient was seen and examined at the bedside by the rapid response team and primary team.   ICU PA and Dr. Godoy and Dr Nettles at bedside.    Rapid Response Vital Signs:  BP: 88/  HR:  RR:  SpO2: 82% on NC   Temp:  FS:    Vital Signs Last 24 Hrs  T(C): 36.5 (24 Jun 2022 04:38), Max: 36.6 (23 Jun 2022 09:40)  T(F): 97.7 (24 Jun 2022 04:38), Max: 97.8 (23 Jun 2022 09:40)  HR: 115 (24 Jun 2022 05:18) (86 - 139)  BP: 100/65 (24 Jun 2022 05:18) (94/60 - 986/-)  BP(mean): --  RR: 18 (24 Jun 2022 04:38) (18 - 20)  SpO2: 91% (24 Jun 2022 04:38) (88% - 94%)    Physical Exam:  General: agonal breathing, cachetic  HEENT: NCAT, PERRL, EOMI bilaterally, moist mucous membranes   Neck: Supple, nontender  Neurology: nonresponsive to painful stimuli   Respiratory: Clear to auscultation bilaterally, no wheezing or rhonchi appreciated  CV: Regular rate and rhythm, +S1/S2, no murmurs, rubs or gallops  Abdominal: Soft, nontender, nondistended, normoactive bowel sounds  Extremities: No clubbing, no cyanosis, no edema, + peripheral pulses  Skin: Warm, dry, normal color    LABS:                        12.5   8.81  )-----------( 182      ( 24 Jun 2022 05:50 )             39.9     24 Jun 2022 05:50    x      |  x      |  39     ----------------------------<  148    x       |  36     |  0.98     Ca    8.9        24 Jun 2022 05:50  Phos  2.7       23 Jun 2022 10:33  Mg     2.3       23 Jun 2022 10:33    TPro  6.9    /  Alb  2.2    /  TBili  1.7    /  DBili  x      /  AST  26     /  ALT  35     /  AlkPhos  136    23 Jun 2022 10:33        CAPILLARY BLOOD GLUCOSE      POCT Blood Glucose.: 185 mg/dL (24 Jun 2022 08:19)        RADIOLOGY & ADDITIONAL TESTS:      Assessment/Plan:  Patient is a 89 yo M with PMH of chronic Afib, HTN, DVT, HLD, GOUT (on allopurinol and Colchicine), CHF (TTE 2019 LVED 64%) and macular degeneration who was brought in by family for complaint of cough, weakness for the last few days. Imaging shows moderate bilateral pleural effusions Admitted with acute on chronic HFpEF A fib with RVR and management of pleural effusion, + hMPv Virus   - CT from 6/19 showing bilateral pleural effusions with b lines and bilateral patchy opacities. Pt with EF 60%, TROY, mod MR/TR. ABGs and CMP showing hypercapnic respiratory failure, slowly increasing bicarb since admission. Pt was previously A&Ox2 daily however suddenly declined this morning.   - Pt placed on NRB on arrival to RR   - STAT ABG ordered  - BiPAP stared for hypercapnia likelye multifactorial due to viral infection and worsening cardiac function.   - STAT EKG   - ICU PA and senior resident in agreement with plan.  - Will continue to follow, RN to call if any changes.  - Pt transferred to ICU on BIPAP   - Dr Nettles contacted emergency contact, family requesting full measures be taken including intubation. Family informed that prognosis is dismal and Resident Rapid Response Note    Patient is a 89y old  Male who presents with a chief complaint of weight loss (24 Jun 2022 07:53)      Rapid response was called at 08:15 on this 89y Male patient for agonal breathing, AMS.     Patient was seen and examined at the bedside by the rapid response team and primary team.   ICU PA and Dr. Godoy and Dr Nettles at bedside.    Rapid Response Vital Signs:  BP: 81/58  HR: 81  RR: 10  SpO2: 78% on NRB  Temp: 97.3F  FS: 185    Vital Signs Last 24 Hrs  T(C): 36.5 (24 Jun 2022 04:38), Max: 36.6 (23 Jun 2022 09:40)  T(F): 97.7 (24 Jun 2022 04:38), Max: 97.8 (23 Jun 2022 09:40)  HR: 115 (24 Jun 2022 05:18) (86 - 139)  BP: 100/65 (24 Jun 2022 05:18) (94/60 - 986/-)  BP(mean): --  RR: 18 (24 Jun 2022 04:38) (18 - 20)  SpO2: 91% (24 Jun 2022 04:38) (88% - 94%)    Physical Exam:  General: agonal breathing, cachetic  HEENT: NCAT, PERRL, EOMI bilaterally, moist mucous membranes   Neck: Supple, nontender  Neurology: nonresponsive to painful stimuli   Respiratory: Clear to auscultation bilaterally, no wheezing or rhonchi appreciated  CV: Regular rate and rhythm, +S1/S2, no murmurs, rubs or gallops  Abdominal: Soft, nontender, nondistended, normoactive bowel sounds  Extremities: No clubbing, no cyanosis, no edema, + peripheral pulses  Skin: Warm, dry, normal color    LABS:                        12.5   8.81  )-----------( 182      ( 24 Jun 2022 05:50 )             39.9     24 Jun 2022 05:50    x      |  x      |  39     ----------------------------<  148    x       |  36     |  0.98     Ca    8.9        24 Jun 2022 05:50  Phos  2.7       23 Jun 2022 10:33  Mg     2.3       23 Jun 2022 10:33    TPro  6.9    /  Alb  2.2    /  TBili  1.7    /  DBili  x      /  AST  26     /  ALT  35     /  AlkPhos  136    23 Jun 2022 10:33        CAPILLARY BLOOD GLUCOSE      POCT Blood Glucose.: 185 mg/dL (24 Jun 2022 08:19)        RADIOLOGY & ADDITIONAL TESTS:      Assessment/Plan:  Patient is a 89 yo M with PMH of chronic Afib, HTN, DVT, HLD, GOUT (on allopurinol and Colchicine), CHF (TTE 2019 LVED 64%) and macular degeneration who was brought in by family for complaint of cough, weakness for the last few days. Imaging shows moderate bilateral pleural effusions Admitted with acute on chronic HFpEF A fib with RVR and management of pleural effusion, + hMPv Virus   - CT from 6/19 showing bilateral pleural effusions with b lines and bilateral patchy opacities. Pt with EF 60%, TROY, mod MR/TR. ABGs and CMP showing hypercapnic respiratory failure, slowly increasing bicarb since admission. Pt was previously A&Ox2 daily however suddenly declined this morning.   - Pt placed on NRB on arrival to RR   - STAT ABG ordered  - BiPAP stared for hypercapnia likely multifactorial due to viral infection and worsening cardiac function.   - STAT EKG   - STAT CT head ordered for AMS   - ICU PA and senior resident in agreement with plan.  - Will continue to follow, RN to call if any changes.  - Pt transferred to ICU on BIPAP   - Dr Nettles contacted emergency contact, family requesting full measures be taken including intubation. Family informed that prognosis is dismal and Resident Rapid Response Note    Patient is a 89y old  Male who presents with a chief complaint of weight loss (24 Jun 2022 07:53)    Rapid response was called at 08:15 on this 89y Male patient for agonal breathing, AMS.     Patient was seen and examined at the bedside by the rapid response team and primary team.   ICU PA, Dr. Godoy and Dr Nettles at bedside.    Rapid Response Vital Signs:  BP: 81/58  HR: 81  RR: 10  SpO2: 78% on NRB  Temp: 97.3F  FS: 185    Vital Signs Last 24 Hrs  T(C): 36.5 (24 Jun 2022 04:38), Max: 36.6 (23 Jun 2022 09:40)  T(F): 97.7 (24 Jun 2022 04:38), Max: 97.8 (23 Jun 2022 09:40)  HR: 115 (24 Jun 2022 05:18) (86 - 139)  BP: 100/65 (24 Jun 2022 05:18) (94/60 - 986/-)  RR: 18 (24 Jun 2022 04:38) (18 - 20)  SpO2: 91% (24 Jun 2022 04:38) (88% - 94%)    Physical Exam:  General: agonal breathing, cachetic  HEENT: NCAT, PERRL, EOMI bilaterally, moist mucous membranes   Neck: Supple, nontender  Neurology: nonresponsive to painful stimuli   Respiratory: +Coarse breath sounds b/l  CV: Regular rate, irregularly irregular rhythm, +S1/S2, no murmurs, rubs or gallops  Abdominal: Soft, nontender, nondistended, normoactive bowel sounds  Extremities: No clubbing, no cyanosis, no edema, + peripheral pulses  Skin: Cool to touch distally    LABS:                        12.5   8.81  )-----------( 182      ( 24 Jun 2022 05:50 )             39.9     24 Jun 2022 05:50    x      |  x      |  39     ----------------------------<  148    x       |  36     |  0.98     Ca    8.9        24 Jun 2022 05:50  Phos  2.7       23 Jun 2022 10:33  Mg     2.3       23 Jun 2022 10:33    TPro  6.9    /  Alb  2.2    /  TBili  1.7    /  DBili  x      /  AST  26     /  ALT  35     /  AlkPhos  136    23 Jun 2022 10:33      CAPILLARY BLOOD GLUCOSE  POCT Blood Glucose.: 185 mg/dL (24 Jun 2022 08:19)    RADIOLOGY & ADDITIONAL TESTS:    Assessment/Plan:  Patient is a 89 yo M with PMH of chronic Afib, HTN, DVT, HLD, GOUT (on allopurinol and Colchicine), CHF (TTE 2019 LVED 64%) and macular degeneration who was brought in by family for complaint of cough, weakness for the last few days. Imaging shows moderate bilateral pleural effusions Admitted with acute on chronic HFpEF A fib with RVR and management of pleural effusion, + hMPv Virus   - CT from 6/19 showing bilateral pleural effusions with b lines and bilateral patchy opacities. Pt with EF 60%, TROY, mod MR/TR. ABGs and CMP showing hypercapnic respiratory failure, slowly increasing bicarb since admission. Pt was previously A&Ox2 daily however suddenly declined this morning.   - Pt placed on NRB on arrival to RR   - STAT ABG ordered  - BiPAP stared for hypercapnia likely multifactorial due to viral infection and worsening cardiac function.   - STAT EKG   - STAT CT head ordered for AMS   - ICU PA and senior resident in agreement with plan  - Will continue to follow, RN to call if any changes.  - Pt transferred to ICU on BIPAP   - Dr Nettles contacted emergency contact, family requesting full measures be taken including intubation. Family informed that prognosis is guarded and that patient is at risk for further deterioration Resident Rapid Response Note    Patient is a 89y old  Male who presents with a chief complaint of weight loss (24 Jun 2022 07:53)    Rapid response was called at 08:15 on this 89y Male patient for agonal breathing, AMS.     Patient was seen and examined at the bedside by the rapid response team and primary team.   ICU PA, Dr. Godoy and Dr Nettles at bedside.    Rapid Response Vital Signs:  BP: 81/58  HR: 81  RR: 10  SpO2: 78% on NRB  Temp: 97.3F  FS: 185    Vital Signs Last 24 Hrs  T(C): 36.5 (24 Jun 2022 04:38), Max: 36.6 (23 Jun 2022 09:40)  T(F): 97.7 (24 Jun 2022 04:38), Max: 97.8 (23 Jun 2022 09:40)  HR: 115 (24 Jun 2022 05:18) (86 - 139)  BP: 100/65 (24 Jun 2022 05:18) (94/60 - 986/-)  RR: 18 (24 Jun 2022 04:38) (18 - 20)  SpO2: 91% (24 Jun 2022 04:38) (88% - 94%)    Physical Exam:  General: agonal breathing, cachetic  HEENT: NCAT, PERRL, EOMI bilaterally, moist mucous membranes   Neck: Supple, nontender  Neurology: nonresponsive to painful stimuli   Respiratory: +Coarse breath sounds b/l  CV: Regular rate, irregularly irregular rhythm, +S1/S2, no murmurs, rubs or gallops  Abdominal: Soft, nontender, nondistended, normoactive bowel sounds  Extremities: No clubbing, no cyanosis, no edema, + peripheral pulses  Skin: Cool to touch distally    LABS:                        12.5   8.81  )-----------( 182      ( 24 Jun 2022 05:50 )             39.9     24 Jun 2022 05:50    x      |  x      |  39     ----------------------------<  148    x       |  36     |  0.98     Ca    8.9        24 Jun 2022 05:50  Phos  2.7       23 Jun 2022 10:33  Mg     2.3       23 Jun 2022 10:33    TPro  6.9    /  Alb  2.2    /  TBili  1.7    /  DBili  x      /  AST  26     /  ALT  35     /  AlkPhos  136    23 Jun 2022 10:33      CAPILLARY BLOOD GLUCOSE  POCT Blood Glucose.: 185 mg/dL (24 Jun 2022 08:19)    RADIOLOGY & ADDITIONAL TESTS:    Assessment/Plan:  Patient is a 89 yo M with PMH of chronic Afib, HTN, DVT, HLD, GOUT (on allopurinol and Colchicine), CHF (TTE 2019 LVED 64%) and macular degeneration who was brought in by family for complaint of cough, weakness for the last few days. Imaging shows moderate bilateral pleural effusions Admitted with acute on chronic HFpEF A fib with RVR and management of pleural effusion, + hMPv Virus   - CT from 6/19 showing bilateral pleural effusions with b lines and bilateral patchy opacities. Pt with EF 60%, TROY, mod MR/TR. ABGs and CMP showing hypercapnic respiratory failure, slowly increasing bicarb since admission. Pt was previously A&Ox2 daily however suddenly declined this morning.   - Pt placed on NRB on arrival to RR   - STAT ABG ordered  - BiPAP stared for hypercapnia likely multifactorial due to viral infection and worsening cardiac function.   - STAT EKG   - STAT CT head ordered for AMS   - ICU PA and senior resident in agreement with plan  - Will continue to follow, RN to call if any changes.  - Pt transferred to ICU on BIPAP   - Dr Nettles contacted emergency contact, family requesting full measures be taken including intubation. Family informed that prognosis is guarded and that patient is at risk for further deterioration    Addendum: RRT f/u    Pt seen and examined at bedside around 10:45 AM in ICU. Pt is on BIPAP, somnolent but briefly arousable, not answering questions but following some simple commands.     VS  T 97.3  HR 83, AFib on monitor  /73  SpO2 100% on BiPAP    Physical exam:   General: somnolent, cachetic  HEENT: NCAT, PERRL, EOMI bilaterally, moist mucous membranes   Neck: Supple, nontender  Neurology: Briefly arousable, not answering questions but following simple commands  Respiratory: + Coarse breath sounds b/l  CV: Regular rate, irregularly irregular rhythm, +S1/S2, no murmurs, rubs or gallops  Abdominal: Soft, nontender, nondistended, normoactive bowel sounds  Extremities: No clubbing, no cyanosis, no edema, + peripheral pulses  Skin: Cool to touch distally    A/P  Patient is a 89 yo M with PMH of chronic Afib, HTN, DVT, HLD, GOUT (on allopurinol and Colchicine), CHF (TTE 2019 LVED 64%) and macular degeneration who was brought in by family for complaint of cough, weakness for the last few days. Imaging shows moderate bilateral pleural effusions Admitted with acute on chronic HFpEF A fib with RVR and management of pleural effusion, + hMPv Virus   - Sudden decline in mental status potentially due to hypercapnic respiratory failure vs aspiration event vs progression of mental status decline seen during hospital course  - Pt now in ICU on BIPAP  - Infectious workup, respiratory management as per ICU  - ABGs showing compensated hypercapnia, improving  - CTH negative  - Will continue to follow from hospitalist team

## 2022-06-24 NOTE — CHART NOTE - NSCHARTNOTEFT_GEN_A_CORE
:  Kevin Latif MD    INDICATION:    Respiratory Failure (J96.00)      PROCEDURE:  [x] LIMITED ECHO  [x] LIMITED CHEST  [ ] LIMITED RETROPERITONEAL  [ ] LIMITED ABDOMINAL  [ ] LIMITED DVT  [ ] NEEDLE GUIDANCE VASCULAR  [ ] NEEDLE GUIDANCE THORACENTESIS  [ ] NEEDLE GUIDANCE PARACENTESIS  [ ] NEEDLE GUIDANCE PERICARDIOCENTESIS  [ ] OTHER    FINDINGS:  Scattered B lines anteriorly bilaterally  Moderate bilateral pleural effusions  LV systolic function appears normal  There is evidence of significant mitral regurgitation  Left atrium markedly enlarged  Tricuspid regurgitation  Estimated PASP is 45-50 mm Hg (assuming RAP 10-15)  No significant pericardial effusion  IVC 2.5 cm without significant inspiratory variation    INTERPRETATION:  Scattered B lines anteriorly bilaterally  Moderate bilateral pleural effusions  LV systolic function appears normal  There is evidence of significant mitral regurgitation  Left atrium markedly enlarged  Tricuspid regurgitation  Estimated PASP is 45-50 mm Hg (assuming RAP 10-15)  No significant pericardial effusion  IVC 2.5 cm without significant inspiratory variation    Images stored in ICU Ultrasound

## 2022-06-24 NOTE — CONSULT NOTE ADULT - SUBJECTIVE AND OBJECTIVE BOX
Patient is a 89y old  Male who presents with a chief complaint of weight loss (24 Jun 2022 07:53)      BRIEF HOSPITAL COURSE: Patient is a 89 yo male with a pmh of a-fib (on eliquis), HTN, HLD, and diastolic HF who presented to [facility] ED on 06/15/22 with cough and weakness for past several days. Patient at time reported weightless and reduced appetite for x1 month. Of note, patient follows with Dr. Allen outpatient, who on 6/6 d/c'd digoxin, increased lopressor and coumadin was changed to Eliquis. Prior to hospitalization, patient was relatively independent at baseline, AxOx3 and walks with a cane. In ED, patient was noted to be + for HMPV and CT with ground glass opacities and b/l pleural effusions. Hospital course was complicated by a-fib with RVR which cardiology was consulted for. Patient was admitted to medicine floor being treat with digoxin, Cardizem, lopressor, and supportive care for HMPV.   RRT was called on 06/24/22 am due to AMS / obtunded. Upon assessment, patient is unresponsive with agonal respirations. GOC established prior evening with family who would like all aggressive measures and full code status. Patient was being prepped for intubation though began to awakening with BVM with hyperventilation technique while supplies being gathered. Primary medical team at bedside stating patient has been with steady mental decline over past several days though remained alert. ABG obtained during BVM showing pH 7.38/Co2 55/o2 238/HCO3 32. Patient with now with mental status appropriate for NIV.  Patient being placed on BIPAP and being transferred to ICU.    Events last 24 hours: Obtunded, hypercapnic, being placed on bipap and transferred to ICU.     PAST MEDICAL & SURGICAL HISTORY:  Atrial fibrillation  HTN (hypertension)  HLD (hyperlipidemia)  Anemia  CHF (congestive heart failure)  DVT, lower extremity    Review of Systems:  Unable to be obtained d/t mental status.    Medications:  digoxin     Tablet 125 MICROGram(s) Oral every other day  diltiazem    milliGRAM(s) Oral daily  furosemide    Tablet 20 milliGRAM(s) Oral daily  metoprolol tartrate 100 milliGRAM(s) Oral two times a day  midodrine. 5 milliGRAM(s) Oral every 8 hours  ALBUTerol    0.083% 2.5 milliGRAM(s) Nebulizer every 4 hours PRN  guaifenesin/dextromethorphan Oral Liquid 10 milliLiter(s) Oral every 6 hours PRN  acetaminophen     Tablet .. 650 milliGRAM(s) Oral every 6 hours PRN  melatonin 3 milliGRAM(s) Oral at bedtime PRN  ondansetron Injectable 4 milliGRAM(s) IV Push every 8 hours PRN  apixaban 2.5 milliGRAM(s) Oral every 12 hours  aluminum hydroxide/magnesium hydroxide/simethicone Suspension 30 milliLiter(s) Oral every 4 hours PRN      ICU Vital Signs Last 24 Hrs  T(C): 36.5 (24 Jun 2022 04:38), Max: 36.6 (23 Jun 2022 09:40)  T(F): 97.7 (24 Jun 2022 04:38), Max: 97.8 (23 Jun 2022 09:40)  HR: 115 (24 Jun 2022 05:18) (86 - 139)  BP: 100/65 (24 Jun 2022 05:18) (94/60 - 986/-)  BP(mean): --  ABP: --  ABP(mean): --  RR: 18 (24 Jun 2022 04:38) (18 - 20)  SpO2: 91% (24 Jun 2022 04:38) (88% - 94%)      ABG - ( 24 Jun 2022 08:31 )  pH, Arterial: 7.38  pH, Blood: x     /  pCO2: 55    /  pO2: 238   / HCO3: 32    / Base Excess: 7.4   /  SaO2: 99.9          I&O's Detail    23 Jun 2022 07:01  -  24 Jun 2022 07:00  --------------------------------------------------------  IN:    Oral Fluid: 420 mL  Total IN: 420 mL    OUT:    Voided (mL): 750 mL  Total OUT: 750 mL    Total NET: -330 mL      LABS:                        12.5   8.81  )-----------( 182      ( 24 Jun 2022 05:50 )             39.9     06-24    x   |  x   |  39<H>  ----------------------------<  148<H>  x    |  36<H>  |  0.98    Ca    8.9      24 Jun 2022 05:50  Phos  2.7     06-23  Mg     2.3     06-23    TPro  6.9  /  Alb  2.2<L>  /  TBili  1.7<H>  /  DBili  x   /  AST  26  /  ALT  35  /  AlkPhos  136<H>  06-23    CAPILLARY BLOOD GLUCOSE      POCT Blood Glucose.: 185 mg/dL (24 Jun 2022 08:19)      CULTURES:      Physical Examination:    General: Obtunded, unresponsive, agonal -> now with spontaneous resp. Cachectic.    PULM: Diminished breath sounds b/l, no noted sputum production. Chest expansion symmetrical.     CVS: Iregular rate and rhythm, controlled in 80's. SBP ranging 80's-100's.     ABD: Soft, nondistended, nontender, present bowel sounds    EXT: No edema, nontender    SKIN: Cool, pallor, scattered ecchymosis.     NEURO: Obtunded -> beginning to move ext, intermittently following commands.      RADIOLOGY: < from: CT Chest No Cont (06.15.22 @ 15:39) >  INTERPRETATION:  INDICATION: Shortness of breath  TECHNIQUE: Unenhanced CT of the chest. Coronal, sagittal, and MIP images   were reconstructed and reviewed.  COMPARISON: No prior chest CT.    FINDINGS:    AIRWAYS, LUNGS and PLEURA: Patent central airways. Moderate bilateral   pleural effusions, interlobular septal thickening in patchy groundglass   opacities representing pulmonary edema. Multiple bilateral smaller than 6   mm nodules, indeterminant etiology.    MEDIASTINUM AND RENZO: Borderline/mildly enlarged mediastinal lymph nodes   including AP window lymph node measuring 1.1 cm short axis.    HEART AND VESSELS: Cardiomegaly. The left atrium is severely dilated.   Mitral annulus and coronary calcifications. No pericardial effusion.   Thoracic aorta and pulmonary artery normal in diameter.    VISUALIZED UPPER ABDOMEN: Small exophytic cyst within the left kidney.    CHEST WALL AND BONES: No aggressive osseous lesion.    LOWER NECK: Within normal limits.    IMPRESSION:    Moderate bilateral pleural effusions, interlobular septal thickening in   patchy groundglass opacities representing pulmonary edema.    Multiple bilateral small lung nodules, indeterminant etiology. Metastatic   disease is a possibility. Recommend follow-up chest CT in 4 weeks to   determine the stability. Further evaluation can be performed with PET CT.    < from: CT Head No Cont (06.21.22 @ 08:27) >  INTERPRETATION:  CT HEAD    CLINICAL HISTORY: head tremors with deglutition noted on mbs    TECHNIQUE:  Noncontrast CT.  Axial Acquisition.  Sagittal and coronal reformations.    COMPARISON:  No prior studies for comparison    FINDINGS:  *  Exam is degraded by motion.  *  HEMORRHAGE:  No evidence of intracranial hemorrhage.  *  BRAIN PARENCHYMA:  Moderate atrophy. Moderate somewhat patchy   periventricular and subcortical white matter ischemic changes. No mass or   mass effect.  *  VENTRICLES / SHIFT:  No hydrocephalus. No midline shift.  *  EXTRA-AXIAL / BASAL CISTERNS:  No extra-axial mass. Basal cisterns   preserved.  Atherosclerotic calcifications of the cavernous internal   carotid arteries.  *  CALVARIUM AND EXTRACRANIAL SOFT TISSUES:  No depressed calvarial   fracture.  *  SINUSES, ORBITS, MASTOIDS:  Mild fluid left maxillary sinus.    IMPRESSION:  Degraded by motion. No evidence of an acute intracranial hemorrhage,   midline shift, or hydrocephalus. Moderate atrophy with moderate patchy   white matter ischemic changes. Left maxillary sinus disease.    --- End of Report ---      CRITICAL CARE TIME SPENT: 46 minutes  Evaluating/treating patient, reviewing data/labs/imaging, discussing case with multidisciplinary team, discussing plan/goals of care with patient/family. Non-inclusive of procedure time.

## 2022-06-24 NOTE — PROGRESS NOTE ADULT - SUBJECTIVE AND OBJECTIVE BOX
Neurology follow up note    ROBERT JOHNSON89yMale      Interval History:    Patient feels ok no new complaints.    Allergies    No Known Allergies    Intolerances        MEDICATIONS    acetaminophen     Tablet .. 650 milliGRAM(s) Oral every 6 hours PRN  ALBUTerol    0.083% 2.5 milliGRAM(s) Nebulizer every 4 hours PRN  aluminum hydroxide/magnesium hydroxide/simethicone Suspension 30 milliLiter(s) Oral every 4 hours PRN  apixaban 2.5 milliGRAM(s) Oral every 12 hours  digoxin     Tablet 125 MICROGram(s) Oral every other day  diltiazem    milliGRAM(s) Oral daily  furosemide    Tablet 20 milliGRAM(s) Oral daily  guaifenesin/dextromethorphan Oral Liquid 10 milliLiter(s) Oral every 6 hours PRN  melatonin 3 milliGRAM(s) Oral at bedtime PRN  metoprolol tartrate 100 milliGRAM(s) Oral two times a day  midodrine. 5 milliGRAM(s) Oral every 8 hours  ondansetron Injectable 4 milliGRAM(s) IV Push every 8 hours PRN              Vital Signs Last 24 Hrs  T(C): 36.5 (24 Jun 2022 04:38), Max: 36.6 (23 Jun 2022 09:40)  T(F): 97.7 (24 Jun 2022 04:38), Max: 97.8 (23 Jun 2022 09:40)  HR: 115 (24 Jun 2022 05:18) (86 - 139)  BP: 100/65 (24 Jun 2022 05:18) (94/60 - 986/-)  BP(mean): --  RR: 18 (24 Jun 2022 04:38) (18 - 20)  SpO2: 91% (24 Jun 2022 04:38) (88% - 94%)    REVIEW OF SYSTEMS:  Constitutionally, the patient denies fever, chills, or night sweats.  Head:  No headaches.  The patient has poor vision out of both eyes to begin with, which is his baseline, can only see periphery out of the one eye.  Ears:  No ringing in the ears.  Neck:  No neck pain.  Cardiovascular:  No chest pain.  Respiratory:  Positive history of cough but does not feel significantly short of breath.  Abdomen:  No nausea, vomiting, or abdominal pain.  Extremities/Neurological:  No numbness or tingling.  General:  The patient just feels weak all over, and history of poor oral intake.      PHYSICAL EXAMINATION:  GENERAL:  The patient does appear to be cachectic in appearance.   HEENT:  Head:  Normocephalic, atraumatic.  Eyes:  No scleral icterus.  Ears:  Hard of hearing.  NECK:  Supple.  CARDIOVASCULAR:  S1 and S2 heard.  RESPIRATORY:  Decreased breath sounds bilaterally.  ABDOMEN:  Soft, nontender.  EXTREMITIES:  No clubbing or cyanosis was noted.     NEUROLOGIC:  The patient is awake and alert.  Location hospital, appear little forgetful today  Extraocular movements were intact.  The patient has poor vision out of both eyes, which is his baseline.  Speech was fluent.  No dysarthria.  Motor:  Bilateral upper 4/5, bilateral lower 3-/5.  No head tremors were noted.  No resting tremors were noted.  Upper extremities, no cogwheel rigidity was noted.  No significant bradykinesia was noted.                        LABS:  CBC Full  -  ( 24 Jun 2022 05:50 )  WBC Count : 8.81 K/uL  RBC Count : 3.87 M/uL  Hemoglobin : 12.5 g/dL  Hematocrit : 39.9 %  Platelet Count - Automated : 182 K/uL  Mean Cell Volume : 103.1 fl  Mean Cell Hemoglobin : 32.3 pg  Mean Cell Hemoglobin Concentration : 31.3 gm/dL  Auto Neutrophil # : x  Auto Lymphocyte # : x  Auto Monocyte # : x  Auto Eosinophil # : x  Auto Basophil # : x  Auto Neutrophil % : x  Auto Lymphocyte % : x  Auto Monocyte % : x  Auto Eosinophil % : x  Auto Basophil % : x      06-24    x   |  x   |  39<H>  ----------------------------<  148<H>  x    |  36<H>  |  0.98    Ca    8.9      24 Jun 2022 05:50  Phos  2.7     06-23  Mg     2.3     06-23    TPro  6.9  /  Alb  2.2<L>  /  TBili  1.7<H>  /  DBili  x   /  AST  26  /  ALT  35  /  AlkPhos  136<H>  06-23    Hemoglobin A1C:     LIVER FUNCTIONS - ( 23 Jun 2022 10:33 )  Alb: 2.2 g/dL / Pro: 6.9 g/dL / ALK PHOS: 136 U/L / ALT: 35 U/L / AST: 26 U/L / GGT: x           Vitamin B12         RADIOLOGY    ANALYSIS AND PLAN:  A 89-year-old with episode of altered mental status in regards to lethargy and generalized weakness.  For generalized weakness, suspect most likely metabolic encephalopathy secondary to respiratory issues along with decreased oral intake, suspect less likely this is a primary central nervous system event.  Suspect the patient's episodes of head possibly falling forward secondary to generalized weakness.  The patient does complain of weakness in his neck muscles as well, suspect less likely this is a primary neurological event such as Parkinson's, no other symptoms at present to suggest an underlying cause of Parkinson's.  Monitor oral intake.  Aspiration precautions.  For history of atrial fibrillation, continue the patient on anticoagulation.   patient appears more forgetful today possible from hospital setting today answer more questions accordingly   was able to drink ensure with me no coughing     Spoke with daughter, Leigh, at [phone] 6/23     Greater than 34 minutes of time was spent with the patient, plan of care, reviewing data, with greater than 50% of the visit was spent counseling and/or coordinating care with multidisciplinary healthcare team       Neurology follow up note    ROBERT JOHNSON89yMale      Interval History:    Patient events noted patient with breathing issues sent to ICU on bipap now     Allergies    No Known Allergies    Intolerances        MEDICATIONS    acetaminophen     Tablet .. 650 milliGRAM(s) Oral every 6 hours PRN  ALBUTerol    0.083% 2.5 milliGRAM(s) Nebulizer every 4 hours PRN  aluminum hydroxide/magnesium hydroxide/simethicone Suspension 30 milliLiter(s) Oral every 4 hours PRN  apixaban 2.5 milliGRAM(s) Oral every 12 hours  digoxin     Tablet 125 MICROGram(s) Oral every other day  diltiazem    milliGRAM(s) Oral daily  furosemide    Tablet 20 milliGRAM(s) Oral daily  guaifenesin/dextromethorphan Oral Liquid 10 milliLiter(s) Oral every 6 hours PRN  melatonin 3 milliGRAM(s) Oral at bedtime PRN  metoprolol tartrate 100 milliGRAM(s) Oral two times a day  midodrine. 5 milliGRAM(s) Oral every 8 hours  ondansetron Injectable 4 milliGRAM(s) IV Push every 8 hours PRN              Vital Signs Last 24 Hrs  T(C): 36.5 (24 Jun 2022 04:38), Max: 36.6 (23 Jun 2022 09:40)  T(F): 97.7 (24 Jun 2022 04:38), Max: 97.8 (23 Jun 2022 09:40)  HR: 115 (24 Jun 2022 05:18) (86 - 139)  BP: 100/65 (24 Jun 2022 05:18) (94/60 - 986/-)  BP(mean): --  RR: 18 (24 Jun 2022 04:38) (18 - 20)  SpO2: 91% (24 Jun 2022 04:38) (88% - 94%)    REVIEW OF SYSTEMS:  unable to obtain  on bipap     PHYSICAL EXAMINATION:  GENERAL:  The patient does appear to be cachectic in appearance.   HEENT:  Head:  Normocephalic, atraumatic.  Eyes:  No scleral icterus.  Ears:  Hard of hearing.  NECK:  Supple.  CARDIOVASCULAR:  S1 and S2 heard.  RESPIRATORY:  Decreased breath sounds bilaterally.  ABDOMEN:  Soft, nontender.  EXTREMITIES:  No clubbing or cyanosis was noted.     NEUROLOGIC:  The patient is confused   The patient has poor vision out of both eyes, which is his baseline.  Speech was fluent.  No dysarthria.  Motor:  Bilateral upper wrist restrains  bilateral lower 3-/5.   previous exam No head tremors were noted.  No resting tremors were noted.  Upper extremities, no cogwheel rigidity was noted.  No significant bradykinesia was noted.                        LABS:  CBC Full  -  ( 24 Jun 2022 05:50 )  WBC Count : 8.81 K/uL  RBC Count : 3.87 M/uL  Hemoglobin : 12.5 g/dL  Hematocrit : 39.9 %  Platelet Count - Automated : 182 K/uL  Mean Cell Volume : 103.1 fl  Mean Cell Hemoglobin : 32.3 pg  Mean Cell Hemoglobin Concentration : 31.3 gm/dL  Auto Neutrophil # : x  Auto Lymphocyte # : x  Auto Monocyte # : x  Auto Eosinophil # : x  Auto Basophil # : x  Auto Neutrophil % : x  Auto Lymphocyte % : x  Auto Monocyte % : x  Auto Eosinophil % : x  Auto Basophil % : x      06-24    x   |  x   |  39<H>  ----------------------------<  148<H>  x    |  36<H>  |  0.98    Ca    8.9      24 Jun 2022 05:50  Phos  2.7     06-23  Mg     2.3     06-23    TPro  6.9  /  Alb  2.2<L>  /  TBili  1.7<H>  /  DBili  x   /  AST  26  /  ALT  35  /  AlkPhos  136<H>  06-23    Hemoglobin A1C:     LIVER FUNCTIONS - ( 23 Jun 2022 10:33 )  Alb: 2.2 g/dL / Pro: 6.9 g/dL / ALK PHOS: 136 U/L / ALT: 35 U/L / AST: 26 U/L / GGT: x           Vitamin B12         RADIOLOGY    ANALYSIS AND PLAN:  A 89-year-old with episode of altered mental status in regards to lethargy and generalized weakness.  For generalized weakness, suspect most likely metabolic encephalopathy secondary to respiratory issues along with decreased oral intake, suspect less likely this is a primary central nervous system event.  Suspect the patient's episodes of head possibly falling forward secondary to generalized weakness.  The patient does complain of weakness in his neck muscles as well, suspect less likely this is a primary neurological event such as Parkinson's, no other symptoms at present to suggest an underlying cause of Parkinson's.  For history of atrial fibrillation, continue the patient on anticoagulation.  repeat head ct no changes   monitor respiratory status as needed     Spoke with daughter, Leigh, at [phone] 6/23 called today went to [facility] 6/24    Greater than 33  minutes of time was spent with the patient, plan of care, reviewing data, with greater than 50% of the visit was spent counseling and/or coordinating care with multidisciplinary healthcare team

## 2022-06-24 NOTE — PROGRESS NOTE ADULT - PROBLEM SELECTOR PLAN 3
- Symptomatic treatment- Tylenol PRN fever and guaifenesin-DM PRN cough, Supp O2 prn  - MBS: Pureed with mildly thick liquids. Aspiration precautions. May need to re-evaluate as viral syndrome resolves  - Blood cx x2 and urine cx negative  - ID (Dr. Goldman) consulted: supportive management  - removed isolation precautions

## 2022-06-24 NOTE — PROGRESS NOTE ADULT - ATTENDING COMMENTS
The patient was seen and evaluated independently by the attending physician.  - I have personally reviewed the pt's labs, imaging, micro data and consultant recommendations.    #acute encephalopathy, agonal respirations, likely multifactorial including hypercapnic resp failure  #debbie, likely hypoperfusion from transient hypotension  #lactic acidosis, likely hypoxia induced  #acute diastolic chf exac  #af w/ rvr  #viral syndrome, resolved  #multiple comorbidities  #frail state  #advanced care planning    - I assessed pt at bedside this morning when nurse verbalized concern about AMS. I personally alerted the RRT when I assessed the pt due to abrupt change in his status. I was present at bedside for the duration of the RRT.   - pt transferred to MICU  - ct head without acute findings  - mental status hasn't improved much despite improved in oxygenation and improved CO2 levels on serial ABG's  - avoid nephrotoxic agents  - monitor renal indices and lft's  - trend lactate  - remains at risk for abrupt decompensation  - remains full code for now and confirmed the family wishes twice today

## 2022-06-24 NOTE — PROGRESS NOTE ADULT - SUBJECTIVE AND OBJECTIVE BOX
Patient is a 89y old  Male who presents with a chief complaint of weight loss (24 Jun 2022 12:51)    INTERVAL HPI/OVERNIGHT EVENTS: Overnight, rate control medications were given early due to AFib with RVR. Pt seen and examined at the bedside this AM, during RRT called this morning. Pt appears obtunded and has agonal respirations, not answering questions nor following commands. Pt transferred to ICU for further management.    MEDICATIONS  (STANDING):  apixaban 2.5 milliGRAM(s) Oral every 12 hours  chlorhexidine 2% Cloths 1 Application(s) Topical <User Schedule>  digoxin     Tablet 125 MICROGram(s) Oral every other day  diltiazem    milliGRAM(s) Oral daily  furosemide    Tablet 20 milliGRAM(s) Oral daily  metoprolol tartrate 100 milliGRAM(s) Oral two times a day  midodrine. 5 milliGRAM(s) Oral every 8 hours    MEDICATIONS  (PRN):  acetaminophen     Tablet .. 650 milliGRAM(s) Oral every 6 hours PRN Temp greater or equal to 38C (100.4F), Mild Pain (1 - 3)  ALBUTerol    0.083% 2.5 milliGRAM(s) Nebulizer every 4 hours PRN Shortness of Breath and/or Wheezing  aluminum hydroxide/magnesium hydroxide/simethicone Suspension 30 milliLiter(s) Oral every 4 hours PRN Dyspepsia  guaifenesin/dextromethorphan Oral Liquid 10 milliLiter(s) Oral every 6 hours PRN Cough  melatonin 3 milliGRAM(s) Oral at bedtime PRN Insomnia  ondansetron Injectable 4 milliGRAM(s) IV Push every 8 hours PRN Nausea and/or Vomiting    Allergies  No Known Allergies    Intolerances    REVIEW OF SYSTEMS: Unable to obtain due to mental status.     Vital Signs Last 24 Hrs  T(C): 36.9 (24 Jun 2022 13:20), Max: 36.9 (24 Jun 2022 13:20)  T(F): 98.4 (24 Jun 2022 13:20), Max: 98.4 (24 Jun 2022 13:20)  HR: 87 (24 Jun 2022 12:06) (86 - 139)  BP: 100/65 (24 Jun 2022 05:18) (100/65 - 986/-)  RR: 18 (24 Jun 2022 04:38) (18 - 18)  SpO2: 100% (24 Jun 2022 12:06) (91% - 100%)    PHYSICAL EXAM:  General: agonal breathing, cachetic  HEENT: NCAT, PERRL, EOMI bilaterally, moist mucous membranes   Neck: Supple, nontender  Neurology: obtunded, nonresponsive to painful stimuli   Respiratory: +Coarse breath sounds b/l  CV: Regular rate, irregularly irregular rhythm, +S1/S2, no murmurs, rubs or gallops  Abdominal: Soft, nontender, nondistended, normoactive bowel sounds  Extremities: No clubbing, no cyanosis, no edema, + peripheral pulses  Skin: Cool to touch     LABS:                        13.5   8.50  )-----------( 169      ( 24 Jun 2022 11:40 )             43.1     CBC Full  -  ( 24 Jun 2022 11:40 )  WBC Count : 8.50 K/uL  Hemoglobin : 13.5 g/dL  Hematocrit : 43.1 %  Platelet Count - Automated : 169 K/uL  Mean Cell Volume : 103.9 fl  Mean Cell Hemoglobin : 32.5 pg  Mean Cell Hemoglobin Concentration : 31.3 gm/dL  Auto Neutrophil # : 6.58 K/uL  Auto Lymphocyte # : 0.70 K/uL  Auto Monocyte # : 1.13 K/uL  Auto Eosinophil # : 0.01 K/uL  Auto Basophil # : 0.04 K/uL  Auto Neutrophil % : 77.4 %  Auto Lymphocyte % : 8.2 %  Auto Monocyte % : 13.3 %  Auto Eosinophil % : 0.1 %  Auto Basophil % : 0.5 %    24 Jun 2022 11:40    141    |  104    |  43     ----------------------------<  137    4.7     |  30     |  1.30     Ca    9.4        24 Jun 2022 11:40  Phos  4.0       24 Jun 2022 11:40  Mg     2.7       24 Jun 2022 11:40    TPro  7.7    /  Alb  2.5    /  TBili  2.1    /  DBili  x      /  AST  43     /  ALT  48     /  AlkPhos  156    24 Jun 2022 11:40    CAPILLARY BLOOD GLUCOSE    POCT Blood Glucose.: 185 mg/dL (24 Jun 2022 08:19)    RADIOLOGY & ADDITIONAL TESTS:  Personally reviewed.     Consultant(s) Notes Reviewed:  [x] YES  [ ] NO

## 2022-06-24 NOTE — PROGRESS NOTE ADULT - PROBLEM SELECTOR PLAN 1
RRT called on 6/24 for acute mental status change, pt found to be hypercapnic with progressive metabolic compensation (HCO3 on BMP trending upwards), with some improvement with mental status with hyperventilation and BiPAP  - CTH 6/24 negative for acute intracranial pathology  - Pt transferred to ICU for closer monitoring on BiPAP, and infectious/encephalopathy workup  - Further care per ICU

## 2022-06-24 NOTE — PROGRESS NOTE ADULT - ASSESSMENT
Patient is a 89 yo M with PMH of chronic Afib, HTN, DVT, HLD, GOUT (on allopurinol and Colchicine), CHF (TTE 2019 LVED 64%) and macular degeneration who was brought in by family for complaint of cough, weakness for the last few days. Imaging shows moderate bilateral pleural effusions Admitted with acute on chronic HFpEF A fib with RVR and management of pleural effusion, + hMPv Virus. RRT called on 6/24 for deterioration in mental status, pt transferred to ICU for hypercarbic respiratory failure and further workup/respiratory management.

## 2022-06-24 NOTE — PROGRESS NOTE ADULT - SUBJECTIVE AND OBJECTIVE BOX
[facility] -- Vincenzo Wyman Grossman, Wachsman, Alex Akers Savella, Goodger: Office # [phone]    Follow Up:  Afib with RVR     Subjective/Observations: Patient seen, lethargic unable to provide meaningful information at this time, s/p RRT      REVIEW OF SYSTEMS: All review of systems is negative for eye, ENT, GI, , allergic, dermatologic, musculoskeletal and neurologic except as described above    PAST MEDICAL & SURGICAL HISTORY:  Atrial fibrillation      HTN (hypertension)      HLD (hyperlipidemia)      Anemia      CHF (congestive heart failure)      DVT, lower extremity          MEDICATIONS  (STANDING):  apixaban 2.5 milliGRAM(s) Oral every 12 hours  chlorhexidine 2% Cloths 1 Application(s) Topical <User Schedule>  digoxin     Tablet 125 MICROGram(s) Oral every other day  diltiazem    milliGRAM(s) Oral daily  furosemide    Tablet 20 milliGRAM(s) Oral daily  metoprolol tartrate 100 milliGRAM(s) Oral two times a day  midodrine. 5 milliGRAM(s) Oral every 8 hours    MEDICATIONS  (PRN):  acetaminophen     Tablet .. 650 milliGRAM(s) Oral every 6 hours PRN Temp greater or equal to 38C (100.4F), Mild Pain (1 - 3)  ALBUTerol    0.083% 2.5 milliGRAM(s) Nebulizer every 4 hours PRN Shortness of Breath and/or Wheezing  aluminum hydroxide/magnesium hydroxide/simethicone Suspension 30 milliLiter(s) Oral every 4 hours PRN Dyspepsia  guaifenesin/dextromethorphan Oral Liquid 10 milliLiter(s) Oral every 6 hours PRN Cough  melatonin 3 milliGRAM(s) Oral at bedtime PRN Insomnia  ondansetron Injectable 4 milliGRAM(s) IV Push every 8 hours PRN Nausea and/or Vomiting    Allergies    No Known Allergies    Intolerances      Vital Signs Last 24 Hrs  T(C): 36.5 (24 Jun 2022 04:38), Max: 36.5 (24 Jun 2022 04:38)  T(F): 97.7 (24 Jun 2022 04:38), Max: 97.7 (24 Jun 2022 04:38)  HR: 87 (24 Jun 2022 12:06) (86 - 139)  BP: 100/65 (24 Jun 2022 05:18) (100/65 - 986/-)  BP(mean): --  RR: 18 (24 Jun 2022 04:38) (18 - 18)  SpO2: 100% (24 Jun 2022 12:06) (91% - 100%)  I&O's Summary    23 Jun 2022 07:01  -  24 Jun 2022 07:00  --------------------------------------------------------  IN: 420 mL / OUT: 750 mL / NET: -330 mL          TELE: afib 80's with 's overnight   PHYSICAL EXAM:  Constitutional: NAD, awake and alert, well-developed  HEENT: Moist Mucous Membranes, Anicteric  Pulmonary: Non-labored, breath sounds are clear bilaterally, No wheezing, rales or rhonchi  Cardiovascular: Regular, S1 and S2, No murmurs, rubs, gallops or clicks  Gastrointestinal: Bowel Sounds present, soft, nontender.   Lymph: No peripheral edema. No lymphadenopathy.  Skin: No visible rashes or ulcers.  Psych:  Mood & affect appropriate for situation    LABS: All Labs Reviewed:                        13.5   8.50  )-----------( 169      ( 24 Jun 2022 11:40 )             43.1                         12.5   8.81  )-----------( 182      ( 24 Jun 2022 05:50 )             39.9                         13.3   7.19  )-----------( 172      ( 23 Jun 2022 10:33 )             42.1     24 Jun 2022 11:40    141    |  104    |  43     ----------------------------<  137    4.7     |  30     |  1.30   24 Jun 2022 05:50    143    |  103    |  39     ----------------------------<  148    4.2     |  36     |  0.98   23 Jun 2022 10:33    141    |  105    |  35     ----------------------------<  169    4.1     |  35     |  0.98     Ca    9.4        24 Jun 2022 11:40  Ca    8.9        24 Jun 2022 05:50  Ca    8.8        23 Jun 2022 10:33  Phos  2.6       24 Jun 2022 05:50  Phos  2.7       23 Jun 2022 10:33  Phos  2.4       22 Jun 2022 08:41  Mg     2.3       24 Jun 2022 05:50  Mg     2.3       23 Jun 2022 10:33  Mg     2.3       22 Jun 2022 08:41    TPro  7.7    /  Alb  2.5    /  TBili  2.1    /  DBili  x      /  AST  43     /  ALT  48     /  AlkPhos  156    24 Jun 2022 11:40  TPro  6.9    /  Alb  2.2    /  TBili  1.7    /  DBili  x      /  AST  26     /  ALT  35     /  AlkPhos  136    23 Jun 2022 10:33              Triiodothyronine, Total (T3 Total): 51 ng/dL (06-20-22 @ 23:41)    Cholesterol, Serum: 173 mg/dL (06-17-22 @ 10:57)  HDL Cholesterol, Serum: 32 mg/dL (06-17-22 @ 10:57)  Triglycerides, Serum: 110 mg/dL (06-17-22 @ 10:57)      12 Lead ECG:   Ventricular Rate 130 BPM    QRS Duration 78 ms    Q-T Interval 288 ms    QTC Calculation(Bazett) 423 ms    R Axis 78 degrees    T Axis 269 degrees    Diagnosis Line Atrial fibrillation with rapid ventricular response  ST & T wave abnormality, consider inferior ischemia  ST & T wave abnormality, consider anterolateral ischemia  Abnormal ECG  When compared with ECG of 15-RUI-2022 12:46, (Unconfirmed)  No significant change was found  Confirmed by TOMASZ HEBERT ([id number]) on 6/15/2022 3:04:33 PM (06-15-22 @ 12:48)    < from: Xray Chest 1 View- PORTABLE-Urgent (06.15.22 @ 13:35) >    ACC: [id number] EXAM:  XR CHEST PORTABLE URGENT 1V                          PROCEDURE DATE:  06/15/2022          INTERPRETATION:  AP semierect chest on Deepali 15, 2022 at 1:19 PM. Patient   has sepsis.    COMPARISON: None available.    Heart magnified by technique.    There are bilateral lower lobe infiltrates.    IMPRESSION: Bilateral lower lobe infiltrates.    --- End of Report ---            HASEEB BRAUN MD; Attending Radiologist  This document has been electronically signed. Rui 15 2022  1:56PM    < end of copied text >  < from: TTE Echo Complete w/o Contrast w/ Doppler (06.19.22 @ 10:25) >    ACC: [id number] EXAM:  ECHO TTE WO CON COMP W DOPP                          PROCEDURE DATE:  06/19/2022          INTERPRETATION:  INDICATION: Dyspnea  Sonographer LK    Blood Pressure 123/78    Height 182.9 cm     Weight 70.3 kg       BSA 1.9   sq m    Dimensions:  LA 5.2       Normal Values: 2.0 - 4.0 cm  Ao 2.8        Normal Values: 2.0 - 3.8 cm  SEPTUM 1.4       Normal Values: 0.6 - 1.2 cm  PWT 1.1       Normal Values: 0.6 - 1.1 cm  LVIDd 4.2         Normal Values: 3.0 - 5.6 cm  LVIDs 3.0   Normal Values: 1.8 - 4.0 cm      OBSERVATIONS:  Technically difficult and limited study  Mitral Valve: Mitral annular calcification, calcified leaflets with an   undetermined degree of stenosis, moderate MR.  Aortic Valve/Aorta: Calcified trileaflet aortic valve with decreased   opening. Doppler interrogation was not performed  Tricuspid Valve: Moderate TR.  Pulmonic Valve: Mild PI  Left Atrium: Enlarged  Right Atrium: Enlarged  Left Ventricle: The left ventricular endocardium is not well-visualized.   Overall grossly normal LV size and systolic function, estimated LVEF of   60%.  Right Ventricle: Not well-visualized  Pericardium: no significant pericardial effusion.        IMPRESSION:  Technically difficult and limited study  The left ventricular endocardium is not well-visualized. Overall grossly   normal LV size and systolic function, estimated LVEF of 60%.  The right ventricle is not well-visualized  Biatrial enlargement  Calcified trileaflet aortic valve with decreased opening. Doppler   interrogation was not performed  Mitral annular calcification, calcified leaflets with an undetermined   degree of stenosis, moderate MR.  Moderate TR.    --- End of Report ---            FATOUMATA HAND MD; Attending Cardiologist  This document has been electronically signed. Jun 20 2022  1:51PM    < end of copied text >

## 2022-06-24 NOTE — PROGRESS NOTE ADULT - SUBJECTIVE AND OBJECTIVE BOX
Date/Time Patient Seen:  		  Referring MD:   Data Reviewed	       Patient is a 89y old  Male who presents with a chief complaint of weight loss (23 Jun 2022 14:42)      Subjective/HPI     PAST MEDICAL & SURGICAL HISTORY:  Atrial fibrillation    HTN (hypertension)    HLD (hyperlipidemia)    Anemia    CHF (congestive heart failure)    DVT, lower extremity          Medication list         MEDICATIONS  (STANDING):  apixaban 2.5 milliGRAM(s) Oral every 12 hours  digoxin     Tablet 125 MICROGram(s) Oral every other day  diltiazem    milliGRAM(s) Oral daily  furosemide    Tablet 20 milliGRAM(s) Oral daily  metoprolol tartrate 100 milliGRAM(s) Oral two times a day  midodrine. 5 milliGRAM(s) Oral every 8 hours    MEDICATIONS  (PRN):  acetaminophen     Tablet .. 650 milliGRAM(s) Oral every 6 hours PRN Temp greater or equal to 38C (100.4F), Mild Pain (1 - 3)  ALBUTerol    0.083% 2.5 milliGRAM(s) Nebulizer every 4 hours PRN Shortness of Breath and/or Wheezing  aluminum hydroxide/magnesium hydroxide/simethicone Suspension 30 milliLiter(s) Oral every 4 hours PRN Dyspepsia  guaifenesin/dextromethorphan Oral Liquid 10 milliLiter(s) Oral every 6 hours PRN Cough  melatonin 3 milliGRAM(s) Oral at bedtime PRN Insomnia  ondansetron Injectable 4 milliGRAM(s) IV Push every 8 hours PRN Nausea and/or Vomiting         Vitals log        ICU Vital Signs Last 24 Hrs  T(C): 36.5 (24 Jun 2022 04:38), Max: 36.6 (23 Jun 2022 09:40)  T(F): 97.7 (24 Jun 2022 04:38), Max: 97.8 (23 Jun 2022 09:40)  HR: 115 (24 Jun 2022 05:18) (86 - 139)  BP: 100/65 (24 Jun 2022 05:18) (94/60 - 986/-)  BP(mean): --  ABP: --  ABP(mean): --  RR: 18 (24 Jun 2022 04:38) (18 - 20)  SpO2: 91% (24 Jun 2022 04:38) (88% - 94%)           Input and Output:  I&O's Detail    23 Jun 2022 07:01  -  24 Jun 2022 05:36  --------------------------------------------------------  IN:    Oral Fluid: 420 mL  Total IN: 420 mL    OUT:    Voided (mL): 750 mL  Total OUT: 750 mL    Total NET: -330 mL          Lab Data                        13.3   7.19  )-----------( 172      ( 23 Jun 2022 10:33 )             42.1     06-23    141  |  105  |  35<H>  ----------------------------<  169<H>  4.1   |  35<H>  |  0.98    Ca    8.8      23 Jun 2022 10:33  Phos  2.7     06-23  Mg     2.3     06-23    TPro  6.9  /  Alb  2.2<L>  /  TBili  1.7<H>  /  DBili  x   /  AST  26  /  ALT  35  /  AlkPhos  136<H>  06-23            Review of Systems	      Objective     Physical Examination  heart s1s2  lung dec BS  abd soft        Pertinent Lab findings & Imaging      Ramírez:  NO   Adequate UO     I&O's Detail    23 Jun 2022 07:01  -  24 Jun 2022 05:36  --------------------------------------------------------  IN:    Oral Fluid: 420 mL  Total IN: 420 mL    OUT:    Voided (mL): 750 mL  Total OUT: 750 mL    Total NET: -330 mL               Discussed with:     Cultures:	        Radiology

## 2022-06-24 NOTE — PROGRESS NOTE ADULT - ASSESSMENT
89y old  Male PMH Afib on ac htn hld chief complaint of weakness. Patient was seen in office 6/6 by Dr Allen for decreased appetite and weakness, digoxin was discontinued and increased lopressor to 100mg Po BID     AFIB with RVR  - Telemetry: Afib 80's with episode of RVR 's overnight  - Continue Cardizem 90 mg q6H  - continue Lopressor 50 mg q12H, and Dig every other day, can switch to IV if unable to tolerate PO and unable to swallow   - lethargic this AM, s/p RRT transferred to ICU for close monitoring   - Continue Eliquis, though, now he appears to be a poor candidate for long-term AC  - Continue Midodrine 5 mg q8H for hypotension.  Can increase as necessary to allow AVN uptitration if HR persistently elevated  - Repeat TTE showed EF 60%, TROY, mod MR/TR  - Compensated from HF standpoint.  Continue Lasix 20 mg daily.  Monitor volume status   - CT showed moderate bilateral pleural effusions.  Pulm following.  No immediate need for thora per Pulm.  Recommend IP if able  - Monitor and replete lytes, keep K>4, Mg>2.    - AMS not improving, CT head negative of acute pathology.  Aspiration precaution  - Poor prognosis.  [facility] discussion ongoing   - Will continue to follow.    Apple Martinez, [facility]  Nurse Practitioner - Cardiology   Spectra #[phone]

## 2022-06-25 NOTE — PROGRESS NOTE ADULT - ATTENDING COMMENTS
The patient was seen and evaluated independently by the attending physician.  - I have personally reviewed the pt's labs, imaging, micro data and consultant recommendations.    #acute encephalopathy 2/2 hypercapnea  #cardiorenal syndrome  #congestive hepatopathy  #debbie, likely hypoperfusion from transient hypotension, improving  #lactic acidosis, likely hypoxia induced, resolved  #acute diastolic chf exac  #af w/ rvr  #viral syndrome, resolved  #multiple comorbidities  #frail state  #advanced care planning    - remains in MICU level of care. BiPAP at night, nasal cannula during day. swallow eval at bedside w/ staff, adv diet  - ct head without acute findings  - mental status is better today  - avoid nephrotoxic agents  - monitor renal indices and lft's  - bili improving, lft's improving  - remains at risk for abrupt decompensation  - remains full code, prognosis is poor

## 2022-06-25 NOTE — PROGRESS NOTE ADULT - PROBLEM SELECTOR PLAN 10
Eliquis 2.5mg bid for AC    PT EVAL: rehabilitation facility; [facility]    GO discussion with family on 6/23, pt will remain full code for now; may need to re-visit GOC conversation pending ICU course
Eliquis 2.5mg bid for AC    GOC discussion with family on 6/23, pt will remain full code for now; may need to re-visit GOC conversation pending ICU course

## 2022-06-25 NOTE — PROGRESS NOTE ADULT - PROBLEM SELECTOR PLAN 6
Resolved  Likely prerenal azotemia from decreased PO intake  - Avoid nephrotoxic agents.  - Caution with IVF given hx of CHF with b/l pleural effusion

## 2022-06-25 NOTE — PROGRESS NOTE ADULT - ASSESSMENT
89yo male with PMH of chronic AFib on Eliquis, CHF (EF 64%), HTN, DVT, HLD, gout, macular degeneration who presented for cough and weakness, admitted for acute on chronic heart failure exacerbation with bilateral pleural effusions, AFib with RVR and +hMPV. Rapid Response called on 6/24 for AMS, hypoxia and agonal breathing and found to hypercapnic with lactic acidosis, placed on BiPAP and transferred to ICU for further management.     Neuro:  - Encephalopathy: now resolved, patient A&Ox4; AMS likely in setting of acute hypercapnia/hypoxia  - Start mirtazapine for appetite stimulation, depression    Cardio:  - Acute decompensated HF: good response to Lasix 40mg IVP x2 yesterday, will give another Lasix 40mg IVP today   - AFib with RVR: restart PO medications metoprolol 50mg q6h, Diltiazem 60mg q6h, Digoxin for rate control  - Restart Eliquis 2.5mg BID     Pulm:  - Acute hypercapnic respiratory failure 2/2 acute decompensated heart failure, improved s/p diuresis and BiPAP  - Give additional Lasix 40mg IVP today  - Satting well on 3L NC, continue BiPAP at night     GI:   - Bedside swallow function with ICU team this morning, will tentatively start supervised pureed diet   - Pending formal Swallow eval tomorrow  - Transaminitis and bilirubin downtrending, like congestive hepatopathy    Renal:   - Cr 1.2 today, down from 1.3; likely MARISOL 2/2 cardiorenal syndrome  - Maintain strict Is/Os, maintain mead for closely UOP monitoring   - Diurese with Lasix 40mg IVP today     ID:   - No evidence of acute infection, observe off antibiotics  - F/u BCx, UCx     Heme:  - DVT PPx: Eliquis 2.5mg BID for history of AFib, DVT    Endo:  - Blood glucose goal in -180    Skin:  - No lines, mead from 6/24    Dispo:  - ICU  - Full code

## 2022-06-25 NOTE — PROGRESS NOTE ADULT - ATTENDING COMMENTS
89M PMH A-Fib on apixaban, HTN, HLD, and chronic diastolic heart failure who presents with cough, weakness. loss of appetite, and significant weight loss, found on admission to have cardiogenic pulmonary edema with bilateral pleural effusions and RVP positive for human metapneumovirus. Course complicated by acute encephalopathy due to acute on chronic hypercapnic respiratory failure with acute decompensated heart failure along with mild MARISOL, lactic acidosis, and congestive hepatopathy.     1. Neuro: markedly improved encephalopathy, likely due to hypercapnia + ADHF + lactic acidosis. Close monitoring of neuro status. Start mirtazapine for FARIDA/weight loss  2. CV: ADHF, although hemodynamically stable. Diuresis with furosemide. Resolved lactic acidosis with diuresis and BiPAP. Change diltiazem, metoprolol, and digoxin to oral. Continue apixaban  3. Pulm: continue nasal cannula during the day and BiPAP every night. Close respiratory monitoring  4. GI: speech and swallow eval. Start pureed diet with honey thickened liquids. Improving transaminitis and hyperbilirubinemia, likely due to congestive hepatopathy, improved with diuresis  5. Renal: mild MARISOL due to cardiorenal syndrome. Strict I/O's, close monitoring of kidney function and lytes. Continue diuresis  6. ID: no evidence of active infection, no fevers or leukocytosis, procalcitonin is 0.1. Lactic acidosis improving with diuresis/bipap. Observe off abx  7. Heme: on apixaban for A-Fib  8. Endo: no active issues, TSH normal, monitor FSG q6h  9. Skin: no lines, has mead 6/24  10. Dispo: full code, discussed with children at bedside. Significantly improved from yesterday, but clinical picture worrisome for cardiac cachexia approaching end of life  CC time spent: 35min

## 2022-06-25 NOTE — PROGRESS NOTE ADULT - SUBJECTIVE AND OBJECTIVE BOX
Patient is a 89y old  Male who presents with a chief complaint of weight loss (2022 08:49)    INTERVAL HPI/OVERNIGHT EVENTS: No acute overnight events. Pt seen and examined at the bedside this AM. Pt reports feeling well, but complains of thirst as he was not able to drink while on the BiPAP. Pt is much more awake and alert today and has no other complaints or concerns.     MEDICATIONS  (STANDING):  apixaban 2.5 milliGRAM(s) Oral every 12 hours  chlorhexidine 2% Cloths 1 Application(s) Topical <User Schedule>  digoxin  Injectable 125 MICROGram(s) IV Push daily  diltiazem Injectable 10 milliGRAM(s) IV Push <User Schedule>  metoprolol tartrate Injectable 5 milliGRAM(s) IV Push <User Schedule>    MEDICATIONS  (PRN):  acetaminophen     Tablet .. 650 milliGRAM(s) Oral every 6 hours PRN Temp greater or equal to 38C (100.4F), Mild Pain (1 - 3)  ALBUTerol    0.083% 2.5 milliGRAM(s) Nebulizer every 4 hours PRN Shortness of Breath and/or Wheezing  aluminum hydroxide/magnesium hydroxide/simethicone Suspension 30 milliLiter(s) Oral every 4 hours PRN Dyspepsia  guaifenesin/dextromethorphan Oral Liquid 10 milliLiter(s) Oral every 6 hours PRN Cough  melatonin 3 milliGRAM(s) Oral at bedtime PRN Insomnia  ondansetron Injectable 4 milliGRAM(s) IV Push every 8 hours PRN Nausea and/or Vomiting    Allergies  No Known Allergies    Intolerances    REVIEW OF SYSTEMS:  CONSTITUTIONAL: No fever or chills  HEENT:  No headache, no sore throat. + Dry mouth, thirst  RESPIRATORY: No cough, wheezing, or shortness of breath  CARDIOVASCULAR: No chest pain, palpitations  GASTROINTESTINAL: No abd pain, nausea, vomiting, or diarrhea  GENITOURINARY: No dysuria, frequency, or hematuria  NEUROLOGICAL: no focal weakness or dizziness  MUSCULOSKELETAL: no myalgias     Vital Signs Last 24 Hrs  T(C): 36.7 (2022 08:15), Max: 36.9 (2022 13:20)  T(F): 98.1 (2022 08:15), Max: 98.4 (2022 13:20)  HR: 109 (2022 08:00) (74 - 198)  BP: 97/64 (2022 08:00) (91/56 - 127/58)  BP(mean): 72 (2022 08:00) (69 - 88)  RR: 27 (2022 08:00) (16 - 43)  SpO2: 100% (2022 08:00) (80% - 100%)    PHYSICAL EXAM:  GENERAL: NAD  HEENT:  anicteric, moist mucous membranes  CHEST/LUNG:  Diminished breath sounds b/l  HEART:  Regular rate, irregularly irregular thyrhm, S1, S2  ABDOMEN:  BS+, soft, nontender, nondistended  EXTREMITIES: no edema, cyanosis, or calf tenderness  NERVOUS SYSTEM: answers questions and follows commands appropriately    LABS:                        12.8   8.50  )-----------( 175      ( 2022 07:00 )             40.9     CBC Full  -  ( 2022 07:00 )  WBC Count : 8.50 K/uL  Hemoglobin : 12.8 g/dL  Hematocrit : 40.9 %  Platelet Count - Automated : 175 K/uL  Mean Cell Volume : 102.5 fl  Mean Cell Hemoglobin : 32.1 pg  Mean Cell Hemoglobin Concentration : 31.3 gm/dL    2022 07:00    145    |  105    |  48     ----------------------------<  82     4.1     |  35     |  1.20     Ca    8.8        2022 07:00  Phos  3.7       2022 07:00  Mg     2.4       2022 07:00    TPro  6.8    /  Alb  2.2    /  TBili  1.6    /  DBili  x      /  AST  37     /  ALT  41     /  AlkPhos  127    2022 07:00    Urinalysis Basic - ( 2022 15:10 )  Color: Pale Yellow / Appearance: Slightly Turbid / S.005 / pH: x  Gluc: x / Ketone: Negative  / Bili: Negative / Urobili: Negative   Blood: x / Protein: Negative / Nitrite: Negative   Leuk Esterase: Trace / RBC: 6-10 /HPF / WBC 0-2   Sq Epi: x / Non Sq Epi: Occasional / Bacteria: Occasional    CAPILLARY BLOOD GLUCOSE  POCT Blood Glucose.: 80 mg/dL (2022 05:37)  POCT Blood Glucose.: 84 mg/dL (2022 23:15)  POCT Blood Glucose.: 123 mg/dL (2022 18:20)    RADIOLOGY & ADDITIONAL TESTS:  Personally reviewed.     Consultant(s) Notes Reviewed:  [x] YES  [ ] NO

## 2022-06-25 NOTE — PROGRESS NOTE ADULT - SUBJECTIVE AND OBJECTIVE BOX
Date/Time Patient Seen:  		  Referring MD:   Data Reviewed	       Patient is a 89y old  Male who presents with a chief complaint of weight loss (24 Jun 2022 13:40)      Subjective/HPI     PAST MEDICAL & SURGICAL HISTORY:  Atrial fibrillation    HTN (hypertension)    HLD (hyperlipidemia)    Anemia    CHF (congestive heart failure)    DVT, lower extremity          Medication list         MEDICATIONS  (STANDING):  apixaban 2.5 milliGRAM(s) Oral every 12 hours  chlorhexidine 2% Cloths 1 Application(s) Topical <User Schedule>  digoxin  Injectable 125 MICROGram(s) IV Push daily  diltiazem Injectable 10 milliGRAM(s) IV Push <User Schedule>  metoprolol tartrate Injectable 5 milliGRAM(s) IV Push <User Schedule>    MEDICATIONS  (PRN):  acetaminophen     Tablet .. 650 milliGRAM(s) Oral every 6 hours PRN Temp greater or equal to 38C (100.4F), Mild Pain (1 - 3)  ALBUTerol    0.083% 2.5 milliGRAM(s) Nebulizer every 4 hours PRN Shortness of Breath and/or Wheezing  aluminum hydroxide/magnesium hydroxide/simethicone Suspension 30 milliLiter(s) Oral every 4 hours PRN Dyspepsia  guaifenesin/dextromethorphan Oral Liquid 10 milliLiter(s) Oral every 6 hours PRN Cough  melatonin 3 milliGRAM(s) Oral at bedtime PRN Insomnia  ondansetron Injectable 4 milliGRAM(s) IV Push every 8 hours PRN Nausea and/or Vomiting         Vitals log        ICU Vital Signs Last 24 Hrs  T(C): 36.6 (25 Jun 2022 03:53), Max: 36.9 (24 Jun 2022 13:20)  T(F): 97.9 (25 Jun 2022 03:53), Max: 98.4 (24 Jun 2022 13:20)  HR: 104 (25 Jun 2022 07:00) (74 - 198)  BP: 103/58 (25 Jun 2022 07:00) (91/56 - 127/58)  BP(mean): 77 (25 Jun 2022 07:00) (69 - 88)  ABP: --  ABP(mean): --  RR: 26 (25 Jun 2022 07:00) (16 - 43)  SpO2: 100% (25 Jun 2022 07:00) (80% - 100%)           Input and Output:  I&O's Detail    24 Jun 2022 07:01  -  25 Jun 2022 07:00  --------------------------------------------------------  IN:  Total IN: 0 mL    OUT:    Indwelling Catheter - Urethral (mL): 670 mL    Post-Void Residual per Intermittent Catheterization (mL): 700 mL  Total OUT: 1370 mL    Total NET: -1370 mL          Lab Data                        13.5   8.50  )-----------( 169      ( 24 Jun 2022 11:40 )             43.1     06-24    141  |  104  |  43<H>  ----------------------------<  137<H>  4.7   |  30  |  1.30    Ca    9.4      24 Jun 2022 11:40  Phos  4.0     06-24  Mg     2.7     06-24    TPro  7.7  /  Alb  2.5<L>  /  TBili  2.1<H>  /  DBili  x   /  AST  43<H>  /  ALT  48  /  AlkPhos  156<H>  06-24    ABG - ( 24 Jun 2022 10:45 )  pH, Arterial: 7.44  pH, Blood: x     /  pCO2: 51    /  pO2: 106   / HCO3: 35    / Base Excess: 10.4  /  SaO2: 99.2                    Review of Systems	      Objective     Physical Examination  heart s1s2  lung dec BS        Pertinent Lab findings & Imaging      Ramírez:  NO   Adequate UO     I&O's Detail    24 Jun 2022 07:01  -  25 Jun 2022 07:00  --------------------------------------------------------  IN:  Total IN: 0 mL    OUT:    Indwelling Catheter - Urethral (mL): 670 mL    Post-Void Residual per Intermittent Catheterization (mL): 700 mL  Total OUT: 1370 mL    Total NET: -1370 mL               Discussed with:     Cultures:	        Radiology

## 2022-06-25 NOTE — PROGRESS NOTE ADULT - NS ATTEST RISK GEN_ALL_CORE
Risk Statement (NON-critical care)

## 2022-06-25 NOTE — PROGRESS NOTE ADULT - ASSESSMENT
89y old  Male PMH Afib on ac htn hld chief complaint of weakness. Patient was seen in office 6/6 by Dr Aleln for decreased appetite and weakness, digoxin was discontinued and increased lopressor to 100mg Po BID.  Now with altered mental status and hypercarbic respiratory failure, transferred to the ICU    Hypercarbic resp failure  - mental status improved with bipap and iv lasix  - cont oxygen supplemenatation as needed  - though he does not appear volume overloaded on exam, sluggish flow/dilatation noted in the IVC  - can diurese him prn; seems compensated though cxr with worsening congestion  - if renal function trend favorable, can give him Lasi 20 iv x 1 today    AFIB with RVR  - remains in af with borderline rates  - can change iv rate control medications back to po  - restart back on metoprolol and digoxin qod  - cont ac with eliquis  - midodrine as needed for hypotension  - Repeat TTE showed EF 60%, TROY, mod MR/TR  - CT showed moderate bilateral pleural effusions  - Monitor and replete lytes, keep K>4, Mg>2.    - AMS slightly improved, possibility of aspiration.  - Poor prognosis.  GOC discussion ongoing   - Will continue to follow. Very high risk of decompensation.

## 2022-06-25 NOTE — PROGRESS NOTE ADULT - ASSESSMENT
89y old  Male pmg afib on ac htn hld chief complaint of weakness    transferred to ICU  management for AF and HF  NIPPV as tolerated  I and O  rate and rhythm control    AF management - rate and rhythm control  AC for AF  I and O  cvs rx regimen and BP control  hMPV - resp viral illness - supportive measures - Albuterol PRN for sob and or wheezing  monitor VS and HD and Sat  Pleural Eff - Atelectasis - I ze if able to tolerate - no immediate need for thoracentesis - medical management  diuresis as per CARDIO recs  GOC discussion  isolation precs for hMPV  cough rx regimen  replete noe

## 2022-06-25 NOTE — PROGRESS NOTE ADULT - SUBJECTIVE AND OBJECTIVE BOX
Patient is a 89y old  Male who presents with a chief complaint of weight loss (2022 09:01)    24 hour events: ***    REVIEW OF SYSTEMS  Constitutional: No fever, chills, fatigue  Neuro: No headache, numbness, weakness  Resp: No cough, wheezing, shortness of breath  CVS: No chest pain, palpitations, leg swelling  GI: No abdominal pain, nausea, vomiting, diarrhea   : No dysuria, frequency, incontinence  Skin: No itching, burning, rashes, or lesions   Msk: No joint pain or swelling  Psych: No depression, anxiety, mood swings  Heme: No bleeding    T(F): 98.6 (22 @ 12:27), Max: 98.6 (22 @ 12:27)  HR: 109 (22 @ 13:00) (74 - 198)  BP: 108/65 (22 @ 13:00) (91/56 - 114/67)  RR: 25 (22 @ 13:00) (16 - 43)  SpO2: 100% (22 @ 13:00) (96% - 100%)  Wt(kg): --            I&O's Summary     @ 07:01  -   @ 07:00  --------------------------------------------------------  IN: 0 mL / OUT: 1395 mL / NET: -1395 mL     @ 07:01  -   @ 13:11  --------------------------------------------------------  IN: 0 mL / OUT: 200 mL / NET: -200 mL      PHYSICAL EXAM  General:   CNS:   HEENT:   Resp:   CVS:   Abd:   Ext:   Skin:     MEDICATIONS    diltiazem    Tablet Oral  metoprolol tartrate Oral      ALBUTerol    0.083% Nebulizer PRN  guaifenesin/dextromethorphan Oral Liquid Oral PRN    acetaminophen     Tablet .. Oral PRN  melatonin Oral PRN  mirtazapine Oral  ondansetron Injectable IV Push PRN      apixaban Oral    aluminum hydroxide/magnesium hydroxide/simethicone Suspension Oral PRN          chlorhexidine 2% Cloths Topical                            12.8   8.50  )-----------( 175      ( 2022 07:00 )             40.9       06-25    145  |  105  |  48<H>  ----------------------------<  82  4.1   |  35<H>  |  1.20    Ca    8.8      2022 07:00  Phos  3.7     06-25  Mg     2.4     06-25    TPro  6.8  /  Alb  2.2<L>  /  TBili  1.6<H>  /  DBili  x   /  AST  37  /  ALT  41  /  AlkPhos  127<H>  06-25    Lactate 1.5           06-24 @ 20:00    Lactate 3.2           06-24 @ 15:40            Urinalysis Basic - ( 2022 15:10 )    Color: Pale Yellow / Appearance: Slightly Turbid / S.005 / pH: x  Gluc: x / Ketone: Negative  / Bili: Negative / Urobili: Negative   Blood: x / Protein: Negative / Nitrite: Negative   Leuk Esterase: Trace / RBC: 6-10 /HPF / WBC 0-2   Sq Epi: x / Non Sq Epi: Occasional / Bacteria: Occasional            Radiology: ***  Bedside lung ultrasound: ***  Bedside ECHO: ***    CENTRAL LINE: Y/N          DATE INSERTED:              REMOVE: Y/N  DUDLEY: Y/N                        DATE INSERTED:              REMOVE: Y/N  A-LINE: Y/N                       DATE INSERTED:              REMOVE: Y/N    GLOBAL ISSUE/BEST PRACTICE  Analgesia:   Sedation:   CAM-ICU:   HOB elevation: yes  Stress ulcer prophylaxis:   VTE prophylaxis:   Glycemic control:   Nutrition:     CODE STATUS: ***  [facility] discussion: Y       Patient is a 89y old  Male who presents with a chief complaint of weight loss (2022 09:01)    24 hour events: No acute overnight events. Hypotensive this morning 97/50, cardizem held with elevated HRs to 130s. Patient is fully awake, alert and oriented and asking for water and food. He reports he feels well and denies shortness of breath this morning.     REVIEW OF SYSTEMS  Constitutional: No fever, chills, fatigue  Neuro: No headache, numbness, weakness  Resp: No cough, wheezing, shortness of breath  CVS: No chest pain, palpitations, leg swelling  GI: No abdominal pain, nausea, vomiting, diarrhea   : No dysuria, frequency, incontinence  Skin: No itching, burning, rashes, or lesions   Msk: No joint pain or swelling  Psych: No depression, anxiety, mood swings  Heme: No bleeding    T(F): 98.6 (22 @ 12:27), Max: 98.6 (22 @ 12:27)  HR: 109 (22 @ 13:00) (74 - 198)  BP: 108/65 (22 @ 13:00) (91/56 - 114/67)  RR: 25 (22 @ 13:00) (16 - 43)  SpO2: 100% (22 @ 13:00) (96% - 100%)  Wt(kg): --            I&O's Summary     @ 07:  -   @ 07:00  --------------------------------------------------------  IN: 0 mL / OUT: 1395 mL / NET: -1395 mL     @ 07:  -   @ 13:11  --------------------------------------------------------  IN: 0 mL / OUT: 200 mL / NET: -200 mL      PHYSICAL EXAM  General: elderly, malnourished male, no acute distress  CNS: A&Ox4, follows commands  HEENT: EOMI B/L, PEREZ, MMM  Resp: Rales appreciated at bilateral bases  CVS: irregular rate and rhythm, no murmur  Abd: soft, NTND  Ext: loss of muscle tone, no edema  Skin: warm and perfused, scabbing on bilateral feet    MEDICATIONS    diltiazem    Tablet Oral  metoprolol tartrate Oral      ALBUTerol    0.083% Nebulizer PRN  guaifenesin/dextromethorphan Oral Liquid Oral PRN    acetaminophen     Tablet .. Oral PRN  melatonin Oral PRN  mirtazapine Oral  ondansetron Injectable IV Push PRN      apixaban Oral    aluminum hydroxide/magnesium hydroxide/simethicone Suspension Oral PRN          chlorhexidine 2% Cloths Topical                            12.8   8.50  )-----------( 175      ( 2022 07:00 )             40.9       06-25    145  |  105  |  48<H>  ----------------------------<  82  4.1   |  35<H>  |  1.20    Ca    8.8      2022 07:00  Phos  3.7     06-25  Mg     2.4     06-25    TPro  6.8  /  Alb  2.2<L>  /  TBili  1.6<H>  /  DBili  x   /  AST  37  /  ALT  41  /  AlkPhos  127<H>  06-25    Lactate 1.5           06-24 @ 20:00    Lactate 3.2           06-24 @ 15:40            Urinalysis Basic - ( 2022 15:10 )    Color: Pale Yellow / Appearance: Slightly Turbid / S.005 / pH: x  Gluc: x / Ketone: Negative  / Bili: Negative / Urobili: Negative   Blood: x / Protein: Negative / Nitrite: Negative   Leuk Esterase: Trace / RBC: 6-10 /HPF / WBC 0-2   Sq Epi: x / Non Sq Epi: Occasional / Bacteria: Occasional            Radiology: CXR showing bilateral pleural effusions  Bedside lung ultrasound: not performed  Bedside ECHO: not performed     CENTRAL LINE: N            DUDLEY: Y                        DATE INSERTED:      2022          A-LINE: N                           GLOBAL ISSUE/BEST PRACTICE  Analgesia: none  Sedation: none  CAM-ICU: n/a  HOB elevation: yes  Stress ulcer prophylaxis: none  VTE prophylaxis: Eliquis 2.5mg BID  Glycemic control: none  Nutrition: Pureed diet    CODE STATUS: FULL CODE  GOC discussion: Y

## 2022-06-25 NOTE — PROGRESS NOTE ADULT - PROBLEM SELECTOR PLAN 1
RRT called on 6/24 for acute mental status change, pt found to be hypercapnic with progressive metabolic compensation (HCO3 on BMP trending upwards), with some improvement with mental status with hyperventilation and BiPAP  - CTH 6/24 negative for acute intracranial pathology  - Pt transferred to ICU for closer monitoring on BiPAP, and infectious/encephalopathy workup  - Mental status much improved on BiPAP and diuresis, possibly component of pulmonary congestion/decompensated CHF contributing to symptoms  - Further care per ICU

## 2022-06-25 NOTE — CHART NOTE - NSCHARTNOTEFT_GEN_A_CORE
Assessment: patient seen for follow up malnutrition  with  rapid response to ICU 6/24  with change in mental status  89y old  Male who presents with a chief complaint of weight loss   patient seen NPO in bed states is thirsty . spoke with RN . speech evaluation ordered.   6/25 BM   off BIPAP on NC          Factors impacting intake: [ ] none [ ] nausea  [ ] vomiting [ ] diarrhea [ ] constipation  [ ]chewing problems [x ] swallowing issues  [ ] other: prior to NPO puree mild thick liquids    Diet Prescription: Diet, NPO:   Except Medications     Special Instructions for Nursing:  Except Medications (06-24-22 @ 08:52)        Current Weight: 6/25 wt 123.6# , 6/20 133.1# weight loss ? now wt ICU scale noted      Pertinent Medications: MEDICATIONS  (STANDING):  apixaban 2.5 milliGRAM(s) Oral every 12 hours  chlorhexidine 2% Cloths 1 Application(s) Topical <User Schedule>  digoxin  Injectable 125 MICROGram(s) IV Push daily  diltiazem Injectable 10 milliGRAM(s) IV Push <User Schedule>  metoprolol tartrate Injectable 5 milliGRAM(s) IV Push <User Schedule>    MEDICATIONS  (PRN):  acetaminophen     Tablet .. 650 milliGRAM(s) Oral every 6 hours PRN Temp greater or equal to 38C (100.4F), Mild Pain (1 - 3)  ALBUTerol    0.083% 2.5 milliGRAM(s) Nebulizer every 4 hours PRN Shortness of Breath and/or Wheezing  aluminum hydroxide/magnesium hydroxide/simethicone Suspension 30 milliLiter(s) Oral every 4 hours PRN Dyspepsia  guaifenesin/dextromethorphan Oral Liquid 10 milliLiter(s) Oral every 6 hours PRN Cough  melatonin 3 milliGRAM(s) Oral at bedtime PRN Insomnia  ondansetron Injectable 4 milliGRAM(s) IV Push every 8 hours PRN Nausea and/or Vomiting    Pertinent Labs: .9 B12 and folate WNL  Skin: right foot dry scab    Estimated Needs:   [x ] no change since previous assessment [id number] kcals and  gms protein no change now off BIPAP  [ ] recalculated:     Previous Nutrition Diagnosis:   [ ] Inadequate Energy Intake [ ]Inadequate Oral Intake [ ] Excessive Energy Intake   [ ] Underweight [ ] Increased Nutrient Needs [ ] Overweight/Obesity   [ ] Altered GI Function [ ] Unintended Weight Loss [ ] Food & Nutrition Related Knowledge Deficit [x ] Malnutrition   (X) swallow difficulties   Nutrition Diagnosis is [x ] ongoing  [ ] resolved [ ] not applicable     New Nutrition Diagnosis: [x ] not applicable       Interventions:   Recommend  [ ] Change Diet To:  [ ] Nutrition Supplement  [ ] Nutrition Support  [x ] Other: follow for speech evaluation advance  diet per speech follow for POC    Monitoring and Evaluation:   [ ] PO intake [ x ] Tolerance to diet prescription [ x ] weights [ x ] labs[ x ] follow up per protocol  [ ] other:

## 2022-06-25 NOTE — PROGRESS NOTE ADULT - PROBLEM SELECTOR PLAN 8
- Multiple differentials possible. May be adverse effect of digoxin vs age-related deconditioning vs cardiac cachexia   - TSH wnl    - CT shows multiple bilateral small lung nodules, indeterminant etiology. Metastatic disease is a possibility. Recommend follow-up chest CT in 4 weeks to determine the stability. Further evaluation can be performed with PET CT. Will consider repeat imaging as outpatient or PET  - Palliative care consulted for GOC

## 2022-06-25 NOTE — PROGRESS NOTE ADULT - PROBLEM SELECTOR PLAN 2
Likely multifactorial in setting of human metapneumovirus infection, poor po intake and med noncompliance  - Continue Digoxin  - Rate control medications now being given IV  - Cardiac monitoring  - Continue home Eliquis  - Cardio ([facility] following)

## 2022-06-25 NOTE — PROGRESS NOTE ADULT - PROBLEM SELECTOR PLAN 7
- proBNP [id number] on admission, improved to ~4000  - CT chest: Moderate bilateral pleural effusions, interlobular septal thickening in patchy ground glass opacities representing pulmonary edema.  - CXR: Bilateral lower lobe infiltrates.  - Continue home Lasix 20mg qd with hold parameter    - TTE: EF of 60%, biatrial enlargement, calcified trileaflet aortic valve with decreased opening, mitral annular calcification, moderate MR, moderate TR

## 2022-06-25 NOTE — PROGRESS NOTE ADULT - SUBJECTIVE AND OBJECTIVE BOX
[facility] - Vincenzo Wyman, Tiffany, Daina, Alex Akers Savella  Office Number:  [phone]    Patient resting comfortably in bed in NAD.    moved to icu yesterday because of lethargy  better this morning, and says he is doing ok  remains in af with borderline rates    ROS: negative unless otherwise mentioned.    Telemetry:  af 100    MEDICATIONS  (STANDING):  apixaban 2.5 milliGRAM(s) Oral every 12 hours  chlorhexidine 2% Cloths 1 Application(s) Topical <User Schedule>  digoxin  Injectable 125 MICROGram(s) IV Push daily  diltiazem Injectable 10 milliGRAM(s) IV Push <User Schedule>  metoprolol tartrate Injectable 5 milliGRAM(s) IV Push <User Schedule>    MEDICATIONS  (PRN):  acetaminophen     Tablet .. 650 milliGRAM(s) Oral every 6 hours PRN Temp greater or equal to 38C (100.4F), Mild Pain (1 - 3)  ALBUTerol    0.083% 2.5 milliGRAM(s) Nebulizer every 4 hours PRN Shortness of Breath and/or Wheezing  aluminum hydroxide/magnesium hydroxide/simethicone Suspension 30 milliLiter(s) Oral every 4 hours PRN Dyspepsia  guaifenesin/dextromethorphan Oral Liquid 10 milliLiter(s) Oral every 6 hours PRN Cough  melatonin 3 milliGRAM(s) Oral at bedtime PRN Insomnia  ondansetron Injectable 4 milliGRAM(s) IV Push every 8 hours PRN Nausea and/or Vomiting      Allergies    No Known Allergies    Intolerances        Vital Signs Last 24 Hrs  T(C): 36.7 (25 Jun 2022 08:15), Max: 36.9 (24 Jun 2022 13:20)  T(F): 98.1 (25 Jun 2022 08:15), Max: 98.4 (24 Jun 2022 13:20)  HR: 109 (25 Jun 2022 08:00) (74 - 198)  BP: 97/64 (25 Jun 2022 08:00) (91/56 - 127/58)  BP(mean): 72 (25 Jun 2022 08:00) (69 - 88)  RR: 27 (25 Jun 2022 08:00) (16 - 43)  SpO2: 100% (25 Jun 2022 08:00) (80% - 100%)    I&O's Summary    24 Jun 2022 07:01  -  25 Jun 2022 07:00  --------------------------------------------------------  IN: 0 mL / OUT: 1370 mL / NET: -1370 mL        ON EXAM:    Constitutional: NAD, awake/appropriate, cachectic  HEENT: Moist Mucous Membranes, Anicteric  Pulmonary: Non-labored, breath sounds are decreased bilaterally, No wheezing, rales or rhonchi  Cardiovascular: Regular, S1 and S2, No murmurs, rubs, gallops or clicks  Gastrointestinal: Bowel Sounds present, soft, nontender.   Lymph: No peripheral edema. No lymphadenopathy.  Skin: No visible rashes or ulcers.  Psych:  tired but appropriate    LABS: All Labs Reviewed:                        12.8   8.50  )-----------( 175      ( 25 Jun 2022 07:00 )             40.9                         13.5   8.50  )-----------( 169      ( 24 Jun 2022 11:40 )             43.1                         12.5   8.81  )-----------( 182      ( 24 Jun 2022 05:50 )             39.9     25 Jun 2022 07:00    145    |  105    |  48     ----------------------------<  82     4.1     |  35     |  1.20   24 Jun 2022 11:40    141    |  104    |  43     ----------------------------<  137    4.7     |  30     |  1.30   24 Jun 2022 05:50    143    |  103    |  39     ----------------------------<  148    4.2     |  36     |  0.98     Ca    8.8        25 Jun 2022 07:00  Ca    9.4        24 Jun 2022 11:40  Ca    8.9        24 Jun 2022 05:50  Phos  3.7       25 Jun 2022 07:00  Phos  4.0       24 Jun 2022 11:40  Phos  2.6       24 Jun 2022 05:50  Mg     2.4       25 Jun 2022 07:00  Mg     2.7       24 Jun 2022 11:40  Mg     2.3       24 Jun 2022 05:50    TPro  6.8    /  Alb  2.2    /  TBili  1.6    /  DBili  x      /  AST  37     /  ALT  41     /  AlkPhos  127    25 Jun 2022 07:00  TPro  7.7    /  Alb  2.5    /  TBili  2.1    /  DBili  x      /  AST  43     /  ALT  48     /  AlkPhos  156    24 Jun 2022 11:40  TPro  6.9    /  Alb  2.2    /  TBili  1.7    /  DBili  x      /  AST  26     /  ALT  35     /  AlkPhos  136    23 Jun 2022 10:33          Blood Culture:

## 2022-06-25 NOTE — PROGRESS NOTE ADULT - PROBLEM SELECTOR PLAN 5
- CT shows moderate bilateral pleural effusions, interlobular septal thickening in patchy ground glass opacities representing pulmonary edema.  - CXR shows bilateral lower lobe infiltrates.  - S/p IV diuresis with improvement in ICU  - Pulm (Dr. Carpio) consulted, recs appreciated: no immediate need for thoracentesis

## 2022-06-26 NOTE — PROGRESS NOTE ADULT - ASSESSMENT
89yo male with PMH of chronic AFib on Eliquis, CHF (EF 64%), HTN, DVT, HLD, gout, macular degeneration who presented for cough and weakness, admitted for acute on chronic heart failure exacerbation with bilateral pleural effusions, AFib with RVR and +hMPV. Rapid Response called on 6/24 for AMS, hypoxia and agonal breathing and found to hypercapnic with lactic acidosis, placed on BiPAP and transferred to ICU for further management.     Neuro:  - encephalopathy 2/2 acute hypercapnia, resolved, A&Ox4,   - continue mirtazapine for appetite stimulation, depression    Cardio:  - acute on chronic HFpEF, s/p Lasix 40mg IVP x3 over past few days  - HCO3 40 today on AM BMP, will give acetazolamide 500mg IV x 1 today  - AFib with RVR: metoprolol tartrate 50mg q6h, Diltiazem 60mg q6h, Digoxin 125mcg qd. Continue Eliquis Eliquis 2.5mg BID     Pulm:  - Acute hypercapnic respiratory failure 2/2 acute decompensated heart failure, improved s/p diuresis and BiPAP  - Give additional Lasix 40mg IVP today  - Satting well on 3L NC, continue BiPAP at night     GI:   - continue supervised pureed diet, f/u S&S eval today  - Transaminitis and bilirubin downtrending, like congestive hepatopathy    Renal:   - Cr 1.1 today, downtrending, likely MARISOL 2/2 cardiorenal syndrome  - Maintain strict Is/Os, maintain mead for closely UOP monitoring   - Will give acetazolamide 500mg IV today    ID:   - No evidence of acute infection, observe off antibiotics  - F/u BCx NGTD, UCx - gram + organisms, f/u sensitivites    Heme:  - DVT PPx: Eliquis 2.5mg BID for history of AFib, DVT    Endo:  - Blood glucose goal in -180    Skin:  - No lines, mead from 6/24    Dispo:  - ICU  - Full code

## 2022-06-26 NOTE — PROGRESS NOTE ADULT - SUBJECTIVE AND OBJECTIVE BOX
Neurology Follow up note    ROBERT BERGS89yMale    HPI:  Patient is a 89 yo M with PMH of Afib, HTN, DLD who was brought in by family for complaint of cough, weakness for the last few days. Patient reports weight loss and reduced appetite for the last month. Denies chest pain or palpitations, denies fever, denies N/V/D. Of note, patient was seen recently by cardiologist Dr Allen on  for weakness and decreased appetite and his digoxin was discontinue and his lopressor was increased and coumadin changed to Eliquis.  Uses a cane occasionally at home, denies recent falls. Denies blood in stool.     ED course:  Na 139, K 5.3 (hemolyzed), Cr 1.80, Lactate 3.1, Mg 2.8, BNP 56573, Lactate 1.8, INR 3.43. Covid negative, resp panel positive for human metapneumovirus. CT shows ground glass and nodules. EKG shows afib, . Received metoprolol 5mg IV and cardiology consulted.  (15 Rui 2022 20:48)      Interval History -no new events    Patient is seen, chart was reviewed and case was discussed with the treatment team.  Pt is not in any distress.   Lying on bed comfortably.     Vital Signs Last 24 Hrs  T(C): 37.1 (2022 12:00), Max: 37.4 (2022 19:30)  T(F): 98.8 (2022 12:00), Max: 99.3 (2022 19:30)  HR: 95 (2022 14:00) (95 - 121)  BP: 109/66 (2022 14:00) (95/58 - 119/74)  BP(mean): 83 (2022 14:00) (66 - 92)  RR: 33 (2022 14:00) (19 - 41)  SpO2: 94% (2022 14:00) (94% - 100%)        REVIEW OF SYSTEMS:    Constitutional: No fever,  Eyes: No eye pain,   ENT:  No difficulty hearing, tinnitus, vertigo; No sinus or throat pain  Neck: No pain or stiffness  Respiratory: No  chills or hemoptysis  Cardiovascular: No chest pain, palpitations,   Gastrointestinal: No abdominal or epigastric pain  Genitourinary: No d hematuria   Neurological: No headaches, numbness or tremors  Psychiatric: No mood swings or difficulty sleeping  Musculoskeletal: No joint pain or swelling;   Skin: No itching, burning, rashes or lesions   Lymph Nodes: No enlarged glands  Endocrine: No heat or cold intolerance; No hair loss,   Allergy and Immunologic: No hives or eczema    On Neurological Examination:    Mental Status - Pt is  awake,  Follows simple  commands    Speech -  hypophonic /dysarthria    Cranial Nerves - Pupils 3 mm equal and reactive to light, bilateral ptosis  Pt has  facial asymmetry.    Muscle tone - is normal      Motor Exam -weakness of UE/LE 3-4/5 proximal > distal   neck 1/5    Sensory Exam - Pt withdraws all extremities equally on stimulation. No asymmetry seen. No complaints of tingling, numbness.    coordination:    Finger to nose: normal    Deep tendon Reflexes - 2 plus all over.       Neck Supple -  Yes.     MEDICATIONS    acetaminophen     Tablet .. 650 milliGRAM(s) Oral every 6 hours PRN  apixaban 2.5 milliGRAM(s) Oral every 12 hours  chlorhexidine 2% Cloths 1 Application(s) Topical <User Schedule>  digoxin     Tablet 125 MICROGram(s) Oral daily  diltiazem    Tablet 90 milliGRAM(s) Oral <User Schedule>  melatonin 3 milliGRAM(s) Oral at bedtime PRN  metoprolol tartrate 50 milliGRAM(s) Oral four times a day  mirtazapine 15 milliGRAM(s) Oral daily  pyridostigmine 30 milliGRAM(s) Oral every 8 hours      Allergies    No Known Allergies    Intolerances        LABS:  CBC Full  -  ( 2022 05:34 )  WBC Count : 8.02 K/uL  RBC Count : 3.80 M/uL  Hemoglobin : 12.4 g/dL  Hematocrit : 39.4 %  Platelet Count - Automated : 173 K/uL  Mean Cell Volume : 103.7 fl  Mean Cell Hemoglobin : 32.6 pg  Mean Cell Hemoglobin Concentration : 31.5 gm/dL      Urinalysis Basic - ( 2022 15:10 )    Color: Pale Yellow / Appearance: Slightly Turbid / S.005 / pH: x  Gluc: x / Ketone: Negative  / Bili: Negative / Urobili: Negative   Blood: x / Protein: Negative / Nitrite: Negative   Leuk Esterase: Trace / RBC: 6-10 /HPF / WBC 0-2   Sq Epi: x / Non Sq Epi: Occasional / Bacteria: Occasional          145  |  104  |  44<H>  ----------------------------<  184<H>  3.5   |  40<H>  |  1.10    Ca    8.5      2022 05:34  Phos  2.4       Mg     2.3         TPro  6.5  /  Alb  2.1<L>  /  TBili  1.4<H>  /  DBili  x   /  AST  29  /  ALT  35  /  AlkPhos  119      Hemoglobin A1C:     Vitamin B12     RADIOLOGY    ASSESSMENT AND PLAN:      seen for ams related to metabolic encephalopoathy  head drop/dysphagia/dysarthria ?myasthenia     acetyl choline receptor/musk  antibodies  trial of mestinone  management dw critical care team  Physical therapy evaluation.  Pain is accessed and addressed.  Would continue to follow.

## 2022-06-26 NOTE — PROVIDER CONTACT NOTE (HYPOGLYCEMIA EVENT) - NS PROVIDER CONTACT BACKGROUND-HYPO
Age: 89y    Gender: Male    POCT Blood Glucose:  72 mg/dL (06-26-22 @ 05:17)  81 mg/dL (06-25-22 @ 23:09)  115 mg/dL (06-25-22 @ 18:14)  87 mg/dL (06-25-22 @ 12:58)  80 mg/dL (06-25-22 @ 05:37)      eMAR:

## 2022-06-26 NOTE — PROGRESS NOTE ADULT - SUBJECTIVE AND OBJECTIVE BOX
[facility] - Vincenzo Wyman, Tiffany, Daina, Delphine, Deandra Johnson  Office Number:  [phone]    Patient resting comfortably in bed in NAD.  Laying flat with no respiratory distress.   remains in af with borderline rates    ROS: negative unless otherwise mentioned.    Telemetry:  af    MEDICATIONS  (STANDING):  apixaban 2.5 milliGRAM(s) Oral every 12 hours  chlorhexidine 2% Cloths 1 Application(s) Topical <User Schedule>  digoxin     Tablet 125 MICROGram(s) Oral daily  diltiazem    Tablet 90 milliGRAM(s) Oral <User Schedule>  metoprolol tartrate 50 milliGRAM(s) Oral four times a day  mirtazapine 15 milliGRAM(s) Oral daily  pyridostigmine 30 milliGRAM(s) Oral every 8 hours    MEDICATIONS  (PRN):  acetaminophen     Tablet .. 650 milliGRAM(s) Oral every 6 hours PRN Temp greater or equal to 38C (100.4F), Mild Pain (1 - 3)  melatonin 3 milliGRAM(s) Oral at bedtime PRN Insomnia      Allergies    No Known Allergies    Intolerances        Vital Signs Last 24 Hrs  T(C): 37.1 (26 Jun 2022 12:00), Max: 37.4 (25 Jun 2022 19:30)  T(F): 98.8 (26 Jun 2022 12:00), Max: 99.3 (25 Jun 2022 19:30)  HR: 107 (26 Jun 2022 12:00) (96 - 128)  BP: 95/58 (26 Jun 2022 12:00) (95/58 - 119/74)  BP(mean): 66 (26 Jun 2022 12:00) (66 - 92)  RR: 34 (26 Jun 2022 12:00) (19 - 41)  SpO2: 98% (26 Jun 2022 12:00) (94% - 100%)    I&O's Summary    25 Jun 2022 07:01  -  26 Jun 2022 07:00  --------------------------------------------------------  IN: 0 mL / OUT: 1450 mL / NET: -1450 mL    26 Jun 2022 07:01  -  26 Jun 2022 12:22  --------------------------------------------------------  IN: 150 mL / OUT: 160 mL / NET: -10 mL        ON EXAM:  Constitutional: NAD, awake/appropriate, cachectic  HEENT: Moist Mucous Membranes, Anicteric  Pulmonary: Non-labored, breath sounds are decreased bilaterally, No wheezing, rales or rhonchi  Cardiovascular: Regular, S1 and S2, No murmurs, rubs, gallops or clicks  Gastrointestinal: Bowel Sounds present, soft, nontender.   Lymph: No peripheral edema. No lymphadenopathy.  Skin: No visible rashes or ulcers.  Psych:  tired but appropriate    LABS: All Labs Reviewed:                        12.4   8.02  )-----------( 173      ( 26 Jun 2022 05:34 )             39.4                         12.8   8.50  )-----------( 175      ( 25 Jun 2022 07:00 )             40.9                         13.5   8.50  )-----------( 169      ( 24 Jun 2022 11:40 )             43.1     26 Jun 2022 05:34    145    |  104    |  44     ----------------------------<  184    3.5     |  40     |  1.10   25 Jun 2022 07:00    145    |  105    |  48     ----------------------------<  82     4.1     |  35     |  1.20   24 Jun 2022 11:40    141    |  104    |  43     ----------------------------<  137    4.7     |  30     |  1.30     Ca    8.5        26 Jun 2022 05:34  Ca    8.8        25 Jun 2022 07:00  Ca    9.4        24 Jun 2022 11:40  Phos  2.4       26 Jun 2022 05:34  Phos  3.7       25 Jun 2022 07:00  Phos  4.0       24 Jun 2022 11:40  Mg     2.3       26 Jun 2022 05:34  Mg     2.4       25 Jun 2022 07:00  Mg     2.7       24 Jun 2022 11:40    TPro  6.5    /  Alb  2.1    /  TBili  1.4    /  DBili  x      /  AST  29     /  ALT  35     /  AlkPhos  119    26 Jun 2022 05:34  TPro  6.8    /  Alb  2.2    /  TBili  1.6    /  DBili  x      /  AST  37     /  ALT  41     /  AlkPhos  127    25 Jun 2022 07:00  TPro  7.7    /  Alb  2.5    /  TBili  2.1    /  DBili  x      /  AST  43     /  ALT  48     /  AlkPhos  156    24 Jun 2022 11:40          Blood Culture: Organism --  Gram Stain Blood -- Gram Stain --  Specimen Source Clean Catch Clean Catch (Midstream)  Culture-Blood --    Organism --  Gram Stain Blood -- Gram Stain --  Specimen Source .Blood Blood-Peripheral  Culture-Blood --    Organism --  Gram Stain Blood -- Gram Stain --  Specimen Source .Blood Blood-Peripheral  Culture-Blood --

## 2022-06-26 NOTE — PROGRESS NOTE ADULT - ASSESSMENT
89y old  Male PMH Afib on ac htn hld chief complaint of weakness. Patient was seen in office 6/6 by Dr Allen for decreased appetite and weakness, digoxin was discontinued and increased lopressor to 100mg Po BID.  Now with altered mental status and hypercarbic respiratory failure, transferred to the ICU    Hypercarbic resp failure  - mental status improved with bipap and iv lasix  - cont oxygen supplementation as needed  - though he does not appear volume overloaded on exam, sluggish flow/dilatation noted in the IVC  - can diurese him prn; seems compensated though cxr with worsening congestion  - Lasix iv on 6/25, and given diamox today for alkalosis    AFIB with RVR  - remains in af with borderline rates  - cont po metoprolol and digoxin  - cont ac with eliquis  - midodrine as needed for hypotension  - Repeat TTE showed EF 60%, TROY, mod MR/TR  - CT showed moderate bilateral pleural effusions, followed by pumonary  - Monitor and replete lytes, keep K>4, Mg>2.    - AMS slightly improved, possibility of aspiration.  - Poor prognosis.  GOC discussion ongoing   - Will continue to follow. Very high risk of decompensation. 89y old  Male PMH Afib on ac htn hld chief complaint of weakness. Patient was seen in office 6/6 by Dr Allen for decreased appetite and weakness, digoxin was discontinued and increased lopressor to 100mg Po BID.  Now with altered mental status and hypercarbic respiratory failure, transferred to the ICU    Hypercarbic resp failure  - mental status improved with bipap and iv lasix  - cont oxygen supplementation as needed  - though he does not appear volume overloaded on exam, sluggish flow/dilatation noted in the IVC  - can diurese him prn; seems compensated though cxr with worsening congestion  - Lasix iv on 6/25, and given diamox today for alkalosis    AFIB with RVR  - remains in af with borderline rates  - cont po metoprolol and digoxin  - cont ac with eliquis  - midodrine as needed for hypotension  - Repeat TTE showed EF 60%, TROY, mod MR/TR  - CT showed moderate bilateral pleural effusions, followed by pumonary  - Monitor and replete lytes, keep K>4, Mg>2.    - AMS slightly improved, possibility of aspiration.  - Poor prognosis.  GOC discussion ongoing   - Will continue to follow. Very high risk of decompensation.    Upon my evaluation, this patient is at high risk for imminent or life threatening deterioration due to ams, resp failure, chf and other active medical issues which require my direct attention, intervention, and personal management.  I have personally spent >30 minutes  of critical care time exclusive of time spent on separate billing procedures. This includes review of laboratory data, radiology results, discussion with primary team\patient, and monitoring for potential decompensation. Interventions were performed as documented above.

## 2022-06-26 NOTE — PROGRESS NOTE ADULT - ATTENDING COMMENTS
89M PMH A-Fib on apixaban, HTN, HLD, and chronic diastolic heart failure who presents with cough, weakness. loss of appetite, and significant weight loss, found on admission to have cardiogenic pulmonary edema with bilateral pleural effusions and RVP positive for human metapneumovirus. Course complicated by acute encephalopathy due to acute on chronic hypercapnic respiratory failure with acute decompensated heart failure along with mild MARISOL, lactic acidosis, and congestive hepatopathy.     1. Neuro: improved encephalopathy, likely due to hypercapnia + ADHF + lactic acidosis. Concern for MG given head drop, dysphonia, and dysphagia. Start pyridostigmine 30 mg q8h. Close monitoring of neuro status. Follow-up neuro. Continue mirtazapine for FARIDA/weight loss  2. CV: ADHF, remains hemodynamically stable. Will diurese with acetazolamide 500 mg IV today given metabolic alkalosis from furosemide. Strict I/O's. Resolved lactic acidosis with diuresis and BiPAP. Continue diltiazem, metoprolol, and digoxin. Continue apixaban  3. Pulm: continue nasal cannula during the day and BiPAP every night. Close respiratory monitoring  4. GI: speech and swallow eval. Continue pureed diet with honey thickened liquids. Improving transaminitis and hyperbilirubinemia, likely due to congestive hepatopathy, improved with diuresis  5. Renal: mild MARISOL due to cardiorenal syndrome, now improved. Strict I/O's, close monitoring of kidney function and lytes. Diuresis with acetazolamide  6. ID: no evidence of active infection, no fevers or leukocytosis, procalcitonin is 0.1. Lactic acidosis improving with diuresis/bipap. Observe off abx  7. Heme: on apixaban for A-Fib  8. Endo: no active issues, TSH normal, monitor FSG q6h  9. Skin: no lines, d/c mead  10. Dispo: full code, discussed with children. Improved encephalopathy, but clinical picture worrisome for cardiac cachexia approaching end of life 89M PMH A-Fib on apixaban, HTN, HLD, and chronic diastolic heart failure who presents with cough, weakness. loss of appetite, and significant weight loss, found on admission to have cardiogenic pulmonary edema with bilateral pleural effusions and RVP positive for human metapneumovirus. Course complicated by acute encephalopathy due to acute on chronic hypercapnic respiratory failure with acute decompensated heart failure along with mild MARISOL, lactic acidosis, and congestive hepatopathy.     1. Neuro: improved encephalopathy, likely due to hypercapnia + ADHF + lactic acidosis. Concern for MG given head drop, dysphonia, and dysphagia. Start pyridostigmine 30 mg q8h. Close monitoring of neuro status. Follow-up neuro. Continue mirtazapine for FARIDA/weight loss  2. CV: ADHF, remains hemodynamically stable. Will diurese with acetazolamide 500 mg IV today given metabolic alkalosis from furosemide. Strict I/O's. Resolved lactic acidosis with diuresis and BiPAP. Continue diltiazem, metoprolol, and digoxin. Continue apixaban  3. Pulm: continue nasal cannula during the day and BiPAP every night. Close respiratory monitoring  4. GI: speech and swallow eval. Continue pureed diet with honey thickened liquids. Improving transaminitis and hyperbilirubinemia, likely due to congestive hepatopathy, improved with diuresis  5. Renal: mild MARISOL due to cardiorenal syndrome, now improved. Strict I/O's, close monitoring of kidney function and lytes. Diuresis with acetazolamide  6. ID: no evidence of active infection, no fevers or leukocytosis, procalcitonin is 0.1. Lactic acidosis improving with diuresis/bipap. Observe off abx  7. Heme: on apixaban for A-Fib  8. Endo: no active issues, TSH normal, monitor FSG q6h  9. Skin: no lines, d/c mead  10. Dispo: full code, discussed with children. Improved encephalopathy, but clinical picture worrisome for cardiac cachexia approaching end of life    ***Attending Addendum:  Called to bedside for increased work of breathing with hypoxemia. He is very weak and unable to bring up secretions. Nasotracheal suctioning performed with significant tan-brown secretions noted. I am worried that he may be aspirating. Increase supplemental oxygen and give BiPAP for work of breathing. NG tube placed. Will keep NPO with tube feeds. CXR confirmed NG tube placement. No focal opacity to suggest pneumonia and he has no fevers or leukocytosis, so will observe off antibiotics. Start airway clearance therapy with albuterol, ipratropium, hypertonic saline, and chest PT. Keep NPO and start glucerna tube feeds (A1C 6.1). Will continue to monitor in ICU    CC time spent: 45 min

## 2022-06-26 NOTE — PROGRESS NOTE ADULT - SUBJECTIVE AND OBJECTIVE BOX
Date/Time Patient Seen:  		  Referring MD:   Data Reviewed	       Patient is a 89y old  Male who presents with a chief complaint of weight loss (25 Jun 2022 13:10)      Subjective/HPI     PAST MEDICAL & SURGICAL HISTORY:  Atrial fibrillation    HTN (hypertension)    HLD (hyperlipidemia)    Anemia    CHF (congestive heart failure)    DVT, lower extremity          Medication list         MEDICATIONS  (STANDING):  apixaban 2.5 milliGRAM(s) Oral every 12 hours  chlorhexidine 2% Cloths 1 Application(s) Topical <User Schedule>  digoxin     Tablet 125 MICROGram(s) Oral daily  diltiazem    Tablet 90 milliGRAM(s) Oral <User Schedule>  metoprolol tartrate 50 milliGRAM(s) Oral four times a day  mirtazapine 15 milliGRAM(s) Oral daily    MEDICATIONS  (PRN):  acetaminophen     Tablet .. 650 milliGRAM(s) Oral every 6 hours PRN Temp greater or equal to 38C (100.4F), Mild Pain (1 - 3)  ALBUTerol    0.083% 2.5 milliGRAM(s) Nebulizer every 4 hours PRN Shortness of Breath and/or Wheezing  aluminum hydroxide/magnesium hydroxide/simethicone Suspension 30 milliLiter(s) Oral every 4 hours PRN Dyspepsia  guaifenesin/dextromethorphan Oral Liquid 10 milliLiter(s) Oral every 6 hours PRN Cough  melatonin 3 milliGRAM(s) Oral at bedtime PRN Insomnia  ondansetron Injectable 4 milliGRAM(s) IV Push every 8 hours PRN Nausea and/or Vomiting         Vitals log        ICU Vital Signs Last 24 Hrs  T(C): 36.1 (26 Jun 2022 03:24), Max: 37.4 (25 Jun 2022 19:30)  T(F): 97 (26 Jun 2022 03:24), Max: 99.3 (25 Jun 2022 19:30)  HR: 112 (26 Jun 2022 05:00) (96 - 128)  BP: 102/64 (26 Jun 2022 05:00) (95/58 - 119/74)  BP(mean): 76 (26 Jun 2022 05:00) (71 - 92)  ABP: --  ABP(mean): --  RR: 28 (26 Jun 2022 05:00) (23 - 41)  SpO2: 100% (26 Jun 2022 05:00) (94% - 100%)           Input and Output:  I&O's Detail    24 Jun 2022 07:01  -  25 Jun 2022 07:00  --------------------------------------------------------  IN:  Total IN: 0 mL    OUT:    Indwelling Catheter - Urethral (mL): 695 mL    Post-Void Residual per Intermittent Catheterization (mL): 700 mL  Total OUT: 1395 mL    Total NET: -1395 mL      25 Jun 2022 07:01  -  26 Jun 2022 06:01  --------------------------------------------------------  IN:  Total IN: 0 mL    OUT:    Indwelling Catheter - Urethral (mL): 1375 mL  Total OUT: 1375 mL    Total NET: -1375 mL          Lab Data                        12.8   8.50  )-----------( 175      ( 25 Jun 2022 07:00 )             40.9     06-25    145  |  105  |  48<H>  ----------------------------<  82  4.1   |  35<H>  |  1.20    Ca    8.8      25 Jun 2022 07:00  Phos  3.7     06-25  Mg     2.4     06-25    TPro  6.8  /  Alb  2.2<L>  /  TBili  1.6<H>  /  DBili  x   /  AST  37  /  ALT  41  /  AlkPhos  127<H>  06-25    ABG - ( 24 Jun 2022 10:45 )  pH, Arterial: 7.44  pH, Blood: x     /  pCO2: 51    /  pO2: 106   / HCO3: 35    / Base Excess: 10.4  /  SaO2: 99.2                    Review of Systems	      Objective     Physical Examination    heart s1s2  lung dec BS  abd soft      Pertinent Lab findings & Imaging      Ramírez:  NO   Adequate UO     I&O's Detail    24 Jun 2022 07:01  -  25 Jun 2022 07:00  --------------------------------------------------------  IN:  Total IN: 0 mL    OUT:    Indwelling Catheter - Urethral (mL): 695 mL    Post-Void Residual per Intermittent Catheterization (mL): 700 mL  Total OUT: 1395 mL    Total NET: -1395 mL      25 Jun 2022 07:01  -  26 Jun 2022 06:01  --------------------------------------------------------  IN:  Total IN: 0 mL    OUT:    Indwelling Catheter - Urethral (mL): 1375 mL  Total OUT: 1375 mL    Total NET: -1375 mL               Discussed with:     Cultures:	        Radiology

## 2022-06-26 NOTE — PROGRESS NOTE ADULT - SUBJECTIVE AND OBJECTIVE BOX
Patient is a 89y old  Male who presents with a chief complaint of weight loss (2022 10:49)    24 hour events: No acute overnight events. Patient seen and examined at bedside this morning. Patient without acute concerns. Able to tolerate po intake.      REVIEW OF SYSTEMS  Constitutional: No fever, chills, fatigue  Neuro: No headache, numbness, weakness  Resp: No cough, wheezing, shortness of breath  CVS: No chest pain, palpitations, leg swelling  GI: No abdominal pain, nausea, vomiting, diarrhea   : No dysuria, frequency, incontinence  Skin: No itching, burning, rashes, or lesions   Msk: No joint pain or swelling  Psych: No depression, anxiety, mood swings  Heme: No bleeding    T(F): 97.9 (22 @ 08:00), Max: 99.3 (22 @ 19:30)  HR: 102 (22 @ 11:00) (96 - 128)  BP: 109/65 (22 @ 11:00) (95/58 - 119/74)  RR: 30 (22 @ 11:00) (19 - 41)  SpO2: 94% (22 @ 11:00) (94% - 100%)  Wt(kg): --            I&O's Summary     @ 07:  -   @ 07:00  --------------------------------------------------------  IN: 0 mL / OUT: 1450 mL / NET: -1450 mL     @ 07:01  -   @ 11:41  --------------------------------------------------------  IN: 150 mL / OUT: 160 mL / NET: -10 mL        PHYSICAL EXAM  General: elderly, malnourished male, no acute distress  CNS: A&Ox4, follows commands  HEENT: EOMI B/L, PEREZ, MMM  Resp: cta b/l  CVS: irregular rate and rhythm, no murmur  Abd: soft, nt, nd  Ext: loss of muscle tone, no edema  Skin: warm and perfused, scabbing on bilateral feet    MEDICATIONS    digoxin     Tablet Oral  diltiazem    Tablet Oral  metoprolol tartrate Oral        acetaminophen     Tablet .. Oral PRN  melatonin Oral PRN  mirtazapine Oral      apixaban Oral            chlorhexidine 2% Cloths Topical    pyridostigmine Oral                          12.4   8.02  )-----------( 173      ( 2022 05:34 )             39.4       06-    145  |  104  |  44<H>  ----------------------------<  184<H>  3.5   |  40<H>  |  1.10    Ca    8.5      2022 05:34  Phos  2.4       Mg     2.3         TPro  6.5  /  Alb  2.1<L>  /  TBili  1.4<H>  /  DBili  x   /  AST  29  /  ALT  35  /  AlkPhos  119  -            Urinalysis Basic - ( 2022 15:10 )    Color: Pale Yellow / Appearance: Slightly Turbid / S.005 / pH: x  Gluc: x / Ketone: Negative  / Bili: Negative / Urobili: Negative   Blood: x / Protein: Negative / Nitrite: Negative   Leuk Esterase: Trace / RBC: 6-10 /HPF / WBC 0-2   Sq Epi: x / Non Sq Epi: Occasional / Bacteria: Occasional      Clean Catch Clean Catch (Midstream)   50,000 - 99,000 CFU/mL Gram positive organisms --  @ 15:10  .Blood Blood-Peripheral   No growth to date. --  @ 11:45  .Blood Blood-Peripheral   No growth to date. --  @ 11:40        CENTRAL LINE: N            OCTAVIA: Y                        DATE INSERTED:      2022          A-LINE: N                           GLOBAL ISSUE/BEST PRACTICE  Analgesia: none  Sedation: none  CAM-ICU: n/a  HOB elevation: yes  Stress ulcer prophylaxis: none  VTE prophylaxis: Eliquis 2.5mg BID  Glycemic control: none  Nutrition: Pureed diet    CODE STATUS: FULL CODE  GOC discussion: LYNNE

## 2022-06-26 NOTE — PROGRESS NOTE ADULT - ASSESSMENT
89y old  Male pmg afib on ac htn hld chief complaint of weakness    NIPPV use as tolerated  vs noted  labs reviewed  diuresis Judiciously -       AF management - rate and rhythm control  AC for AF  I and O  cvs rx regimen and BP control  hMPV - resp viral illness - supportive measures - Albuterol PRN for sob and or wheezing  monitor VS and HD and Sat  Pleural Eff - Atelectasis - I ze if able to tolerate - no immediate need for thoracentesis - medical management  diuresis as per CARDIO recs  GOC discussion  isolation precs for hMPV  cough rx regimen  replete noe

## 2022-06-27 NOTE — PROGRESS NOTE ADULT - PROBLEM SELECTOR PLAN 1
Acute hypercapnic respiratory failure requiring non invasive ventilatory support  Close monitoring in ICU  Prognosis is guarded  GOC- full code  Ongoing goals of care discussion

## 2022-06-27 NOTE — PROGRESS NOTE ADULT - PROBLEM SELECTOR PLAN 9
Eliquis 2.5mg bid for AC    GOC discussion with family on 6/23, pt will remain full code for now; may need to re-visit GOC conversation pending ICU course

## 2022-06-27 NOTE — PROGRESS NOTE ADULT - PROBLEM SELECTOR PLAN 3
- Symptomatic treatment- Tylenol PRN fever and guaifenesin-DM PRN cough, Supp O2 prn  - off isolation  Monitor

## 2022-06-27 NOTE — PROGRESS NOTE ADULT - PROBLEM SELECTOR PLAN 2
Acute metabolic encephalopathy likely related to hypercapnia- continue bipap care  Prognosis is guarded

## 2022-06-27 NOTE — PROGRESS NOTE ADULT - TIME BILLING
direct care of a critically ill patient including but not limited to reviewing chart, medications ,laboratory data, imaging reports, discussion of plan of care with consultants on the case, coordination of care with multidisciplinary team involved in the case and discussion of plan with ICU team.    Prognosis is guarded'  Contineu care per ICU direct care of a critically ill patient including but not limited to reviewing chart, medications ,laboratory data, imaging reports, discussion of plan of care with consultants on the case, coordination of care with multidisciplinary team involved in the case and discussion of plan with ICU team.    Prognosis is guarded'  Continue care per ICU  Cardio, Neuro and Pulm consult follow up ongoing

## 2022-06-27 NOTE — PROGRESS NOTE ADULT - ASSESSMENT
89yo male with PMH of chronic AFib on Eliquis, CHF (EF 64%), HTN, DVT, HLD, gout, macular degeneration who presented for cough and weakness, admitted for acute on chronic heart failure exacerbation with bilateral pleural effusions, AFib with RVR and +hMPV. Rapid Response called on 6/24 for AMS, hypoxia and agonal breathing and found to hypercapnic with lactic acidosis, placed on BiPAP and transferred to ICU for further management.     Neuro:  - encephalopathy 2/2 acute hypercapnia, initially resolved, however patient less responsive to commands this morning  - continue mirtazapine for appetite stimulation, depression  - continue pyridostigmine for possible MG per neuro    Cardio:  - acute on chronic HFpEF, s/p Lasix 40mg IVP x3, acetazolamide 500mg IV x1, over past few days  - appears volume overloaded on exam, will give lasix 40mg IV x1 today  - AFib with RVR: continue Digoxin 125mcg qd. Continue Eliquis Eliquis 2.5mg BID   - patient with hypotensive episode this afternoon SBP 76. STOP metoprolol tartrate 50mg q6h, Diltiazem 60mg q6h, continue to monitor afib, consider amiodarone if needed    Pulm:  - Acute hypercapnic respiratory failure 2/2 acute decompensated heart failure  - will give Lasix 40mg IVP today  - Satting well on 3L NC, continue BiPAP at night     GI:   - continue supervised pureed diet, f/u S&S eval once off bipap  - Transaminitis and bilirubin downtrended, like congestive hepatopathy, daily CMPs    Renal:   - Cr 1.1, downtrending, likely MARISOL 2/2 cardiorenal syndrome  - Maintain strict Is/Os, maintain mead for closely UOP monitoring     ID:   - No evidence of acute infection, observe off antibiotics  - F/u BCx NGTD, UCx - coag negative staph, likely contaminant    Heme:  - DVT PPx: Eliquis 2.5mg BID for history of AFib, DVT    Endo:  - Blood glucose goal in -180    Skin:  - No lines, mead 6/27    Dispo:  - ICU  - Full code  - Family (son and daughter) updated at bedside   89yo male with PMH of chronic AFib on Eliquis, CHF (EF 64%), HTN, DVT, HLD, gout, macular degeneration who presented for cough and weakness, admitted for acute on chronic heart failure exacerbation with bilateral pleural effusions, AFib with RVR and +hMPV. Rapid Response called on 6/24 for AMS, hypoxia and agonal breathing and found to hypercapnic with lactic acidosis, placed on BiPAP and transferred to ICU for further management.     Neuro:  - encephalopathy 2/2 acute hypercapnia, initially resolved, however patient less responsive to commands this morning  - continue mirtazapine for appetite stimulation, depression  - continue pyridostigmine for possible MG per neuro    Cardio:  - acute on chronic HFpEF, s/p Lasix 40mg IVP x3, acetazolamide 500mg IV x1, over past few days  - appears volume overloaded on exam, will give lasix 40mg IV x1 today  - AFib with RVR: continue Digoxin 125mcg qd. Continue Eliquis Eliquis 2.5mg BID   - patient with hypotensive episode this afternoon SBP 76. STOP metoprolol tartrate 50mg q6h, Diltiazem 60mg q6h, continue to monitor afib, consider amiodarone if needed    Pulm:  - Acute hypercapnic respiratory failure 2/2 acute decompensated heart failure  - on BiPAP overnight, will continue BiPAP at this time  - will give Lasix 40mg IVP today    GI:   - continue supervised pureed diet, f/u S&S eval once off bipap  - Transaminitis and bilirubin downtrended, like congestive hepatopathy, daily CMPs    Renal:   - Cr 1.1, downtrending, likely MARISOL 2/2 cardiorenal syndrome  - Maintain strict Is/Os, maintain mead for closely UOP monitoring     ID:   - No evidence of acute infection, observe off antibiotics  - F/u BCx NGTD, UCx - coag negative staph, likely contaminant    Heme:  - DVT PPx: Eliquis 2.5mg BID for history of AFib, DVT    Endo:  - Blood glucose goal in -180    Skin:  - No lines, mead 6/27    Dispo:  - ICU  - Full code  - Family (son and daughter) updated at bedside   89yo male with PMH of chronic AFib on Eliquis, CHF (EF 64%), HTN, DVT, HLD, gout, macular degeneration who presented for cough and weakness, admitted for acute on chronic heart failure exacerbation with bilateral pleural effusions, AFib with RVR and +hMPV. Rapid Response called on 6/24 for AMS, hypoxia and agonal breathing and found to hypercapnic with lactic acidosis, placed on BiPAP and transferred to ICU for further management.     Neuro:  - encephalopathy 2/2 acute hypercapnia, initially resolved, however patient less responsive to commands this morning  - continue mirtazapine for appetite stimulation, depression  - continue pyridostigmine for possible MG per neuro    Cardio:  - acute on chronic HFpEF, s/p Lasix 40mg IVP x3, acetazolamide 500mg IV x1, over past few days  - appears volume overloaded on exam, will give lasix 40mg IV x1 today  - AFib with RVR: continue Digoxin 125mcg qd. Continue Eliquis Eliquis 2.5mg BID   - patient with hypotensive episode this afternoon SBP 76. will give midodrine 15mg po STAT and q8hrs. STOP metoprolol tartrate 50mg q6h, Diltiazem 60mg q6h, continue to monitor afib, consider amiodarone if needed.     Pulm:  - Acute hypercapnic respiratory failure 2/2 acute decompensated heart failure  - on BiPAP overnight, will continue BiPAP at this time  - will give Lasix 40mg IVP today    GI:   - continue supervised pureed diet, f/u S&S eval once off bipap  - Transaminitis and bilirubin downtrended, like congestive hepatopathy, daily CMPs    Renal:   - Cr 1.1, downtrending, likely MARISOL 2/2 cardiorenal syndrome  - Maintain strict Is/Os, maintain mead for closely UOP monitoring     ID:   - No evidence of acute infection, observe off antibiotics  - F/u BCx NGTD, UCx - coag negative staph, likely contaminant    Heme:  - DVT PPx: Eliquis 2.5mg BID for history of AFib, DVT    Endo:  - Blood glucose goal in -180    Skin:  - No lines, mead 6/27    Dispo:  - ICU  - Full code  - Family (son and daughter) updated at bedside

## 2022-06-27 NOTE — SWALLOW BEDSIDE ASSESSMENT ADULT - SPECIFY REASON(S)
to assess swallow function
to assess swallow function
to re-assess swallow function, as per MD request

## 2022-06-27 NOTE — SWALLOW BEDSIDE ASSESSMENT ADULT - SWALLOW EVAL: CURRENT DIET
NPO/NGT, per MD order
soft & bite-sized and mildly thick liquids, as per MD order
regular solids with thin liquids, per MD order

## 2022-06-27 NOTE — PROGRESS NOTE ADULT - ASSESSMENT
89y old  Male PMH Afib on ac htn hld chief complaint of weakness. Patient was seen in office 6/6 by Dr Allen for decreased appetite and weakness, digoxin was discontinued and increased lopressor to 100mg Po BID.  Now with altered mental status and hypercarbic respiratory failure, transferred to the ICU    Hypercarbic resp failure  - mental status unchanged. nonresponsive  - cont bipap  - there is a component of HF contributing to resp failure  - given diamox on 6/26, would consider redosing today    AFIB with RVR  - remains in af with borderline rates  - cont po metoprolol and digoxin  - check dig level  - please replete K as hypoK will increase risk of toxicity  - cont ac with eliquis  - midodrine as needed for hypotension  - Repeat TTE showed EF 60%, TROY, mod MR/TR  - CT showed moderate bilateral pleural effusions, followed by pulmonary. no need for thoracentesis       - Poor prognosis.  GOC discussion ongoing   - Will continue to follow. Very high risk of decompensation.    Upon my evaluation, this patient is at high risk for imminent or life threatening deterioration due to ams, resp failure, chf and other active medical issues which require my direct attention, intervention, and personal management.  I have personally spent >30 minutes  of critical care time exclusive of time spent on separate billing procedures. This includes review of laboratory data, radiology results, discussion with primary team\patient, and monitoring for potential decompensation. Interventions were performed as documented above.

## 2022-06-27 NOTE — PROGRESS NOTE ADULT - SUBJECTIVE AND OBJECTIVE BOX
[facility] -- Vincenzo Wyman, Daina Allen Pannella, Patel, Savella  Office # [phone]      Follow Up:  resp failure     Subjective/Observations: Patient seen and examined. Events noted. Resting  in bed. Unable to provide meaningful information. On bipap      REVIEW OF SYSTEMS: Limited 2/2 comorbidities     PAST MEDICAL & SURGICAL HISTORY:  Atrial fibrillation      HTN (hypertension)      HLD (hyperlipidemia)      Anemia      CHF (congestive heart failure)      DVT, lower extremity          MEDICATIONS  (STANDING):  albuterol/ipratropium for Nebulization 3 milliLiter(s) Nebulizer every 6 hours  apixaban 2.5 milliGRAM(s) Oral every 12 hours  chlorhexidine 2% Cloths 1 Application(s) Topical <User Schedule>  dextrose 5%. 1000 milliLiter(s) (50 mL/Hr) IV Continuous <Continuous>  digoxin     Tablet 125 MICROGram(s) Oral daily  diltiazem    Tablet 90 milliGRAM(s) Oral <User Schedule>  metoprolol tartrate 50 milliGRAM(s) Oral four times a day  mirtazapine 15 milliGRAM(s) Oral daily  pyridostigmine 30 milliGRAM(s) Oral every 8 hours  sodium chloride 3%  Inhalation 4 milliLiter(s) Inhalation every 6 hours    MEDICATIONS  (PRN):  acetaminophen     Tablet .. 650 milliGRAM(s) Oral every 6 hours PRN Temp greater or equal to 38C (100.4F), Mild Pain (1 - 3)  melatonin 3 milliGRAM(s) Oral at bedtime PRN Insomnia      Allergies    No Known Allergies    Intolerances            Vital Signs Last 24 Hrs  T(C): 36.8 (27 Jun 2022 12:00), Max: 37.1 (27 Jun 2022 00:00)  T(F): 98.3 (27 Jun 2022 12:00), Max: 98.7 (27 Jun 2022 00:00)  HR: 93 (27 Jun 2022 12:00) (89 - 108)  BP: 104/59 (27 Jun 2022 12:00) (85/51 - 119/74)  BP(mean): 76 (27 Jun 2022 12:00) (63 - 92)  RR: 29 (27 Jun 2022 12:00) (17 - 50)  SpO2: 96% (27 Jun 2022 12:00) (87% - 100%)    I&O's Summary    26 Jun 2022 07:01  -  27 Jun 2022 07:00  --------------------------------------------------------  IN: 390 mL / OUT: 420 mL / NET: -30 mL    27 Jun 2022 07:01  -  27 Jun 2022 12:50  --------------------------------------------------------  IN: 210 mL / OUT: 0 mL / NET: 210 mL          PHYSICAL EXAM:  TELE: AF  PVCs  Constitutional: unresponsive Bipap  HEENT: Moist Mucous Membranes, Anicteric  Pulmonary: Decreased breath sounds b/l. No rales, crackles or wheeze appreciated.   Cardiovascular: IRRR, S1 and S2, No murmurs, rubs, gallops or clicks  Gastrointestinal: Bowel Sounds present, soft, nontender.   Lymph: No peripheral edema. No lymphadenopathy.  Skin: No visible rashes or ulcers.  Psych:  unable to assess     LABS: All Labs Reviewed:                        12.5   8.64  )-----------( 162      ( 27 Jun 2022 05:41 )             38.0                         12.4   8.02  )-----------( 173      ( 26 Jun 2022 05:34 )             39.4                         12.8   8.50  )-----------( 175      ( 25 Jun 2022 07:00 )             40.9     27 Jun 2022 05:41    145    |  106    |  49     ----------------------------<  104    3.2     |  32     |  1.10   26 Jun 2022 05:34    145    |  104    |  44     ----------------------------<  184    3.5     |  40     |  1.10   25 Jun 2022 07:00    145    |  105    |  48     ----------------------------<  82     4.1     |  35     |  1.20     Ca    8.8        27 Jun 2022 05:41  Ca    8.5        26 Jun 2022 05:34  Ca    8.8        25 Jun 2022 07:00  Phos  2.0       27 Jun 2022 05:41  Phos  2.4       26 Jun 2022 05:34  Phos  3.7       25 Jun 2022 07:00  Mg     2.2       27 Jun 2022 05:41  Mg     2.3       26 Jun 2022 05:34  Mg     2.4       25 Jun 2022 07:00    TPro  6.5    /  Alb  2.1    /  TBili  1.4    /  DBili  x      /  AST  29     /  ALT  35     /  AlkPhos  119    26 Jun 2022 05:34  TPro  6.8    /  Alb  2.2    /  TBili  1.6    /  DBili  x      /  AST  37     /  ALT  41     /  AlkPhos  127    25 Jun 2022 07:00

## 2022-06-27 NOTE — PROGRESS NOTE ADULT - PROBLEM SELECTOR PLAN 4
Heart rate better controlled.  Continue to monitor.  Continue Digoxin  Cardiac monitoring  Cardiology consult follow up ongoing Heart rate better controlled.  Continue to monitor.  Continue Digoxin; check digoxin level due to potential toxicity; adjust dose as needed  Cardiac monitoring  Cardiology consult follow up ongoing

## 2022-06-27 NOTE — SWALLOW BEDSIDE ASSESSMENT ADULT - COMMENTS
Consult received and chart reviewed. Pt known to this service this admission. MBS completed 6/20, at which time pt was recommended puree with mildly thick liquids via teaspoon.  Pt s/p RRT 6/24. As per chart note 6/24, "Presentation consistent with cardiac cachexia with acute hypercapnic respiratory failure in the setting of acute decompensated diastolic heart failure."    Attempted clinical swallow assessment this AM. Upon arrival, pt currently receiving supplemental O2 via BiPAP. Per discussion with Dr. Sun, pt is not medically appropriate for swallow assessment at this time. Recommend that oral nutrition/hydration/medication is contraindicated in the setting of BiPAP and AMS, continue with NGT. Please reconsult this service, as pt is medically appropriate. Dr. Sun is in agreement with POC.

## 2022-06-27 NOTE — PROGRESS NOTE ADULT - SUBJECTIVE AND OBJECTIVE BOX
MEDICAL ATTENDING NOTE    Patient is a 89y old  Male who presents with a chief complaint of weight loss (27 Jun 2022 12:50)      INTERVAL HPI/OVERNIGHT EVENTS: lethargic    MEDICATIONS  (STANDING):  albuterol/ipratropium for Nebulization 3 milliLiter(s) Nebulizer every 6 hours  apixaban 2.5 milliGRAM(s) Oral every 12 hours  chlorhexidine 2% Cloths 1 Application(s) Topical <User Schedule>  dextrose 5%. 1000 milliLiter(s) (50 mL/Hr) IV Continuous <Continuous>  digoxin     Tablet 125 MICROGram(s) Oral daily  midodrine 15 milliGRAM(s) Oral every 8 hours  mirtazapine 15 milliGRAM(s) Oral daily  pyridostigmine 30 milliGRAM(s) Oral every 8 hours  sodium chloride 3%  Inhalation 4 milliLiter(s) Inhalation every 6 hours    MEDICATIONS  (PRN):  acetaminophen     Tablet .. 650 milliGRAM(s) Oral every 6 hours PRN Temp greater or equal to 38C (100.4F), Mild Pain (1 - 3)  melatonin 3 milliGRAM(s) Oral at bedtime PRN Insomnia      __________________________________________________  ----------------------------------------------------------------------------------  REVIEW OF SYSTEMS: unabale to participate in ROS      Vital Signs Last 24 Hrs  T(C): 36.9 (27 Jun 2022 16:00), Max: 37.1 (27 Jun 2022 00:00)  T(F): 98.4 (27 Jun 2022 16:00), Max: 98.7 (27 Jun 2022 00:00)  HR: 103 (27 Jun 2022 16:00) (89 - 108)  BP: 107/75 (27 Jun 2022 16:00) (76/52 - 119/74)  BP(mean): 86 (27 Jun 2022 16:00) (60 - 92)  RR: 27 (27 Jun 2022 16:00) (17 - 50)  SpO2: 98% (27 Jun 2022 16:00) (87% - 100%)    _________________  PHYSICAL EXAM:  ---------------------------   NAD; Normocephalic;   LUNGS - no wheezing  HEART: S1 S2+   ABDOMEN: Soft, Nontender, non distended  EXTREMITIES: no cyanosis; no edema  NERVOUS SYSTEM:  lethargy+    _________________________________________________  LABS:                        12.5   8.64  )-----------( 162      ( 27 Jun 2022 05:41 )             38.0     06-27    145  |  106  |  49<H>  ----------------------------<  104<H>  3.2<L>   |  32<H>  |  1.10    Ca    8.8      27 Jun 2022 05:41  Phos  2.0     06-27  Mg     2.2     06-27    TPro  6.5  /  Alb  2.1<L>  /  TBili  1.4<H>  /  DBili  x   /  AST  29  /  ALT  35  /  AlkPhos  119  06-26        CAPILLARY BLOOD GLUCOSE      POCT Blood Glucose.: 118 mg/dL (27 Jun 2022 12:10)  POCT Blood Glucose.: 82 mg/dL (27 Jun 2022 05:54)  POCT Blood Glucose.: 68 mg/dL (27 Jun 2022 05:51)  POCT Blood Glucose.: 134 mg/dL (26 Jun 2022 23:21)  POCT Blood Glucose.: 137 mg/dL (26 Jun 2022 17:46)                             MEDICAL ATTENDING NOTE    Patient is a 89y old  Male who presents with a chief complaint of weight loss (27 Jun 2022 12:50)      INTERVAL HPI/OVERNIGHT EVENTS: lethargic     MEDICATIONS  (STANDING):  albuterol/ipratropium for Nebulization 3 milliLiter(s) Nebulizer every 6 hours  apixaban 2.5 milliGRAM(s) Oral every 12 hours  chlorhexidine 2% Cloths 1 Application(s) Topical <User Schedule>  dextrose 5%. 1000 milliLiter(s) (50 mL/Hr) IV Continuous <Continuous>  digoxin     Tablet 125 MICROGram(s) Oral daily  midodrine 15 milliGRAM(s) Oral every 8 hours  mirtazapine 15 milliGRAM(s) Oral daily  pyridostigmine 30 milliGRAM(s) Oral every 8 hours  sodium chloride 3%  Inhalation 4 milliLiter(s) Inhalation every 6 hours    MEDICATIONS  (PRN):  acetaminophen     Tablet .. 650 milliGRAM(s) Oral every 6 hours PRN Temp greater or equal to 38C (100.4F), Mild Pain (1 - 3)  melatonin 3 milliGRAM(s) Oral at bedtime PRN Insomnia      __________________________________________________  ----------------------------------------------------------------------------------  REVIEW OF SYSTEMS: unable to participate in ROS      Vital Signs Last 24 Hrs  T(C): 36.9 (27 Jun 2022 16:00), Max: 37.1 (27 Jun 2022 00:00)  T(F): 98.4 (27 Jun 2022 16:00), Max: 98.7 (27 Jun 2022 00:00)  HR: 103 (27 Jun 2022 16:00) (89 - 108)  BP: 107/75 (27 Jun 2022 16:00) (76/52 - 119/74)  BP(mean): 86 (27 Jun 2022 16:00) (60 - 92)  RR: 27 (27 Jun 2022 16:00) (17 - 50)  SpO2: 98% (27 Jun 2022 16:00) (87% - 100%)    _________________  PHYSICAL EXAM:  ---------------------------   NAD; Normocephalic;   LUNGS - no wheezing; on Bipap  HEART: S1 S2+   ABDOMEN: Soft, Nontender, non distended, BS  EXTREMITIES: no cyanosis; no edema  NERVOUS SYSTEM:  lethargy+    _________________________________________________  LABS:                        12.5   8.64  )-----------( 162      ( 27 Jun 2022 05:41 )             38.0     06-27    145  |  106  |  49<H>  ----------------------------<  104<H>  3.2<L>   |  32<H>  |  1.10    Ca    8.8      27 Jun 2022 05:41  Phos  2.0     06-27  Mg     2.2     06-27    TPro  6.5  /  Alb  2.1<L>  /  TBili  1.4<H>  /  DBili  x   /  AST  29  /  ALT  35  /  AlkPhos  119  06-26        CAPILLARY BLOOD GLUCOSE      POCT Blood Glucose.: 118 mg/dL (27 Jun 2022 12:10)  POCT Blood Glucose.: 82 mg/dL (27 Jun 2022 05:54)  POCT Blood Glucose.: 68 mg/dL (27 Jun 2022 05:51)  POCT Blood Glucose.: 134 mg/dL (26 Jun 2022 23:21)  POCT Blood Glucose.: 137 mg/dL (26 Jun 2022 17:46)

## 2022-06-27 NOTE — PROGRESS NOTE ADULT - ASSESSMENT
89y old  Male pmg afib on ac htn hld chief complaint of weakness    NIPPV use as tolerated  vs noted  labs reviewed  diuresis Judiciously - I and O -   ABG noted    AF management - rate and rhythm control  AC for AF  I and O  cvs rx regimen and BP control  hMPV - resp viral illness - supportive measures - Albuterol PRN for sob and or wheezing  monitor VS and HD and Sat  Pleural Eff - Atelectasis - I ze if able to tolerate - no immediate need for thoracentesis - medical management  diuresis as per CARDIO recs  GOC discussion  isolation precs for hMPV  cough rx regimen  replmelinda liu

## 2022-06-27 NOTE — PROGRESS NOTE ADULT - PROBLEM SELECTOR PLAN 6
- proBNP 29329 on admission, improved to ~4000  - CT chest: Moderate bilateral pleural effusions, interlobular septal thickening in patchy ground glass opacities representing pulmonary edema.  - CXR: Bilateral lower lobe infiltrates.  - Continue home Lasix 20mg qd with hold parameter    - TTE: EF of 60%, biatrial enlargement, calcified trileaflet aortic valve with decreased opening, mitral annular calcification, moderate MR, moderate TR

## 2022-06-27 NOTE — PROGRESS NOTE ADULT - CONVERSATION DETAILS
pt with no HCP, surrogates are 4 children (2 sons and 2 daughters)  discussed with son and daughter (teachers) that pt is currently BiPAP dependent.  We planned to trial off BiPAP but became hypotensive so will keep on BiPAP for now.  Discussed that BiPAP is not a long term solution and is a bridge to improvement or worsening.  If her requires intubation it is unlikely that he would be successfully weaned from vent and survive to hospital discharge.   They acknowledge pt has said he would want intubation, however we discussed that the decision should be evaluated in the context of the current state and prognosis.   Will continue to discuss with family.

## 2022-06-27 NOTE — SWALLOW BEDSIDE ASSESSMENT ADULT - SLP PERTINENT HISTORY OF CURRENT PROBLEM
Per charting, "89yo male with PMH of chronic AFib on Eliquis, CHF (EF 64%), HTN, DVT, HLD, gout, macular degeneration who presented for cough and weakness, admitted for acute on chronic heart failure exacerbation with bilateral pleural effusions, AFib with RVR and +hMPV. Rapid Response called on 6/24 for AMS, hypoxia and agonal breathing and found to hypercapnic with lactic acidosis, placed on BiPAP and transferred to ICU for further management."
r/o dysphagia
Per charting, " 89 yo M with PMH of Afib, HTN, DVT, HLD, GOUT (on allopurinol and Colchicine), CHF (TTE 2019 LVED 64%) and macular degeneration who was brought in by family for complaint of cough, weakness for the last few days. Imaging shows moderate bilateral pleural effusions Admitted with A fib with RVR and management of pleural effusion, + hMPv Virus "

## 2022-06-27 NOTE — PROGRESS NOTE ADULT - SUBJECTIVE AND OBJECTIVE BOX
Patient is a 89y old  Male who presents with a chief complaint of weight loss (27 Jun 2022 12:50)    24 hour events: Patient overnight with agonal breathing. BiPAP maintained. Patient seen and examined at bedside. Patient lethargic, BiPAP in place. SBP 80s overnight. Not responsive to commands this morning.    REVIEW OF SYSTEMS  Unable to obtain      T(F): 98.3 (06-27-22 @ 12:00), Max: 98.7 (06-27-22 @ 00:00)  HR: 93 (06-27-22 @ 15:00) (89 - 108)  BP: 107/58 (06-27-22 @ 15:00) (76/52 - 119/74)  RR: 23 (06-27-22 @ 15:00) (17 - 50)  SpO2: 98% (06-27-22 @ 15:00) (87% - 100%)  Wt(kg): --            I&O's Summary    06-26 @ 07:01  -  06-27 @ 07:00  --------------------------------------------------------  IN: 390 mL / OUT: 420 mL / NET: -30 mL    06-27 @ 07:01  -  06-27 @ 15:03  --------------------------------------------------------  IN: 420 mL / OUT: 0 mL / NET: 420 mL      PHYSICAL EXAM  General: elderly, malnourished male  CNS: occasionally opening eyes, unable to follow commands at this time  HEENT: nc/at, PERRL  Resp: bibasilar crackles, occasional wheezing  CVS: irregular rate and rhythm, no murmur  Abd: soft, nt, nd  Ext: loss of muscle tone, no edema  Skin: warm and perfused, scabbing on bilateral feet      MEDICATIONS    digoxin     Tablet Oral  diltiazem    Tablet Oral  metoprolol tartrate Oral  midodrine Oral      albuterol/ipratropium for Nebulization Nebulizer  sodium chloride 3%  Inhalation Inhalation    acetaminophen     Tablet .. Oral PRN  melatonin Oral PRN  mirtazapine Oral      apixaban Oral        dextrose 5%. IV Continuous      chlorhexidine 2% Cloths Topical    pyridostigmine Oral                          12.5   8.64  )-----------( 162      ( 27 Jun 2022 05:41 )             38.0     Bands 6.0    06-27    145  |  106  |  49<H>  ----------------------------<  104<H>  3.2<L>   |  32<H>  |  1.10    Ca    8.8      27 Jun 2022 05:41  Phos  2.0     06-27  Mg     2.2     06-27    TPro  6.5  /  Alb  2.1<L>  /  TBili  1.4<H>  /  DBili  x   /  AST  29  /  ALT  35  /  AlkPhos  119  06-26              Clean Catch Clean Catch (Midstream)   50,000 - 99,000 CFU/mL Coag Negative Staphylococcus "Susceptibilities not  performed" -- 06-24 @ 15:10  .Blood Blood-Peripheral   No growth to date. -- 06-24 @ 11:45  .Blood Blood-Peripheral   No growth to date. -- 06-24 @ 11:40        CENTRAL LINE: N            OCTAVIA: Y                        DATE INSERTED:      6/24/2022          A-LINE: N                           GLOBAL ISSUE/BEST PRACTICE  Analgesia: none  Sedation: none  CAM-ICU: n/a  HOB elevation: yes  Stress ulcer prophylaxis: none  VTE prophylaxis: Eliquis 2.5mg BID  Glycemic control: none  Nutrition: NPO, on BiPAP    CODE STATUS: FULL CODE  GOC discussion: Y   Patient is a 89y old  Male who presents with a chief complaint of weight loss (27 Jun 2022 12:50)    24 hour events: Patient overnight with agonal breathing. BiPAP maintained. Patient seen and examined at bedside. Patient lethargic, BiPAP in place. SBP 80s overnight. Not responsive to commands this morning.    REVIEW OF SYSTEMS  Unable to obtain      T(F): 98.3 (06-27-22 @ 12:00), Max: 98.7 (06-27-22 @ 00:00)  HR: 93 (06-27-22 @ 15:00) (89 - 108)  BP: 107/58 (06-27-22 @ 15:00) (76/52 - 119/74)  RR: 23 (06-27-22 @ 15:00) (17 - 50)  SpO2: 98% (06-27-22 @ 15:00) (87% - 100%)  Wt(kg): --        I&O's Summary    06-26 @ 07:01  -  06-27 @ 07:00  --------------------------------------------------------  IN: 390 mL / OUT: 420 mL / NET: -30 mL    06-27 @ 07:01  -  06-27 @ 15:03  --------------------------------------------------------  IN: 420 mL / OUT: 0 mL / NET: 420 mL      PHYSICAL EXAM  General: elderly, malnourished male  CNS: occasionally opening eyes, unable to follow commands at this time  HEENT: nc/at, PERRL  Resp: paradoxical respirations, bibasilar crackles, occasional wheezing  CVS: irregular rate and rhythm, no murmur  Abd: soft, nt, nd  Ext: loss of muscle tone, no edema  Skin: warm and perfused, scabbing on bilateral feet      MEDICATIONS    digoxin     Tablet Oral  diltiazem    Tablet Oral  metoprolol tartrate Oral  midodrine Oral      albuterol/ipratropium for Nebulization Nebulizer  sodium chloride 3%  Inhalation Inhalation    acetaminophen     Tablet .. Oral PRN  melatonin Oral PRN  mirtazapine Oral      apixaban Oral        dextrose 5%. IV Continuous      chlorhexidine 2% Cloths Topical    pyridostigmine Oral                          12.5   8.64  )-----------( 162      ( 27 Jun 2022 05:41 )             38.0     Bands 6.0    06-27    145  |  106  |  49<H>  ----------------------------<  104<H>  3.2<L>   |  32<H>  |  1.10    Ca    8.8      27 Jun 2022 05:41  Phos  2.0     06-27  Mg     2.2     06-27    TPro  6.5  /  Alb  2.1<L>  /  TBili  1.4<H>  /  DBili  x   /  AST  29  /  ALT  35  /  AlkPhos  119  06-26              Clean Catch Clean Catch (Midstream)   50,000 - 99,000 CFU/mL Coag Negative Staphylococcus "Susceptibilities not  performed" -- 06-24 @ 15:10  .Blood Blood-Peripheral   No growth to date. -- 06-24 @ 11:45  .Blood Blood-Peripheral   No growth to date. -- 06-24 @ 11:40        CENTRAL LINE: N            OCTAVIA: LYNNE                        DATE INSERTED:      6/24/2022          A-LINE: N                           GLOBAL ISSUE/BEST PRACTICE  Analgesia: none  Sedation: none  CAM-ICU: n/a  HOB elevation: yes  Stress ulcer prophylaxis: none  VTE prophylaxis: Eliquis 2.5mg BID  Glycemic control: none  Nutrition: NPO, on BiPAP    CODE STATUS: FULL CODE  GOC discussion: LYNNE   Patient is a 89y old  Male who presents with a chief complaint of weight loss (27 Jun 2022 12:50)    24 hour events: Patient overnight with agonal breathing. BiPAP maintained. Patient seen and examined at bedside. Patient lethargic, BiPAP in place. SBP 80s overnight. Not responsive to commands this morning.    REVIEW OF SYSTEMS  Unable to obtain      T(F): 98.3 (06-27-22 @ 12:00), Max: 98.7 (06-27-22 @ 00:00)  HR: 93 (06-27-22 @ 15:00) (89 - 108)  BP: 107/58 (06-27-22 @ 15:00) (76/52 - 119/74)  RR: 23 (06-27-22 @ 15:00) (17 - 50)  SpO2: 98% (06-27-22 @ 15:00) (87% - 100%)  Wt(kg): --        I&O's Summary    06-26 @ 07:01  -  06-27 @ 07:00  --------------------------------------------------------  IN: 390 mL / OUT: 420 mL / NET: -30 mL    06-27 @ 07:01  -  06-27 @ 15:03  --------------------------------------------------------  IN: 420 mL / OUT: 0 mL / NET: 420 mL      PHYSICAL EXAM  General: elderly, malnourished male  CNS: occasionally opening eyes, unable to follow commands at this time  HEENT: nc/at, PERRL  Resp: paradoxical respirations, bibasilar crackles, occasional wheezing  CVS: irregular rate and rhythm, no murmur  Abd: soft, nt, nd  Ext: loss of muscle tone, no edema  Skin: warm and perfused, scabbing on bilateral feet      MEDICATIONS    digoxin     Tablet Oral  diltiazem    Tablet Oral  metoprolol tartrate Oral  midodrine Oral      albuterol/ipratropium for Nebulization Nebulizer  sodium chloride 3%  Inhalation Inhalation    acetaminophen     Tablet .. Oral PRN  melatonin Oral PRN  mirtazapine Oral      apixaban Oral        dextrose 5%. IV Continuous      chlorhexidine 2% Cloths Topical    pyridostigmine Oral                          12.5   8.64  )-----------( 162      ( 27 Jun 2022 05:41 )             38.0     Bands 6.0    06-27    145  |  106  |  49<H>  ----------------------------<  104<H>  3.2<L>   |  32<H>  |  1.10    Ca    8.8      27 Jun 2022 05:41  Phos  2.0     06-27  Mg     2.2     06-27    TPro  6.5  /  Alb  2.1<L>  /  TBili  1.4<H>  /  DBili  x   /  AST  29  /  ALT  35  /  AlkPhos  119  06-26              Clean Catch Clean Catch (Midstream)   50,000 - 99,000 CFU/mL Coag Negative Staphylococcus "Susceptibilities not  performed" -- 06-24 @ 15:10  .Blood Blood-Peripheral   No growth to date. -- 06-24 @ 11:45  .Blood Blood-Peripheral   No growth to date. -- 06-24 @ 11:40      Bedside Ultrasound: L moderate effusion, R small effusion with consolidation. IVC 2.02, no variation      CENTRAL LINE: N            DUDLEY: Y                        DATE INSERTED:      6/24/2022          A-LINE: N                           GLOBAL ISSUE/BEST PRACTICE  Analgesia: none  Sedation: none  CAM-ICU: n/a  HOB elevation: yes  Stress ulcer prophylaxis: none  VTE prophylaxis: Eliquis 2.5mg BID  Glycemic control: none  Nutrition: NPO, on BiPAP    CODE STATUS: FULL CODE  GOC discussion: Y   Patient is a 89y old  Male who presents with a chief complaint of weight loss (27 Jun 2022 12:50)    24 hour events: Patient overnight with episode of labored and agonal breathing, was placed on BiPAP and then improved.  Patient seen and examined at bedside. Patient lethargic, BiPAP in place. SBP 80s overnight. Not responsive to commands this morning.    REVIEW OF SYSTEMS  Unable to obtain      T(F): 98.3 (06-27-22 @ 12:00), Max: 98.7 (06-27-22 @ 00:00)  HR: 93 (06-27-22 @ 15:00) (89 - 108)  BP: 107/58 (06-27-22 @ 15:00) (76/52 - 119/74)  RR: 23 (06-27-22 @ 15:00) (17 - 50)  SpO2: 98% (06-27-22 @ 15:00) (87% - 100%)  Wt(kg): --        I&O's Summary    06-26 @ 07:01  -  06-27 @ 07:00  --------------------------------------------------------  IN: 390 mL / OUT: 420 mL / NET: -30 mL    06-27 @ 07:01  -  06-27 @ 15:03  --------------------------------------------------------  IN: 420 mL / OUT: 0 mL / NET: 420 mL      PHYSICAL EXAM  General: elderly, chronically ill, malnourished male  CNS: occasionally opening eyes, unable to follow commands at this time  HEENT: nc/at, PERRL  Resp: paradoxical respirations, bibasilar crackles, occasional wheezing  CVS: irregular rate and rhythm, no murmur  Abd: soft, nt, nd  Ext: loss of muscle tone, no edema  Skin: warm and perfused, scabbing on bilateral feet      MEDICATIONS    digoxin     Tablet Oral  diltiazem    Tablet Oral  metoprolol tartrate Oral  midodrine Oral      albuterol/ipratropium for Nebulization Nebulizer  sodium chloride 3%  Inhalation Inhalation    acetaminophen     Tablet .. Oral PRN  melatonin Oral PRN  mirtazapine Oral      apixaban Oral        dextrose 5%. IV Continuous      chlorhexidine 2% Cloths Topical    pyridostigmine Oral                          12.5   8.64  )-----------( 162      ( 27 Jun 2022 05:41 )             38.0     Bands 6.0    06-27    145  |  106  |  49<H>  ----------------------------<  104<H>  3.2<L>   |  32<H>  |  1.10    Ca    8.8      27 Jun 2022 05:41  Phos  2.0     06-27  Mg     2.2     06-27    TPro  6.5  /  Alb  2.1<L>  /  TBili  1.4<H>  /  DBili  x   /  AST  29  /  ALT  35  /  AlkPhos  119  06-26              Clean Catch Clean Catch (Midstream)   50,000 - 99,000 CFU/mL Coag Negative Staphylococcus "Susceptibilities not  performed" -- 06-24 @ 15:10  .Blood Blood-Peripheral   No growth to date. -- 06-24 @ 11:45  .Blood Blood-Peripheral   No growth to date. -- 06-24 @ 11:40      Bedside Ultrasound: L moderate effusion, R small effusion with consolidation. IVC 2.02, no variation      CENTRAL LINE: N            DUDLEY: Y                        DATE INSERTED:      6/24/2022          A-LINE: N                           GLOBAL ISSUE/BEST PRACTICE  Analgesia: none  Sedation: none  CAM-ICU: n/a  HOB elevation: yes  Stress ulcer prophylaxis: none  VTE prophylaxis: Eliquis 2.5mg BID  Glycemic control: none  Nutrition: NPO, on BiPAP    CODE STATUS: FULL CODE  GOC discussion: Y

## 2022-06-27 NOTE — PROGRESS NOTE ADULT - PROBLEM SELECTOR PLAN 5
Monitor BP while on antihypertensive rate controlling agents  - Monitor routine hemodynamics  Dose adjustments as needed

## 2022-06-27 NOTE — PROGRESS NOTE ADULT - SUBJECTIVE AND OBJECTIVE BOX
Neurology Follow up note    ROBERT [id number]    HPI:  Patient is a 89 yo M with PMH of Afib, HTN, DLD who was brought in by family for complaint of cough, weakness for the last few days. Patient reports weight loss and reduced appetite for the last month. Denies chest pain or palpitations, denies fever, denies N/V/D. Of note, patient was seen recently by cardiologist Dr Allen on 6/6 for weakness and decreased appetite and his digoxin was discontinue and his lopressor was increased and coumadin changed to Eliquis.  Uses a cane occasionally at home, denies recent falls. Denies blood in stool.     ED course:  Na 139, K 5.3 (hemolyzed), Cr 1.80, Lactate 3.1, Mg 2.8, BNP 01400, Lactate 1.8, INR 3.43. Covid negative, resp panel positive for human metapneumovirus. CT shows ground glass and nodules. EKG shows afib, . Received metoprolol 5mg IV and cardiology consulted.  (15 Rui 2022 20:48)      Interval History -on bipa    Patient is seen, chart was reviewed and case was discussed with the treatment team.  Pt is not in any distress.   Lying on bed comfortably.     Vital Signs Last 24 Hrs  T(C): 36.9 (27 Jun 2022 16:00), Max: 37.1 (27 Jun 2022 00:00)  T(F): 98.4 (27 Jun 2022 16:00), Max: 98.7 (27 Jun 2022 00:00)  HR: 107 (27 Jun 2022 17:00) (89 - 108)  BP: 105/63 (27 Jun 2022 17:00) (76/52 - 119/74)  BP(mean): 78 (27 Jun 2022 17:00) (60 - 92)  RR: 29 (27 Jun 2022 17:00) (17 - 50)  SpO2: 91% (27 Jun 2022 17:00) (87% - 100%)        REVIEW OF SYSTEMS:    Constitutional: No fever,  Eyes: No eye pain,   ENT:  No difficulty hearing, tinnitus, vertigo; No sinus or throat pain  Neck: No pain or stiffness  Respiratory: No  chills or hemoptysis  Cardiovascular: No chest pain, palpitations,   Gastrointestinal: No abdominal or epigastric pain  Genitourinary: No d hematuria   Neurological: No headaches, numbness or tremors  Psychiatric: No mood swings or difficulty sleeping  Musculoskeletal: No joint pain or swelling;   Skin: No itching, burning, rashes or lesions   Lymph Nodes: No enlarged glands  Endocrine: No heat or cold intolerance; No hair loss,   Allergy and Immunologic: No hives or eczema    On Neurological Examination:    Mental Status - Pt is  awake,  Follows simple  commands    Speech -  hypophonic /dysarthria    Cranial Nerves - Pupils 3 mm equal and reactive to light, bilateral ptosis  Pt has  facial asymmetry.    Muscle tone - is normal      Motor Exam -weakness of UE/LE 3-4/5 proximal > distal   neck 1/5    Sensory Exam - Pt withdraws all extremities equally on stimulation. No asymmetry seen. No complaints of tingling, numbness.    coordination:    Finger to nose: normal    Deep tendon Reflexes - 2 plus all over.       Neck Supple -  Yes.     MEDICATIONS    acetaminophen     Tablet .. 650 milliGRAM(s) Oral every 6 hours PRN  apixaban 2.5 milliGRAM(s) Oral every 12 hours  chlorhexidine 2% Cloths 1 Application(s) Topical <User Schedule>  digoxin     Tablet 125 MICROGram(s) Oral daily  diltiazem    Tablet 90 milliGRAM(s) Oral <User Schedule>  melatonin 3 milliGRAM(s) Oral at bedtime PRN  metoprolol tartrate 50 milliGRAM(s) Oral four times a day  mirtazapine 15 milliGRAM(s) Oral daily  pyridostigmine 30 milliGRAM(s) Oral every 8 hours      Allergies    No Known Allergies    Intolerances          06-27    145  |  106  |  49<H>  ----------------------------<  104<H>  3.2<L>   |  32<H>  |  1.10    Ca    8.8      27 Jun 2022 05:41  Phos  2.0     06-27  Mg     2.2     06-27    TPro  6.5  /  Alb  2.1<L>  /  TBili  1.4<H>  /  DBili  x   /  AST  29  /  ALT  35  /  AlkPhos  119  06-26      Hemoglobin A1C:     Vitamin B12     RADIOLOGY    ASSESSMENT AND PLAN:      seen for ams related to metabolic encephalopoathy  head drop/dysphagia/dysarthria ?myasthenia     consider increasing mestinon to 60 mg tid  acetyl choline receptor/musk  antibodies  management dw critical care team  Physical therapy evaluation.  Pain is accessed and addressed.  Would continue to follow.

## 2022-06-27 NOTE — PROGRESS NOTE ADULT - ATTENDING COMMENTS
89M PMH A-Fib on apixaban, HTN, HLD, and chronic diastolic heart failure, subacute anorexia and wt loss of 50lbs over last few months, now presents with acute decompensated heart failure in the setting of human metapneumovirus infection.  Course complicated by metabolic encephalopathy, acute hypercapnic respiratory failure, congestive hepatopathy, MARISOL.      --likely toxic/metabolic encephalopathy  will d/c remeron and prn melatonin  possible myesthenic gravis to account for head drop, dysphagia, continue trial of mestinon, f/u serologies  --acute hypercapnic respiratory failure  maintain BiPAP for now, ABG acceptable  remains volume overloaded, diuresis  place mead  --acute decompensated diastolic HF  volume overload, diuresis as above  --Afib controlled on digoxin, lopressor, diltiazem  this afternoon developed hypotension, so started midodrine and held lopressor, dilt  AC with eliquis  --dysphagia, continue TF if off BiPAP  --renal function acceptable  --no infectious concerns at this point, monitor off Abx  --hypoglycemia while NPO, start D5

## 2022-06-27 NOTE — PROGRESS NOTE ADULT - ASSESSMENT
Patient is a 89 yo M with PMH of chronic Afib, HTN, DVT, HLD, GOUT (on allopurinol and Colchicine), CHF (TTE 2019 LVED 64%) and macular degeneration who was brought in by family for complaint of cough, weakness for the last few days. Imaging shows moderate bilateral pleural effusions Admitted with acute on chronic HFpEF A fib with RVR and management of pleural effusion, + hMPv Virus. RRT called on 6/24 for deterioration in mental status, pt transferred to ICU for hypercarbic respiratory failure and further workup/respiratory management.

## 2022-06-27 NOTE — PROGRESS NOTE ADULT - SUBJECTIVE AND OBJECTIVE BOX
Date/Time Patient Seen:  		  Referring MD:   Data Reviewed	       Patient is a 89y old  Male who presents with a chief complaint of weight loss (26 Jun 2022 14:31)      Subjective/HPI     PAST MEDICAL & SURGICAL HISTORY:  Atrial fibrillation    HTN (hypertension)    HLD (hyperlipidemia)    Anemia    CHF (congestive heart failure)    DVT, lower extremity          Medication list         MEDICATIONS  (STANDING):  albuterol/ipratropium for Nebulization 3 milliLiter(s) Nebulizer every 6 hours  apixaban 2.5 milliGRAM(s) Oral every 12 hours  chlorhexidine 2% Cloths 1 Application(s) Topical <User Schedule>  digoxin     Tablet 125 MICROGram(s) Oral daily  diltiazem    Tablet 90 milliGRAM(s) Oral <User Schedule>  metoprolol tartrate 50 milliGRAM(s) Oral four times a day  mirtazapine 15 milliGRAM(s) Oral daily  pyridostigmine 30 milliGRAM(s) Oral every 8 hours  sodium chloride 3%  Inhalation 4 milliLiter(s) Inhalation every 6 hours    MEDICATIONS  (PRN):  acetaminophen     Tablet .. 650 milliGRAM(s) Oral every 6 hours PRN Temp greater or equal to 38C (100.4F), Mild Pain (1 - 3)  melatonin 3 milliGRAM(s) Oral at bedtime PRN Insomnia         Vitals log        ICU Vital Signs Last 24 Hrs  T(C): 36.9 (27 Jun 2022 04:00), Max: 37.1 (26 Jun 2022 12:00)  T(F): 98.4 (27 Jun 2022 04:00), Max: 98.8 (26 Jun 2022 12:00)  HR: 97 (27 Jun 2022 05:15) (89 - 114)  BP: 88/52 (27 Jun 2022 04:00) (85/51 - 119/74)  BP(mean): 64 (27 Jun 2022 04:00) (63 - 92)  ABP: --  ABP(mean): --  RR: 43 (27 Jun 2022 04:00) (17 - 50)  SpO2: 100% (27 Jun 2022 05:15) (87% - 100%)           Input and Output:  I&O's Detail    25 Jun 2022 07:01  -  26 Jun 2022 07:00  --------------------------------------------------------  IN:  Total IN: 0 mL    OUT:    Indwelling Catheter - Urethral (mL): 1450 mL  Total OUT: 1450 mL    Total NET: -1450 mL      26 Jun 2022 07:01  -  27 Jun 2022 05:35  --------------------------------------------------------  IN:    Enteral Tube Flush: 50 mL    Jevity 1.5: 50 mL    Oral Fluid: 290 mL  Total IN: 390 mL    OUT:    Indwelling Catheter - Urethral (mL): 420 mL  Total OUT: 420 mL    Total NET: -30 mL          Lab Data                        12.4   8.02  )-----------( 173      ( 26 Jun 2022 05:34 )             39.4     06-26    145  |  104  |  44<H>  ----------------------------<  184<H>  3.5   |  40<H>  |  1.10    Ca    8.5      26 Jun 2022 05:34  Phos  2.4     06-26  Mg     2.3     06-26    TPro  6.5  /  Alb  2.1<L>  /  TBili  1.4<H>  /  DBili  x   /  AST  29  /  ALT  35  /  AlkPhos  119  06-26    ABG - ( 26 Jun 2022 19:56 )  pH, Arterial: 7.36  pH, Blood: x     /  pCO2: 66    /  pO2: 57    / HCO3: 37    / Base Excess: 11.9  /  SaO2: 91.1                    Review of Systems	      Objective     Physical Examination    heart s1s2  lung dec BS  abd soft      Pertinent Lab findings & Imaging      Ramírez:  NO   Adequate UO     I&O's Detail    25 Jun 2022 07:01  -  26 Jun 2022 07:00  --------------------------------------------------------  IN:  Total IN: 0 mL    OUT:    Indwelling Catheter - Urethral (mL): 1450 mL  Total OUT: 1450 mL    Total NET: -1450 mL      26 Jun 2022 07:01  -  27 Jun 2022 05:35  --------------------------------------------------------  IN:    Enteral Tube Flush: 50 mL    Jevity 1.5: 50 mL    Oral Fluid: 290 mL  Total IN: 390 mL    OUT:    Indwelling Catheter - Urethral (mL): 420 mL  Total OUT: 420 mL    Total NET: -30 mL               Discussed with:     Cultures:	        Radiology

## 2022-06-28 NOTE — PROGRESS NOTE ADULT - ASSESSMENT
89y old  Male PMH Afib on ac htn hld chief complaint of weakness. Patient was seen in office 6/6 by Dr Allen for decreased appetite and weakness, digoxin was discontinued and increased lopressor to 100mg Po BID.  Now with altered mental status and hypercarbic respiratory failure, transferred to the ICU    Hypercarbic resp failure  - mental status unchanged and remains nonresponsive  - cont bipap  - there is a component of HF contributing to resp failure  - would give iv lasix today    AFIB with RVR  - remains in af with borderline rates  - cont po metoprolol and digoxin  - please replete K as hypoK will increase risk of toxicity  - cont ac with eliquis  - midodrine as needed for hypotension  - Repeat TTE showed EF 60%, TROY, mod MR/TR  - CT showed moderate bilateral pleural effusions, followed by pulmonary. no need for thoracentesis     - Poor prognosis.  GOC discussion ongoing   - Will continue to follow. Very high risk of decompensation.    Upon my evaluation, this patient is at high risk for imminent or life threatening deterioration due to ams, resp failure, chf and other active medical issues which require my direct attention, intervention, and personal management.  I have personally spent >30 minutes  of critical care time exclusive of time spent on separate billing procedures. This includes review of laboratory data, radiology results, discussion with primary team\patient, and monitoring for potential decompensation. Interventions were performed as documented above.

## 2022-06-28 NOTE — PROGRESS NOTE ADULT - SUBJECTIVE AND OBJECTIVE BOX
[facility] - Vincenzo Wyman, Tiffany, Daina, Delphine, Deandra Johnson  Office Number:  [phone]    Patient resting comfortably in bed in NAD.    sleeping this am/lethargic, on bipap    ROS: negative unless otherwise mentioned.    Telemetry:  af    MEDICATIONS  (STANDING):  albuterol/ipratropium for Nebulization 3 milliLiter(s) Nebulizer every 6 hours  apixaban 2.5 milliGRAM(s) Oral every 12 hours  chlorhexidine 2% Cloths 1 Application(s) Topical <User Schedule>  dextrose 5%. 1000 milliLiter(s) (50 mL/Hr) IV Continuous <Continuous>  digoxin     Tablet 125 MICROGram(s) Oral daily  metoprolol tartrate 25 milliGRAM(s) Oral two times a day  midodrine 15 milliGRAM(s) Oral every 8 hours  pyridostigmine 30 milliGRAM(s) Oral every 8 hours  sodium chloride 3%  Inhalation 4 milliLiter(s) Inhalation every 6 hours    MEDICATIONS  (PRN):  acetaminophen     Tablet .. 650 milliGRAM(s) Oral every 6 hours PRN Temp greater or equal to 38C (100.4F), Mild Pain (1 - 3)      Allergies    No Known Allergies    Intolerances        Vital Signs Last 24 Hrs  T(C): 37.3 (28 Jun 2022 12:01), Max: 37.3 (28 Jun 2022 12:01)  T(F): 99.1 (28 Jun 2022 12:01), Max: 99.1 (28 Jun 2022 12:01)  HR: 112 (28 Jun 2022 14:12) (93 - 123)  BP: 144/91 (28 Jun 2022 14:00) (89/61 - 144/91)  BP(mean): 110 (28 Jun 2022 14:00) (70 - 110)  RR: 30 (28 Jun 2022 14:00) (19 - 41)  SpO2: 97% (28 Jun 2022 14:12) (90% - 100%)    I&O's Summary    27 Jun 2022 07:01  -  28 Jun 2022 07:00  --------------------------------------------------------  IN: 830 mL / OUT: 745 mL / NET: 85 mL    28 Jun 2022 07:01  -  28 Jun 2022 14:37  --------------------------------------------------------  IN: 620 mL / OUT: 115 mL / NET: 505 mL        ON EXAM:    Constitutional: unresponsive Bipap  HEENT: Moist Mucous Membranes, Anicteric  Pulmonary: Decreased breath sounds b/l. No rales, crackles or wheeze appreciated.   Cardiovascular: IRRR, S1 and S2, No murmurs, rubs, gallops or clicks  Gastrointestinal: Bowel Sounds present, soft, nontender.   Lymph: No peripheral edema. No lymphadenopathy.  Skin: No visible rashes or ulcers.  Psych:  unable to assess     LABS: All Labs Reviewed:                        13.0   11.51 )-----------( 192      ( 28 Jun 2022 07:20 )             40.7                         12.5   8.64  )-----------( 162      ( 27 Jun 2022 05:41 )             38.0                         12.4   8.02  )-----------( 173      ( 26 Jun 2022 05:34 )             39.4     28 Jun 2022 07:20    144    |  105    |  50     ----------------------------<  104    3.0     |  33     |  1.10   27 Jun 2022 05:41    145    |  106    |  49     ----------------------------<  104    3.2     |  32     |  1.10   26 Jun 2022 05:34    145    |  104    |  44     ----------------------------<  184    3.5     |  40     |  1.10     Ca    8.8        28 Jun 2022 07:20  Ca    8.8        27 Jun 2022 05:41  Ca    8.5        26 Jun 2022 05:34  Phos  2.6       28 Jun 2022 07:20  Phos  2.0       27 Jun 2022 05:41  Phos  2.4       26 Jun 2022 05:34  Mg     2.4       28 Jun 2022 07:20  Mg     2.2       27 Jun 2022 05:41  Mg     2.3       26 Jun 2022 05:34    TPro  6.9    /  Alb  2.2    /  TBili  1.7    /  DBili  x      /  AST  20     /  ALT  27     /  AlkPhos  118    28 Jun 2022 07:20  TPro  6.5    /  Alb  2.1    /  TBili  1.4    /  DBili  x      /  AST  29     /  ALT  35     /  AlkPhos  119    26 Jun 2022 05:34    PT/INR - ( 28 Jun 2022 07:20 )   PT: 24.8 sec;   INR: 2.10 ratio         PTT - ( 28 Jun 2022 07:20 )  PTT:31.2 sec      Blood Culture: Organism --  Gram Stain Blood -- Gram Stain --  Specimen Source Clean Catch Clean Catch (Midstream)  Culture-Blood --    Organism --  Gram Stain Blood -- Gram Stain --  Specimen Source .Blood Blood-Peripheral  Culture-Blood --    Organism --  Gram Stain Blood -- Gram Stain --  Specimen Source .Blood Blood-Peripheral  Culture-Blood --           General

## 2022-06-28 NOTE — PROGRESS NOTE ADULT - PROBLEM SELECTOR PLAN 1
Acute hypercapnic respiratory failure requiring non invasive ventilatory support  Continue  monitoring in ICU  Prognosis is guarded  GOC- full code  Continue supportive measures  continue bronchodilators

## 2022-06-28 NOTE — PROGRESS NOTE ADULT - ASSESSMENT
89 y/o M w/ pmh of Afib on apixaban, htn, hld, chronic diastolic HF, subacute anorexia, wt loss of 50 months over the last few months, presented to [facility] hospital for acute decompensated HF 2/2 to HMPV, hospital courser c/b metabolic encephalopathy, acute hypercarbic resp failure, congestive hepatopathy and MARISOL.       89 y/o M w/ pmh of Afib on apixaban, htn, hld, chronic diastolic HF, subacute anorexia, wt loss of 50 months over the last few months, presented to [facility] hospital for acute decompensated HF 2/2 to HMPV, hospital courser c/b metabolic encephalopathy, acute hypercarbic resp failure, congestive hepatopathy and MARISOL.      -Neuro: Metabolic encephalopathy stable monitor MS closely, MG on pyridostigmine  -Cardiac: Acute decompensated diastolic HF on lasix, Afib currently on dig/lopressor for rate control and midodrine 15mg q8h for hypotension, maintain MAP >65, if SBP <90 will d/c lopressor  -Resp: Acute Hypoxic/hypecarbic resp failure pt placed back on bipap 2/2 to hypoxia, actively titrating bipap setting to maintain o2 >90%, cont duonebs and saline nebs  -GI: Cont TF as ordered/tolerated, add protonix 40mg suspension for GI ppx  -Renal: Renal function stable, cont to monitor along with lytes  -ID: HMPV    89 y/o M w/ pmh of Afib on apixaban, htn, hld, chronic diastolic HF, subacute anorexia, wt loss of 50 months over the last few months, presented to [facility] hospital for acute decompensated HF 2/2 to HMPV, hospital courser c/b metabolic encephalopathy, acute hypercarbic resp failure, congestive hepatopathy and MARISOL.      -Neuro: Metabolic encephalopathy stable monitor MS closely, MG on pyridostigmine  -Cardiac: Acute decompensated diastolic HF on lasix, Afib currently on dig/lopressor for rate control and midodrine 15mg q8h for hypotension, maintain MAP >65, if SBP <90 will d/c lopressor  -Resp: Acute Hypoxic/hypecarbic resp failure pt placed back on bipap 2/2 to hypoxia, actively titrating bipap setting to maintain o2 >90%, cont duonebs and saline nebs  -GI: Cont TF as ordered/tolerated, add protonix 40mg suspension for GI ppx  -Renal: Renal function stable, cont to monitor along with lytes  -ID: HMPV cont supportive care  -Heme: DVT ppx w/ eliquis  -Endo: Hypoglycemia on D5  -Dispo: Pt remains critically ill currently in the MICU, GOC discussed and ongoing with family, I did follow back up with daughter tonight regarding intubation and they have not made a decision yet, pt is at high risk for intubation, dispo pending hospital course

## 2022-06-28 NOTE — PROGRESS NOTE ADULT - TIME BILLING
direct care of a critically ill patient including but not limited to reviewing chart, medications ,laboratory data, discussion of plan of care and coordination of care with multidisciplinary team involved in the case;  and discussion of plan with ICU team.    Overall prognosis guarded.    Updates on current status and plan of care provided to family by ICU team.

## 2022-06-28 NOTE — PROGRESS NOTE ADULT - SUBJECTIVE AND OBJECTIVE BOX
Neurology follow up note    ROBERT JOHNSON89yMale      Interval History:    Patient resting in bed on bipap     Allergies    No Known Allergies    Intolerances        MEDICATIONS    acetaminophen     Tablet .. 650 milliGRAM(s) Oral every 6 hours PRN  albuterol/ipratropium for Nebulization 3 milliLiter(s) Nebulizer every 6 hours  apixaban 2.5 milliGRAM(s) Oral every 12 hours  chlorhexidine 2% Cloths 1 Application(s) Topical <User Schedule>  dextrose 5%. 1000 milliLiter(s) IV Continuous <Continuous>  digoxin     Tablet 125 MICROGram(s) Oral daily  midodrine 15 milliGRAM(s) Oral every 8 hours  potassium chloride   Powder 40 milliEquivalent(s) Oral once  potassium chloride  10 mEq/100 mL IVPB 10 milliEquivalent(s) IV Intermittent every 1 hour  pyridostigmine 30 milliGRAM(s) Oral every 8 hours  sodium chloride 3%  Inhalation 4 milliLiter(s) Inhalation every 6 hours              Vital Signs Last 24 Hrs  T(C): 37.2 (28 Jun 2022 07:58), Max: 37.2 (28 Jun 2022 07:58)  T(F): 98.9 (28 Jun 2022 07:58), Max: 98.9 (28 Jun 2022 07:58)  HR: 113 (28 Jun 2022 08:06) (92 - 123)  BP: 113/74 (28 Jun 2022 08:00) (76/52 - 114/75)  BP(mean): 89 (28 Jun 2022 08:00) (60 - 94)  RR: 41 (28 Jun 2022 08:00) (19 - 42)  SpO2: 100% (28 Jun 2022 08:06) (90% - 100%)    `REVIEW OF SYSTEMS:  unable to obtain  on bipap     PHYSICAL EXAMINATION:  GENERAL:  The patient does appear to be cachectic in appearance.   HEENT:  Head:  Normocephalic, atraumatic.  Eyes:  No scleral icterus.  Ears:  Hard of hearing.  NECK:  Supple.  CARDIOVASCULAR:  S1 and S2 heard.  RESPIRATORY:  Decreased breath sounds bilaterally.  ABDOMEN:  Soft, nontender.  EXTREMITIES:  No clubbing or cyanosis was noted.     NEUROLOGIC:  The patient is lethargic   The patient has poor vision out of both eyes, which is his baseline.  Speech was fluent.  No dysarthria.  Motor:  Bilateral upper slight flexion at elbows   bilateral lower 3-/5.   previous exam No head tremors were noted.  No resting tremors were noted.  Upper extremities, no cogwheel rigidity was noted.  No significant bradykinesia was noted.                   LABS:  CBC Full  -  ( 28 Jun 2022 07:20 )  WBC Count : 11.51 K/uL  RBC Count : 4.01 M/uL  Hemoglobin : 13.0 g/dL  Hematocrit : 40.7 %  Platelet Count - Automated : 192 K/uL  Mean Cell Volume : 101.5 fl  Mean Cell Hemoglobin : 32.4 pg  Mean Cell Hemoglobin Concentration : 31.9 gm/dL  Auto Neutrophil # : 9.75 K/uL  Auto Lymphocyte # : 0.87 K/uL  Auto Monocyte # : 0.78 K/uL  Auto Eosinophil # : 0.02 K/uL  Auto Basophil # : 0.04 K/uL  Auto Neutrophil % : 84.7 %  Auto Lymphocyte % : 7.6 %  Auto Monocyte % : 6.8 %  Auto Eosinophil % : 0.2 %  Auto Basophil % : 0.3 %      06-28    144  |  105  |  50<H>  ----------------------------<  104<H>  3.0<L>   |  33<H>  |  1.10    Ca    8.8      28 Jun 2022 07:20  Phos  2.6     06-28  Mg     2.4     06-28    TPro  6.9  /  Alb  2.2<L>  /  TBili  1.7<H>  /  DBili  x   /  AST  20  /  ALT  27  /  AlkPhos  118  06-28    Hemoglobin A1C:     LIVER FUNCTIONS - ( 28 Jun 2022 07:20 )  Alb: 2.2 g/dL / Pro: 6.9 g/dL / ALK PHOS: 118 U/L / ALT: 27 U/L / AST: 20 U/L / GGT: x           Vitamin B12   PT/INR - ( 28 Jun 2022 07:20 )   PT: 24.8 sec;   INR: 2.10 ratio         PTT - ( 28 Jun 2022 07:20 )  PTT:31.2 sec      RADIOLOGY    ANALYSIS AND PLAN:  A 89-year-old with episode of altered mental status in regards to lethargy and generalized weakness.  For generalized weakness, suspect most likely metabolic encephalopathy secondary to respiratory issues along with decreased oral intake, suspect less likely this is a primary central nervous system event.  Suspect the patient's episodes of head possibly falling forward secondary to generalized weakness.  The patient does complain of weakness in his neck muscles as well, suspect less likely this is a primary neurological event such as Parkinson's, no other symptoms at present to suggest an underlying cause of Parkinson's.  For history of atrial fibrillation, continue the patient on anticoagulation.  repeat head ct no changes   monitor respiratory status as needed   trial of pyridostigmine awaiting ACH levels     Spoke with daughter, Leigh, at [phone] 6/28     Greater than 33  minutes of time was spent with the patient, plan of care, reviewing data, with greater than 50% of the visit was spent counseling and/or coordinating care with multidisciplinary healthcare team

## 2022-06-28 NOTE — PROGRESS NOTE ADULT - ASSESSMENT
89yo male with PMH of chronic AFib on Eliquis, CHF (EF 64%), HTN, DVT, HLD, gout, macular degeneration who presented for cough and weakness, admitted for acute on chronic heart failure exacerbation with bilateral pleural effusions, AFib with RVR and +hMPV. Rapid Response called on 6/24 for AMS, hypoxia and agonal breathing and found to hypercapnic with lactic acidosis, placed on BiPAP and transferred to ICU for further management.     Neuro:  - encephalopathy 2/2 acute hypercapnia, able to follow simple commands today, appears slightly improved from yesterday  - dc mirtazapine as may be contributing to lethargia  - continue pyridostigmine for possible MG per neuro    Cardio:  - acute on chronic HFpEF, s/p Lasix 40mg IVP x3, acetazolamide 500mg IV x1, over past few days  - diuresis with lasix 40mg IV x1 today  - AFib with RVR: continue Digoxin 125mcg qd. Continue Eliquis Eliquis 2.5mg BID   - continue midodrine 15mg po q8hrs. continue to hold home diltiazem. restart lopressor 25mg po bid    Pulm:  - Acute hypercapnic respiratory failure 2/2 acute decompensated heart failure  - able to wean off bipap today  - will give Lasix 40mg IVP today    GI:   - NPO with tube feeds    Renal:   - Cr 1.1, stable, baseline 0.8-0.9 likely MARISOL 2/2 cardiorenal syndrome  - Maintain strict Is/Os, maintain mead for closely UOP monitoring     ID:   - No evidence of acute infection, observe off antibiotics  - F/u BCx NGTD, UCx - coag negative staph, likely contaminant    Heme:  - DVT PPx: Eliquis 2.5mg BID for history of AFib, DVT    Endo:  - Blood glucose goal in -180, continue D5 mIVF    Skin:  - No lines, mead 6/27    Dispo:  - ICU  - Full code    89yo male with PMH of chronic AFib on Eliquis, CHF (EF 64%), HTN, DVT, HLD, gout, macular degeneration who presented for cough and weakness, admitted for acute on chronic heart failure exacerbation with bilateral pleural effusions, AFib with RVR and +hMPV. Rapid Response called on 6/24 for AMS, hypoxia and agonal breathing and found to hypercapnic with lactic acidosis, placed on BiPAP and transferred to ICU for further management.     Neuro:  - encephalopathy 2/2 acute hypercapnia, able to follow simple commands today, appears slightly improved from yesterday  - dc mirtazapine as may be contributing to lethargia  - continue pyridostigmine for possible MG per neuro    Cardio:  - acute on chronic HFpEF, s/p Lasix 40mg IVP x3, acetazolamide 500mg IV x1, over past few days  - diuresis with lasix 40mg IV x1 today  - AFib with RVR: continue Digoxin 125mcg qd. Continue Eliquis Eliquis 2.5mg BID   - continue midodrine 15mg po q8hrs. continue to hold home diltiazem. restart lopressor 25mg po bid    Pulm:  - Acute hypercapnic respiratory failure 2/2 acute decompensated heart failure  - able to wean off bipap today, tolerating HF NC at this time, continue to monitor  - will give Lasix 40mg IVP today    GI:   - NPO with tube feeds    Renal:   - Cr 1.1, stable, baseline 0.8-0.9 likely MARISOL 2/2 cardiorenal syndrome  - Maintain strict Is/Os, maintain mead for closely UOP monitoring     ID:   - No evidence of acute infection, observe off antibiotics  - F/u BCx NGTD, UCx - coag negative staph, likely contaminant    Heme:  - DVT PPx: Eliquis 2.5mg BID for history of AFib, DVT    Endo:  - Blood glucose goal in -180, continue D5 mIVF    Skin:  - No lines, mead 6/27    Dispo:  - ICU  - Full code

## 2022-06-28 NOTE — PROGRESS NOTE ADULT - ASSESSMENT
89y old  Male pmg afib on ac htn hld chief complaint of weakness    NIPPV trials off - as tolerated  on NEBS  GOC updated - full code at present  vs noted  labs reviewed  on Midodrine    AF management - rate and rhythm control  AC for AF  I and O  cvs rx regimen and BP control  hMPV - resp viral illness - supportive measures - Albuterol PRN for sob and or wheezing  monitor VS and HD and Sat  Pleural Eff - Atelectasis - I ze if able to tolerate - no immediate need for thoracentesis - medical management  diuresis as per CARDIO recs and clinical exam  GOC discussion  isolation precs for hMPV  cough rx regimen  replmelinda liu

## 2022-06-28 NOTE — PROGRESS NOTE ADULT - PROBLEM SELECTOR PLAN 5
Monitor BP while on antihypertensive rate controlling agents  Monitor vitals/ hemodynamics  Dose adjustments as needed

## 2022-06-28 NOTE — PROGRESS NOTE ADULT - PROBLEM SELECTOR PLAN 6
Acute on chronic CHF with preserved EF s/p IV diuresis due to pulmonary edema  No signs of peripheral fluid overload however has required intermittent IV Lasix due to Pulm edema  On digoxin- monitor levels due to risk of toxicity

## 2022-06-28 NOTE — PROGRESS NOTE ADULT - SUBJECTIVE AND OBJECTIVE BOX
MEDICAL ATTENDING NOTE    Patient is a 89y old  Male who presents with a chief complaint of weight loss (28 Jun 2022 14:37)      INTERVAL HPI/OVERNIGHT EVENTS: Bipap+    MEDICATIONS  (STANDING):  albuterol/ipratropium for Nebulization 3 milliLiter(s) Nebulizer every 6 hours  apixaban 2.5 milliGRAM(s) Oral every 12 hours  chlorhexidine 2% Cloths 1 Application(s) Topical <User Schedule>  dextrose 5%. 1000 milliLiter(s) (50 mL/Hr) IV Continuous <Continuous>  digoxin     Tablet 125 MICROGram(s) Oral daily  metoprolol tartrate 25 milliGRAM(s) Oral two times a day  midodrine 15 milliGRAM(s) Oral every 8 hours  pyridostigmine 30 milliGRAM(s) Oral every 8 hours  sodium chloride 3%  Inhalation 4 milliLiter(s) Inhalation every 6 hours    MEDICATIONS  (PRN):  acetaminophen     Tablet .. 650 milliGRAM(s) Oral every 6 hours PRN Temp greater or equal to 38C (100.4F), Mild Pain (1 - 3)      __________________________________________________  ----------------------------------------------------------------------------------  REVIEW OF SYSTEMS: did not participate in ROS due to being on bipap and lethargy+; no fever      Vital Signs Last 24 Hrs  T(C): 37.3 (28 Jun 2022 12:01), Max: 37.3 (28 Jun 2022 12:01)  T(F): 99.1 (28 Jun 2022 12:01), Max: 99.1 (28 Jun 2022 12:01)  HR: 112 (28 Jun 2022 14:12) (93 - 123)  BP: 144/91 (28 Jun 2022 14:00) (89/61 - 144/91)  BP(mean): 110 (28 Jun 2022 14:00) (70 - 110)  RR: 30 (28 Jun 2022 14:00) (19 - 41)  SpO2: 97% (28 Jun 2022 14:12) (90% - 100%)    _________________  PHYSICAL EXAM:  ---------------------------  Normocephalic; on non invasive ventilation  LUNGS - no wheezing  HEART: S1 S2+   ABDOMEN: Soft, Nontender, non distended  EXTREMITIES: no cyanosis; no edema  NERVOUS SYSTEM: minimally responsive    _________________________________________________  LABS:                        13.0   11.51 )-----------( 192      ( 28 Jun 2022 07:20 )             40.7     06-28    144  |  105  |  50<H>  ----------------------------<  104<H>  3.0<L>   |  33<H>  |  1.10    Ca    8.8      28 Jun 2022 07:20  Phos  2.6     06-28  Mg     2.4     06-28    TPro  6.9  /  Alb  2.2<L>  /  TBili  1.7<H>  /  DBili  x   /  AST  20  /  ALT  27  /  AlkPhos  118  06-28    PT/INR - ( 28 Jun 2022 07:20 )   PT: 24.8 sec;   INR: 2.10 ratio         PTT - ( 28 Jun 2022 07:20 )  PTT:31.2 sec    CAPILLARY BLOOD GLUCOSE      POCT Blood Glucose.: 117 mg/dL (28 Jun 2022 11:46)  POCT Blood Glucose.: 111 mg/dL (28 Jun 2022 05:23)  POCT Blood Glucose.: 144 mg/dL (27 Jun 2022 23:10)  POCT Blood Glucose.: 87 mg/dL (27 Jun 2022 17:27)        Clean Catch Clean Catch (Midstream)  06-24 @ 15:10   50,000 - 99,000 CFU/mL Coag Negative Staphylococcus "Susceptibilities not  performed"  --  --      .Blood Blood-Peripheral  06-24 @ 11:45   No growth to date.  --  --      .Blood Blood-Peripheral  06-24 @ 11:40   No growth to date.  --  --

## 2022-06-28 NOTE — PROGRESS NOTE ADULT - PROBLEM SELECTOR PLAN 7
- CT shows moderate bilateral pleural effusions, interlobular septal thickening in patchy ground glass opacities representing pulmonary edema- s/p IV diuretics  - CXR shows bilateral lower lobe infiltrates.  - Pulm consult follow up ongoing;  no immediate need for thoracentesis; will continue to monitor

## 2022-06-28 NOTE — PROGRESS NOTE ADULT - SUBJECTIVE AND OBJECTIVE BOX
Patient is a 89y old  Male who presents with a chief complaint of weight loss (28 Jun 2022 15:36)    24 hour events: No acute overnight events. The patient was seen and examined at bedside in morning. Patient responsive to questions, BiPAP in place. The patient appears slightly approved in mentation today.       REVIEW OF SYSTEMS  Unable to obtain      T(F): 97.9 (06-28-22 @ 15:29), Max: 99.1 (06-28-22 @ 12:01)  HR: 136 (06-28-22 @ 18:00) (97 - 136)  BP: 113/71 (06-28-22 @ 18:00) (89/61 - 144/91)  RR: 37 (06-28-22 @ 18:00) (19 - 58)  SpO2: 97% (06-28-22 @ 18:00) (90% - 100%)  Wt(kg): --            I&O's Summary    06-27 @ 07:01  -  06-28 @ 07:00  --------------------------------------------------------  IN: 830 mL / OUT: 745 mL / NET: 85 mL    06-28 @ 07:01  -  06-28 @ 18:30  --------------------------------------------------------  IN: 820 mL / OUT: 490 mL / NET: 330 mL      PHYSICAL EXAM  General: elderly, chronically ill, malnourished male  CNS: occasionally opening eyes, able to follow simple commands  HEENT: nc/at, PERRL  Resp: decreased effort but otherwise cta  CVS: irregular rate and rhythm, no murmur  Abd: soft, nt, nd  Ext: loss of muscle tone, no edema  Skin: warm and perfused, scabbing on bilateral feet      MEDICATIONS    digoxin     Tablet Oral  metoprolol tartrate Oral  midodrine Oral      albuterol/ipratropium for Nebulization Nebulizer  sodium chloride 3%  Inhalation Inhalation    acetaminophen     Tablet .. Oral PRN      apixaban Oral        dextrose 5%. IV Continuous      chlorhexidine 2% Cloths Topical    pyridostigmine Oral                          13.0   11.51 )-----------( 192      ( 28 Jun 2022 07:20 )             40.7       06-28    144  |  105  |  50<H>  ----------------------------<  104<H>  3.0<L>   |  33<H>  |  1.10    Ca    8.8      28 Jun 2022 07:20  Phos  2.6     06-28  Mg     2.4     06-28    TPro  6.9  /  Alb  2.2<L>  /  TBili  1.7<H>  /  DBili  x   /  AST  20  /  ALT  27  /  AlkPhos  118  06-28          PT/INR - ( 28 Jun 2022 07:20 )   PT: 24.8 sec;   INR: 2.10 ratio         PTT - ( 28 Jun 2022 07:20 )  PTT:31.2 sec    Clean Catch Clean Catch (Midstream)   50,000 - 99,000 CFU/mL Coag Negative Staphylococcus "Susceptibilities not  performed" -- 06-24 @ 15:10  .Blood Blood-Peripheral   No growth to date. -- 06-24 @ 11:45  .Blood Blood-Peripheral   No growth to date. -- 06-24 @ 11:40        CENTRAL LINE: NAT DUDLEY: Y                        DATE INSERTED:      6/24/2022          A-LINE: N                           GLOBAL ISSUE/BEST PRACTICE  Analgesia: none  Sedation: none  CAM-ICU: n/a  HOB elevation: yes  Stress ulcer prophylaxis: none  VTE prophylaxis: Eliquis 2.5mg BID  Glycemic control: none  Nutrition: NPO with tube feeds, on BiPAP    CODE STATUS: FULL CODE  GOC discussion: Y       Patient is a 89y old  Male who presents with a chief complaint of weight loss (28 Jun 2022 15:36)    24 hour events: No acute overnight events. The patient was seen and examined at bedside in morning. Patient responsive to questions, BiPAP in place. The patient appears slightly approved in mentation today.       REVIEW OF SYSTEMS  Unable to obtain      T(F): 97.9 (06-28-22 @ 15:29), Max: 99.1 (06-28-22 @ 12:01)  HR: 136 (06-28-22 @ 18:00) (97 - 136)  BP: 113/71 (06-28-22 @ 18:00) (89/61 - 144/91)  RR: 37 (06-28-22 @ 18:00) (19 - 58)  SpO2: 97% (06-28-22 @ 18:00) (90% - 100%)  Wt(kg): --            I&O's Summary    06-27 @ 07:01  -  06-28 @ 07:00  --------------------------------------------------------  IN: 830 mL / OUT: 745 mL / NET: 85 mL    06-28 @ 07:01  -  06-28 @ 18:30  --------------------------------------------------------  IN: 820 mL / OUT: 490 mL / NET: 330 mL      PHYSICAL EXAM  General: elderly, chronically ill, malnourished male  CNS: occasionally opening eyes, able to follow simple commands  HEENT: nc/at, PERRL  Resp: decreased effort, course basilar breath sounds  CVS: irregular rate and rhythm, no murmur  Abd: soft, nt, nd  Ext: loss of muscle tone, no edema  Skin: warm and perfused, scabbing on bilateral feet      MEDICATIONS    digoxin     Tablet Oral  metoprolol tartrate Oral  midodrine Oral      albuterol/ipratropium for Nebulization Nebulizer  sodium chloride 3%  Inhalation Inhalation    acetaminophen     Tablet .. Oral PRN      apixaban Oral        dextrose 5%. IV Continuous      chlorhexidine 2% Cloths Topical    pyridostigmine Oral                          13.0   11.51 )-----------( 192      ( 28 Jun 2022 07:20 )             40.7       06-28    144  |  105  |  50<H>  ----------------------------<  104<H>  3.0<L>   |  33<H>  |  1.10    Ca    8.8      28 Jun 2022 07:20  Phos  2.6     06-28  Mg     2.4     06-28    TPro  6.9  /  Alb  2.2<L>  /  TBili  1.7<H>  /  DBili  x   /  AST  20  /  ALT  27  /  AlkPhos  118  06-28          PT/INR - ( 28 Jun 2022 07:20 )   PT: 24.8 sec;   INR: 2.10 ratio         PTT - ( 28 Jun 2022 07:20 )  PTT:31.2 sec    Clean Catch Clean Catch (Midstream)   50,000 - 99,000 CFU/mL Coag Negative Staphylococcus "Susceptibilities not  performed" -- 06-24 @ 15:10  .Blood Blood-Peripheral   No growth to date. -- 06-24 @ 11:45  .Blood Blood-Peripheral   No growth to date. -- 06-24 @ 11:40        CENTRAL LINE: N            OCTAVIA: Y                        DATE INSERTED:      6/24/2022          A-LINE: N                           GLOBAL ISSUE/BEST PRACTICE  Analgesia: none  Sedation: none  CAM-ICU: n/a  HOB elevation: yes  Stress ulcer prophylaxis: none  VTE prophylaxis: Eliquis 2.5mg BID  Glycemic control: none  Nutrition: NPO with tube feeds, on BiPAP    CODE STATUS: FULL CODE  GOC discussion: Y       Patient is a 89y old  Male who presents with a chief complaint of weight loss (28 Jun 2022 15:36)    24 hour events: No acute overnight events. The patient was seen and examined at bedside in morning. Patient responsive to questions, BiPAP in place. The patient appears slightly approved in mentation today.       REVIEW OF SYSTEMS  Unable to obtain      T(F): 97.9 (06-28-22 @ 15:29), Max: 99.1 (06-28-22 @ 12:01)  HR: 136 (06-28-22 @ 18:00) (97 - 136)  BP: 113/71 (06-28-22 @ 18:00) (89/61 - 144/91)  RR: 37 (06-28-22 @ 18:00) (19 - 58)  SpO2: 97% (06-28-22 @ 18:00) (90% - 100%)  Wt(kg): --            I&O's Summary    06-27 @ 07:01  -  06-28 @ 07:00  --------------------------------------------------------  IN: 830 mL / OUT: 745 mL / NET: 85 mL    06-28 @ 07:01  -  06-28 @ 18:30  --------------------------------------------------------  IN: 820 mL / OUT: 490 mL / NET: 330 mL      PHYSICAL EXAM  General: elderly, chronically ill, malnourished male  CNS: occasionally opening eyes, able to follow simple commands  HEENT: nc/at, PERRL  Resp: decreased effort, course basilar breath sounds  CVS: irregular rate and rhythm, no murmur  Abd: soft, nt, nd  Ext: loss of muscle tone, no edema  Skin: warm and perfused, scabbing on bilateral feet      MEDICATIONS    digoxin     Tablet Oral  metoprolol tartrate Oral  midodrine Oral      albuterol/ipratropium for Nebulization Nebulizer  sodium chloride 3%  Inhalation Inhalation    acetaminophen     Tablet .. Oral PRN      apixaban Oral        dextrose 5%. IV Continuous      chlorhexidine 2% Cloths Topical    pyridostigmine Oral                          13.0   11.51 )-----------( 192      ( 28 Jun 2022 07:20 )             40.7       06-28    144  |  105  |  50<H>  ----------------------------<  104<H>  3.0<L>   |  33<H>  |  1.10    Ca    8.8      28 Jun 2022 07:20  Phos  2.6     06-28  Mg     2.4     06-28    TPro  6.9  /  Alb  2.2<L>  /  TBili  1.7<H>  /  DBili  x   /  AST  20  /  ALT  27  /  AlkPhos  118  06-28          PT/INR - ( 28 Jun 2022 07:20 )   PT: 24.8 sec;   INR: 2.10 ratio         PTT - ( 28 Jun 2022 07:20 )  PTT:31.2 sec    Clean Catch Clean Catch (Midstream)   50,000 - 99,000 CFU/mL Coag Negative Staphylococcus "Susceptibilities not  performed" -- 06-24 @ 15:10  .Blood Blood-Peripheral   No growth to date. -- 06-24 @ 11:45  .Blood Blood-Peripheral   No growth to date. -- 06-24 @ 11:40    POCUS--IVC without variation, normal LV function    CENTRAL LINE: N            OCTAVIA: Y                        DATE INSERTED:      6/24/2022          A-LINE: N                           GLOBAL ISSUE/BEST PRACTICE  Analgesia: none  Sedation: none  CAM-ICU: n/a  HOB elevation: yes  Stress ulcer prophylaxis: none  VTE prophylaxis: Eliquis 2.5mg BID  Glycemic control: none  Nutrition: NPO with tube feeds, on BiPAP    CODE STATUS: FULL CODE

## 2022-06-28 NOTE — PROGRESS NOTE ADULT - SUBJECTIVE AND OBJECTIVE BOX
Date/Time Patient Seen:  		  Referring MD:   Data Reviewed	       Patient is a 89y old  Male who presents with a chief complaint of weight loss (27 Jun 2022 17:31)      Subjective/HPI     PAST MEDICAL & SURGICAL HISTORY:  Atrial fibrillation    HTN (hypertension)    HLD (hyperlipidemia)    Anemia    CHF (congestive heart failure)    DVT, lower extremity          Medication list         MEDICATIONS  (STANDING):  albuterol/ipratropium for Nebulization 3 milliLiter(s) Nebulizer every 6 hours  apixaban 2.5 milliGRAM(s) Oral every 12 hours  chlorhexidine 2% Cloths 1 Application(s) Topical <User Schedule>  dextrose 5%. 1000 milliLiter(s) (50 mL/Hr) IV Continuous <Continuous>  digoxin     Tablet 125 MICROGram(s) Oral daily  midodrine 15 milliGRAM(s) Oral every 8 hours  pyridostigmine 30 milliGRAM(s) Oral every 8 hours  sodium chloride 3%  Inhalation 4 milliLiter(s) Inhalation every 6 hours    MEDICATIONS  (PRN):  acetaminophen     Tablet .. 650 milliGRAM(s) Oral every 6 hours PRN Temp greater or equal to 38C (100.4F), Mild Pain (1 - 3)         Vitals log        ICU Vital Signs Last 24 Hrs  T(C): 36.7 (28 Jun 2022 04:00), Max: 36.9 (27 Jun 2022 16:00)  T(F): 98 (28 Jun 2022 04:00), Max: 98.4 (27 Jun 2022 16:00)  HR: 123 (28 Jun 2022 05:36) (92 - 123)  BP: 103/66 (28 Jun 2022 05:00) (76/52 - 114/75)  BP(mean): 80 (28 Jun 2022 05:00) (60 - 90)  ABP: --  ABP(mean): --  RR: 34 (28 Jun 2022 05:00) (19 - 42)  SpO2: 100% (28 Jun 2022 05:36) (90% - 100%)           Input and Output:  I&O's Detail    26 Jun 2022 07:01  -  27 Jun 2022 07:00  --------------------------------------------------------  IN:    Enteral Tube Flush: 50 mL    Jevity 1.5: 50 mL    Oral Fluid: 290 mL  Total IN: 390 mL    OUT:    Indwelling Catheter - Urethral (mL): 420 mL  Total OUT: 420 mL    Total NET: -30 mL      27 Jun 2022 07:01  -  28 Jun 2022 05:49  --------------------------------------------------------  IN:    dextrose 5%: 650 mL    Enteral Tube Flush: 180 mL  Total IN: 830 mL    OUT:    Indwelling Catheter - Urethral (mL): 505 mL    Jevity 1.5: 0 mL    Voided (mL): 200 mL  Total OUT: 705 mL    Total NET: 125 mL          Lab Data                        12.5   8.64  )-----------( 162      ( 27 Jun 2022 05:41 )             38.0     06-27    145  |  106  |  49<H>  ----------------------------<  104<H>  3.2<L>   |  32<H>  |  1.10    Ca    8.8      27 Jun 2022 05:41  Phos  2.0     06-27  Mg     2.2     06-27      ABG - ( 27 Jun 2022 08:12 )  pH, Arterial: 7.48  pH, Blood: x     /  pCO2: 45    /  pO2: 93    / HCO3: 34    / Base Excess: 10.0  /  SaO2: 99.2                    Review of Systems	      Objective     Physical Examination    heart s1s2  lung dec BS  abd soft      Pertinent Lab findings & Imaging      Elmore:  NO   Adequate UO     I&O's Detail    26 Jun 2022 07:01  -  27 Jun 2022 07:00  --------------------------------------------------------  IN:    Enteral Tube Flush: 50 mL    Jevity 1.5: 50 mL    Oral Fluid: 290 mL  Total IN: 390 mL    OUT:    Indwelling Catheter - Urethral (mL): 420 mL  Total OUT: 420 mL    Total NET: -30 mL      27 Jun 2022 07:01  -  28 Jun 2022 05:49  --------------------------------------------------------  IN:    dextrose 5%: 650 mL    Enteral Tube Flush: 180 mL  Total IN: 830 mL    OUT:    Indwelling Catheter - Urethral (mL): 505 mL    Jevity 1.5: 0 mL    Voided (mL): 200 mL  Total OUT: 705 mL    Total NET: 125 mL               Discussed with:     Cultures:	        Radiology

## 2022-06-28 NOTE — PROGRESS NOTE ADULT - ATTENDING COMMENTS
89M PMH A-Fib on apixaban, HTN, HLD, and chronic diastolic heart failure, subacute anorexia and wt loss of 50lbs over last few months, now presents with acute decompensated heart failure in the setting of human metapneumovirus infection.  Course complicated by metabolic encephalopathy, acute hypercapnic respiratory failure, congestive hepatopathy, MARISOL.      --toxic/metabolic encephalopathy mildly improved today, able to follow commands  possible myesthenic gravis to account for head drop, dysphagia, continue trial of mestinon, f/u serologies  --acute hypercapnic respiratory failure  today able to tolerate HFNC for several hours with acceptable respiratory mechanics  remains volume overloaded, diuresis  --acute decompensated diastolic HF  volume overload, diuresis as above  --Afib uncontrolled since off antihtn meds from yesterday  restart low dose lopressor, continue digoxin  hypotension, continue midodrine   AC with eliquis  --dysphagia, continue TF if off BiPAP  --CKD at baseline  --no infectious concerns at this point, monitor off Abx  --hypoglycemia while NPO, cont D5  --plan discussed in detail with family at baseline

## 2022-06-28 NOTE — PROGRESS NOTE ADULT - SUBJECTIVE AND OBJECTIVE BOX
HPI: 89 y/o M w/ pmh of Afib on apixaban, htn, hld, chronic diastolic HF, subacute anorexia, wt loss of 50 months over the last few months, presented to [facility] for acute decompensated HF 2/2 to HMPV, hospital courser c/b metabolic encephalopathy, acute hypercarbic resp failure, congestive hepatopathy and MARISOL.    24 hour events:     Review of Systems: Unable to obtian 2/2 to lethargy     T(F): 99.4 (06-28-22 @ 20:49), Max: 99.4 (06-28-22 @ 20:49)  HR: 125 (06-28-22 @ 20:00) (100 - 138)  BP: 110/73 (06-28-22 @ 20:00) (97/63 - 144/91)  RR: 38 (06-28-22 @ 20:00) (25 - 58)  SpO2: 73% (06-28-22 @ 20:00) (73% - 100%)  Wt(kg): --      06-27-22 @ 07:01  -  06-28-22 @ 07:00  --------------------------------------------------------  IN: 830 mL / OUT: 745 mL / NET: 85 mL    06-28-22 @ 07:01  -  06-28-22 @ 20:56  --------------------------------------------------------  IN: 970 mL / OUT: 490 mL / NET: 480 mL        CAPILLARY BLOOD GLUCOSE      POCT Blood Glucose.: 124 mg/dL (28 Jun 2022 17:31)      I&O's Summary    27 Jun 2022 07:01  -  28 Jun 2022 07:00  --------------------------------------------------------  IN: 830 mL / OUT: 745 mL / NET: 85 mL    28 Jun 2022 07:01  -  28 Jun 2022 20:56  --------------------------------------------------------  IN: 970 mL / OUT: 490 mL / NET: 480 mL        Physical Exam:   Gen: Ill appearing male in bed  Neuro: lethargic but woken to verbal commands, able to follow basic commands only   Resp: good air entry b/l  CVS: +tacycardic  Abd: BSx4, soft, ND  Ext: +edema   Skin: cool to touch    Meds:    digoxin     Tablet Oral  metoprolol tartrate Oral  midodrine Oral      albuterol/ipratropium for Nebulization Nebulizer  sodium chloride 3%  Inhalation Inhalation    acetaminophen     Tablet .. Oral PRN      apixaban Oral        dextrose 5%. IV Continuous      chlorhexidine 2% Cloths Topical    pyridostigmine Oral                            13.0   11.51 )-----------( 192      ( 28 Jun 2022 07:20 )             40.7       06-28    144  |  105  |  50<H>  ----------------------------<  104<H>  3.0<L>   |  33<H>  |  1.10    Ca    8.8      28 Jun 2022 07:20  Phos  2.6     06-28  Mg     2.4     06-28    TPro  6.9  /  Alb  2.2<L>  /  TBili  1.7<H>  /  DBili  x   /  AST  20  /  ALT  27  /  AlkPhos  118  06-28      PT/INR - ( 28 Jun 2022 07:20 )   PT: 24.8 sec;   INR: 2.10 ratio         PTT - ( 28 Jun 2022 07:20 )  PTT:31.2 sec    Clean Catch Clean Catch (Midstream)   50,000 - 99,000 CFU/mL Coag Negative Staphylococcus "Susceptibilities not  performed" -- 06-24 @ 15:10  .Blood Blood-Peripheral   No growth to date. -- 06-24 @ 11:45  .Blood Blood-Peripheral   No growth to date. -- 06-24 @ 11:40      Radiology:     < from: Xray Chest 1 View- PORTABLE-Urgent (Xray Chest 1 View- PORTABLE-Urgent .) (06.26.22 @ 17:30) >    ACC: [id number] EXAM:  XR CHEST PORTABLE URGENT 1V                          PROCEDURE DATE:  06/26/2022          INTERPRETATION:  Clinical history: 89-year-old male, NG tube placement.    Portable view of the chest is compared to 6/24/2022 and demonstrates an   NG tube with the tip in the stomach, new.    Normal cardiac silhouette with no pneumothorax, effusions or acute   osseous findings, unchanged.    Mild pulmonary venous congestion/perihilar interstitial infiltrates,   improved.    Infiltrate the anterior segment of the right upper lobe, improved.    IMPRESSION:  NG tube with the tip in the stomach, new.    Mild pulmonary venous congestion/perihilar interstitial infiltrates,   improved    --- End of Report ---            ROMINA PABON DO; Attending Radiologist  This document has been electronically signed. Jun 28 2022  3:10PM    < end of copied text >      CENTRAL LINE: N/Y          DATE INSERTED:              REMOVE: Y/N  DUDLEY: N/Y                       DATE INSERTED:              REMOVE: Y/N  A-LINE: N/Y                       DATE INSERTED:              REMOVE: Y/N    GLOBAL ISSUE/BEST PRACTICE:  Analgesia:  Sedation:  CAM-ICU:   HOB elevation: yes  Stress ulcer prophylaxis:  VTE prophylaxis:  Glycemic control:  Nutrition:    CODE STATUS: ***    CRITICAL CARE TIME SPENT:  (Assessing presenting problems of acute illness, which pose high probability of life threatening deterioration or end organ damage/dysfunction, as well as medical decision making including initiating plan of care, reviewing data, reviewing radiologic exams, discussing with multidisciplinary team,  discussing goals of care with patient/family, and writing this note.  Non-inclusive of procedures performed)     HPI: 89 y/o M w/ pmh of Afib on apixaban, htn, hld, chronic diastolic HF, subacute anorexia, wt loss of 50 months over the last few months, presented to [facility] for acute decompensated HF 2/2 to HMPV, hospital courser c/b metabolic encephalopathy, acute hypercarbic resp failure, congestive hepatopathy and MARISOL.    24 hour events: tonight pt is on HFNC 40L/100%, called to bedside by RN for hypoxia to 80%, pt seen and examined currently reports feeling okay, placed on bipap with improvement in o2, [facility] d/w daughter regarding intubation should it become medically necessary at this time she is unsure and would like to discuss with her siblings and will call us back trent, pt will remains FULL CODE for now.     Review of Systems: Unable to obtain 2/2 to lethargy     T(F): 99.4 (06-28-22 @ 20:49), Max: 99.4 (06-28-22 @ 20:49)  HR: 125 (06-28-22 @ 20:00) (100 - 138)  BP: 110/73 (06-28-22 @ 20:00) (97/63 - 144/91)  RR: 38 (06-28-22 @ 20:00) (25 - 58)  SpO2: 73% (06-28-22 @ 20:00) (73% - 100%)  Wt(kg): --      06-27-22 @ 07:01  -  06-28-22 @ 07:00  --------------------------------------------------------  IN: 830 mL / OUT: 745 mL / NET: 85 mL    06-28-22 @ 07:01  -  06-28-22 @ 20:56  --------------------------------------------------------  IN: 970 mL / OUT: 490 mL / NET: 480 mL        CAPILLARY BLOOD GLUCOSE      POCT Blood Glucose.: 124 mg/dL (28 Jun 2022 17:31)      I&O's Summary    27 Jun 2022 07:01  -  28 Jun 2022 07:00  --------------------------------------------------------  IN: 830 mL / OUT: 745 mL / NET: 85 mL    28 Jun 2022 07:01  -  28 Jun 2022 20:56  --------------------------------------------------------  IN: 970 mL / OUT: 490 mL / NET: 480 mL        Physical Exam:   Gen: Ill appearing male in bed  Neuro: lethargic but woken to verbal commands, able to follow basic commands only   Resp: good air entry b/l  CVS: +tacycardic  Abd: BSx4, soft, ND  Ext:  no edema   Skin: warm to touch    Meds:    digoxin     Tablet Oral  metoprolol tartrate Oral  midodrine Oral      albuterol/ipratropium for Nebulization Nebulizer  sodium chloride 3%  Inhalation Inhalation    acetaminophen     Tablet .. Oral PRN      apixaban Oral        dextrose 5%. IV Continuous      chlorhexidine 2% Cloths Topical    pyridostigmine Oral                            13.0   11.51 )-----------( 192      ( 28 Jun 2022 07:20 )             40.7       06-28    144  |  105  |  50<H>  ----------------------------<  104<H>  3.0<L>   |  33<H>  |  1.10    Ca    8.8      28 Jun 2022 07:20  Phos  2.6     06-28  Mg     2.4     06-28    TPro  6.9  /  Alb  2.2<L>  /  TBili  1.7<H>  /  DBili  x   /  AST  20  /  ALT  27  /  AlkPhos  118  06-28      PT/INR - ( 28 Jun 2022 07:20 )   PT: 24.8 sec;   INR: 2.10 ratio         PTT - ( 28 Jun 2022 07:20 )  PTT:31.2 sec    Clean Catch Clean Catch (Midstream)   50,000 - 99,000 CFU/mL Coag Negative Staphylococcus "Susceptibilities not  performed" -- 06-24 @ 15:10  .Blood Blood-Peripheral   No growth to date. -- 06-24 @ 11:45  .Blood Blood-Peripheral   No growth to date. -- 06-24 @ 11:40      Radiology:     < from: Xray Chest 1 View- PORTABLE-Urgent (Xray Chest 1 View- PORTABLE-Urgent .) (06.26.22 @ 17:30) >    ACC: [id number] EXAM:  XR CHEST PORTABLE URGENT 1V                          PROCEDURE DATE:  06/26/2022          INTERPRETATION:  Clinical history: 89-year-old male, NG tube placement.    Portable view of the chest is compared to 6/24/2022 and demonstrates an   NG tube with the tip in the stomach, new.    Normal cardiac silhouette with no pneumothorax, effusions or acute   osseous findings, unchanged.    Mild pulmonary venous congestion/perihilar interstitial infiltrates,   improved.    Infiltrate the anterior segment of the right upper lobe, improved.    IMPRESSION:  NG tube with the tip in the stomach, new.    Mild pulmonary venous congestion/perihilar interstitial infiltrates,   improved    --- End of Report ---            ROMINA PABON DO; Attending Radiologist  This document has been electronically signed. Jun 28 2022  3:10PM    < end of copied text >      CENTRAL LINE: N  DUDLEY: Y  A-LINE: N    GLOBAL ISSUE/BEST PRACTICE:  Analgesia: Y  Sedation: N  CAM-ICU: n/a  HOB elevation: Y  Stress ulcer prophylaxis:  Y  VTE prophylaxis: Y   Glycemic control: Y  Nutrition: Y    CODE STATUS: FULL CODE    CRITICAL CARE TIME SPENT: 35 mins  (Assessing presenting problems of acute illness, which pose high probability of life threatening deterioration or end organ damage/dysfunction, as well as medical decision making including initiating plan of care, reviewing data, reviewing radiologic exams, discussing with multidisciplinary team,  discussing goals of care with patient/family, and writing this note.  Non-inclusive of procedures performed)

## 2022-06-29 NOTE — PROVIDER CONTACT NOTE (EICU) - ACTION/TREATMENT ORDERED:
Phenylephrine infusion order placed. Vital signs, response to vasopressor therapy to be followed up by primary bedside team.

## 2022-06-29 NOTE — PROGRESS NOTE ADULT - ASSESSMENT
89y old  Male pmg afib on ac htn hld chief complaint of weakness    NIPPV  IVF  NEBS  on AC  VS noted  labs reviewed      AF management - rate and rhythm control  AC for AF  I and O  cvs rx regimen and BP control  hMPV - resp viral illness - supportive measures - Albuterol PRN for sob and or wheezing  monitor VS and HD and Sat  Pleural Eff - Atelectasis - I ze if able to tolerate - no immediate need for thoracentesis - medical management  diuresis as per CARDIO recs and clinical exam  GOC discussion  isolation precs for hMPV  cough rx regimen  replmelinda liu

## 2022-06-29 NOTE — PROGRESS NOTE ADULT - PROBLEM SELECTOR PLAN 4
Heart rate better controlled.  Continue to monitor.  Continue Digoxin; check digoxin level due to potential toxicity; adjust dose as needed  Cardiac monitoring  Cardiology consult follow up ongoing Heart rate better controlled.  Continue to monitor.  Continue Digoxin; monitor digoxin level due to potential toxicity; adjust dose as needed  Cardiology consult follow up ongoing
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
## 2022-06-29 NOTE — PROGRESS NOTE ADULT - SUBJECTIVE AND OBJECTIVE BOX
MEDICAL ATTENDING NOTE    Patient is a 89y old  Male who presents with a chief complaint of weight loss (29 Jun 2022 14:40)      INTERVAL HPI/OVERNIGHT EVENTS: offers no new complaints today    MEDICATIONS  (STANDING):  albuterol/ipratropium for Nebulization 3 milliLiter(s) Nebulizer every 6 hours  apixaban 2.5 milliGRAM(s) Oral every 12 hours  chlorhexidine 2% Cloths 1 Application(s) Topical <User Schedule>  dextrose 5%. 1000 milliLiter(s) (50 mL/Hr) IV Continuous <Continuous>  digoxin     Tablet 125 MICROGram(s) Oral daily  metoprolol tartrate 25 milliGRAM(s) Oral every 6 hours  midodrine 15 milliGRAM(s) Oral every 8 hours  pyridostigmine 30 milliGRAM(s) Oral every 8 hours  sodium chloride 3%  Inhalation 4 milliLiter(s) Inhalation every 6 hours    MEDICATIONS  (PRN):  acetaminophen     Tablet .. 650 milliGRAM(s) Oral every 6 hours PRN Temp greater or equal to 38C (100.4F), Mild Pain (1 - 3)      __________________________________________________  ----------------------------------------------------------------------------------  REVIEW OF SYSTEMS: negative      Vital Signs Last 24 Hrs  T(C): 36.9 (29 Jun 2022 08:59), Max: 37.9 (28 Jun 2022 23:40)  T(F): 98.4 (29 Jun 2022 08:59), Max: 100.3 (28 Jun 2022 23:40)  HR: 97 (29 Jun 2022 14:14) (97 - 138)  BP: 71/50 (29 Jun 2022 14:00) (63/48 - 147/79)  BP(mean): 56 (29 Jun 2022 14:00) (53 - 107)  RR: 45 (29 Jun 2022 14:00) (28 - 59)  SpO2: 92% (29 Jun 2022 14:14) (71% - 100%)    _________________  PHYSICAL EXAM:  ---------------------------   NAD; Normocephalic;   LUNGS - no wheezing  HEART: S1 S2+   ABDOMEN: Soft, Nontender, non distended  EXTREMITIES: no cyanosis; no edema  NERVOUS SYSTEM:  Awake and alert; no focal neuro deficits appreciated    _________________________________________________  LABS:                        13.1   9.11  )-----------( 188      ( 29 Jun 2022 05:35 )             40.1     06-29    141  |  105  |  56<H>  ----------------------------<  143<H>  3.5   |  28  |  1.50<H>    Ca    8.6      29 Jun 2022 05:35  Phos  2.3     06-29  Mg     2.3     06-29    TPro  6.7  /  Alb  2.1<L>  /  TBili  1.8<H>  /  DBili  x   /  AST  24  /  ALT  25  /  AlkPhos  99  06-29    PT/INR - ( 28 Jun 2022 07:20 )   PT: 24.8 sec;   INR: 2.10 ratio         PTT - ( 28 Jun 2022 07:20 )  PTT:31.2 sec    CAPILLARY BLOOD GLUCOSE      POCT Blood Glucose.: 127 mg/dL (29 Jun 2022 11:50)  POCT Blood Glucose.: 124 mg/dL (28 Jun 2022 17:31)                           MEDICAL ATTENDING NOTE    Patient is a 89y old  Male who presents with a chief complaint of weight loss (29 Jun 2022 14:40)      INTERVAL HPI/OVERNIGHT EVENTS: still lethargic but intermittently responds with a nod    MEDICATIONS  (STANDING):  albuterol/ipratropium for Nebulization 3 milliLiter(s) Nebulizer every 6 hours  apixaban 2.5 milliGRAM(s) Oral every 12 hours  chlorhexidine 2% Cloths 1 Application(s) Topical <User Schedule>  dextrose 5%. 1000 milliLiter(s) (50 mL/Hr) IV Continuous <Continuous>  digoxin     Tablet 125 MICROGram(s) Oral daily  metoprolol tartrate 25 milliGRAM(s) Oral every 6 hours  midodrine 15 milliGRAM(s) Oral every 8 hours  pyridostigmine 30 milliGRAM(s) Oral every 8 hours  sodium chloride 3%  Inhalation 4 milliLiter(s) Inhalation every 6 hours    MEDICATIONS  (PRN):  acetaminophen     Tablet .. 650 milliGRAM(s) Oral every 6 hours PRN Temp greater or equal to 38C (100.4F), Mild Pain (1 - 3)      __________________________________________________  ----------------------------------------------------------------------------------  REVIEW OF SYSTEMS: no fever; no chest pain;       Vital Signs Last 24 Hrs  T(C): 36.9 (29 Jun 2022 08:59), Max: 37.9 (28 Jun 2022 23:40)  T(F): 98.4 (29 Jun 2022 08:59), Max: 100.3 (28 Jun 2022 23:40)  HR: 97 (29 Jun 2022 14:14) (97 - 138)  BP: 71/50 (29 Jun 2022 14:00) (63/48 - 147/79)  BP(mean): 56 (29 Jun 2022 14:00) (53 - 107)  RR: 45 (29 Jun 2022 14:00) (28 - 59)  SpO2: 92% (29 Jun 2022 14:14) (71% - 100%)    _________________  PHYSICAL EXAM:  ---------------------------  NAD; cachexia++  LUNGS - no wheezing; Hi Flow+ NC  HEART: S1 S2+   ABDOMEN: Soft, Nontender, non distended; BS+  EXTREMITIES: no cyanosis; no edema  NERVOUS SYSTEM: still obtunded but responds intermittently with a nod to his name and very simple questions     _________________________________________________  LABS:                        13.1   9.11  )-----------( 188      ( 29 Jun 2022 05:35 )             40.1     06-29    141  |  105  |  56<H>  ----------------------------<  143<H>  3.5   |  28  |  1.50<H>    Ca    8.6      29 Jun 2022 05:35  Phos  2.3     06-29  Mg     2.3     06-29    TPro  6.7  /  Alb  2.1<L>  /  TBili  1.8<H>  /  DBili  x   /  AST  24  /  ALT  25  /  AlkPhos  99  06-29    PT/INR - ( 28 Jun 2022 07:20 )   PT: 24.8 sec;   INR: 2.10 ratio         PTT - ( 28 Jun 2022 07:20 )  PTT:31.2 sec    CAPILLARY BLOOD GLUCOSE      POCT Blood Glucose.: 127 mg/dL (29 Jun 2022 11:50)  POCT Blood Glucose.: 124 mg/dL (28 Jun 2022 17:31)
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
## 2022-06-29 NOTE — PROVIDER CONTACT NOTE (EICU) - SITUATION
Patient experiencing runs of 6 bts of v tach on tele within 30 second time frame, patient would break followed by a 6 beat run
e-Alerted by bedside team requesting order for Phenylephrine infusion for hypotension (MAP <65).

## 2022-06-29 NOTE — CHART NOTE - NSCHARTNOTESELECT_GEN_ALL_CORE
Event Note
MICU Attending Accept Note/Event Note
Event Note
Nutrition Services
POCUS/Event Note
Rapid Response
restraints/Event Note

## 2022-06-29 NOTE — PROGRESS NOTE ADULT - SUBJECTIVE AND OBJECTIVE BOX
Patient is a 89y old  Male who presents with a chief complaint of weight loss (29 Jun 2022 09:24)    24 hour events: ***    REVIEW OF SYSTEMS  Constitutional: No fever, chills, fatigue  Neuro: No headache, numbness, weakness  Resp: No cough, wheezing, shortness of breath  CVS: No chest pain, palpitations, leg swelling  GI: No abdominal pain, nausea, vomiting, diarrhea   : No dysuria, frequency, incontinence  Skin: No itching, burning, rashes, or lesions   Msk: No joint pain or swelling  Psych: No depression, anxiety, mood swings  Heme: No bleeding    T(F): 98.4 (06-29-22 @ 08:59), Max: 100.3 (06-28-22 @ 23:40)  HR: 97 (06-29-22 @ 14:14) (97 - 138)  BP: 71/50 (06-29-22 @ 14:00) (63/48 - 147/79)  RR: 45 (06-29-22 @ 14:00) (28 - 59)  SpO2: 92% (06-29-22 @ 14:14) (71% - 100%)  Wt(kg): --            I&O's Summary    06-28 @ 07:01  -  06-29 @ 07:00  --------------------------------------------------------  IN: 1590 mL / OUT: 810 mL / NET: 780 mL    06-29 @ 07:01  -  06-29 @ 14:40  --------------------------------------------------------  IN: 870 mL / OUT: 40 mL / NET: 830 mL      PHYSICAL EXAM  General:   CNS:   HEENT:   Resp:   CVS:   Abd:   Ext:   Skin:     MEDICATIONS    digoxin     Tablet Oral  metoprolol tartrate Oral  midodrine Oral      albuterol/ipratropium for Nebulization Nebulizer  sodium chloride 3%  Inhalation Inhalation    acetaminophen     Tablet .. Oral PRN      apixaban Oral        dextrose 5%. IV Continuous      chlorhexidine 2% Cloths Topical    pyridostigmine Oral                          13.1   9.11  )-----------( 188      ( 29 Jun 2022 05:35 )             40.1       06-29    141  |  105  |  56<H>  ----------------------------<  143<H>  3.5   |  28  |  1.50<H>    Ca    8.6      29 Jun 2022 05:35  Phos  2.3     06-29  Mg     2.3     06-29    TPro  6.7  /  Alb  2.1<L>  /  TBili  1.8<H>  /  DBili  x   /  AST  24  /  ALT  25  /  AlkPhos  99  06-29          PT/INR - ( 28 Jun 2022 07:20 )   PT: 24.8 sec;   INR: 2.10 ratio         PTT - ( 28 Jun 2022 07:20 )  PTT:31.2 sec    Clean Catch Clean Catch (Midstream)   50,000 - 99,000 CFU/mL Coag Negative Staphylococcus "Susceptibilities not  performed" -- 06-24 @ 15:10        Radiology: ***  Bedside lung ultrasound: ***  Bedside ECHO: ***    CENTRAL LINE: Y/N          DATE INSERTED:              REMOVE: Y/N  DUDLEY: Y/N                        DATE INSERTED:              REMOVE: Y/N  A-LINE: Y/N                       DATE INSERTED:              REMOVE: Y/N    GLOBAL ISSUE/BEST PRACTICE  Analgesia:   Sedation:   CAM-ICU:   HOB elevation: yes  Stress ulcer prophylaxis:   VTE prophylaxis:   Glycemic control:   Nutrition:     CODE STATUS: ***  [facility] discussion: Y       Patient is a 89y old  Male who presents with a chief complaint of weight loss (29 Jun 2022 09:24)    24 hour events: Patient with desat last evening to 80% while on HF NC at 40L and 100%. Pt placed on BiPAP with improvement in saturation. Patient weaned to HF NC at 40 L and 100% this morning. Patient seen and examined at bedside. Pt opening eyes, responsive to some simple commands.      REVIEW OF SYSTEMS  Unable to obtain    T(F): 98.4 (06-29-22 @ 08:59), Max: 100.3 (06-28-22 @ 23:40)  HR: 97 (06-29-22 @ 14:14) (97 - 138)  BP: 71/50 (06-29-22 @ 14:00) (63/48 - 147/79)  RR: 45 (06-29-22 @ 14:00) (28 - 59)  SpO2: 92% (06-29-22 @ 14:14) (71% - 100%)  Wt(kg): --            I&O's Summary    06-28 @ 07:01  -  06-29 @ 07:00  --------------------------------------------------------  IN: 1590 mL / OUT: 810 mL / NET: 780 mL    06-29 @ 07:01  -  06-29 @ 14:40  --------------------------------------------------------  IN: 870 mL / OUT: 40 mL / NET: 830 mL      PHYSICAL EXAM  General: elderly, chronically ill, malnourished male  CNS: opening eyes, able to follow simple commands  HEENT: nc/at, PERRL  Resp: decreased effort, course basilar breath sounds  CVS: tachy rate, irregular rhythm, no murmur  Abd: soft, nt, nd  Ext: loss of muscle tone, no edema  Skin: warm and perfused, scabbing on bilateral feet      MEDICATIONS    digoxin     Tablet Oral  metoprolol tartrate Oral  midodrine Oral      albuterol/ipratropium for Nebulization Nebulizer  sodium chloride 3%  Inhalation Inhalation    acetaminophen     Tablet .. Oral PRN      apixaban Oral        dextrose 5%. IV Continuous      chlorhexidine 2% Cloths Topical    pyridostigmine Oral                          13.1   9.11  )-----------( 188      ( 29 Jun 2022 05:35 )             40.1       06-29    141  |  105  |  56<H>  ----------------------------<  143<H>  3.5   |  28  |  1.50<H>    Ca    8.6      29 Jun 2022 05:35  Phos  2.3     06-29  Mg     2.3     06-29    TPro  6.7  /  Alb  2.1<L>  /  TBili  1.8<H>  /  DBili  x   /  AST  24  /  ALT  25  /  AlkPhos  99  06-29          PT/INR - ( 28 Jun 2022 07:20 )   PT: 24.8 sec;   INR: 2.10 ratio         PTT - ( 28 Jun 2022 07:20 )  PTT:31.2 sec    Clean Catch Clean Catch (Midstream)   50,000 - 99,000 CFU/mL Coag Negative Staphylococcus "Susceptibilities not  performed" -- 06-24 @ 15:10          CENTRAL LINE: N            DUDLEY: LYNNE                      A-LINE: N                           GLOBAL ISSUE/BEST PRACTICE  Analgesia: none  Sedation: none  CAM-ICU: n/a  HOB elevation: yes  Stress ulcer prophylaxis: none  VTE prophylaxis: Eliquis 2.5mg BID  Glycemic control: none  Nutrition: NPO with tube feeds    CODE STATUS: FULL CODE       Patient is a 89y old  Male who presents with a chief complaint of weight loss (29 Jun 2022 09:24)    24 hour events: Patient with desat last evening to 80% while on HF NC at 40L and 100%. Pt placed on BiPAP with improvement in saturation. Patient weaned to HF NC at 40 L and 100% this morning. Pt also with episode afib rvr 140s overnight, s/p amio IV x1. Patient seen and examined at bedside. Pt opening eyes, responsive to some simple commands.      REVIEW OF SYSTEMS  Unable to obtain    T(F): 98.4 (06-29-22 @ 08:59), Max: 100.3 (06-28-22 @ 23:40)  HR: 97 (06-29-22 @ 14:14) (97 - 138)  BP: 71/50 (06-29-22 @ 14:00) (63/48 - 147/79)  RR: 45 (06-29-22 @ 14:00) (28 - 59)  SpO2: 92% (06-29-22 @ 14:14) (71% - 100%)  Wt(kg): --            I&O's Summary    06-28 @ 07:01  -  06-29 @ 07:00  --------------------------------------------------------  IN: 1590 mL / OUT: 810 mL / NET: 780 mL    06-29 @ 07:01  -  06-29 @ 14:40  --------------------------------------------------------  IN: 870 mL / OUT: 40 mL / NET: 830 mL      PHYSICAL EXAM  General: elderly, chronically ill, malnourished male  CNS: opening eyes, able to follow simple commands  HEENT: nc/at, PERRL  Resp: decreased effort, course basilar breath sounds  CVS: tachy rate, irregular rhythm, no murmur  Abd: soft, nt, nd  Ext: loss of muscle tone, no edema  Skin: warm and perfused, scabbing on bilateral feet      MEDICATIONS    digoxin     Tablet Oral  metoprolol tartrate Oral  midodrine Oral      albuterol/ipratropium for Nebulization Nebulizer  sodium chloride 3%  Inhalation Inhalation    acetaminophen     Tablet .. Oral PRN      apixaban Oral        dextrose 5%. IV Continuous      chlorhexidine 2% Cloths Topical    pyridostigmine Oral                          13.1   9.11  )-----------( 188      ( 29 Jun 2022 05:35 )             40.1       06-29    141  |  105  |  56<H>  ----------------------------<  143<H>  3.5   |  28  |  1.50<H>    Ca    8.6      29 Jun 2022 05:35  Phos  2.3     06-29  Mg     2.3     06-29    TPro  6.7  /  Alb  2.1<L>  /  TBili  1.8<H>  /  DBili  x   /  AST  24  /  ALT  25  /  AlkPhos  99  06-29          PT/INR - ( 28 Jun 2022 07:20 )   PT: 24.8 sec;   INR: 2.10 ratio         PTT - ( 28 Jun 2022 07:20 )  PTT:31.2 sec    Clean Catch Clean Catch (Midstream)   50,000 - 99,000 CFU/mL Coag Negative Staphylococcus "Susceptibilities not  performed" -- 06-24 @ 15:10          CENTRAL LINE: N            DUDLEY: Y                      A-LINE: N                           GLOBAL ISSUE/BEST PRACTICE  Analgesia: none  Sedation: none  CAM-ICU: n/a  HOB elevation: yes  Stress ulcer prophylaxis: none  VTE prophylaxis: Eliquis 2.5mg BID  Glycemic control: none  Nutrition: NPO with tube feeds    CODE STATUS: FULL CODE       Patient is a 89y old  Male who presents with a chief complaint of weight loss (29 Jun 2022 09:24)    24 hour events: yesterday tolerated about 6hrs off BiPAP, Patient with desat last evening to 80% while on HF NC at 40L and 100%. Pt placed on BiPAP with improvement in saturation. Patient weaned to HF NC at 40 L and 100% this morning. Pt also with episode afib rvr 140s overnight, s/p amio IV x1. Patient seen and examined at bedside. Pt opening eyes, responsive to some simple commands.      REVIEW OF SYSTEMS  Unable to obtain    T(F): 98.4 (06-29-22 @ 08:59), Max: 100.3 (06-28-22 @ 23:40)  HR: 97 (06-29-22 @ 14:14) (97 - 138)  BP: 71/50 (06-29-22 @ 14:00) (63/48 - 147/79)  RR: 45 (06-29-22 @ 14:00) (28 - 59)  SpO2: 92% (06-29-22 @ 14:14) (71% - 100%)  Wt(kg): --            I&O's Summary    06-28 @ 07:01  -  06-29 @ 07:00  --------------------------------------------------------  IN: 1590 mL / OUT: 810 mL / NET: 780 mL    06-29 @ 07:01  -  06-29 @ 14:40  --------------------------------------------------------  IN: 870 mL / OUT: 40 mL / NET: 830 mL      PHYSICAL EXAM  General: elderly, chronically ill, malnourished male  CNS: opening eyes, able to follow simple commands  HEENT: nc/at, PERRL  Resp: decreased effort, course basilar breath sounds  CVS: tachy rate, irregular rhythm, no murmur  Abd: soft, nt, nd  Ext: loss of muscle tone, no edema  Skin: warm and perfused, scabbing on bilateral feet      MEDICATIONS    digoxin     Tablet Oral  metoprolol tartrate Oral  midodrine Oral      albuterol/ipratropium for Nebulization Nebulizer  sodium chloride 3%  Inhalation Inhalation    acetaminophen     Tablet .. Oral PRN      apixaban Oral        dextrose 5%. IV Continuous      chlorhexidine 2% Cloths Topical    pyridostigmine Oral                          13.1   9.11  )-----------( 188      ( 29 Jun 2022 05:35 )             40.1       06-29    141  |  105  |  56<H>  ----------------------------<  143<H>  3.5   |  28  |  1.50<H>    Ca    8.6      29 Jun 2022 05:35  Phos  2.3     06-29  Mg     2.3     06-29    TPro  6.7  /  Alb  2.1<L>  /  TBili  1.8<H>  /  DBili  x   /  AST  24  /  ALT  25  /  AlkPhos  99  06-29          PT/INR - ( 28 Jun 2022 07:20 )   PT: 24.8 sec;   INR: 2.10 ratio         PTT - ( 28 Jun 2022 07:20 )  PTT:31.2 sec    Clean Catch Clean Catch (Midstream)   50,000 - 99,000 CFU/mL Coag Negative Staphylococcus "Susceptibilities not  performed" -- 06-24 @ 15:10          CENTRAL LINE: N            DUDLEY: Y                      A-LINE: N                           GLOBAL ISSUE/BEST PRACTICE  Analgesia: none  Sedation: none  CAM-ICU: n/a  HOB elevation: yes  Stress ulcer prophylaxis: none  VTE prophylaxis: Eliquis 2.5mg BID  Glycemic control: none  Nutrition: NPO with tube feeds    CODE STATUS: FULL CODE

## 2022-06-29 NOTE — PROGRESS NOTE ADULT - PROBLEM SELECTOR PLAN 9
Eliquis 2.5mg bid for AC    GOC discussion with family on 6/23, pt will remain full code for now; may need to re-visit GOC conversation pending ICU course Chay Rucker

## 2022-06-29 NOTE — PROGRESS NOTE ADULT - ATTENDING COMMENTS
89M PMH A-Fib on apixaban, HTN, HLD, and chronic diastolic heart failure, subacute anorexia and wt loss of 50lbs over last few months, now presents with acute decompensated heart failure in the setting of human metapneumovirus infection.  Course complicated by metabolic encephalopathy, acute hypercapnic respiratory failure, congestive hepatopathy, MARISOL.      --toxic/metabolic encephalopathy persists  possible myesthenic gravis to account for head drop, dysphagia, continue trial of mestinon, f/u serologies  --acute hypercapnic respiratory failure  today able to tolerate HFNC for 6 hours then developed labored respiratons and hypotension  --acute decompensated diastolic HF  hold diuresis for now given worsening MARISOL  hypotension, continue midodrine   --Afib inadequately controlled, continue digoxin, uptitrate lopressor if tolerates  AC with eliquis  --dysphagia, continue TF if off BiPAP  --mild MARISOL on CKD   --no infectious concerns at this point, monitor off Abx  --hypoglycemia while NPO, cont D5  --plan discussed in detail with family at baseline

## 2022-06-29 NOTE — PROGRESS NOTE ADULT - PROBLEM SELECTOR PLAN 3
- Symptomatic treatment- Tylenol PRN fever and guaifenesin-DM PRN cough, Supp O2 prn  - off isolation  Monitor - Symptomatic treatment  - Supplemental oxygen ongoing  - off isolation  Prognosis guarded

## 2022-06-29 NOTE — PROGRESS NOTE ADULT - CONVERSATION DETAILS
Discussed prognosis with children.  They understand that if pt was to require mechanical ventilation it is unlikely that he would be successfully weaned from the vent. They do not want him to suffer.  Children are in agreement with DNR, DNI.  We will continue current course of treatment in the hopes that he may improve, but understand that he may deteriorate in which case they would consider hospice care.    We also discussed that even with the best realistic outcome, it is unlikely pt would be able to live independently at home as he was prior to hospitalization.

## 2022-06-29 NOTE — PROGRESS NOTE ADULT - ASSESSMENT
89yo male with PMH of chronic AFib on Eliquis, CHF (EF 64%), HTN, DVT, HLD, gout, macular degeneration who presented for cough and weakness, admitted for acute on chronic heart failure exacerbation with bilateral pleural effusions, AFib with RVR and +hMPV. Rapid Response called on 6/24 for AMS, hypoxia and agonal breathing and found to hypercapnic with lactic acidosis, placed on BiPAP and transferred to ICU for further management.     Neuro:  - encephalopathy 2/2 acute hypercapnia, opening eyes and able to follow simple commands   - continue pyridostigmine for possible MG per neuro    Cardio:  - acute on chronic HFpEF, s/p Lasix 40mg IV, acetazolamide 500mg IV doses  - hold diuretics as patient does not appear volume overloaded and with new MARIOSL  - AFib with RVR: overnight with afib RVR 140s, s/p amio IV x1. continue Digoxin 125mcg qd, increase lopressor 25mg po bid to q6hrs. continue Eliquis 2.5mg BID. continue to hold home diltiazem.  - continue midodrine 15mg po q8hrs.     Pulm:  - Acute hypercapnic respiratory failure 2/2 acute decompensated heart failure  - became hypoxic overnight on HFNC, placed back on BIPAP and was able to wean to HFNC again this morning  - attempt to wean off HFNC if able    GI:   - NPO with tube feeds    Renal:   - MARISOL with Cr 1.1 -> 1.5, likely prerenal, monitor renal indices  - Maintain strict Is/Os, maintain mead for closely UOP monitoring     ID:   - No evidence of acute infection, observe off antibiotics  - BCx NGTD, UCx gram+ cocci coag negative, likely contaminant    Heme:  - DVT PPx: Eliquis 2.5mg BID for history of AFib, DVT    Endo:  - Blood glucose goal in -180, continue D5 mIVF    Skin:  - No lines, mead 6/27    Dispo:  - ICU  - Full code   89yo male with PMH of chronic AFib on Eliquis, CHF (EF 64%), HTN, DVT, HLD, gout, macular degeneration who presented for cough and weakness, admitted for acute on chronic heart failure exacerbation with bilateral pleural effusions, AFib with RVR and +hMPV. Rapid Response called on 6/24 for AMS, hypoxia and agonal breathing and found to hypercapnic with lactic acidosis, placed on BiPAP and transferred to ICU for further management.     Neuro:  - encephalopathy 2/2 acute hypercapnia, opening eyes and able to follow simple commands   - continue pyridostigmine for possible MG per neuro, negative ach receptor ab, f/u other serologies    Cardio:  - acute on chronic HFpEF, s/p Lasix 40mg IV, acetazolamide 500mg IV doses  - hold diuretics as patient does not appear volume overloaded and with new MARISOL  - AFib with RVR: overnight with afib RVR 140s, s/p amio IV x1. continue Digoxin 125mcg qd, increase lopressor 25mg po bid to q6hrs. continue Eliquis 2.5mg BID. continue to hold home diltiazem.  - continue midodrine 15mg po q8hrs.     Pulm:  - Acute hypercapnic respiratory failure 2/2 acute decompensated heart failure  - became hypoxic overnight on HFNC, placed back on BIPAP and was able to wean to HFNC again this morning  - attempt to wean off HFNC if able    GI:   - NPO with tube feeds    Renal:   - MARISOL with Cr 1.1 -> 1.5, likely prerenal, monitor renal indices  - Maintain strict Is/Os, maintain mead for closely UOP monitoring     ID:   - No evidence of acute infection, observe off antibiotics  - BCx NGTD, UCx gram+ cocci coag negative, likely contaminant    Heme:  - DVT PPx: Eliquis 2.5mg BID for history of AFib, DVT    Endo:  - Blood glucose goal in -180, continue D5 mIVF    Skin:  - No lines, mead 6/27    Dispo:  - ICU  - Full code

## 2022-06-29 NOTE — PROGRESS NOTE ADULT - PROBLEM SELECTOR PLAN 6
Acute on chronic CHF with preserved EF s/p IV diuresis due to pulmonary edema  No signs of peripheral fluid overload however has required intermittent IV Lasix due to Pulm edema  On digoxin- monitor levels due to risk of toxicity Acute on chronic CHF with preserved EF s/p IV diuresis due to pulmonary edema  No signs of peripheral fluid overload now however has required intermittent IV Lasix due to Pulm edema  On digoxin

## 2022-06-29 NOTE — PROGRESS NOTE ADULT - PROBLEM SELECTOR PLAN 2
Acute metabolic encephalopathy likely related to hypercapnia- continue bipap   Prognosis is guarded Acute metabolic encephalopathy likely related to hypercapnia- continue HFNC + intermittent BiPAP   Prognosis is guarded

## 2022-06-29 NOTE — PROGRESS NOTE ADULT - SUBJECTIVE AND OBJECTIVE BOX
Neurology follow up note    ROBERT JOHNSON89yMale      Interval History:      Patient resting in bed on bipap     Allergies    No Known Allergies    Intolerances        MEDICATIONS    acetaminophen     Tablet .. 650 milliGRAM(s) Oral every 6 hours PRN  albuterol/ipratropium for Nebulization 3 milliLiter(s) Nebulizer every 6 hours  apixaban 2.5 milliGRAM(s) Oral every 12 hours  chlorhexidine 2% Cloths 1 Application(s) Topical <User Schedule>  dextrose 5%. 1000 milliLiter(s) IV Continuous <Continuous>  digoxin     Tablet 125 MICROGram(s) Oral daily  metoprolol tartrate 25 milliGRAM(s) Oral two times a day  midodrine 15 milliGRAM(s) Oral every 8 hours  pyridostigmine 30 milliGRAM(s) Oral every 8 hours  sodium chloride 3%  Inhalation 4 milliLiter(s) Inhalation every 6 hours              Vital Signs Last 24 Hrs  T(C): 36.9 (29 Jun 2022 08:59), Max: 37.9 (28 Jun 2022 23:40)  T(F): 98.4 (29 Jun 2022 08:59), Max: 100.3 (28 Jun 2022 23:40)  HR: 116 (29 Jun 2022 09:00) (112 - 138)  BP: 87/52 (29 Jun 2022 09:00) (63/48 - 147/79)  BP(mean): 62 (29 Jun 2022 09:00) (53 - 110)  RR: 59 (29 Jun 2022 09:00) (26 - 59)  SpO2: 92% (29 Jun 2022 09:00) (73% - 100%)      REVIEW OF SYSTEMS:  unable to obtain  on bipap     PHYSICAL EXAMINATION:  GENERAL:  The patient does appear to be cachectic in appearance.   HEENT:  Head:  Normocephalic, atraumatic.  Eyes:  No scleral icterus.  Ears:  Hard of hearing.  NECK:  Supple.  CARDIOVASCULAR:  S1 and S2 heard.  RESPIRATORY:  Decreased breath sounds bilaterally.  ABDOMEN:  Soft, nontender.  EXTREMITIES:  No clubbing or cyanosis was noted.     NEUROLOGIC:  The patient is lethargic   The patient has poor vision out of both eyes, which is his baseline.  Speech was fluent.  No dysarthria.  Motor:  Bilateral upper slight flexion at elbows   bilateral lower 3-/5.   previous exam No head tremors were noted.  No resting tremors were noted.  Upper extremities, no cogwheel rigidity was noted.  No significant bradykinesia was noted.                   LABS:  CBC Full  -  ( 29 Jun 2022 05:35 )  WBC Count : 9.11 K/uL  RBC Count : 3.99 M/uL  Hemoglobin : 13.1 g/dL  Hematocrit : 40.1 %  Platelet Count - Automated : 188 K/uL  Mean Cell Volume : 100.5 fl  Mean Cell Hemoglobin : 32.8 pg  Mean Cell Hemoglobin Concentration : 32.7 gm/dL  Auto Neutrophil # : 7.85 K/uL  Auto Lymphocyte # : 0.63 K/uL  Auto Monocyte # : 0.54 K/uL  Auto Eosinophil # : 0.00 K/uL  Auto Basophil # : 0.03 K/uL  Auto Neutrophil % : 86.2 %  Auto Lymphocyte % : 6.9 %  Auto Monocyte % : 5.9 %  Auto Eosinophil % : 0.0 %  Auto Basophil % : 0.3 %      06-29    141  |  105  |  56<H>  ----------------------------<  143<H>  3.5   |  28  |  1.50<H>    Ca    8.6      29 Jun 2022 05:35  Phos  2.3     06-29  Mg     2.3     06-29    TPro  6.7  /  Alb  2.1<L>  /  TBili  1.8<H>  /  DBili  x   /  AST  24  /  ALT  25  /  AlkPhos  99  06-29    Hemoglobin A1C:     LIVER FUNCTIONS - ( 29 Jun 2022 05:35 )  Alb: 2.1 g/dL / Pro: 6.7 g/dL / ALK PHOS: 99 U/L / ALT: 25 U/L / AST: 24 U/L / GGT: x           Vitamin B12   PT/INR - ( 28 Jun 2022 07:20 )   PT: 24.8 sec;   INR: 2.10 ratio         PTT - ( 28 Jun 2022 07:20 )  PTT:31.2 sec      RADIOLOGY      ANALYSIS AND PLAN:  A 89-year-old with episode of altered mental status in regards to lethargy and generalized weakness.  For generalized weakness, suspect most likely metabolic encephalopathy secondary to respiratory issues along with decreased oral intake, suspect less likely this is a primary central nervous system event.  Suspect the patient's episodes of head possibly falling forward secondary to generalized weakness.  The patient does complain of weakness in his neck muscles as well, suspect less likely this is a primary neurological event such as Parkinson's, no other symptoms at present to suggest an underlying cause of Parkinson's.  For history of atrial fibrillation, continue the patient on anticoagulation.  repeat head ct no changes   monitor respiratory status as needed   trial of pyridostigmine awaiting ACH levels   as per daughter was off bipap yesterday was talking to her appropriately 6/28    Spoke with daughter, Leigh, at [phone] 6/29     Greater than 33 minutes of time was spent with the patient, plan of care, reviewing data, with greater than 50% of the visit was spent counseling and/or coordinating care with multidisciplinary healthcare team

## 2022-06-29 NOTE — PROGRESS NOTE ADULT - SUBJECTIVE AND OBJECTIVE BOX
Date/Time Patient Seen:  		  Referring MD:   Data Reviewed	       Patient is a 89y old  Male who presents with a chief complaint of weight loss (28 Jun 2022 20:55)      Subjective/HPI     PAST MEDICAL & SURGICAL HISTORY:  Atrial fibrillation    HTN (hypertension)    HLD (hyperlipidemia)    Anemia    CHF (congestive heart failure)    DVT, lower extremity          Medication list         MEDICATIONS  (STANDING):  albuterol/ipratropium for Nebulization 3 milliLiter(s) Nebulizer every 6 hours  apixaban 2.5 milliGRAM(s) Oral every 12 hours  chlorhexidine 2% Cloths 1 Application(s) Topical <User Schedule>  dextrose 5%. 1000 milliLiter(s) (50 mL/Hr) IV Continuous <Continuous>  digoxin     Tablet 125 MICROGram(s) Oral daily  metoprolol tartrate 25 milliGRAM(s) Oral two times a day  midodrine 15 milliGRAM(s) Oral every 8 hours  pyridostigmine 30 milliGRAM(s) Oral every 8 hours  sodium chloride 3%  Inhalation 4 milliLiter(s) Inhalation every 6 hours    MEDICATIONS  (PRN):  acetaminophen     Tablet .. 650 milliGRAM(s) Oral every 6 hours PRN Temp greater or equal to 38C (100.4F), Mild Pain (1 - 3)         Vitals log        ICU Vital Signs Last 24 Hrs  T(C): 37.9 (29 Jun 2022 04:18), Max: 37.9 (28 Jun 2022 23:40)  T(F): 100.2 (29 Jun 2022 04:18), Max: 100.3 (28 Jun 2022 23:40)  HR: 136 (29 Jun 2022 04:00) (112 - 138)  BP: 100/63 (29 Jun 2022 04:00) (89/55 - 147/79)  BP(mean): 75 (29 Jun 2022 04:00) (65 - 110)  ABP: --  ABP(mean): --  RR: 44 (29 Jun 2022 04:00) (25 - 58)  SpO2: 90% (29 Jun 2022 04:00) (73% - 100%)           Input and Output:  I&O's Detail    27 Jun 2022 07:01  -  28 Jun 2022 07:00  --------------------------------------------------------  IN:    dextrose 5%: 650 mL    Enteral Tube Flush: 180 mL  Total IN: 830 mL    OUT:    Indwelling Catheter - Urethral (mL): 545 mL    Jevity 1.5: 0 mL    Voided (mL): 200 mL  Total OUT: 745 mL    Total NET: 85 mL      28 Jun 2022 07:01  -  29 Jun 2022 05:59  --------------------------------------------------------  IN:    dextrose 5%: 200 mL    dextrose 5%: 650 mL    Enteral Tube Flush: 120 mL    IV PiggyBack: 150 mL  Total IN: 1120 mL    OUT:    Indwelling Catheter - Urethral (mL): 640 mL    Jevity 1.5: 0 mL  Total OUT: 640 mL    Total NET: 480 mL          Lab Data                        13.0   11.51 )-----------( 192      ( 28 Jun 2022 07:20 )             40.7     06-28    144  |  105  |  50<H>  ----------------------------<  104<H>  3.0<L>   |  33<H>  |  1.10    Ca    8.8      28 Jun 2022 07:20  Phos  2.6     06-28  Mg     2.4     06-28    TPro  6.9  /  Alb  2.2<L>  /  TBili  1.7<H>  /  DBili  x   /  AST  20  /  ALT  27  /  AlkPhos  118  06-28    ABG - ( 28 Jun 2022 22:38 )  pH, Arterial: 7.40  pH, Blood: x     /  pCO2: 43    /  pO2: 80    / HCO3: 27    / Base Excess: 1.8   /  SaO2: 98.3                    Review of Systems	      Objective     Physical Examination    heart s1s2  lung dec BS  abd soft      Pertinent Lab findings & Imaging      Ramírez:  NO   Adequate UO     I&O's Detail    27 Jun 2022 07:01  -  28 Jun 2022 07:00  --------------------------------------------------------  IN:    dextrose 5%: 650 mL    Enteral Tube Flush: 180 mL  Total IN: 830 mL    OUT:    Indwelling Catheter - Urethral (mL): 545 mL    Jevity 1.5: 0 mL    Voided (mL): 200 mL  Total OUT: 745 mL    Total NET: 85 mL      28 Jun 2022 07:01  -  29 Jun 2022 05:59  --------------------------------------------------------  IN:    dextrose 5%: 200 mL    dextrose 5%: 650 mL    Enteral Tube Flush: 120 mL    IV PiggyBack: 150 mL  Total IN: 1120 mL    OUT:    Indwelling Catheter - Urethral (mL): 640 mL    Jevity 1.5: 0 mL  Total OUT: 640 mL    Total NET: 480 mL               Discussed with:     Cultures:	        Radiology

## 2022-06-29 NOTE — PROGRESS NOTE ADULT - TIME BILLING
direct care of a critically ill patient including but not limited to reviewing chart, medications ,laboratory data, imaging reports, discussion of plan of care with consultants on the case, coordination of care with multidisciplinary team involved in the case and discussion of plan with ICU team.    Patient's family were updated about current status and plan of care by the ICU team.

## 2022-06-29 NOTE — PROGRESS NOTE ADULT - PROBLEM SELECTOR PLAN 1
Acute hypercapnic respiratory failure requiring High flow oxygen  supplemenation; off BIPAP currently  Continue  monitoring in ICU  Prognosis is guarded  GOC- full code  Continue supportive measures  continue bronchodilators

## 2022-06-29 NOTE — CHART NOTE - NSCHARTNOTEFT_GEN_A_CORE
Nutr.re-assessment:     Factors impacting intake: [ ] none [ ] nausea  [ ] vomiting [ ] diarrhea [ ] constipation  [ ]chewing problems [ ] swallowing issues  [ ] other:     Diet Prescription: Diet, NPO with Tube Feed:   Tube Feeding Modality: Nasogastric  Jevity 1.5  Total Volume for 24 Hours (mL): 1200  Continuous  Starting Tube Feed Rate {mL per Hour}: 20  Increase Tube Feed Rate by (mL): 10     Every 4 hours  Until Goal Tube Feed Rate (mL per Hour): 50  Tube Feed Duration (in Hours): 24  Tube Feed Start Time: 17:25 (06-26-22 @ 17:21)    Intake:     Current Weight:   % Weight Change    Pertinent Medications: MEDICATIONS  (STANDING):  albuterol/ipratropium for Nebulization 3 milliLiter(s) Nebulizer every 6 hours  apixaban 2.5 milliGRAM(s) Oral every 12 hours  chlorhexidine 2% Cloths 1 Application(s) Topical <User Schedule>  dextrose 5%. 1000 milliLiter(s) (50 mL/Hr) IV Continuous <Continuous>  digoxin     Tablet 125 MICROGram(s) Oral daily  metoprolol tartrate 25 milliGRAM(s) Oral two times a day  midodrine 15 milliGRAM(s) Oral every 8 hours  pyridostigmine 30 milliGRAM(s) Oral every 8 hours  sodium chloride 3%  Inhalation 4 milliLiter(s) Inhalation every 6 hours    MEDICATIONS  (PRN):  acetaminophen     Tablet .. 650 milliGRAM(s) Oral every 6 hours PRN Temp greater or equal to 38C (100.4F), Mild Pain (1 - 3)    Pertinent Labs: 06-29 Na141 mmol/L Glu 143 mg/dL<H> K+ 3.5 mmol/L Cr  1.50 mg/dL<H> BUN 56 mg/dL<H> 06-29 Phos 2.3 mg/dL<L> 06-29 Alb 2.1 g/dL<L> 06-17 Chol 173 mg/dL LDL --    HDL 32 mg/dL<L> Trig 110 mg/dL     CAPILLARY BLOOD GLUCOSE      POCT Blood Glucose.: 124 mg/dL (28 Jun 2022 17:31)  POCT Blood Glucose.: 117 mg/dL (28 Jun 2022 11:46)    Skin:     Estimated Needs:   [ ] no change since previous assessment  [ ] recalculated:     Previous Nutrition Diagnosis:   [ ] Inadequate Energy Intake [ ]Inadequate Oral Intake [ ] Excessive Energy Intake   [ ] Underweight [ ] Increased Nutrient Needs [ ] Overweight/Obesity   [ ] Altered GI Function [ ] Unintended Weight Loss [ ] Food & Nutrition Related Knowledge Deficit [ ] Malnutrition     Nutrition Diagnosis is [ ] ongoing  [ ] resolved [ ] not applicable     New Nutrition Diagnosis: [ ] not applicable       Interventions:   Recommend  [ ] Change Diet To:  [ ] Nutrition Supplement  [ ] Nutrition Support  [ ] Other:     Monitoring and Evaluation:   [ ] PO intake [ x ] Tolerance to diet prescription [ x ] weights [ x ] labs[ x ] follow up per protocol  [ ] other: Nutr.re-assessment: ( CC PA progress note excerpts from last night)   89 y/o M w/ pmh of Afib on apixaban, htn, hld, chronic diastolic HF, subacute anorexia, wt loss of 50 months over the last few months, presented to [facility] for acute decompensated HF 2/2 to HMPV, hospital courser c/b metabolic encephalopathy, acute hypercarbic resp failure, congestive hepatopathy and AMRISOL.    24 hour events: tonight pt is on HFNC 40L/100%, called to bedside by RN for hypoxia to 80%, pt seen and examined currently reports feeling okay, placed on bipap with improvement in o2, GOC d/w daughter regarding intubation should it become medically necessary at this time she is unsure and would like to discuss with her siblings and will call us back trent, pt will remains FULL CODE for now.  Factors impacting intake: [ ] none [ ] nausea  [ ] vomiting [ ] diarrhea [ ] constipation  [ ]chewing problems [   ] swallowing issues  [ ] other:     Diet Prescription: Diet, NPO with Tube Feed:   Tube Feeding Modality: Nasogastric  Jevity 1.5  Total Volume for 24 Hours (mL): 1200  Continuous  Starting Tube Feed Rate {mL per Hour}: 20  Increase Tube Feed Rate by (mL): 10     Every 4 hours  Until Goal Tube Feed Rate (mL per Hour): 50  Tube Feed Duration (in Hours): 24  Tube Feed Start Time: 17:25 (06-26-22 @ 17:21)    Intake:     Current Weight:   % Weight Change    Pertinent Medications: MEDICATIONS  (STANDING):  albuterol/ipratropium for Nebulization 3 milliLiter(s) Nebulizer every 6 hours  apixaban 2.5 milliGRAM(s) Oral every 12 hours  chlorhexidine 2% Cloths 1 Application(s) Topical <User Schedule>  dextrose 5%. 1000 milliLiter(s) (50 mL/Hr) IV Continuous <Continuous>  digoxin     Tablet 125 MICROGram(s) Oral daily  metoprolol tartrate 25 milliGRAM(s) Oral two times a day  midodrine 15 milliGRAM(s) Oral every 8 hours  pyridostigmine 30 milliGRAM(s) Oral every 8 hours  sodium chloride 3%  Inhalation 4 milliLiter(s) Inhalation every 6 hours    MEDICATIONS  (PRN):  acetaminophen     Tablet .. 650 milliGRAM(s) Oral every 6 hours PRN Temp greater or equal to 38C (100.4F), Mild Pain (1 - 3)    Pertinent Labs: 06-29 Na141 mmol/L Glu 143 mg/dL<H> K+ 3.5 mmol/L Cr  1.50 mg/dL<H> BUN 56 mg/dL<H> 06-29 Phos 2.3 mg/dL<L> 06-29 Alb 2.1 g/dL<L> 06-17 Chol 173 mg/dL LDL --    HDL 32 mg/dL<L> Trig 110 mg/dL     CAPILLARY BLOOD GLUCOSE      POCT Blood Glucose.: 124 mg/dL (28 Jun 2022 17:31)  POCT Blood Glucose.: 117 mg/dL (28 Jun 2022 11:46)    Skin:     Estimated Needs:   [ ] no change since previous assessment  [ ] recalculated:     Previous Nutrition Diagnosis:   [ ] Inadequate Energy Intake [ ]Inadequate Oral Intake [ ] Excessive Energy Intake   [ ] Underweight [ ] Increased Nutrient Needs [ ] Overweight/Obesity   [ ] Altered GI Function [ ] Unintended Weight Loss [ ] Food & Nutrition Related Knowledge Deficit [ ] Malnutrition     Nutrition Diagnosis is [ ] ongoing  [ ] resolved [ ] not applicable     New Nutrition Diagnosis: [ ] not applicable       Interventions:   Recommend  [ ] Change Diet To:  [ ] Nutrition Supplement  [ ] Nutrition Support  [ ] Other:     Monitoring and Evaluation:   [ ] PO intake [ x ] Tolerance to diet prescription [ x ] weights [ x ] labs[ x ] follow up per protocol  [ ] other: Nutr.re-assessment: ( CC PA progress note excerpts from last night) 89 y/o M w/ pmh of Afib on apixaban, htn, hld, chronic diastolic HF, subacute anorexia, wt loss of 50 months over the last few months, presented to [facility] for acute decompensated HF 2/2 to HMPV, hospital courser c/b metabolic encephalopathy, acute hypercarbic resp failure, congestive hepatopathy and MARISOL.  last night, pt was on on HFNC 40L/100%, . Due to hypoxia to 80%, pt seen and examined  placed on bipap with improvement in o2, TF on hold r/t. Pt continously attempting to pull out NGT .    Factors impacting intake: [ ] none [ ] nausea  [ ] vomiting [ ] diarrhea [ ] constipation  [ ]chewing problems [X   ] swallowing issues  [X ] other: bipap    Diet Prescription: Diet, NPO with Tube Feed:   Tube Feeding Modality: Nasogastric  Jevity 1.5  Total Volume for 24 Hours (mL): 1200  Continuous  Starting Tube Feed Rate {mL per Hour}: 20  Increase Tube Feed Rate by (mL): 10     Every 4 hours  Until Goal Tube Feed Rate (mL per Hour): 50  Tube Feed Duration (in Hours): 24  Tube Feed Start Time: 17:25 (06-26-22 @ 17:21)    Intake: nil, TF on hold .    Current Weight: 56 kg /123.4#  admission 61.5 kg ( 6/16/22) Pt dx with maln on initial assessment and continues to appear so . 6/28- 54.5 kg , 6/27: 55 kg, 6/25: 56.1 kg, 6/24: 58.5 kg ,   % Weight Change: some fluctuations in wt. some likely erroneous ( ie 6/23/22: 82.5 kg )  would anticipate real wt loss if nutrition support is not re-initiated soon    Pertinent Medications: MEDICATIONS  (STANDING):  albuterol/ipratropium for Nebulization 3 milliLiter(s) Nebulizer every 6 hours  apixaban 2.5 milliGRAM(s) Oral every 12 hours  chlorhexidine 2% Cloths 1 Application(s) Topical <User Schedule>  dextrose 5%. 1000 milliLiter(s) (50 mL/Hr) IV Continuous <Continuous>  digoxin     Tablet 125 MICROGram(s) Oral daily  metoprolol tartrate 25 milliGRAM(s) Oral two times a day  midodrine 15 milliGRAM(s) Oral every 8 hours  pyridostigmine 30 milliGRAM(s) Oral every 8 hours  sodium chloride 3%  Inhalation 4 milliLiter(s) Inhalation every 6 hours    MEDICATIONS  (PRN):  acetaminophen     Tablet .. 650 milliGRAM(s) Oral every 6 hours PRN Temp greater or equal to 38C (100.4F), Mild Pain (1 - 3)    Pertinent Labs: 06-29 Na141 mmol/L Glu 143 mg/dL<H> K+ 3.5 mmol/L Cr  1.50 mg/dL<H> BUN 56 mg/dL<H> 06-29 Phos 2.3 mg/dL<L> 06-29 Alb 2.1 g/dL<L> 06-17 Chol 173 mg/dL LDL --    HDL 32 mg/dL<L> Trig 110 mg/dL     CAPILLARY BLOOD GLUCOSE      POCT Blood Glucose.: 124 mg/dL (28 Jun 2022 17:31)  POCT Blood Glucose.: 117 mg/dL (28 Jun 2022 11:46)    Skin:     Estimated Needs:   [ ] no change since previous assessment  [ ] recalculated:     Previous Nutrition Diagnosis:   [ ] Inadequate Energy Intake [ ]Inadequate Oral Intake [ ] Excessive Energy Intake   [ ] Underweight [ ] Increased Nutrient Needs [ ] Overweight/Obesity   [ ] Altered GI Function [ ] Unintended Weight Loss [ ] Food & Nutrition Related Knowledge Deficit [ ] Malnutrition     Nutrition Diagnosis is [ ] ongoing  [ ] resolved [ ] not applicable     New Nutrition Diagnosis: [ ] not applicable       Interventions:   Recommend  [ ] Change Diet To:  [ ] Nutrition Supplement  [ ] Nutrition Support  [ ] Other:     Monitoring and Evaluation:   [ ] PO intake [ x ] Tolerance to diet prescription [ x ] weights [ x ] labs[ x ] follow up per protocol  [ ] other: Nutr.re-assessment: ( CC PA progress note excerpts from last night) 89 y/o M w/ pmh of Afib on apixaban, htn, hld, chronic diastolic HF, subacute anorexia, wt loss of 50 months over the last few months, presented to [facility] for acute decompensated HF 2/2 to HMPV, hospital courser c/b metabolic encephalopathy, acute hypercarbic resp failure, congestive hepatopathy and MARISOL.  last night, pt was on on HFNC 40L/100%, . Due to hypoxia to 80%, pt seen and examined  placed on bipap with improvement in o2, TF on hold r/t. Pt continously attempting to pull out NGT .    Factors impacting intake: [ ] none [ ] nausea  [ ] vomiting [ ] diarrhea [ ] constipation  [ ]chewing problems [X   ] swallowing issues  [X ] other: bipap    Diet Prescription: Diet, NPO with Tube Feed:   Tube Feeding Modality: Nasogastric  Jevity 1.5  Total Volume for 24 Hours (mL): 1200  Continuous  Starting Tube Feed Rate {mL per Hour}: 20  Increase Tube Feed Rate by (mL): 10     Every 4 hours  Until Goal Tube Feed Rate (mL per Hour): 50  Tube Feed Duration (in Hours): 24  Tube Feed Start Time: 17:25 (06-26-22 @ 17:21)    Intake: nil, TF on hold .    Current Weight: 56 kg /123.4#  admission 61.5 kg ( 6/16/22) Pt dx with maln on initial assessment and continues to appear so . 6/28- 54.5 kg , 6/27: 55 kg, 6/25: 56.1 kg, 6/24: 58.5 kg ,   % Weight Change: some fluctuations in wt. some likely erroneous ( ie 6/23/22: 82.5 kg )  would anticipate real wt loss if nutrition support is not re-initiated soon    Pertinent Medications: MEDICATIONS  (STANDING):  albuterol/ipratropium for Nebulization 3 milliLiter(s) Nebulizer every 6 hours  apixaban 2.5 milliGRAM(s) Oral every 12 hours  chlorhexidine 2% Cloths 1 Application(s) Topical <User Schedule>  dextrose 5%. 1000 milliLiter(s) (50 mL/Hr) IV Continuous <Continuous>  digoxin     Tablet 125 MICROGram(s) Oral daily  metoprolol tartrate 25 milliGRAM(s) Oral two times a day  midodrine 15 milliGRAM(s) Oral every 8 hours  pyridostigmine 30 milliGRAM(s) Oral every 8 hours  sodium chloride 3%  Inhalation 4 milliLiter(s) Inhalation every 6 hours    MEDICATIONS  (PRN):  acetaminophen     Tablet .. 650 milliGRAM(s) Oral every 6 hours PRN Temp greater or equal to 38C (100.4F), Mild Pain (1 - 3)    Pertinent Labs: 06-29 Na141 mmol/L Glu 143 mg/dL<H> K+ 3.5 mmol/L Cr  1.50 mg/dL<H> BUN 56 mg/dL<H> 06-29 Phos 2.3 mg/dL<L> 06-29 Alb 2.1 g/dL<L> 06-17 Chol 173 mg/dL LDL --    HDL 32 mg/dL<L> Trig 110 mg/dL     CAPILLARY BLOOD GLUCOSE      POCT Blood Glucose.: 124 mg/dL (28 Jun 2022 17:31)  POCT Blood Glucose.: 117 mg/dL (28 Jun 2022 11:46)    Skin: none documented  BM: 6/29  edema: none    Estimated Needs:   [X ] no change since previous assessment  [ ] recalculated:     Previous Nutrition Diagnosis:   [ ] Inadequate Energy Intake [ ]Inadequate Oral Intake [ ] Excessive Energy Intake   [ ] Underweight [ ] Increased Nutrient Needs [ ] Overweight/Obesity   [ ] Altered GI Function [ ] Unintended Weight Loss [ ] Food & Nutrition Related Knowledge Deficit [ X] Malnutrition     Nutrition Diagnosis is [ X] ongoing  [ ] resolved [ ] not applicable     New Nutrition Diagnosis: [ ] not applicable       Interventions:   Recommend  [ ] Change Diet To:  [ ] Nutrition Supplement  [ X] Nutrition Support: resume TF ASAP  [ ] Other:     Monitoring and Evaluation:   [ ] PO intake [ x ] Tolerance to diet prescription [ x ] weights [ x ] labs[ x ] follow up per protocol  [ ] other:

## 2022-06-29 NOTE — PROGRESS NOTE ADULT - PROBLEM SELECTOR PLAN 5
Monitor BP while on antihypertensive rate controlling agents  Monitor vitals/ hemodynamics  Dose adjustments as needed Hs h/o HTN but now hypotensive- continue midodrine  Monitor BP  Dose adjustment per ICU team

## 2022-06-30 NOTE — PROGRESS NOTE ADULT - ASSESSMENT
89yo male with PMH of chronic AFib on Eliquis, CHF (EF 64%), HTN, DVT, HLD, gout, macular degeneration who presented for cough and weakness, admitted for acute on chronic heart failure exacerbation with bilateral pleural effusions, AFib with RVR and +hMPV. Rapid Response called on 6/24 for AMS, hypoxia and agonal breathing and found to hypercapnic with lactic acidosis, placed on BiPAP and transferred to ICU for further management.     Neuro:  - encephalopathy 2/2 acute hypercapnia, opening eyes and able to follow simple commands   - continue pyridostigmine for possible MG per neuro, negative ach receptor ab and voltageC channel ab, f/u other serologies    Cardio:  - acute on chronic HFpEF, s/p Lasix 40mg IV, acetazolamide 500mg IV doses  - give lasix 60mg IV x1, albumin x1  - AFib with RVR: overnight with afib RVR 140s, s/p amio IV x1. continue Digoxin 125mcg qd, give amio loading and start amio gtt. continue eliquis 2.5mg po bid. hold home lopressor and diltiazem.  - continue midodrine 15mg po q8hrs.     Pulm:  - acute hypercapnic respiratory failure 2/2 acute decompensated heart failure  - on BiPAP, weaned to HF NC during day    GI:   - NPO with tube feeds    Renal:   - MARISOL with Cr 1.8, like prerenal as dec po   - Maintain strict Is/Os, maintain mead for closely UOP monitoring     ID:   - No evidence of acute infection, observe off antibiotics    Heme:  - DVT PPx: Eliquis 2.5mg BID for history of AFib, DVT    Endo:  - Blood glucose goal in -180, continue D5 mIVF @25 cc/hr    Skin:  - No lines, mead 6/27    Dispo:  - ICU  - Full code

## 2022-06-30 NOTE — PROGRESS NOTE ADULT - PROBLEM SELECTOR PLAN 1
Acute hypercapnic respiratory failure requiring High flow oxygen  supplementation and non invasive ventilation with BIPAP  Continue  monitoring in ICU  Prognosis is guarded  GOC- DNR  Continue supportive measures  continue bronchodilators

## 2022-06-30 NOTE — PROGRESS NOTE ADULT - SUBJECTIVE AND OBJECTIVE BOX
Date/Time Patient Seen:  		  Referring MD:   Data Reviewed	       Patient is a 89y old  Male who presents with a chief complaint of weight loss (29 Jun 2022 14:52)      Subjective/HPI     PAST MEDICAL & SURGICAL HISTORY:  Atrial fibrillation    HTN (hypertension)    HLD (hyperlipidemia)    Anemia    CHF (congestive heart failure)    DVT, lower extremity          Medication list         MEDICATIONS  (STANDING):  albuterol/ipratropium for Nebulization 3 milliLiter(s) Nebulizer every 6 hours  aMIOdarone IVPB 150 milliGRAM(s) IV Intermittent once  apixaban 2.5 milliGRAM(s) Oral every 12 hours  chlorhexidine 2% Cloths 1 Application(s) Topical <User Schedule>  dextrose 5%. 1000 milliLiter(s) (50 mL/Hr) IV Continuous <Continuous>  digoxin     Tablet 125 MICROGram(s) Oral daily  midodrine 15 milliGRAM(s) Oral every 8 hours  phenylephrine    Infusion 0.1 MICROgram(s)/kG/Min (2.31 mL/Hr) IV Continuous <Continuous>  pyridostigmine 30 milliGRAM(s) Oral every 8 hours  sodium chloride 3%  Inhalation 4 milliLiter(s) Inhalation every 6 hours    MEDICATIONS  (PRN):  acetaminophen     Tablet .. 650 milliGRAM(s) Oral every 6 hours PRN Temp greater or equal to 38C (100.4F), Mild Pain (1 - 3)         Vitals log        ICU Vital Signs Last 24 Hrs  T(C): 36.6 (30 Jun 2022 05:30), Max: 36.9 (29 Jun 2022 08:59)  T(F): 97.9 (30 Jun 2022 05:30), Max: 98.4 (29 Jun 2022 08:59)  HR: 145 (30 Jun 2022 04:30) (97 - 145)  BP: 116/68 (30 Jun 2022 04:30) (71/50 - 138/87)  BP(mean): 86 (30 Jun 2022 04:30) (56 - 107)  ABP: --  ABP(mean): --  RR: 31 (30 Jun 2022 04:30) (23 - 59)  SpO2: 100% (30 Jun 2022 04:30) (62% - 100%)           Input and Output:  I&O's Detail    28 Jun 2022 07:01  -  29 Jun 2022 07:00  --------------------------------------------------------  IN:    dextrose 5%: 1000 mL    dextrose 5%: 200 mL    Enteral Tube Flush: 240 mL    IV PiggyBack: 150 mL  Total IN: 1590 mL    OUT:    Indwelling Catheter - Urethral (mL): 810 mL    Jevity 1.5: 0 mL  Total OUT: 810 mL    Total NET: 780 mL      29 Jun 2022 07:01  -  30 Jun 2022 06:05  --------------------------------------------------------  IN:    dextrose 5%: 900 mL    Enteral Tube Flush: 90 mL    IV PiggyBack: 250 mL    Jevity 1.5: 20 mL    Phenylephrine: 26 mL    Sodium Chloride 0.9% Bolus: 250 mL  Total IN: 1536 mL    OUT:    Indwelling Catheter - Urethral (mL): 110 mL  Total OUT: 110 mL    Total NET: 1426 mL          Lab Data                        13.1   9.11  )-----------( 188      ( 29 Jun 2022 05:35 )             40.1     06-29    141  |  105  |  56<H>  ----------------------------<  143<H>  3.5   |  28  |  1.50<H>    Ca    8.6      29 Jun 2022 05:35  Phos  2.3     06-29  Mg     2.3     06-29    TPro  6.7  /  Alb  2.1<L>  /  TBili  1.8<H>  /  DBili  x   /  AST  24  /  ALT  25  /  AlkPhos  99  06-29    ABG - ( 28 Jun 2022 22:38 )  pH, Arterial: 7.40  pH, Blood: x     /  pCO2: 43    /  pO2: 80    / HCO3: 27    / Base Excess: 1.8   /  SaO2: 98.3                    Review of Systems	      Objective     Physical Examination    heart s1s2  lung dc BS  abd s oft      Pertinent Lab findings & Imaging      Ramírez:  NO   Adequate UO     I&O's Detail    28 Jun 2022 07:01  -  29 Jun 2022 07:00  --------------------------------------------------------  IN:    dextrose 5%: 1000 mL    dextrose 5%: 200 mL    Enteral Tube Flush: 240 mL    IV PiggyBack: 150 mL  Total IN: 1590 mL    OUT:    Indwelling Catheter - Urethral (mL): 810 mL    Jevity 1.5: 0 mL  Total OUT: 810 mL    Total NET: 780 mL      29 Jun 2022 07:01  -  30 Jun 2022 06:05  --------------------------------------------------------  IN:    dextrose 5%: 900 mL    Enteral Tube Flush: 90 mL    IV PiggyBack: 250 mL    Jevity 1.5: 20 mL    Phenylephrine: 26 mL    Sodium Chloride 0.9% Bolus: 250 mL  Total IN: 1536 mL    OUT:    Indwelling Catheter - Urethral (mL): 110 mL  Total OUT: 110 mL    Total NET: 1426 mL               Discussed with:     Cultures:	        Radiology

## 2022-06-30 NOTE — PROGRESS NOTE ADULT - SUBJECTIVE AND OBJECTIVE BOX
HPI: 89 y/o M w/ pmh of Afib on apixaban, htn, hld, chronic diastolic HF, subacute anorexia, wt loss of 50 months over the last few months, presented to [facility] for acute decompensated HF 2/2 to HMPV, hospital courser c/b metabolic encephalopathy, acute hypercarbic resp failure, congestive hepatopathy and MARISOL.    24 hour events: pt remains in acute hypoxic/hypercarbic resp failure alternating b/w HFNC and BIPAP, GOC were d/w today and pt was made DNR/DNI by family, tonight pt developed more hypotension while on midodrine 15mg q8h, decision was made to start cali given rapid afib and hypotension, lopressor was stopped and another Amio 150mg bolus was ordered given bust of HR into the 150s.    Review of Systems: Unable to obtain 2/2 to lethargy     T(F): 97.9 (06-30-22 @ 05:30), Max: 98.4 (06-29-22 @ 08:59)  HR: 145 (06-30-22 @ 04:30) (97 - 145)  BP: 116/68 (06-30-22 @ 04:30) (63/48 - 138/87)  RR: 31 (06-30-22 @ 04:30) (23 - 59)  SpO2: 100% (06-30-22 @ 04:30) (62% - 100%)  Wt(kg): --      06-28-22 @ 07:01  -  06-29-22 @ 07:00  --------------------------------------------------------  IN: 1590 mL / OUT: 810 mL / NET: 780 mL    06-29-22 @ 07:01  -  06-30-22 @ 05:46  --------------------------------------------------------  IN: 1536 mL / OUT: 110 mL / NET: 1426 mL        CAPILLARY BLOOD GLUCOSE      POCT Blood Glucose.: 127 mg/dL (29 Jun 2022 11:50)      I&O's Summary    28 Jun 2022 07:01  -  29 Jun 2022 07:00  --------------------------------------------------------  IN: 1590 mL / OUT: 810 mL / NET: 780 mL    29 Jun 2022 07:01  -  30 Jun 2022 05:46  --------------------------------------------------------  IN: 1536 mL / OUT: 110 mL / NET: 1426 mL        Physical Exam:   Gen: Ill appearing male in bed  Neuro: lethargic but woken to verbal commands, able to follow basic commands only   Resp: good air entry b/l  CVS: +tacycardic  Abd: BSx4, soft, ND  Ext:  no edema   Skin: warm to touch    Meds:    aMIOdarone IVPB IV Intermittent  digoxin     Tablet Oral  metoprolol tartrate Oral  midodrine Oral  phenylephrine    Infusion IV Continuous      albuterol/ipratropium for Nebulization Nebulizer  sodium chloride 3%  Inhalation Inhalation    acetaminophen     Tablet .. Oral PRN      apixaban Oral        dextrose 5%. IV Continuous      chlorhexidine 2% Cloths Topical    pyridostigmine Oral                            13.1   9.11  )-----------( 188      ( 29 Jun 2022 05:35 )             40.1       06-29    141  |  105  |  56<H>  ----------------------------<  143<H>  3.5   |  28  |  1.50<H>    Ca    8.6      29 Jun 2022 05:35  Phos  2.3     06-29  Mg     2.3     06-29    TPro  6.7  /  Alb  2.1<L>  /  TBili  1.8<H>  /  DBili  x   /  AST  24  /  ALT  25  /  AlkPhos  99  06-29          PT/INR - ( 28 Jun 2022 07:20 )   PT: 24.8 sec;   INR: 2.10 ratio         PTT - ( 28 Jun 2022 07:20 )  PTT:31.2 sec      Radiology:     < from: Xray Chest 1 View- PORTABLE-Urgent (Xray Chest 1 View- PORTABLE-Urgent .) (06.26.22 @ 17:30) >  ACC: [id number] EXAM:  XR CHEST PORTABLE URGENT 1V                          PROCEDURE DATE:  06/26/2022          INTERPRETATION:  Clinical history: 89-year-old male, NG tube placement.    Portable view of the chest is compared to 6/24/2022 and demonstrates an   NG tube with the tip in the stomach, new.    Normal cardiac silhouette with no pneumothorax, effusions or acute   osseous findings, unchanged.    Mild pulmonary venous congestion/perihilar interstitial infiltrates,   improved.    Infiltrate the anterior segment of the right upper lobe, improved.    IMPRESSION:  NG tube with the tip in the stomach, new.    Mild pulmonary venous congestion/perihilar interstitial infiltrates,   improved    --- End of Report ---      ROMINA PABON DO; Attending Radiologist  This document has been electronically signed. Jun 28 2022  3:10PM    < end of copied text >        CENTRAL LINE: N  DUDLEY: Y  A-LINE: N    GLOBAL ISSUE/BEST PRACTICE:  Analgesia: Y  Sedation: N  CAM-ICU: n/a  HOB elevation: Y  Stress ulcer prophylaxis:  Y  VTE prophylaxis: Y   Glycemic control: Y  Nutrition: Y    CODE STATUS: FULL CODE    CRITICAL CARE TIME SPENT: 35 mins  (Assessing presenting problems of acute illness, which pose high probability of life threatening deterioration or end organ damage/dysfunction, as well as medical decision making including initiating plan of care, reviewing data, reviewing radiologic exams, discussing with multidisciplinary team,  discussing goals of care with patient/family, and writing this note.  Non-inclusive of procedures performed)

## 2022-06-30 NOTE — PROGRESS NOTE ADULT - PROBLEM SELECTOR PLAN 6
Acute on chronic CHF with preserved EF s/p IV diuresis due to pulmonary edema  No signs of peripheral fluid overload now however has required intermittent IV Lasix due to Pulm edema  On digoxin

## 2022-06-30 NOTE — PROGRESS NOTE ADULT - SUBJECTIVE AND OBJECTIVE BOX
Neurology follow up note    ROBERT JOHNSON89yMale      Interval History:    Patient resting in bed on bipap     Allergies    No Known Allergies    Intolerances        MEDICATIONS    acetaminophen     Tablet .. 650 milliGRAM(s) Oral every 6 hours PRN  albuterol/ipratropium for Nebulization 3 milliLiter(s) Nebulizer every 6 hours  apixaban 2.5 milliGRAM(s) Oral every 12 hours  chlorhexidine 2% Cloths 1 Application(s) Topical <User Schedule>  dextrose 5%. 1000 milliLiter(s) IV Continuous <Continuous>  digoxin     Tablet 125 MICROGram(s) Oral daily  midodrine 15 milliGRAM(s) Oral every 8 hours  pantoprazole   Suspension 40 milliGRAM(s) Oral daily  phenylephrine    Infusion 0.1 MICROgram(s)/kG/Min IV Continuous <Continuous>  pyridostigmine 30 milliGRAM(s) Oral every 8 hours  sodium chloride 3%  Inhalation 4 milliLiter(s) Inhalation every 6 hours          Height (cm): 180.3 (06-30 @ 09:00)  Weight (kg): 57.3 (06-30 @ 09:00)  BMI (kg/m2): 17.6 (06-30 @ 09:00)    Vital Signs Last 24 Hrs  T(C): 37.2 (30 Jun 2022 08:26), Max: 37.2 (30 Jun 2022 08:26)  T(F): 99 (30 Jun 2022 08:26), Max: 99 (30 Jun 2022 08:26)  HR: 115 (30 Jun 2022 07:46) (97 - 150)  BP: 99/59 (30 Jun 2022 07:00) (71/50 - 116/68)  BP(mean): 75 (30 Jun 2022 07:00) (56 - 86)  RR: 37 (30 Jun 2022 07:00) (23 - 47)  SpO2: 100% (30 Jun 2022 07:46) (62% - 100%)    REVIEW OF SYSTEMS:  unable to obtain  on bipap     PHYSICAL EXAMINATION:  GENERAL:  The patient does appear to be cachectic in appearance.   HEENT:  Head:  Normocephalic, atraumatic.  Eyes:  No scleral icterus.  Ears:  Hard of hearing.  NECK:  Supple.  CARDIOVASCULAR:  S1 and S2 heard.  RESPIRATORY:  Decreased breath sounds bilaterally.  ABDOMEN:  Soft, nontender.  EXTREMITIES:  No clubbing or cyanosis was noted.     NEUROLOGIC:  The patient is lethargic   The patient has poor vision out of both eyes, which is his baseline.  Speech was fluent.  No dysarthria.  Motor:  Bilateral upper slight flexion at elbows   bilateral lower 3-/5.   previous exam No head tremors were noted.  No resting tremors were noted.  Upper extremities, no cogwheel rigidity was noted.  No significant bradykinesia was noted.                        LABS:  CBC Full  -  ( 30 Jun 2022 06:18 )  WBC Count : 7.93 K/uL  RBC Count : 3.35 M/uL  Hemoglobin : 11.0 g/dL  Hematocrit : 34.6 %  Platelet Count - Automated : 164 K/uL  Mean Cell Volume : 103.3 fl  Mean Cell Hemoglobin : 32.8 pg  Mean Cell Hemoglobin Concentration : 31.8 gm/dL  Auto Neutrophil # : 6.79 K/uL  Auto Lymphocyte # : 0.59 K/uL  Auto Monocyte # : 0.50 K/uL  Auto Eosinophil # : 0.00 K/uL  Auto Basophil # : 0.02 K/uL  Auto Neutrophil % : 85.6 %  Auto Lymphocyte % : 7.4 %  Auto Monocyte % : 6.3 %  Auto Eosinophil % : 0.0 %  Auto Basophil % : 0.3 %      06-30    136  |  100  |  64<H>  ----------------------------<  197<H>  3.5   |  26  |  1.80<H>    Ca    8.2<L>      30 Jun 2022 06:18  Phos  3.2     06-30  Mg     2.2     06-30    TPro  6.4  /  Alb  1.9<L>  /  TBili  1.7<H>  /  DBili  x   /  AST  23  /  ALT  24  /  AlkPhos  91  06-30    Hemoglobin A1C:     LIVER FUNCTIONS - ( 30 Jun 2022 06:18 )  Alb: 1.9 g/dL / Pro: 6.4 g/dL / ALK PHOS: 91 U/L / ALT: 24 U/L / AST: 23 U/L / GGT: x           Vitamin B12         RADIOLOGY          ANALYSIS AND PLAN:  A 89-year-old with episode of altered mental status in regards to lethargy and generalized weakness.  For generalized weakness, suspect most likely metabolic encephalopathy secondary to respiratory issues along with decreased oral intake, suspect less likely this is a primary central nervous system event.  Suspect the patient's episodes of head possibly falling forward secondary to generalized weakness.  The patient does complain of weakness in his neck muscles as well, suspect less likely this is a primary neurological event such as Parkinson's, no other symptoms at present to suggest an underlying cause of Parkinson's.  For history of atrial fibrillation, continue the patient on anticoagulation.  repeat head ct no changes   monitor respiratory status as needed   trial of pyridostigmine  ACH levels so far appear normal rest pending   prognosis at present appears poor     Spoke with daughter, Leigh, at [phone] 6/30     Greater than 33 minutes of time was spent with the patient, plan of care, reviewing data, with greater than 50% of the visit was spent counseling and/or coordinating care with multidisciplinary healthcare team

## 2022-06-30 NOTE — PROGRESS NOTE ADULT - SUBJECTIVE AND OBJECTIVE BOX
[facility] - Vincenzo Wyman, Tiffany, Daina, Delphine, Deandra Johnson  Office Number:  [phone]    Patient resting comfortably in bed in NAD.   decreased responsiveness  on bipap  cali for hypotension  af with fast rates overnight, and got iv amio    ROS: negative unless otherwise mentioned.    Telemetry:  af    MEDICATIONS  (STANDING):  albuterol/ipratropium for Nebulization 3 milliLiter(s) Nebulizer every 6 hours  apixaban 2.5 milliGRAM(s) Oral every 12 hours  chlorhexidine 2% Cloths 1 Application(s) Topical <User Schedule>  dextrose 5%. 1000 milliLiter(s) (50 mL/Hr) IV Continuous <Continuous>  digoxin     Tablet 125 MICROGram(s) Oral daily  midodrine 15 milliGRAM(s) Oral every 8 hours  pantoprazole   Suspension 40 milliGRAM(s) Oral daily  phenylephrine    Infusion 0.1 MICROgram(s)/kG/Min (2.31 mL/Hr) IV Continuous <Continuous>  pyridostigmine 30 milliGRAM(s) Oral every 8 hours  sodium chloride 3%  Inhalation 4 milliLiter(s) Inhalation every 6 hours    MEDICATIONS  (PRN):  acetaminophen     Tablet .. 650 milliGRAM(s) Oral every 6 hours PRN Temp greater or equal to 38C (100.4F), Mild Pain (1 - 3)      Allergies    No Known Allergies    Intolerances        Vital Signs Last 24 Hrs  T(C): 37.2 (30 Jun 2022 08:26), Max: 37.2 (30 Jun 2022 08:26)  T(F): 99 (30 Jun 2022 08:26), Max: 99 (30 Jun 2022 08:26)  HR: 115 (30 Jun 2022 07:46) (97 - 150)  BP: 99/59 (30 Jun 2022 07:00) (71/50 - 116/68)  BP(mean): 75 (30 Jun 2022 07:00) (56 - 86)  RR: 37 (30 Jun 2022 07:00) (23 - 47)  SpO2: 100% (30 Jun 2022 07:46) (62% - 100%)    I&O's Summary    29 Jun 2022 07:01  -  30 Jun 2022 07:00  --------------------------------------------------------  IN: 2010 mL / OUT: 235 mL / NET: 1775 mL        ON EXAM:    Constitutional: lethargic  Eyes:    Pupils round, no lesions  ENMT: no exudate or erythema  Pulmonary: scattered rhonchi  Cardiovascular: PMI not palpable irreg normal S1 and S2, no murmurs, rubs, gallops or clicks  Gastrointestinal: Bowel Sounds present, soft, nontender.   Lymph: No cervical lymphadenopathy.  Neurological: Nna no focal deficits  Skin: No rashes  Psych:  lethargic  Ext: trc lower ext edema      LABS: All Labs Reviewed:                        11.0   7.93  )-----------( 164      ( 30 Jun 2022 06:18 )             34.6                         13.1   9.11  )-----------( 188      ( 29 Jun 2022 05:35 )             40.1                         13.0   11.51 )-----------( 192      ( 28 Jun 2022 07:20 )             40.7     30 Jun 2022 06:18    136    |  100    |  64     ----------------------------<  197    3.5     |  26     |  1.80   29 Jun 2022 05:35    141    |  105    |  56     ----------------------------<  143    3.5     |  28     |  1.50   28 Jun 2022 07:20    144    |  105    |  50     ----------------------------<  104    3.0     |  33     |  1.10     Ca    8.2        30 Jun 2022 06:18  Ca    8.6        29 Jun 2022 05:35  Ca    8.8        28 Jun 2022 07:20  Phos  3.2       30 Jun 2022 06:18  Phos  2.3       29 Jun 2022 05:35  Phos  2.6       28 Jun 2022 07:20  Mg     2.2       30 Jun 2022 06:18  Mg     2.3       29 Jun 2022 05:35  Mg     2.4       28 Jun 2022 07:20    TPro  6.4    /  Alb  1.9    /  TBili  1.7    /  DBili  x      /  AST  23     /  ALT  24     /  AlkPhos  91     30 Jun 2022 06:18  TPro  6.7    /  Alb  2.1    /  TBili  1.8    /  DBili  x      /  AST  24     /  ALT  25     /  AlkPhos  99     29 Jun 2022 05:35  TPro  6.9    /  Alb  2.2    /  TBili  1.7    /  DBili  x      /  AST  20     /  ALT  27     /  AlkPhos  118    28 Jun 2022 07:20          Blood Culture:

## 2022-06-30 NOTE — PROGRESS NOTE ADULT - ASSESSMENT
89 y/o M w/ pmh of Afib on apixaban, htn, hld, chronic diastolic HF, subacute anorexia, wt loss of 50 months over the last few months, presented to [facility] hospital for acute decompensated HF 2/2 to HMPV, hospital courser c/b metabolic encephalopathy, acute hypercarbic resp failure, congestive hepatopathy and MARISOL.      -Neuro: Metabolic encephalopathy stable monitor MS closely, MG on pyridostigmine neurology following input noted  -Cardiac: Acute decompensated diastolic HF currently holding Lasix due to worsening renal function, Rapid Afib currently on dig for rate control, d/c lopressor 2/2 to hypotension, will add Amio Bolus 150mg IVPB now if no improvement may need to start amio gtt, Hypotension on midodrine 15mg q8h added low dose cali for hypotension will titrate gtt to maintain MAP >65  -Resp: Acute Hypoxic/hypecarbic resp failure pt alternating b/w HFNC/Bipap, planned for bipap QHS, actively titrating bipap setting to maintain o2 >90%, cont duonebs and saline nebs  -GI: Cont TF as ordered/tolerated when off bipap, protonix for GI ppx  -Renal: MARISOL on CKD currently holding lasix monitor UOP, cont to monitor renal function along with lytes  -ID: HMPV cont supportive care  -Heme: DVT ppx w/ eliquis  -Endo: Hypoglycemia on D5  -Dispo: Pt remains critically ill currently in the MICU, GOC discussed pt is now DNR/DNI, dispo pending hospital course

## 2022-06-30 NOTE — PROGRESS NOTE ADULT - TIME BILLING
direct care of a critically ill patient including but not limited to reviewing chart, medications ,laboratory data, imaging reports, discussion of plan of care with consultants on the case, coordination of care with multidisciplinary team involved in the case and discussion of plan with ICU team.    Prognosis remains poor.  Discussed with ICU team

## 2022-06-30 NOTE — PROGRESS NOTE ADULT - SUBJECTIVE AND OBJECTIVE BOX
Patient is a 89y old  Male who presents with a chief complaint of weight loss (30 Jun 2022 15:53)    24 hour events: The patient was hypotensive overnight, started on phenylephrine gtt, lopressor dc'd. Pt also noted to be afib RVR, give amio x1, still on digoxin po. Pt seen and examined at bedside this morning. Appears to be opening eyes, responsive to some commands.     REVIEW OF SYSTEMS  Unable to obtain       T(F): 98 (06-30-22 @ 15:47), Max: 99 (06-30-22 @ 08:26)  HR: 122 (06-30-22 @ 17:00) (102 - 150)  BP: 122/74 (06-30-22 @ 17:00) (78/51 - 124/68)  RR: 30 (06-30-22 @ 17:00) (18 - 48)  SpO2: 100% (06-30-22 @ 17:00) (62% - 100%)  Wt(kg): --            I&O's Summary    06-29 @ 07:01  -  06-30 @ 07:00  --------------------------------------------------------  IN: 2010 mL / OUT: 235 mL / NET: 1775 mL    06-30 @ 07:01  -  06-30 @ 19:02  --------------------------------------------------------  IN: 982 mL / OUT: 135 mL / NET: 847 mL      Physical Exam:   General: appears ill  Neuro: lethargic though opening eyes, able to follow simple commands  Resp: good air entry b/l  CVS: tachy rate, irregular rhythm  Abd: soft, nt, nd   Ext: no edema   Skin: warm, dry      MEDICATIONS    aMIOdarone Infusion IV Continuous  digoxin     Tablet Oral  midodrine Oral  phenylephrine    Infusion IV Continuous      albuterol/ipratropium for Nebulization Nebulizer  sodium chloride 3%  Inhalation Inhalation    acetaminophen     Tablet .. Oral PRN      apixaban Oral    pantoprazole   Suspension Oral      dextrose 5%. IV Continuous      chlorhexidine 2% Cloths Topical    pyridostigmine Oral                          13.1   10.10 )-----------( 158      ( 30 Jun 2022 16:25 )             40.8       06-30    137  |  103  |  65<H>  ----------------------------<  102<H>  3.7   |  24  |  2.10<H>    Ca    8.6      30 Jun 2022 16:25  Phos  4.1     06-30  Mg     2.2     06-30    TPro  6.9  /  Alb  2.2<L>  /  TBili  2.0<H>  /  DBili  x   /  AST  34  /  ALT  27  /  AlkPhos  96  06-30    Lactate 3.5           06-30 @ 16:25    Lactate 3.6           06-30 @ 11:22              CENTRAL LINE: N  DUDLEY: Y  A-LINE: N    GLOBAL ISSUE/BEST PRACTICE:  Analgesia: Y  Sedation: N  CAM-ICU: n/a  HOB elevation: Y  Stress ulcer prophylaxis:  Y  VTE prophylaxis: Y   Glycemic control: Y  Nutrition: Y    CODE STATUS: DNR

## 2022-06-30 NOTE — PROGRESS NOTE ADULT - ASSESSMENT
89y old  Male PMH Afib on ac htn hld chief complaint of weakness. Patient was seen in office 6/6 by Dr Allen for decreased appetite and weakness, digoxin was discontinued and increased lopressor to 100mg Po BID.  Now with altered mental status and hypercarbic respiratory failure, transferred to the ICU    Hypercarbic resp failure  - mental status unchanged and remains minimally responsive  - cont bipap  - there is a component of HF contributing to resp failure  - has been intermittently diuresed, and would hold lasix today in the setting of debbie    AFIB with RVR  - af with fast rates overnight, with some rate control with iv amiodarone  - cont digoxin  - can use amio as a rate control agent if needed  - hold metoprolol and CCB in the setting of hypotension  - please replete K as hypoK will increase risk of toxicity  - cont ac with eliquis  - midodrine as needed for hypotension, and he is now back on cali  - Repeat TTE showed EF 60%, TROY, mod MR/TR  - CT showed moderate bilateral pleural effusions, followed by pulmonary. no need for thoracentesis     - Poor prognosis.  GOC discussion ongoing   - Will continue to follow. Very high risk of decompensation.    Upon my evaluation, this patient is at high risk for imminent or life threatening deterioration due to ams, resp failure, chf and other active medical issues which require my direct attention, intervention, and personal management.  I have personally spent >30 minutes  of critical care time exclusive of time spent on separate billing procedures. This includes review of laboratory data, radiology results, discussion with primary team\patient, and monitoring for potential decompensation. Interventions were performed as documented above.

## 2022-06-30 NOTE — PROGRESS NOTE ADULT - ATTENDING COMMENTS
89M PMH A-Fib on apixaban, HTN, HLD, and chronic diastolic heart failure, subacute anorexia and wt loss of 50lbs over last few months, now presents with acute decompensated heart failure in the setting of human metapneumovirus infection.  Course complicated by metabolic encephalopathy, acute hypercapnic respiratory failure, congestive hepatopathy, MARISOL.      --toxic/metabolic encephalopathy persists  possible myesthenic gravis to account for head drop, dysphagia, continue trial of mestinon, f/u serologies  --acute hypercapnic respiratory failure  continue time off BiPAP as tolerates  --acute decompensated diastolic HF  trial of albumin and lasix  shock, continue to wean phenyleprine, continue midodrine   --Afib inadequately controlled, continue digoxin, amio bolus and gtt  AC with eliquis  --dysphagia, continue TF if off BiPAP  --mild MARISOL on CKD, diurese as above   --no infectious concerns at this point, monitor off Abx  --hypoglycemia while NPO, cont D5

## 2022-06-30 NOTE — PROGRESS NOTE ADULT - PROBLEM SELECTOR PLAN 2
Acute metabolic encephalopathy likely related to hypercapnia- continue HFNC + intermittent BiPAP   Prognosis is guarded

## 2022-06-30 NOTE — PROGRESS NOTE ADULT - SUBJECTIVE AND OBJECTIVE BOX
MEDICAL ATTENDING NOTE    Patient is a 89y old  Male who presents with a chief complaint of weight loss (30 Jun 2022 10:57)      INTERVAL HPI/OVERNIGHT EVENTS: on BiPAP    MEDICATIONS  (STANDING):  albumin human 25% IVPB 50 milliLiter(s) IV Intermittent once  albuterol/ipratropium for Nebulization 3 milliLiter(s) Nebulizer every 6 hours  apixaban 2.5 milliGRAM(s) Oral every 12 hours  chlorhexidine 2% Cloths 1 Application(s) Topical <User Schedule>  dextrose 5%. 1000 milliLiter(s) (25 mL/Hr) IV Continuous <Continuous>  digoxin     Tablet 125 MICROGram(s) Oral daily  furosemide   Injectable 60 milliGRAM(s) IV Push once  lactated ringers. 500 milliLiter(s) (250 mL/Hr) IV Continuous <Continuous>  midodrine 15 milliGRAM(s) Oral every 8 hours  pantoprazole   Suspension 40 milliGRAM(s) Oral daily  phenylephrine    Infusion 0.1 MICROgram(s)/kG/Min (2.31 mL/Hr) IV Continuous <Continuous>  pyridostigmine 30 milliGRAM(s) Oral every 8 hours  sodium chloride 3%  Inhalation 4 milliLiter(s) Inhalation every 6 hours    MEDICATIONS  (PRN):  acetaminophen     Tablet .. 650 milliGRAM(s) Oral every 6 hours PRN Temp greater or equal to 38C (100.4F), Mild Pain (1 - 3)      __________________________________________________  ----------------------------------------------------------------------------------  REVIEW OF SYSTEMS: unable to participate in ROS due to non invasive ventilation      Vital Signs Last 24 Hrs  T(C): 37.2 (30 Jun 2022 08:26), Max: 37.2 (30 Jun 2022 08:26)  T(F): 99 (30 Jun 2022 08:26), Max: 99 (30 Jun 2022 08:26)  HR: 121 (30 Jun 2022 11:30) (97 - 150)  BP: 95/67 (30 Jun 2022 11:00) (71/50 - 116/68)  BP(mean): 74 (30 Jun 2022 11:00) (56 - 86)  RR: 26 (30 Jun 2022 11:30) (23 - 48)  SpO2: 95% (30 Jun 2022 11:30) (62% - 100%)    _________________  PHYSICAL EXAM:  ---------------------------   Normocephalic; intubated  LUNGS - ET tube in place; bilateral air entry+  HEART: S1 S2+   ABDOMEN: Soft,  non distended, BS+  EXTREMITIES: no cyanosis; no edema  NERVOUS SYSTEM:  unabl;e to assess as patient intubated    _________________________________________________  LABS:                        11.0   7.93  )-----------( 164      ( 30 Jun 2022 06:18 )             34.6     06-30    136  |  100  |  64<H>  ----------------------------<  197<H>  3.5   |  26  |  1.80<H>    Ca    8.2<L>      30 Jun 2022 06:18  Phos  3.2     06-30  Mg     2.2     06-30    TPro  6.4  /  Alb  1.9<L>  /  TBili  1.7<H>  /  DBili  x   /  AST  23  /  ALT  24  /  AlkPhos  91  06-30        CAPILLARY BLOOD GLUCOSE                    Updates about current status and plan of care provided to family by ICU team             MEDICAL ATTENDING NOTE    Patient is a 89y old  Male who presents with a chief complaint of weight loss (30 Jun 2022 10:57)      INTERVAL HPI/OVERNIGHT EVENTS: on BiPAP    MEDICATIONS  (STANDING):  albumin human 25% IVPB 50 milliLiter(s) IV Intermittent once  albuterol/ipratropium for Nebulization 3 milliLiter(s) Nebulizer every 6 hours  apixaban 2.5 milliGRAM(s) Oral every 12 hours  chlorhexidine 2% Cloths 1 Application(s) Topical <User Schedule>  dextrose 5%. 1000 milliLiter(s) (25 mL/Hr) IV Continuous <Continuous>  digoxin     Tablet 125 MICROGram(s) Oral daily  furosemide   Injectable 60 milliGRAM(s) IV Push once  lactated ringers. 500 milliLiter(s) (250 mL/Hr) IV Continuous <Continuous>  midodrine 15 milliGRAM(s) Oral every 8 hours  pantoprazole   Suspension 40 milliGRAM(s) Oral daily  phenylephrine    Infusion 0.1 MICROgram(s)/kG/Min (2.31 mL/Hr) IV Continuous <Continuous>  pyridostigmine 30 milliGRAM(s) Oral every 8 hours  sodium chloride 3%  Inhalation 4 milliLiter(s) Inhalation every 6 hours    MEDICATIONS  (PRN):  acetaminophen     Tablet .. 650 milliGRAM(s) Oral every 6 hours PRN Temp greater or equal to 38C (100.4F), Mild Pain (1 - 3)      __________________________________________________  ----------------------------------------------------------------------------------  REVIEW OF SYSTEMS: unable to participate in ROS due to non invasive ventilation      Vital Signs Last 24 Hrs  T(C): 37.2 (30 Jun 2022 08:26), Max: 37.2 (30 Jun 2022 08:26)  T(F): 99 (30 Jun 2022 08:26), Max: 99 (30 Jun 2022 08:26)  HR: 121 (30 Jun 2022 11:30) (97 - 150)  BP: 95/67 (30 Jun 2022 11:00) (71/50 - 116/68)  BP(mean): 74 (30 Jun 2022 11:00) (56 - 86)  RR: 26 (30 Jun 2022 11:30) (23 - 48)  SpO2: 95% (30 Jun 2022 11:30) (62% - 100%)    _________________  PHYSICAL EXAM:  ---------------------------   Normocephalic; intubated  LUNGS - ET tube in place; bilateral air entry+  HEART: S1 S2+   ABDOMEN: Soft,  non distended, BS+  EXTREMITIES: no cyanosis; no edema  NERVOUS SYSTEM:  unable to assess as patient on Bipap and minimally responsive to commands    _________________________________________________  LABS:                        11.0   7.93  )-----------( 164      ( 30 Jun 2022 06:18 )             34.6     06-30    136  |  100  |  64<H>  ----------------------------<  197<H>  3.5   |  26  |  1.80<H>    Ca    8.2<L>      30 Jun 2022 06:18  Phos  3.2     06-30  Mg     2.2     06-30    TPro  6.4  /  Alb  1.9<L>  /  TBili  1.7<H>  /  DBili  x   /  AST  23  /  ALT  24  /  AlkPhos  91  06-30        CAPILLARY BLOOD GLUCOSE                    Updates about current status and plan of care provided to family by ICU team

## 2022-06-30 NOTE — PROGRESS NOTE ADULT - ASSESSMENT
89y old  Male pmg afib on ac htn hld chief complaint of weakness    NIPPV  IVF  NEBS  on AC  VS noted  labs reviewed    overnight events noted -   pressors as needed  remains on AMIO    AF management - rate and rhythm control  AC for AF  I and O  cvs rx regimen and BP control  hMPV - resp viral illness - supportive measures - Albuterol PRN for sob and or wheezing  monitor VS and HD and Sat  Pleural Eff - Atelectasis - I ze if able to tolerate - no immediate need for thoracentesis - medical management  GOC discussion - DNR  isolation precs for hMPV  cough rx regimen  replmelinda liu

## 2022-07-01 NOTE — PROGRESS NOTE ADULT - PROBLEM SELECTOR PLAN 1
Acute hypercapnic respiratory failure requiring non invasive ventilation with BIPAP  Continue  monitoring in ICU  Prognosis is guarded  GOC- DNR  Continue supportive measures  continue bronchodilators

## 2022-07-01 NOTE — PROGRESS NOTE ADULT - ASSESSMENT
89y old  Male PMH Afib on ac htn hld chief complaint of weakness. Patient was seen in office 6/6 by Dr Allen for decreased appetite and weakness, digoxin was discontinued and increased lopressor to 100mg Po BID.  Now with altered mental status and hypercarbic respiratory failure, transferred to the ICU    Hypercarbic resp failure  - mental status unchanged and remains minimally responsive  - cont bipap as needed alternating with hfnc when able   - there is a component of HF contributing to resp failure   - Repeat TTE showed EF 60%, TROY, mod MR/TR  - CT showed moderate bilateral pleural effusions, followed by pulmonary. no need for thoracentesis   - has been intermittently diuresed, and would continue to hold lasix today in the setting of debbie. creatinine remains pending from this morning    AFIB with RVR  - af with elevated rates overnight, with some rate control with iv amiodarone  -rates remain  ~100  - cont digoxin  - can cont to use amio as a rate control agent for now  - holding metoprolol and CCB in the setting of hypotension  -bp has improved and is now off cali as of midnight, and on high dose of midodrine 15 tid  - please replete K as hypoK will increase risk of toxicity  - cont ac with eliquis     - Poor prognosis.  GOC discussion ongoing   -now dnr/dni    - Will continue to follow. Very high risk of decompensation.    Upon my evaluation, this patient is at high risk for imminent or life threatening deterioration due to ams, resp failure, chf and other active medical issues which require my direct attention, intervention, and personal management.  I have personally spent >30 minutes  of critical care time exclusive of time spent on separate billing procedures. This includes review of laboratory data, radiology results, discussion with primary team, and monitoring for potential decompensation. Interventions were performed as documented above.

## 2022-07-01 NOTE — PROGRESS NOTE ADULT - ASSESSMENT
89y old  Male pmg afib on ac htn hld chief complaint of weakness    NIPPV  IVF  NEBS  on AC  VS noted  labs reviewed    vs noted  on high fio2  on AMIO  pressors as needed - keep MAP > 60  CM follow up  Lactic Acidosis noted -     AF management - rate and rhythm control  AC for AF  I and O  cvs rx regimen and BP control  hMPV - resp viral illness - supportive measures - Albuterol PRN for sob and or wheezing  monitor VS and HD and Sat  Pleural Eff - Atelectasis - I ze if able to tolerate - no immediate need for thoracentesis - medical management  GOC discussion - DNR  isolation precs for hMPV  cough rx regimen  replmelinda liu

## 2022-07-01 NOTE — CONSULT NOTE ADULT - ASSESSMENT
Patient is on a low air loss mattress  At risk for altered tissue perfusion /YES  Impaired perfusion of peripheral tissue /YES  Continue  Nutrition (as tolerated)  Continue  Offloading   Continue Pericare  Apply cair boots at all times while in bed.   Provide skin checks and foot placement q8h.  Care as per medicine will follow w/ you  Follow [facility]   Findings and recommendations discussed with MOON lord   Thank you for this consult  Awa Galaviz NP, [facility] [phone] Peripheral arterial disease (PAD) lower extremities cold, mottled, toes, plantar feet, and heels, blanchable some areas of excoriation left heel per RN dopplerable dorsal pulses,  -cavilon daily  -zflo pillow to off load heels  Patient is on a low air loss mattress  At risk for altered tissue perfusion /YES  Impaired perfusion of peripheral tissue /YES  Continue  Nutrition (as tolerated)  Continue  Offloading   Continue Pericare  Apply cair boots at all times while in bed.   Provide skin checks and foot placement q8h.  Care as per medicine will follow w/ you  Follow up as outpatient at [facility]   Findings and recommendations discussed with MOON lord   Thank you for this consult  Awa Galaviz NP, [facility] [phone]

## 2022-07-01 NOTE — PROGRESS NOTE ADULT - SUBJECTIVE AND OBJECTIVE BOX
Neurology follow up note    ROBERT JOHNSON89yMale      Interval History:    Patient feels ok no new complaints.    Allergies    No Known Allergies    Intolerances        MEDICATIONS    acetaminophen     Tablet .. 650 milliGRAM(s) Oral every 6 hours PRN  albuterol/ipratropium for Nebulization 3 milliLiter(s) Nebulizer every 6 hours  aMIOdarone Infusion 0.5 mG/Min IV Continuous <Continuous>  apixaban 2.5 milliGRAM(s) Oral every 12 hours  chlorhexidine 2% Cloths 1 Application(s) Topical <User Schedule>  dextrose 5%. 1000 milliLiter(s) IV Continuous <Continuous>  digoxin     Tablet 125 MICROGram(s) Oral daily  midodrine 15 milliGRAM(s) Oral every 8 hours  pantoprazole   Suspension 40 milliGRAM(s) Oral daily  phenylephrine    Infusion 0.1 MICROgram(s)/kG/Min IV Continuous <Continuous>  pyridostigmine 30 milliGRAM(s) Oral every 8 hours  sodium chloride 3%  Inhalation 4 milliLiter(s) Inhalation every 6 hours              Vital Signs Last 24 Hrs  T(C): 36.3 (01 Jul 2022 08:22), Max: 37 (30 Jun 2022 12:18)  T(F): 97.4 (01 Jul 2022 08:22), Max: 98.6 (30 Jun 2022 12:18)  HR: 105 (01 Jul 2022 08:55) (96 - 126)  BP: 122/69 (01 Jul 2022 07:00) (84/51 - 129/68)  BP(mean): 90 (01 Jul 2022 07:00) (61 - 92)  RR: 40 (01 Jul 2022 07:00) (18 - 47)  SpO2: 93% (01 Jul 2022 08:55) (83% - 100%)      REVIEW OF SYSTEMS:  unable to obtain  hi-flow lethargic     PHYSICAL EXAMINATION:  GENERAL:  The patient does appear to be cachectic in appearance.   HEENT:  Head:  Normocephalic, atraumatic.  Eyes:  No scleral icterus.  Ears:  Hard of hearing.  NECK:  Supple.  CARDIOVASCULAR:  S1 and S2 heard.  RESPIRATORY:  Decreased breath sounds bilaterally.  ABDOMEN:  Soft, nontender.  EXTREMITIES:  No clubbing or cyanosis was noted.     NEUROLOGIC:  The patient is lethargic   The patient has poor vision out of both eyes, which is his baseline.  Speech was fluent.  No dysarthria.  Motor:  Bilateral upper slight flexion at elbows   bilateral lower 3-/5.   previous exam No head tremors were noted.  No resting tremors were noted.  Upper extremities, no cogwheel rigidity was noted.  No significant bradykinesia was noted.                        LABS:  CBC Full  -  ( 01 Jul 2022 06:35 )  WBC Count : 10.04 K/uL  RBC Count : 3.65 M/uL  Hemoglobin : 11.8 g/dL  Hematocrit : 36.9 %  Platelet Count - Automated : 158 K/uL  Mean Cell Volume : 101.1 fl  Mean Cell Hemoglobin : 32.3 pg  Mean Cell Hemoglobin Concentration : 32.0 gm/dL  Auto Neutrophil # : 8.45 K/uL  Auto Lymphocyte # : 0.75 K/uL  Auto Monocyte # : 0.64 K/uL  Auto Eosinophil # : 0.07 K/uL  Auto Basophil # : 0.03 K/uL  Auto Neutrophil % : 84.1 %  Auto Lymphocyte % : 7.5 %  Auto Monocyte % : 6.4 %  Auto Eosinophil % : 0.7 %  Auto Basophil % : 0.3 %      07-01    134<L>  |  97  |  70<H>  ----------------------------<  252<H>  3.0<L>   |  29  |  1.80<H>    Ca    8.1<L>      01 Jul 2022 06:35  Phos  3.3     07-01  Mg     2.2     07-01    TPro  6.6  /  Alb  2.1<L>  /  TBili  2.0<H>  /  DBili  x   /  AST  35  /  ALT  26  /  AlkPhos  94  07-01    Hemoglobin A1C:     LIVER FUNCTIONS - ( 01 Jul 2022 06:35 )  Alb: 2.1 g/dL / Pro: 6.6 g/dL / ALK PHOS: 94 U/L / ALT: 26 U/L / AST: 35 U/L / GGT: x           Vitamin B12         RADIOLOGY      ANALYSIS AND PLAN:  A 89-year-old with episode of altered mental status in regards to lethargy and generalized weakness.  For generalized weakness, suspect most likely metabolic encephalopathy secondary to respiratory issues along with decreased oral intake, suspect less likely this is a primary central nervous system event.  Suspect the patient's episodes of head possibly falling forward secondary to generalized weakness.  The patient does complain of weakness in his neck muscles as well, suspect less likely this is a primary neurological event such as Parkinson's, no other symptoms at present to suggest an underlying cause of Parkinson's.  For history of atrial fibrillation, continue the patient on anticoagulation.  repeat head ct no changes   monitor respiratory status as needed   trial of pyridostigmine  ACH levels so far appear normal rest pending suspect less likely MG   prognosis at present appears poor     Spoke with daughter, Leigh, at [phone] 6/30 today went to [facility] 7/1    Greater than 23 minutes of time was spent with the patient, plan of care, reviewing data, with greater than 50% of the visit was spent counseling and/or coordinating care with multidisciplinary healthcare team

## 2022-07-01 NOTE — PROGRESS NOTE ADULT - SUBJECTIVE AND OBJECTIVE BOX
Date/Time Patient Seen:  		  Referring MD:   Data Reviewed	       Patient is a 89y old  Male who presents with a chief complaint of weight loss (30 Jun 2022 15:53)      Subjective/HPI     PAST MEDICAL & SURGICAL HISTORY:  Atrial fibrillation    HTN (hypertension)    HLD (hyperlipidemia)    Anemia    CHF (congestive heart failure)    DVT, lower extremity          Medication list         MEDICATIONS  (STANDING):  albuterol/ipratropium for Nebulization 3 milliLiter(s) Nebulizer every 6 hours  aMIOdarone Infusion 0.5 mG/Min (16.7 mL/Hr) IV Continuous <Continuous>  apixaban 2.5 milliGRAM(s) Oral every 12 hours  chlorhexidine 2% Cloths 1 Application(s) Topical <User Schedule>  dextrose 5%. 1000 milliLiter(s) (25 mL/Hr) IV Continuous <Continuous>  digoxin     Tablet 125 MICROGram(s) Oral daily  midodrine 15 milliGRAM(s) Oral every 8 hours  pantoprazole   Suspension 40 milliGRAM(s) Oral daily  phenylephrine    Infusion 0.1 MICROgram(s)/kG/Min (2.31 mL/Hr) IV Continuous <Continuous>  pyridostigmine 30 milliGRAM(s) Oral every 8 hours  sodium chloride 3%  Inhalation 4 milliLiter(s) Inhalation every 6 hours    MEDICATIONS  (PRN):  acetaminophen     Tablet .. 650 milliGRAM(s) Oral every 6 hours PRN Temp greater or equal to 38C (100.4F), Mild Pain (1 - 3)         Vitals log        ICU Vital Signs Last 24 Hrs  T(C): 36.4 (01 Jul 2022 04:05), Max: 37.2 (30 Jun 2022 08:26)  T(F): 97.6 (01 Jul 2022 04:05), Max: 99 (30 Jun 2022 08:26)  HR: 107 (01 Jul 2022 05:00) (96 - 138)  BP: 108/59 (01 Jul 2022 05:00) (82/51 - 129/68)  BP(mean): 82 (01 Jul 2022 05:00) (61 - 92)  ABP: --  ABP(mean): --  RR: 30 (01 Jul 2022 05:00) (18 - 48)  SpO2: 97% (01 Jul 2022 05:00) (88% - 100%)           Input and Output:  I&O's Detail    29 Jun 2022 07:01  -  30 Jun 2022 07:00  --------------------------------------------------------  IN:    dextrose 5%: 1200 mL    Enteral Tube Flush: 90 mL    IV PiggyBack: 100 mL    IV PiggyBack: 250 mL    Jevity 1.5: 20 mL    Phenylephrine: 100 mL    Sodium Chloride 0.9% Bolus: 250 mL  Total IN: 2010 mL    OUT:    Indwelling Catheter - Urethral (mL): 235 mL  Total OUT: 235 mL    Total NET: 1775 mL      30 Jun 2022 07:01  -  01 Jul 2022 05:36  --------------------------------------------------------  IN:    Amiodarone: 133.2 mL    Amiodarone: 116.9 mL    dextrose 5%: 400 mL    dextrose 5%: 200 mL    IV PiggyBack: 500 mL    IV PiggyBack: 50 mL    Jevity 1.5: 370 mL    Phenylephrine: 125.7 mL  Total IN: 1895.8 mL    OUT:    Indwelling Catheter - Urethral (mL): 480 mL  Total OUT: 480 mL    Total NET: 1415.8 mL          Lab Data                        13.1   10.10 )-----------( 158      ( 30 Jun 2022 16:25 )             40.8     06-30    137  |  103  |  65<H>  ----------------------------<  102<H>  3.7   |  24  |  2.10<H>    Ca    8.6      30 Jun 2022 16:25  Phos  4.1     06-30  Mg     2.2     06-30    TPro  6.9  /  Alb  2.2<L>  /  TBili  2.0<H>  /  DBili  x   /  AST  34  /  ALT  27  /  AlkPhos  96  06-30            Review of Systems	      Objective     Physical Examination    heart s1s2  lung dec BS  abd soft      Pertinent Lab findings & Imaging      Ramírez:  NO   Adequate UO     I&O's Detail    29 Jun 2022 07:01  -  30 Jun 2022 07:00  --------------------------------------------------------  IN:    dextrose 5%: 1200 mL    Enteral Tube Flush: 90 mL    IV PiggyBack: 100 mL    IV PiggyBack: 250 mL    Jevity 1.5: 20 mL    Phenylephrine: 100 mL    Sodium Chloride 0.9% Bolus: 250 mL  Total IN: 2010 mL    OUT:    Indwelling Catheter - Urethral (mL): 235 mL  Total OUT: 235 mL    Total NET: 1775 mL      30 Jun 2022 07:01  -  01 Jul 2022 05:36  --------------------------------------------------------  IN:    Amiodarone: 133.2 mL    Amiodarone: 116.9 mL    dextrose 5%: 400 mL    dextrose 5%: 200 mL    IV PiggyBack: 500 mL    IV PiggyBack: 50 mL    Jevity 1.5: 370 mL    Phenylephrine: 125.7 mL  Total IN: 1895.8 mL    OUT:    Indwelling Catheter - Urethral (mL): 480 mL  Total OUT: 480 mL    Total NET: 1415.8 mL               Discussed with:     Cultures:	        Radiology

## 2022-07-01 NOTE — PROGRESS NOTE ADULT - ASSESSMENT
89M PMH A-Fib on apixaban, HTN, HLD, and chronic diastolic heart failure, subacute anorexia and wt loss of 50lbs over last few months, now presents with acute decompensated heart failure in the setting of human metapneumovirus infection.  Course complicated by metabolic encephalopathy, acute hypercapnic respiratory failure, congestive hepatopathy, MARISOL.      --toxic/metabolic encephalopathy persists  possible myesthenic gravis to account for head drop, dysphagia, continue trial of mestinon, f/u serologies  --acute hypercapnic respiratory failure  continue time off BiPAP as tolerates  continue secretion clearance measures  --acute decompensated diastolic HF  hold lasix for today given MARISOL   hypotension, continue midodrine   --Afib controlled on digoxin, amio   AC with eliquis  --dysphagia, continue TF if off BiPAP  --mild MARISOL on CKD, supportive care  --no infectious concerns at this point, monitor off Abx  --hypoglycemia while NPO, cont D5 .   --son and daughter updated

## 2022-07-01 NOTE — CONSULT NOTE ADULT - CONSULT REASON
Wound
weakness
RAPID RESPONSE / AMS
human metapneumovirus
pleural eff  atelectasis
A-fib RVR with hypotension

## 2022-07-01 NOTE — PROGRESS NOTE ADULT - TIME BILLING
direct care of a critically ill patient including but not limited to reviewing chart, medications ,laboratory data, imaging reports, discussion of plan of care with consultants on the case, coordination of care with multidisciplinary team involved in the case and discussion of plan with ICU team.    Family updated at bedside by ICU team  Discussed with ICU attending- prognosis is poor.   If continues to show clinical decline, will consider hospice evaluation.   Family stall hopeful for recovery however prognosis is poor.

## 2022-07-01 NOTE — PROGRESS NOTE ADULT - SUBJECTIVE AND OBJECTIVE BOX
[facility]    Vincenzo Wyman, Tiffany, Daina, Delphine, Alex, Deandra      [phone]    CHIEF COMPLAINT: Patient is a 89y old  Male who presents with a chief complaint of weight loss (01 Jul 2022 05:36)      Follow Up: end stage hf, resp failure    Interim history: Unable to provide a history on the basis of a poor mental status.  Events noted. remains on bipap alt with hfnc. remains lethargic    MEDICATIONS  (STANDING):  albuterol/ipratropium for Nebulization 3 milliLiter(s) Nebulizer every 6 hours  aMIOdarone Infusion 0.5 mG/Min (16.7 mL/Hr) IV Continuous <Continuous>  apixaban 2.5 milliGRAM(s) Oral every 12 hours  chlorhexidine 2% Cloths 1 Application(s) Topical <User Schedule>  dextrose 5%. 1000 milliLiter(s) (25 mL/Hr) IV Continuous <Continuous>  digoxin     Tablet 125 MICROGram(s) Oral daily  midodrine 15 milliGRAM(s) Oral every 8 hours  pantoprazole   Suspension 40 milliGRAM(s) Oral daily  phenylephrine    Infusion 0.1 MICROgram(s)/kG/Min (2.31 mL/Hr) IV Continuous <Continuous>  pyridostigmine 30 milliGRAM(s) Oral every 8 hours  sodium chloride 3%  Inhalation 4 milliLiter(s) Inhalation every 6 hours    MEDICATIONS  (PRN):  acetaminophen     Tablet .. 650 milliGRAM(s) Oral every 6 hours PRN Temp greater or equal to 38C (100.4F), Mild Pain (1 - 3)      REVIEW OF SYSTEMS: unable to provide  Vital Signs Last 24 Hrs  T(C): 36.4 (01 Jul 2022 04:05), Max: 37.2 (30 Jun 2022 08:26)  T(F): 97.6 (01 Jul 2022 04:05), Max: 99 (30 Jun 2022 08:26)  HR: 117 (01 Jul 2022 07:00) (96 - 126)  BP: 122/69 (01 Jul 2022 07:00) (82/51 - 129/68)  BP(mean): 90 (01 Jul 2022 07:00) (61 - 92)  RR: 40 (01 Jul 2022 07:00) (18 - 48)  SpO2: 94% (01 Jul 2022 07:00) (83% - 100%)    I&O's Summary    30 Jun 2022 07:01  -  01 Jul 2022 07:00  --------------------------------------------------------  IN: 1987.5 mL / OUT: 540 mL / NET: 1447.5 mL        Telemetry past 24h: af vr ~100s    PHYSICAL EXAM:    Constitutional: skeletal, NAD   HEENT:  MMM, sclerae anicteric, conjunctivae clear, no oral cyanosis.  Pulmonary: Non-labored, breath sounds are decr to absent in left lung fields, gen cta right. scattered rhonchi  Cardiovascular: tachy irregular, S1 and S2.  No murmur.  No rubs, gallops or clicks  Gastrointestinal: Bowel Sounds present, soft, nontender.   Lymph: No peripheral edema.   Neurological: unable to evaluate, poor mental status  Skin: No rashes.  Psych:  Mood & affect not evaluable    LABS: All Labs Reviewed:                        11.8   10.04 )-----------( 158      ( 01 Jul 2022 06:35 )             36.9                         13.1   10.10 )-----------( 158      ( 30 Jun 2022 16:25 )             40.8                         11.0   7.93  )-----------( 164      ( 30 Jun 2022 06:18 )             34.6     30 Jun 2022 16:25    137    |  103    |  65     ----------------------------<  102    3.7     |  24     |  2.10   30 Jun 2022 06:18    136    |  100    |  64     ----------------------------<  197    3.5     |  26     |  1.80   29 Jun 2022 05:35    141    |  105    |  56     ----------------------------<  143    3.5     |  28     |  1.50     Ca    8.6        30 Jun 2022 16:25  Ca    8.2        30 Jun 2022 06:18  Ca    8.6        29 Jun 2022 05:35  Phos  4.1       30 Jun 2022 16:25  Phos  3.2       30 Jun 2022 06:18  Phos  2.3       29 Jun 2022 05:35  Mg     2.2       30 Jun 2022 16:25  Mg     2.2       30 Jun 2022 06:18  Mg     2.3       29 Jun 2022 05:35    TPro  6.9    /  Alb  2.2    /  TBili  2.0    /  DBili  x      /  AST  34     /  ALT  27     /  AlkPhos  96     30 Jun 2022 16:25  TPro  6.4    /  Alb  1.9    /  TBili  1.7    /  DBili  x      /  AST  23     /  ALT  24     /  AlkPhos  91     30 Jun 2022 06:18  TPro  6.7    /  Alb  2.1    /  TBili  1.8    /  DBili  x      /  AST  24     /  ALT  25     /  AlkPhos  99     29 Jun 2022 05:35          Blood Culture:         RADIOLOGY:    EKG:    Echo:

## 2022-07-01 NOTE — PROGRESS NOTE ADULT - SUBJECTIVE AND OBJECTIVE BOX
MEDICAL ATTENDING NOTE    Patient is a 89y old  Male who presents with a chief complaint of weight loss (01 Jul 2022 09:54)      INTERVAL HPI/OVERNIGHT EVENTS: non responsive; on BiPAP    MEDICATIONS  (STANDING):  albuterol/ipratropium for Nebulization 3 milliLiter(s) Nebulizer every 6 hours  aMIOdarone    Tablet 200 milliGRAM(s) Oral daily  aMIOdarone Infusion 0.5 mG/Min (16.7 mL/Hr) IV Continuous <Continuous>  apixaban 2.5 milliGRAM(s) Oral every 12 hours  chlorhexidine 2% Cloths 1 Application(s) Topical <User Schedule>  dextrose 5%. 1000 milliLiter(s) (25 mL/Hr) IV Continuous <Continuous>  digoxin     Tablet 125 MICROGram(s) Oral daily  midodrine 15 milliGRAM(s) Oral every 8 hours  pantoprazole   Suspension 40 milliGRAM(s) Oral daily  phenylephrine    Infusion 0.1 MICROgram(s)/kG/Min (2.31 mL/Hr) IV Continuous <Continuous>  potassium chloride   Powder 40 milliEquivalent(s) Oral every 2 hours  pyridostigmine 30 milliGRAM(s) Oral every 8 hours  sodium chloride 3%  Inhalation 4 milliLiter(s) Inhalation every 6 hours    MEDICATIONS  (PRN):  acetaminophen     Tablet .. 650 milliGRAM(s) Oral every 6 hours PRN Temp greater or equal to 38C (100.4F), Mild Pain (1 - 3)      __________________________________________________  ----------------------------------------------------------------------------------  REVIEW OF SYSTEMS: unable to participate in ROS due to poor mentation      Vital Signs Last 24 Hrs  T(C): 36.4 (01 Jul 2022 12:42), Max: 36.7 (30 Jun 2022 15:47)  T(F): 97.5 (01 Jul 2022 12:42), Max: 98 (30 Jun 2022 15:47)  HR: 98 (01 Jul 2022 12:00) (96 - 124)  BP: 104/61 (01 Jul 2022 12:00) (84/51 - 129/68)  BP(mean): 77 (01 Jul 2022 12:00) (61 - 92)  RR: 30 (01 Jul 2022 12:00) (20 - 56)  SpO2: 98% (01 Jul 2022 12:00) (83% - 100%)    _________________  PHYSICAL EXAM:  ---------------------------  Normocephalic; cachexia+  LUNGS - no wheezing, on Bipap  HEART: S1 S2+   ABDOMEN: Soft, Nontender, non distended, BS+  EXTREMITIES: no cyanosis; no edema  NERVOUS SYSTEM:  obtunded+ on Bipap    _________________________________________________  LABS:                        11.8   10.04 )-----------( 158      ( 01 Jul 2022 06:35 )             36.9     07-01    134<L>  |  97  |  70<H>  ----------------------------<  252<H>  3.0<L>   |  29  |  1.80<H>    Ca    8.1<L>      01 Jul 2022 06:35  Phos  3.3     07-01  Mg     2.2     07-01    TPro  6.6  /  Alb  2.1<L>  /  TBili  2.0<H>  /  DBili  x   /  AST  35  /  ALT  26  /  AlkPhos  94  07-01        CAPILLARY BLOOD GLUCOSE      POCT Blood Glucose.: 211 mg/dL (01 Jul 2022 13:01)  POCT Blood Glucose.: 125 mg/dL (30 Jun 2022 23:14)  POCT Blood Glucose.: 127 mg/dL (30 Jun 2022 19:23)                Plan of care was discussed with patient ; all questions and concerns were addressed and care was aligned with patient's wishes.

## 2022-07-01 NOTE — CONSULT NOTE ADULT - CONSULT REQUESTED DATE/TIME
15-Rui-2022 14:30
15-Rui-2022 20:48
16-Jun-2022 13:15
01-Jul-2022 09:44
17-Jun-2022 17:35
24-Jun-2022 08:12

## 2022-07-01 NOTE — PROVIDER CONTACT NOTE (CRITICAL VALUE NOTIFICATION) - ACTION/TREATMENT ORDERED:
no PA orders given
No new orders given
no further intervention at this time
No new orders given
no orders given

## 2022-07-01 NOTE — CONSULT NOTE ADULT - SUBJECTIVE AND OBJECTIVE BOX
HPI:  Patient is a 89 yo M with PMH of Afib, HTN, DLD who was brought in by family for complaint of cough, weakness for the last few days. Patient reports weight loss and reduced appetite for the last month. Denies chest pain or palpitations, denies fever, denies N/V/D. Of note, patient was seen recently by cardiologist Dr Allen on 6/6 for weakness and decreased appetite and his digoxin was discontinue and his lopressor was increased and coumadin changed to Eliquis.  Uses a cane occasionally at home, denies recent falls. Denies blood in stool.         PAST MEDICAL & SURGICAL HISTORY:  Atrial fibrillation      HTN (hypertension)      HLD (hyperlipidemia)      Anemia      CHF (congestive heart failure)      DVT, lower extremity        REVIEW OF SYSTEMS  Patient is unable to provide any information/ROS  due to baseline mental status      MEDICATIONS  (STANDING):  albuterol/ipratropium for Nebulization 3 milliLiter(s) Nebulizer every 6 hours  aMIOdarone Infusion 0.5 mG/Min (16.7 mL/Hr) IV Continuous <Continuous>  apixaban 2.5 milliGRAM(s) Oral every 12 hours  chlorhexidine 2% Cloths 1 Application(s) Topical <User Schedule>  dextrose 5%. 1000 milliLiter(s) (25 mL/Hr) IV Continuous <Continuous>  digoxin     Tablet 125 MICROGram(s) Oral daily  midodrine 15 milliGRAM(s) Oral every 8 hours  pantoprazole   Suspension 40 milliGRAM(s) Oral daily  phenylephrine    Infusion 0.1 MICROgram(s)/kG/Min (2.31 mL/Hr) IV Continuous <Continuous>  pyridostigmine 30 milliGRAM(s) Oral every 8 hours  sodium chloride 3%  Inhalation 4 milliLiter(s) Inhalation every 6 hours    MEDICATIONS  (PRN):  acetaminophen     Tablet .. 650 milliGRAM(s) Oral every 6 hours PRN Temp greater or equal to 38C (100.4F), Mild Pain (1 - 3)      Allergies    No Known Allergies    Intolerances        SOCIAL HISTORY:  / single/ ; (+)HHA; Denies smoking, ETOH, drugs    FAMILY HISTORY:      Vital Signs Last 24 Hrs  T(C): 36.3 (01 Jul 2022 08:22), Max: 37 (30 Jun 2022 12:18)  T(F): 97.4 (01 Jul 2022 08:22), Max: 98.6 (30 Jun 2022 12:18)  HR: 105 (01 Jul 2022 08:55) (96 - 126)  BP: 122/69 (01 Jul 2022 07:00) (84/51 - 129/68)  BP(mean): 90 (01 Jul 2022 07:00) (61 - 92)  RR: 40 (01 Jul 2022 07:00) (18 - 47)  SpO2: 93% (01 Jul 2022 08:55) (83% - 100%)    NAD / gaurded but stable,  A&Ox3/ Alert  cachectic/ MO/ Obese  within defined normal limits  Total Care Sport/ Versa Care P500 bed                            11.8   10.04 )-----------( 158      ( 01 Jul 2022 06:35 )             36.9     07-01    134<L>  |  97  |  70<H>  ----------------------------<  252<H>  3.0<L>   |  29  |  1.80<H>    Ca    8.1<L>      01 Jul 2022 06:35  Phos  3.3     07-01  Mg     2.2     07-01    TPro  6.6  /  Alb  2.1<L>  /  TBili  2.0<H>  /  DBili  x   /  AST  35  /  ALT  26  /  AlkPhos  94  07-01    Auto Neutrophil #: 8.45 K/uL (07-01-22 @ 06:35)    A1C with Estimated Average Glucose Result: 6.1 % (06-26-22 @ 05:34)      PHYSICAL EXAM:    General: resting comfortably in bed; NAD  ENT: no nasal discharge; hearing intact  Neck: trach in place/vented/Supple  Respiratory: CTA  Cardiac: irregular rhythm; no murmurs  Gastrointestinal: soft, NT/ND; PEG in place, Right lower abdomen brownish discoloration from old hematoma?, fluid is felt in subcutaneous space, non tender, no erythema  Extremities: no clubbing or cyanosis; ++ Lower ext edema : no gross deformities, able to move all four extremities, +1 edema on right foot; erythema extends from dorsal portion of foot up towards anterior tibial region, 2 patches of proximal streaking noted - all areas marked for progression; right side dorsalis pedis + posterior tibial pulses dopplerable; faint palpable bilateral popliteal & LLE pulses; necrotic ulcerations on dorsum of the R 2nd toe and distal R 3rd toe  Dermatologic: skin warm, dry and intact; no rashes, wounds, or scars  Lymphatic: no submandibular or cervical LAD  Neurologic: awake, alert, follows Can not follow commands, weakened strength & sensation grossly intact/ paraesthesia  Musculoskeletal/Vascular:  ROM  No edema, warm, no calf tenderness, no hair growth  DP/PT  hemosiderin staining  no deformities/ contractures  DP & PT pulses non-palpable bilaterally 2/2 +1 lower extremity pitting edema, Capillary refill 3 seconds, no hair growth, skin temp warm b/l.  Skin:  moist w/ good turgor  frail,  ecchymosis w/o hematoma  serosanguinous drainage, erythema  No odor, erythema, increased warmth, tenderness, induration, fluctuance                   HPI:  Patient is a 89 yo M with PMH of Afib, HTN, DLD who was brought in by family for complaint of cough, weakness for the last few days. Patient reports weight loss and reduced appetite for the last month. Denies chest pain or palpitations, denies fever, denies N/V/D.       PAST MEDICAL & SURGICAL HISTORY:  Atrial fibrillation      HTN (hypertension)      HLD (hyperlipidemia)      Anemia      CHF (congestive heart failure)      DVT, lower extremity        REVIEW OF SYSTEMS  Patient is unable to provide any information/ROS 2/2 mental status      MEDICATIONS  (STANDING):  albuterol/ipratropium for Nebulization 3 milliLiter(s) Nebulizer every 6 hours  aMIOdarone Infusion 0.5 mG/Min (16.7 mL/Hr) IV Continuous <Continuous>  apixaban 2.5 milliGRAM(s) Oral every 12 hours  chlorhexidine 2% Cloths 1 Application(s) Topical <User Schedule>  dextrose 5%. 1000 milliLiter(s) (25 mL/Hr) IV Continuous <Continuous>  digoxin     Tablet 125 MICROGram(s) Oral daily  midodrine 15 milliGRAM(s) Oral every 8 hours  pantoprazole   Suspension 40 milliGRAM(s) Oral daily  phenylephrine    Infusion 0.1 MICROgram(s)/kG/Min (2.31 mL/Hr) IV Continuous <Continuous>  pyridostigmine 30 milliGRAM(s) Oral every 8 hours  sodium chloride 3%  Inhalation 4 milliLiter(s) Inhalation every 6 hours    MEDICATIONS  (PRN):  acetaminophen     Tablet .. 650 milliGRAM(s) Oral every 6 hours PRN Temp greater or equal to 38C (100.4F), Mild Pain (1 - 3)      Allergies    No Known Allergies    Intolerances        SOCIAL HISTORY: Unable to obtain    FAMILY HISTORY: unable to obtain      Vital Signs Last 24 Hrs  T(C): 36.3 (01 Jul 2022 08:22), Max: 37 (30 Jun 2022 12:18)  T(F): 97.4 (01 Jul 2022 08:22), Max: 98.6 (30 Jun 2022 12:18)  HR: 105 (01 Jul 2022 08:55) (96 - 126)  BP: 122/69 (01 Jul 2022 07:00) (84/51 - 129/68)  BP(mean): 90 (01 Jul 2022 07:00) (61 - 92)  RR: 40 (01 Jul 2022 07:00) (18 - 47)  SpO2: 93% (01 Jul 2022 08:55) (83% - 100%)                            11.8   10.04 )-----------( 158      ( 01 Jul 2022 06:35 )             36.9     07-01    134<L>  |  97  |  70<H>  ----------------------------<  252<H>  3.0<L>   |  29  |  1.80<H>    Ca    8.1<L>      01 Jul 2022 06:35  Phos  3.3     07-01  Mg     2.2     07-01    TPro  6.6  /  Alb  2.1<L>  /  TBili  2.0<H>  /  DBili  x   /  AST  35  /  ALT  26  /  AlkPhos  94  07-01    Auto Neutrophil #: 8.45 K/uL (07-01-22 @ 06:35)    A1C with Estimated Average Glucose Result: 6.1 % (06-26-22 @ 05:34)      PHYSICAL EXAM:    General: resting comfortably in bed;   ENT: no nasal discharge;  Neck: trach in place/vented/Supple  Respiratory: High pete   Dermatologic: peripheral arterial disease (PAD) lower extremities cold, mottled, toes, plantar feet, and heels, blanchable some areas of excoriation left heel per RN dopplerable dorsal pulses,   Neurologic: Can not follow commands  Musculoskeletal/Vascular: no deformities/ contractures

## 2022-07-01 NOTE — PROGRESS NOTE ADULT - SUBJECTIVE AND OBJECTIVE BOX
Interval events:   off BiPAP and on HFNC from 3pm yesterday until 11;30 this am when he appeared hypoxemic with respiratory distress    Review of Systems: VALERIO due to encephalopathy    T(F): 97 (07-01-22 @ 17:00), Max: 97.7 (06-30-22 @ 23:56)  HR: 112 (07-01-22 @ 22:00) (96 - 117)  BP: 90/53 (07-01-22 @ 22:00) (66/42 - 123/62)  RR: 59 (07-01-22 @ 22:00) (27 - 75)  SpO2: 96% (07-01-22 @ 22:00) (75% - 100%)  Wt(kg): --      06-30-22 @ 07:01  -  07-01-22 @ 07:00  --------------------------------------------------------  IN: 1987.5 mL / OUT: 540 mL / NET: 1447.5 mL    07-01-22 @ 07:01  -  07-01-22 @ 23:38  --------------------------------------------------------  IN: 689.7 mL / OUT: 220 mL / NET: 469.7 mL        CAPILLARY BLOOD GLUCOSE      POCT Blood Glucose.: 154 mg/dL (01 Jul 2022 17:27)      I&O's Summary    30 Jun 2022 07:01  -  01 Jul 2022 07:00  --------------------------------------------------------  IN: 1987.5 mL / OUT: 540 mL / NET: 1447.5 mL    01 Jul 2022 07:01  -  01 Jul 2022 23:38  --------------------------------------------------------  IN: 689.7 mL / OUT: 220 mL / NET: 469.7 mL        Physical Exam:   Gen: chronically ill  Neuro: awake but does not respond to voice  HEENT: PERRL  Resp: rhonchi b/l  CVS: RRR  Abd: soft, NTND  Ext: no edema  Skin: WWP    Meds:    aMIOdarone    Tablet Oral  digoxin     Tablet Oral  midodrine Oral  phenylephrine    Infusion IV Continuous      albuterol/ipratropium for Nebulization Nebulizer  sodium chloride 3%  Inhalation Inhalation    acetaminophen     Tablet .. Oral PRN      apixaban Oral    pantoprazole   Suspension Oral      dextrose 5%. IV Continuous      chlorhexidine 2% Cloths Topical    pyridostigmine Oral                            11.8   10.04 )-----------( 158      ( 01 Jul 2022 06:35 )             36.9       07-01    134<L>  |  97  |  70<H>  ----------------------------<  252<H>  3.0<L>   |  29  |  1.80<H>    Ca    8.1<L>      01 Jul 2022 06:35  Phos  3.3     07-01  Mg     2.2     07-01    TPro  6.6  /  Alb  2.1<L>  /  TBili  2.0<H>  /  DBili  x   /  AST  35  /  ALT  26  /  AlkPhos  94  07-01    Lactate 2.9           06-30 @ 23:58      Tubes/lines:  mead    GLOBAL ISSUE/BEST PRACTICE:  Analgesia: N  Sedation:N  HOB elevation: yes  Stress ulcer prophylaxis: Y  VTE prophylaxis: Y, AC  Glycemic control: Y  Nutrition: Y    CODE STATUS: DNR, DNI

## 2022-07-02 NOTE — PROGRESS NOTE ADULT - ASSESSMENT
89y old  Male PMH Afib on ac htn hld chief complaint of weakness. Patient was seen in office 6/6 by Dr Allen for decreased appetite and weakness, digoxin was discontinued and increased lopressor to 100mg Po BID.  Now with altered mental status and hypercarbic respiratory failure, transferred to the ICU    Hypercarbic resp failure  - mental status unchanged and remains minimally responsive  - cont bipap as needed alternating with hfnc when able   - there is a component of HF contributing to resp failure   - Repeat TTE showed EF 60%, TROY, mod MR/TR  - CT showed moderate bilateral pleural effusions, followed by pulmonary. no need for thoracentesis   - has been intermittently diuresed, and would continue to hold lasix today in the setting of debbie.  - creatinine significantly worse this morning    AFIB with RVR  - af with controlled rates overnight; rates remain  ~100  - cont digoxin  - can cont to use amio as a rate control agent for now  - holding metoprolol and CCB in the setting of hypotension  -bp had improved but is now on increasing doses of cali, and on high dose of midodrine 15 tid   - cont ac with eliquis     - Poor prognosis.  GOC discussion ongoing   - given worsening pressor requirements, oxygenation and persistently poor mental status, he remains at risk of abrupt decompensation  -family made aware and they are coming to see him  -dnr/dni    - Will continue to follow. Very high risk of decompensation.    Upon my evaluation, this patient is at high risk for imminent or life threatening deterioration due to ams, resp failure, chf and other active medical issues which require my direct attention, intervention, and personal management.  I have personally spent >30 minutes  of critical care time exclusive of time spent on separate billing procedures. This includes review of laboratory data, radiology results, discussion with primary team, and monitoring for potential decompensation. Interventions were performed as documented above.

## 2022-07-02 NOTE — PROGRESS NOTE ADULT - CRITICAL CARE SERVICES PROVIDED
Patient is critically ill, requiring critical care services.

## 2022-07-02 NOTE — PROGRESS NOTE ADULT - REASON FOR ADMISSION
weight loss

## 2022-07-02 NOTE — PHARMACOTHERAPY INTERVENTION NOTE - COMMENTS
89 y/o M with possible PNA (increasing WBC and bands-27%). Will order cefepime w/ vancomycin 1g x1 (pulse dosing). Trough to be ordered for tomorrow 7/3     89 y/o M with possible PNA (increasing WBC and bands-27%). Will order cefepime w/ vancomycin 1g x1 (pulse dosing). Trough to be ordered for tomorrow 7/3.  Additionally, will disconitnue eliquis at this time due to worsening renal function (SCr 1.8 --> 2.9) and hold off on any anticoagulation

## 2022-07-02 NOTE — PROGRESS NOTE ADULT - SUBJECTIVE AND OBJECTIVE BOX
MEDICAL ATTENDING NOTE    Patient is a 89y old  Male who presents with a chief complaint of weight loss (02 Jul 2022 11:15)      INTERVAL HPI/OVERNIGHT EVENTS: offers no new complaints today    MEDICATIONS  (STANDING):  albuterol/ipratropium for Nebulization 3 milliLiter(s) Nebulizer every 6 hours  aMIOdarone    Tablet 200 milliGRAM(s) Oral <User Schedule>  cefepime   IVPB      cefepime   IVPB 2000 milliGRAM(s) IV Intermittent once  chlorhexidine 2% Cloths 1 Application(s) Topical <User Schedule>  dextrose 5%. 1000 milliLiter(s) (25 mL/Hr) IV Continuous <Continuous>  digoxin     Tablet 125 MICROGram(s) Oral daily  midodrine 15 milliGRAM(s) Oral every 8 hours  pantoprazole   Suspension 40 milliGRAM(s) Oral daily  phenylephrine    Infusion 0.1 MICROgram(s)/kG/Min (2.31 mL/Hr) IV Continuous <Continuous>  phenylephrine    Infusion 8 MICROgram(s)/kG/Min (86 mL/Hr) IV Continuous <Continuous>  pyridostigmine 30 milliGRAM(s) Oral every 8 hours  sodium chloride 3%  Inhalation 4 milliLiter(s) Inhalation every 6 hours  vancomycin  IVPB 1000 milliGRAM(s) IV Intermittent once    MEDICATIONS  (PRN):  acetaminophen     Tablet .. 650 milliGRAM(s) Oral every 6 hours PRN Temp greater or equal to 38C (100.4F), Mild Pain (1 - 3)      __________________________________________________  ----------------------------------------------------------------------------------  REVIEW OF SYSTEMS: negative      Vital Signs Last 24 Hrs  T(C): 36 (02 Jul 2022 08:00), Max: 36.4 (01 Jul 2022 12:42)  T(F): 96.8 (02 Jul 2022 08:00), Max: 97.5 (01 Jul 2022 12:42)  HR: 114 (02 Jul 2022 11:00) (98 - 142)  BP: 80/52 (02 Jul 2022 11:00) (56/38 - 172/77)  BP(mean): 60 (02 Jul 2022 11:00) (43 - 111)  RR: 23 (02 Jul 2022 11:00) (23 - 75)  SpO2: 77% (02 Jul 2022 11:00) (75% - 99%)    _________________  PHYSICAL EXAM:  ---------------------------   NAD; Normocephalic;   LUNGS - no wheezing  HEART: S1 S2+   ABDOMEN: Soft, Nontender, non distended  EXTREMITIES: no cyanosis; no edema  NERVOUS SYSTEM:  Awake and alert; no focal neuro deficits appreciated    _________________________________________________  LABS:                        12.7   14.40 )-----------( 156      ( 02 Jul 2022 05:37 )             40.4     07-02    138  |  103  |  80<H>  ----------------------------<  99  4.5   |  19<L>  |  2.90<H>    Ca    8.7      02 Jul 2022 05:37  Phos  4.2     07-02  Mg     2.3     07-02    TPro  6.7  /  Alb  2.0<L>  /  TBili  2.5<H>  /  DBili  x   /  AST  125<H>  /  ALT  62  /  AlkPhos  93  07-02    PT/INR - ( 02 Jul 2022 05:37 )   PT: 35.0 sec;   INR: 2.96 ratio         PTT - ( 02 Jul 2022 05:37 )  PTT:35.5 sec    CAPILLARY BLOOD GLUCOSE      POCT Blood Glucose.: 293 mg/dL (02 Jul 2022 07:26)  POCT Blood Glucose.: 69 mg/dL (02 Jul 2022 07:23)  POCT Blood Glucose.: 64 mg/dL (02 Jul 2022 06:55)  POCT Blood Glucose.: 63 mg/dL (02 Jul 2022 06:35)  POCT Blood Glucose.: 110 mg/dL (02 Jul 2022 01:04)  POCT Blood Glucose.: 154 mg/dL (01 Jul 2022 17:27)  POCT Blood Glucose.: 211 mg/dL (01 Jul 2022 13:01)                Plan of care was discussed with patient ; all questions and concerns were addressed and care was aligned with patient's wishes.             MEDICAL ATTENDING NOTE    Patient is a 89y old  Male who presents with a chief complaint of weight loss (02 Jul 2022 11:15)      INTERVAL HPI/OVERNIGHT EVENTS:  non communicative     MEDICATIONS  (STANDING):  albuterol/ipratropium for Nebulization 3 milliLiter(s) Nebulizer every 6 hours  aMIOdarone    Tablet 200 milliGRAM(s) Oral <User Schedule>  cefepime   IVPB      cefepime   IVPB 2000 milliGRAM(s) IV Intermittent once  chlorhexidine 2% Cloths 1 Application(s) Topical <User Schedule>  dextrose 5%. 1000 milliLiter(s) (25 mL/Hr) IV Continuous <Continuous>  digoxin     Tablet 125 MICROGram(s) Oral daily  midodrine 15 milliGRAM(s) Oral every 8 hours  pantoprazole   Suspension 40 milliGRAM(s) Oral daily  phenylephrine    Infusion 0.1 MICROgram(s)/kG/Min (2.31 mL/Hr) IV Continuous <Continuous>  phenylephrine    Infusion 8 MICROgram(s)/kG/Min (86 mL/Hr) IV Continuous <Continuous>  pyridostigmine 30 milliGRAM(s) Oral every 8 hours  sodium chloride 3%  Inhalation 4 milliLiter(s) Inhalation every 6 hours  vancomycin  IVPB 1000 milliGRAM(s) IV Intermittent once    MEDICATIONS  (PRN):  acetaminophen     Tablet .. 650 milliGRAM(s) Oral every 6 hours PRN Temp greater or equal to 38C (100.4F), Mild Pain (1 - 3)      __________________________________________________  ----------------------------------------------------------------------------------  REVIEW OF SYSTEMS: unable to obtain as patient non responsive      Vital Signs Last 24 Hrs  T(C): 36 (02 Jul 2022 08:00), Max: 36.4 (01 Jul 2022 12:42)  T(F): 96.8 (02 Jul 2022 08:00), Max: 97.5 (01 Jul 2022 12:42)  HR: 114 (02 Jul 2022 11:00) (98 - 142)  BP: 80/52 (02 Jul 2022 11:00) (56/38 - 172/77)  BP(mean): 60 (02 Jul 2022 11:00) (43 - 111)  RR: 23 (02 Jul 2022 11:00) (23 - 75)  SpO2: 77% (02 Jul 2022 11:00) (75% - 99%)    _________________  PHYSICAL EXAM:  ---------------------------  Normocephalic; on Bipap  LUNGS - no wheezing  HEART: S1 S2+   ABDOMEN: Soft, Nontender, non distended, BS+  EXTREMITIES: no cyanosis; no edema  NERVOUS SYSTEM: encephalopathic; non responsive    _________________________________________________  LABS:                        12.7   14.40 )-----------( 156      ( 02 Jul 2022 05:37 )             40.4     07-02    138  |  103  |  80<H>  ----------------------------<  99  4.5   |  19<L>  |  2.90<H>    Ca    8.7      02 Jul 2022 05:37  Phos  4.2     07-02  Mg     2.3     07-02    TPro  6.7  /  Alb  2.0<L>  /  TBili  2.5<H>  /  DBili  x   /  AST  125<H>  /  ALT  62  /  AlkPhos  93  07-02    PT/INR - ( 02 Jul 2022 05:37 )   PT: 35.0 sec;   INR: 2.96 ratio         PTT - ( 02 Jul 2022 05:37 )  PTT:35.5 sec    CAPILLARY BLOOD GLUCOSE      POCT Blood Glucose.: 293 mg/dL (02 Jul 2022 07:26)  POCT Blood Glucose.: 69 mg/dL (02 Jul 2022 07:23)  POCT Blood Glucose.: 64 mg/dL (02 Jul 2022 06:55)  POCT Blood Glucose.: 63 mg/dL (02 Jul 2022 06:35)  POCT Blood Glucose.: 110 mg/dL (02 Jul 2022 01:04)  POCT Blood Glucose.: 154 mg/dL (01 Jul 2022 17:27)  POCT Blood Glucose.: 211 mg/dL (01 Jul 2022 13:01)                Plan of care was discussed with patient ; all questions and concerns were addressed and care was aligned with patient's wishes.

## 2022-07-02 NOTE — PROGRESS NOTE ADULT - CONVERSATION DETAILS
Discussed pt's decompensation overnight.  There is essentially no possibility that he will survive this acute illness.  We discussed that even with continuing BiPAP and pressors he will , likely within hours.  We also discussed transition to comfort care which they will consider.

## 2022-07-02 NOTE — PROGRESS NOTE ADULT - SUBJECTIVE AND OBJECTIVE BOX
****** CHARTING IN PROGRESS *******    INTERVAL HPI/OVERNIGHT EVENTS:     SUBJECTIVE: Patient seen and examined at bedside.     ROS:  CV: Denies chest pain  Resp: Denies SOB  GI: Denies abdominal pain, constipation, diarrhea, nausea, vomiting  : Denies dysuria  ID: Denies fevers, chills  MSK: Denies joint pain     OBJECTIVE:    VITAL SIGNS:  ICU Vital Signs Last 24 Hrs  T(C): 36.6 (02 Jul 2022 15:59), Max: 36.6 (02 Jul 2022 15:59)  T(F): 97.9 (02 Jul 2022 15:59), Max: 97.9 (02 Jul 2022 15:59)  HR: 113 (02 Jul 2022 15:00) (100 - 142)  BP: 71/50 (02 Jul 2022 15:00) (56/38 - 172/77)  BP(mean): 56 (02 Jul 2022 15:00) (43 - 111)  ABP: --  ABP(mean): --  RR: 33 (02 Jul 2022 15:00) (23 - 75)  SpO2: 91% (02 Jul 2022 15:00) (75% - 99%)        07-01 @ 07:01  -  07-02 @ 07:00  --------------------------------------------------------  IN: 2498.7 mL / OUT: 275 mL / NET: 2223.7 mL      CAPILLARY BLOOD GLUCOSE      POCT Blood Glucose.: 136 mg/dL (02 Jul 2022 11:54)      PHYSICAL EXAM:  General: NAD, comfortable  HEENT: NCAT, PERRL, clear conjunctiva, no scleral icterus  Neck: supple, no JVD  Respiratory: CTA b/l, no wheezing, rhonchi, rales  Cardiovascular: RRR, normal S1S2, no M/R/G  Abdomen: soft, NT/ND, bowel sounds in all four quadrants, no palpable masses  Extremities: WWP, no clubbing, cyanosis, or edema  Neurology: A&Ox3, nonfocal, sensation intact     MEDICATIONS:  MEDICATIONS  (STANDING):  albuterol/ipratropium for Nebulization 3 milliLiter(s) Nebulizer every 6 hours  aMIOdarone    Tablet 200 milliGRAM(s) Oral <User Schedule>  cefepime   IVPB      chlorhexidine 2% Cloths 1 Application(s) Topical <User Schedule>  dextrose 5%. 1000 milliLiter(s) (25 mL/Hr) IV Continuous <Continuous>  digoxin     Tablet 125 MICROGram(s) Oral daily  midodrine 15 milliGRAM(s) Oral every 8 hours  pantoprazole   Suspension 40 milliGRAM(s) Oral daily  phenylephrine    Infusion 0.1 MICROgram(s)/kG/Min (2.31 mL/Hr) IV Continuous <Continuous>  phenylephrine    Infusion 8 MICROgram(s)/kG/Min (86 mL/Hr) IV Continuous <Continuous>  pyridostigmine 30 milliGRAM(s) Oral every 8 hours  sodium chloride 3%  Inhalation 4 milliLiter(s) Inhalation every 6 hours    MEDICATIONS  (PRN):  acetaminophen     Tablet .. 650 milliGRAM(s) Oral every 6 hours PRN Temp greater or equal to 38C (100.4F), Mild Pain (1 - 3)      ALLERGIES:  Allergies    No Known Allergies    Intolerances        LABS:                        12.7   14.40 )-----------( 156      ( 02 Jul 2022 05:37 )             40.4     07-02    138  |  103  |  80<H>  ----------------------------<  99  4.5   |  19<L>  |  2.90<H>    Ca    8.7      02 Jul 2022 05:37  Phos  4.2     07-02  Mg     2.3     07-02    TPro  6.7  /  Alb  2.0<L>  /  TBili  2.5<H>  /  DBili  x   /  AST  125<H>  /  ALT  62  /  AlkPhos  93  07-02    PT/INR - ( 02 Jul 2022 05:37 )   PT: 35.0 sec;   INR: 2.96 ratio         PTT - ( 02 Jul 2022 05:37 )  PTT:35.5 sec      RADIOLOGY & ADDITIONAL TESTS: Reviewed. ***  BEDSIDE LUNG ULTRASOUND: ***  BEDSIDE ECHO: ***    CENTRAL LINE: N        DATE INSERTED:             REMOVE: N  DUDLEY: Y                       DATE INSERTED:              REMOVE: Y/N  A-LINE: N                       DATE INSERTED:              REMOVE: Y/N      GLOBAL ISSUE/BEST PRACTICE  Analgesia: Y  Sedation: Y  HOB elevation: yes  Stress ulcer prophylaxis: Y  VTE prophylaxis: Y  Glycemic control: Y  Nutrition: Y    CODE STATUS: Full Code ****** CHARTING IN PROGRESS *******    INTERVAL HPI/OVERNIGHT EVENTS: Increased WBC and 27% noted on CBC, possible PNA infxn suspected. Cefepime and vancomycin 1g x1 ordered. D/la nena Eliquis at this time due to worsening renal function (Cr 1.8 --> 2.9) and holding anticoag.    SUBJECTIVE: Patient seen and examined at bedside.     ROS: Unable to obtain due to patient's mental status.    OBJECTIVE:    VITAL SIGNS:  ICU Vital Signs Last 24 Hrs  T(C): 36.6 (02 Jul 2022 15:59), Max: 36.6 (02 Jul 2022 15:59)  T(F): 97.9 (02 Jul 2022 15:59), Max: 97.9 (02 Jul 2022 15:59)  HR: 113 (02 Jul 2022 15:00) (100 - 142)  BP: 71/50 (02 Jul 2022 15:00) (56/38 - 172/77)  BP(mean): 56 (02 Jul 2022 15:00) (43 - 111)  ABP: --  ABP(mean): --  RR: 33 (02 Jul 2022 15:00) (23 - 75)  SpO2: 91% (02 Jul 2022 15:00) (75% - 99%)    07-01 @ 07:01  -  07-02 @ 07:00  --------------------------------------------------------  IN: 2498.7 mL / OUT: 275 mL / NET: 2223.7 mL      CAPILLARY BLOOD GLUCOSE      POCT Blood Glucose.: 136 mg/dL (02 Jul 2022 11:54)      PHYSICAL EXAM:  General: NAD, comfortable  HEENT: NCAT, PERRL, clear conjunctiva, no scleral icterus  Neck: supple, no JVD  Respiratory: CTA b/l, no wheezing, rhonchi, rales  Cardiovascular: RRR, normal S1S2, no M/R/G  Abdomen: soft, NT/ND, bowel sounds in all four quadrants, no palpable masses  Extremities: WWP, no clubbing, cyanosis, or edema  Neurology: A&Ox3, nonfocal, sensation intact     MEDICATIONS:  MEDICATIONS  (STANDING):  albuterol/ipratropium for Nebulization 3 milliLiter(s) Nebulizer every 6 hours  aMIOdarone    Tablet 200 milliGRAM(s) Oral <User Schedule>  cefepime   IVPB      chlorhexidine 2% Cloths 1 Application(s) Topical <User Schedule>  dextrose 5%. 1000 milliLiter(s) (25 mL/Hr) IV Continuous <Continuous>  digoxin     Tablet 125 MICROGram(s) Oral daily  midodrine 15 milliGRAM(s) Oral every 8 hours  pantoprazole   Suspension 40 milliGRAM(s) Oral daily  phenylephrine    Infusion 0.1 MICROgram(s)/kG/Min (2.31 mL/Hr) IV Continuous <Continuous>  phenylephrine    Infusion 8 MICROgram(s)/kG/Min (86 mL/Hr) IV Continuous <Continuous>  pyridostigmine 30 milliGRAM(s) Oral every 8 hours  sodium chloride 3%  Inhalation 4 milliLiter(s) Inhalation every 6 hours    MEDICATIONS  (PRN):  acetaminophen     Tablet .. 650 milliGRAM(s) Oral every 6 hours PRN Temp greater or equal to 38C (100.4F), Mild Pain (1 - 3)      ALLERGIES:  Allergies    No Known Allergies    Intolerances        LABS:                        12.7   14.40 )-----------( 156      ( 02 Jul 2022 05:37 )             40.4     07-02    138  |  103  |  80<H>  ----------------------------<  99  4.5   |  19<L>  |  2.90<H>    Ca    8.7      02 Jul 2022 05:37  Phos  4.2     07-02  Mg     2.3     07-02    TPro  6.7  /  Alb  2.0<L>  /  TBili  2.5<H>  /  DBili  x   /  AST  125<H>  /  ALT  62  /  AlkPhos  93  07-02    PT/INR - ( 02 Jul 2022 05:37 )   PT: 35.0 sec;   INR: 2.96 ratio         PTT - ( 02 Jul 2022 05:37 )  PTT:35.5 sec      RADIOLOGY & ADDITIONAL TESTS: Reviewed. ***  BEDSIDE LUNG ULTRASOUND: ***  BEDSIDE ECHO: ***    CENTRAL LINE: N        DATE INSERTED:             REMOVE: N  DUDLEY: Y                       DATE INSERTED:              REMOVE: Y/N  A-LINE: N                       DATE INSERTED:              REMOVE: Y/N      GLOBAL ISSUE/BEST PRACTICE  Analgesia: Y  Sedation: Y  HOB elevation: yes  Stress ulcer prophylaxis: Y  VTE prophylaxis: Y  Glycemic control: Y  Nutrition: Y    CODE STATUS: Full Code INTERVAL HPI/OVERNIGHT EVENTS: Increased WBC and 27% noted on CBC, possible PNA infxn suspected. Cefepime and vancomycin 1g x1 ordered. D/la nena Eliquis at this time due to worsening renal function (Cr 1.8 --> 2.9) and holding anticoag.     SUBJECTIVE: Patient seen and examined at bedside. Family at bedside.     ROS: Unable to obtain due to patient's mental status.    OBJECTIVE:    VITAL SIGNS:  ICU Vital Signs Last 24 Hrs  T(C): 36.6 (02 Jul 2022 15:59), Max: 36.6 (02 Jul 2022 15:59)  T(F): 97.9 (02 Jul 2022 15:59), Max: 97.9 (02 Jul 2022 15:59)  HR: 113 (02 Jul 2022 15:00) (100 - 142)  BP: 71/50 (02 Jul 2022 15:00) (56/38 - 172/77)  BP(mean): 56 (02 Jul 2022 15:00) (43 - 111)  ABP: --  ABP(mean): --  RR: 33 (02 Jul 2022 15:00) (23 - 75)  SpO2: 91% (02 Jul 2022 15:00) (75% - 99%)    07-01 @ 07:01  -  07-02 @ 07:00  --------------------------------------------------------  IN: 2498.7 mL / OUT: 275 mL / NET: 2223.7 mL      CAPILLARY BLOOD GLUCOSE      POCT Blood Glucose.: 136 mg/dL (02 Jul 2022 11:54)      PHYSICAL EXAM:  Gen: chronically ill appearing   HEENT: PERRL  Resp: rhonchi b/l  CVS: RRR  Abd: soft, NTND  Ext: no edema  Skin: WWP    MEDICATIONS:  MEDICATIONS  (STANDING):  albuterol/ipratropium for Nebulization 3 milliLiter(s) Nebulizer every 6 hours  aMIOdarone    Tablet 200 milliGRAM(s) Oral <User Schedule>  cefepime   IVPB      chlorhexidine 2% Cloths 1 Application(s) Topical <User Schedule>  dextrose 5%. 1000 milliLiter(s) (25 mL/Hr) IV Continuous <Continuous>  digoxin     Tablet 125 MICROGram(s) Oral daily  midodrine 15 milliGRAM(s) Oral every 8 hours  pantoprazole   Suspension 40 milliGRAM(s) Oral daily  phenylephrine    Infusion 0.1 MICROgram(s)/kG/Min (2.31 mL/Hr) IV Continuous <Continuous>  phenylephrine    Infusion 8 MICROgram(s)/kG/Min (86 mL/Hr) IV Continuous <Continuous>  pyridostigmine 30 milliGRAM(s) Oral every 8 hours  sodium chloride 3%  Inhalation 4 milliLiter(s) Inhalation every 6 hours    MEDICATIONS  (PRN):  acetaminophen     Tablet .. 650 milliGRAM(s) Oral every 6 hours PRN Temp greater or equal to 38C (100.4F), Mild Pain (1 - 3)      ALLERGIES:  Allergies    No Known Allergies    Intolerances        LABS:                        12.7   14.40 )-----------( 156      ( 02 Jul 2022 05:37 )             40.4     07-02    138  |  103  |  80<H>  ----------------------------<  99  4.5   |  19<L>  |  2.90<H>    Ca    8.7      02 Jul 2022 05:37  Phos  4.2     07-02  Mg     2.3     07-02    TPro  6.7  /  Alb  2.0<L>  /  TBili  2.5<H>  /  DBili  x   /  AST  125<H>  /  ALT  62  /  AlkPhos  93  07-02    PT/INR - ( 02 Jul 2022 05:37 )   PT: 35.0 sec;   INR: 2.96 ratio         PTT - ( 02 Jul 2022 05:37 )  PTT:35.5 sec      RADIOLOGY & ADDITIONAL TESTS: Reviewed.     Analgesia: N  Sedation:N  HOB elevation: yes  Stress ulcer prophylaxis: Y  VTE prophylaxis: Y, AC  Glycemic control: Y  Nutrition: Y    CODE STATUS: DNR, DNI INTERVAL HPI/OVERNIGHT EVENTS:   required restarting phenylephrine overnight, now at max dose of 10mcg/kg/min  now hypoxemic on BiPAP FiO2 100% with SpO2 80s  Increased WBC and 27% noted on CBC, possible PNA infxn suspected. Cefepime and vancomycin 1g x1 ordered.     SUBJECTIVE: Patient seen and examined at bedside. Family at bedside.     ROS: Unable to obtain due to patient's mental status.    OBJECTIVE:    VITAL SIGNS:  ICU Vital Signs Last 24 Hrs  T(C): 36.6 (02 Jul 2022 15:59), Max: 36.6 (02 Jul 2022 15:59)  T(F): 97.9 (02 Jul 2022 15:59), Max: 97.9 (02 Jul 2022 15:59)  HR: 113 (02 Jul 2022 15:00) (100 - 142)  BP: 71/50 (02 Jul 2022 15:00) (56/38 - 172/77)  BP(mean): 56 (02 Jul 2022 15:00) (43 - 111)  ABP: --  ABP(mean): --  RR: 33 (02 Jul 2022 15:00) (23 - 75)  SpO2: 91% (02 Jul 2022 15:00) (75% - 99%)    07-01 @ 07:01  -  07-02 @ 07:00  --------------------------------------------------------  IN: 2498.7 mL / OUT: 275 mL / NET: 2223.7 mL      CAPILLARY BLOOD GLUCOSE      POCT Blood Glucose.: 136 mg/dL (02 Jul 2022 11:54)      PHYSICAL EXAM:  Gen: chronically ill appearing   HEENT: PERRL  neuro: unresponsive to verbal or tactile stimuli  Resp: rhonchi b/l  CVS: RRR  Abd: soft, NTND  Ext: no edema  Skin: WWP    MEDICATIONS:  MEDICATIONS  (STANDING):  albuterol/ipratropium for Nebulization 3 milliLiter(s) Nebulizer every 6 hours  aMIOdarone    Tablet 200 milliGRAM(s) Oral <User Schedule>  cefepime   IVPB      chlorhexidine 2% Cloths 1 Application(s) Topical <User Schedule>  dextrose 5%. 1000 milliLiter(s) (25 mL/Hr) IV Continuous <Continuous>  digoxin     Tablet 125 MICROGram(s) Oral daily  midodrine 15 milliGRAM(s) Oral every 8 hours  pantoprazole   Suspension 40 milliGRAM(s) Oral daily  phenylephrine    Infusion 0.1 MICROgram(s)/kG/Min (2.31 mL/Hr) IV Continuous <Continuous>  phenylephrine    Infusion 8 MICROgram(s)/kG/Min (86 mL/Hr) IV Continuous <Continuous>  pyridostigmine 30 milliGRAM(s) Oral every 8 hours  sodium chloride 3%  Inhalation 4 milliLiter(s) Inhalation every 6 hours    MEDICATIONS  (PRN):  acetaminophen     Tablet .. 650 milliGRAM(s) Oral every 6 hours PRN Temp greater or equal to 38C (100.4F), Mild Pain (1 - 3)      ALLERGIES:  Allergies    No Known Allergies    Intolerances        LABS:                        12.7   14.40 )-----------( 156      ( 02 Jul 2022 05:37 )             40.4     07-02    138  |  103  |  80<H>  ----------------------------<  99  4.5   |  19<L>  |  2.90<H>    Ca    8.7      02 Jul 2022 05:37  Phos  4.2     07-02  Mg     2.3     07-02    TPro  6.7  /  Alb  2.0<L>  /  TBili  2.5<H>  /  DBili  x   /  AST  125<H>  /  ALT  62  /  AlkPhos  93  07-02    PT/INR - ( 02 Jul 2022 05:37 )   PT: 35.0 sec;   INR: 2.96 ratio         PTT - ( 02 Jul 2022 05:37 )  PTT:35.5 sec      RADIOLOGY & ADDITIONAL TESTS: Reviewed.     Analgesia: N  Sedation:N  HOB elevation: yes  Stress ulcer prophylaxis: Y  VTE prophylaxis: Y, AC  Glycemic control: Y  Nutrition: Y    CODE STATUS: DNR, DNI

## 2022-07-02 NOTE — PROGRESS NOTE ADULT - PROBLEM SELECTOR PLAN 2
Acute metabolic encephalopathy likely related to hypercapnia- continue HFNC + intermittent BiPAP   Prognosis is guarded Acute metabolic encephalopathy likely related to hypercapnia- clinically declining  Prognosis is poor

## 2022-07-02 NOTE — PROGRESS NOTE ADULT - NSPROGADDITIONALINFOA_GEN_ALL_CORE
Patient continues to show signs of clinical decline and imminent death  Family leaning towards comfort measures.  Patient appears to be actively dying.  Both daughters and son in-law were at bedside; emotional support offered.

## 2022-07-02 NOTE — PROGRESS NOTE ADULT - ATTENDING COMMENTS
89M PMH A-Fib on apixaban, HTN, HLD, and chronic diastolic heart failure, subacute anorexia and wt loss of 50lbs over last few months, now presents with acute decompensated heart failure in the setting of human metapneumovirus infection.  Course complicated by metabolic encephalopathy, acute hypercapnic respiratory failure, congestive hepatopathy, MARISOL.  He has now markedly deteriorated with profound septic shock and worsening hypoxemic respiratory failure likely from pneumonia.     --metabolic encephalopathy persists, worsened today, now obtunded  possible myesthenic gravis to account for head drop, dysphagia, continue trial of mestinon, f/u serologies (negative thus far)  --acute hypercapnic respiratory failure  despite max BiPAP settings he remains severely hypoxemic  continue secretion clearance measures  --septic shock and acute decompensated diastolic HF  now on max dose phenylephrine, continue midodrine  hold lasix for today given worsening MARISOL   --Afib controlled on digoxin, amio   d/c eliquis due to MARISOL  --dysphagia, continue TF when off BiPAP  --MARISOL on CKD, supportive care  --pneumonia, start vanc by level and cefepime  --hypoglycemia while NPO, cont D5 .   --family updated, see above    Addendum--pt  peacefully at 18:30 surrounded by his family

## 2022-07-02 NOTE — PROGRESS NOTE ADULT - PROBLEM SELECTOR PROBLEM 7
Pleural effusion
Unexplained weight loss
CHF (congestive heart failure)
Unexplained weight loss
Pleural effusion
Pleural effusion
Unexplained weight loss
Unexplained weight loss
Pleural effusion
Unexplained weight loss
CHF (congestive heart failure)
Pleural effusion
Pleural effusion

## 2022-07-02 NOTE — PROGRESS NOTE ADULT - ATTENDING SUPERVISION STATEMENT
Resident

## 2022-07-02 NOTE — PROGRESS NOTE ADULT - PROBLEM SELECTOR PLAN 1
Acute hypercapnic respiratory failure requiring non invasive ventilation with BIPAP  Continue  monitoring in ICU  Prognosis is guarded  GOC- DNR  Continue supportive measures  continue bronchodilators Acute hypercapnic respiratory failure requiring non invasive ventilation with BIPAP  Continue  monitoring in ICU  Prognosis is poor  GOC- DNR  Continue supportive measures  continue bronchodilators  patient continue to declines clinically

## 2022-07-02 NOTE — PROGRESS NOTE ADULT - NUTRITIONAL ASSESSMENT
This patient has been assessed with a concern for Malnutrition and has been determined to have a diagnosis/diagnoses of Severe protein-calorie malnutrition and Underweight (BMI < 19).    This patient is being managed with:   Diet NPO with Tube Feed-  Tube Feeding Modality: Nasogastric  Jevity 1.5  Total Volume for 24 Hours (mL): 1200  Continuous  Starting Tube Feed Rate {mL per Hour}: 20  Increase Tube Feed Rate by (mL): 10     Every 4 hours  Until Goal Tube Feed Rate (mL per Hour): 50  Tube Feed Duration (in Hours): 24  Tube Feed Start Time: 17:25  Entered: Jun 26 2022  5:21PM    The following pending diet order is being considered for treatment of Severe protein-calorie malnutrition and Underweight (BMI < 19):  Diet Soft and Bite Sized-  Mildly Thick Liquids (MILDTHICKLIQS)  Low Sodium  Supplement Feeding Modality:  Oral  Ensure Enlive Cans or Servings Per Day:  1       Frequency:  Two Times a day  Entered: Jun 17 2022 12:17PM  
This patient has been assessed with a concern for Malnutrition and has been determined to have a diagnosis/diagnoses of Severe protein-calorie malnutrition and Underweight (BMI < 19).    This patient is being managed with:   Diet NPO with Tube Feed-  Tube Feeding Modality: Nasogastric  Jevity 1.5  Total Volume for 24 Hours (mL): 1200  Continuous  Starting Tube Feed Rate {mL per Hour}: 20  Increase Tube Feed Rate by (mL): 10     Every 4 hours  Until Goal Tube Feed Rate (mL per Hour): 50  Tube Feed Duration (in Hours): 24  Tube Feed Start Time: 17:25  Entered: Jun 26 2022  5:21PM    The following pending diet order is being considered for treatment of Severe protein-calorie malnutrition and Underweight (BMI < 19):  Diet Soft and Bite Sized-  Mildly Thick Liquids (MILDTHICKLIQS)  Low Sodium  Supplement Feeding Modality:  Oral  Ensure Enlive Cans or Servings Per Day:  1       Frequency:  Two Times a day  Entered: Jun 17 2022 12:17PM  
This patient has been assessed with a concern for Malnutrition and has been determined to have a diagnosis/diagnoses of Severe protein-calorie malnutrition and Underweight (BMI < 19).    This patient is being managed with:   Diet NPO-  Except Medications     Special Instructions for Nursing:  Except Medications  Entered: Jun 24 2022  8:52AM    The following pending diet order is being considered for treatment of Severe protein-calorie malnutrition and Underweight (BMI < 19):  Diet Soft and Bite Sized-  Mildly Thick Liquids (MILDTHICKLIQS)  Low Sodium  Supplement Feeding Modality:  Oral  Ensure Enlive Cans or Servings Per Day:  1       Frequency:  Two Times a day  Entered: Jun 17 2022 12:17PM  
This patient has been assessed with a concern for Malnutrition and has been determined to have a diagnosis/diagnoses of Severe protein-calorie malnutrition and Underweight (BMI < 19).    This patient is being managed with:   Diet NPO with Tube Feed-  Tube Feeding Modality: Nasogastric  Jevity 1.5  Total Volume for 24 Hours (mL): 1200  Continuous  Starting Tube Feed Rate {mL per Hour}: 20  Increase Tube Feed Rate by (mL): 10     Every 4 hours  Until Goal Tube Feed Rate (mL per Hour): 50  Tube Feed Duration (in Hours): 24  Tube Feed Start Time: 17:25  Entered: Jun 26 2022  5:21PM    The following pending diet order is being considered for treatment of Severe protein-calorie malnutrition and Underweight (BMI < 19):  Diet Soft and Bite Sized-  Mildly Thick Liquids (MILDTHICKLIQS)  Low Sodium  Supplement Feeding Modality:  Oral  Ensure Enlive Cans or Servings Per Day:  1       Frequency:  Two Times a day  Entered: Jun 17 2022 12:17PM  
This patient has been assessed with a concern for Malnutrition and has been determined to have a diagnosis/diagnoses of Severe protein-calorie malnutrition and Underweight (BMI < 19).    This patient is being managed with:   Diet Pureed-  Moderately Thick Liquids (MODTHICKLIQS)  Low Sodium  Supplement Feeding Modality:  Oral  Ensure Enlive Cans or Servings Per Day:  1       Frequency:  Two Times a day  Entered: Jun 20 2022 10:02AM    The following pending diet order is being considered for treatment of Severe protein-calorie malnutrition and Underweight (BMI < 19):  Diet Soft and Bite Sized-  Mildly Thick Liquids (MILDTHICKLIQS)  Low Sodium  Supplement Feeding Modality:  Oral  Ensure Enlive Cans or Servings Per Day:  1       Frequency:  Two Times a day  Entered: Jun 17 2022 12:17PM  
This patient has been assessed with a concern for Malnutrition and has been determined to have a diagnosis/diagnoses of Severe protein-calorie malnutrition and Underweight (BMI < 19).    This patient is being managed with:   Diet Soft and Bite Sized-  Mildly Thick Liquids (MILDTHICKLIQS)  Low Sodium  Supplement Feeding Modality:  Oral  Ensure Enlive Cans or Servings Per Day:  1       Frequency:  Two Times a day  Entered: Jun 17 2022 12:17PM    Diet Soft and Bite Sized-  Mildly Thick Liquids (MILDTHICKLIQS)  Entered: Jun 16 2022  4:48PM    The following pending diet order is being considered for treatment of Severe protein-calorie malnutrition and Underweight (BMI < 19):null
This patient has been assessed with a concern for Malnutrition and has been determined to have a diagnosis/diagnoses of Severe protein-calorie malnutrition and Underweight (BMI < 19).    This patient is being managed with:   Diet NPO with Tube Feed-  Tube Feeding Modality: Nasogastric  Jevity 1.5  Total Volume for 24 Hours (mL): 1200  Continuous  Starting Tube Feed Rate {mL per Hour}: 20  Increase Tube Feed Rate by (mL): 10     Every 4 hours  Until Goal Tube Feed Rate (mL per Hour): 50  Tube Feed Duration (in Hours): 24  Tube Feed Start Time: 17:25  Entered: Jun 26 2022  5:21PM    The following pending diet order is being considered for treatment of Severe protein-calorie malnutrition and Underweight (BMI < 19):  Diet Soft and Bite Sized-  Mildly Thick Liquids (MILDTHICKLIQS)  Low Sodium  Supplement Feeding Modality:  Oral  Ensure Enlive Cans or Servings Per Day:  1       Frequency:  Two Times a day  Entered: Jun 17 2022 12:17PM  
This patient has been assessed with a concern for Malnutrition and has been determined to have a diagnosis/diagnoses of Severe protein-calorie malnutrition and Underweight (BMI < 19).    This patient is being managed with:   Diet NPO with Tube Feed-  Tube Feeding Modality: Nasogastric  Jevity 1.5  Total Volume for 24 Hours (mL): 1200  Continuous  Starting Tube Feed Rate {mL per Hour}: 20  Increase Tube Feed Rate by (mL): 10     Every 4 hours  Until Goal Tube Feed Rate (mL per Hour): 50  Tube Feed Duration (in Hours): 24  Tube Feed Start Time: 17:25  Entered: Jun 26 2022  5:21PM    The following pending diet order is being considered for treatment of Severe protein-calorie malnutrition and Underweight (BMI < 19):  Diet Soft and Bite Sized-  Mildly Thick Liquids (MILDTHICKLIQS)  Low Sodium  Supplement Feeding Modality:  Oral  Ensure Enlive Cans or Servings Per Day:  1       Frequency:  Two Times a day  Entered: Jun 17 2022 12:17PM  
This patient has been assessed with a concern for Malnutrition and has been determined to have a diagnosis/diagnoses of Severe protein-calorie malnutrition and Underweight (BMI < 19).    This patient is being managed with:   Diet Pureed-  DASH/TLC {Sodium & Cholesterol Restricted}  Moderately Thick Liquids (MODTHICKLIQS)  Supplement Feeding Modality:  Oral  Ensure Pudding Cans or Servings Per Day:  1       Frequency:  Daily  Entered: Jun 25 2022 10:26AM    The following pending diet order is being considered for treatment of Severe protein-calorie malnutrition and Underweight (BMI < 19):  Diet Soft and Bite Sized-  Mildly Thick Liquids (MILDTHICKLIQS)  Low Sodium  Supplement Feeding Modality:  Oral  Ensure Enlive Cans or Servings Per Day:  1       Frequency:  Two Times a day  Entered: Jun 17 2022 12:17PM  
This patient has been assessed with a concern for Malnutrition and has been determined to have a diagnosis/diagnoses of Severe protein-calorie malnutrition and Underweight (BMI < 19).    This patient is being managed with:   Diet NPO with Tube Feed-  Tube Feeding Modality: Nasogastric  Jevity 1.5  Total Volume for 24 Hours (mL): 1200  Continuous  Starting Tube Feed Rate {mL per Hour}: 20  Increase Tube Feed Rate by (mL): 10     Every 4 hours  Until Goal Tube Feed Rate (mL per Hour): 50  Tube Feed Duration (in Hours): 24  Tube Feed Start Time: 17:25  Entered: Jun 26 2022  5:21PM    The following pending diet order is being considered for treatment of Severe protein-calorie malnutrition and Underweight (BMI < 19):  Diet Soft and Bite Sized-  Mildly Thick Liquids (MILDTHICKLIQS)  Low Sodium  Supplement Feeding Modality:  Oral  Ensure Enlive Cans or Servings Per Day:  1       Frequency:  Two Times a day  Entered: Jun 17 2022 12:17PM  
This patient has been assessed with a concern for Malnutrition and has been determined to have a diagnosis/diagnoses of Severe protein-calorie malnutrition and Underweight (BMI < 19).    This patient is being managed with:   Diet Soft and Bite Sized-  Mildly Thick Liquids (MILDTHICKLIQS)  Low Sodium  Supplement Feeding Modality:  Oral  Ensure Enlive Cans or Servings Per Day:  1       Frequency:  Two Times a day  Entered: Jun 17 2022 12:17PM    Diet Soft and Bite Sized-  Mildly Thick Liquids (MILDTHICKLIQS)  Entered: Jun 16 2022  4:48PM    The following pending diet order is being considered for treatment of Severe protein-calorie malnutrition and Underweight (BMI < 19):null
This patient has been assessed with a concern for Malnutrition and has been determined to have a diagnosis/diagnoses of Severe protein-calorie malnutrition and Underweight (BMI < 19).    This patient is being managed with:   Diet Pureed-  Moderately Thick Liquids (MODTHICKLIQS)  Low Sodium  Supplement Feeding Modality:  Oral  Ensure Enlive Cans or Servings Per Day:  1       Frequency:  Two Times a day  Entered: Jun 20 2022 10:02AM    The following pending diet order is being considered for treatment of Severe protein-calorie malnutrition and Underweight (BMI < 19):  Diet Soft and Bite Sized-  Mildly Thick Liquids (MILDTHICKLIQS)  Low Sodium  Supplement Feeding Modality:  Oral  Ensure Enlive Cans or Servings Per Day:  1       Frequency:  Two Times a day  Entered: Jun 17 2022 12:17PM  
This patient has been assessed with a concern for Malnutrition and has been determined to have a diagnosis/diagnoses of Severe protein-calorie malnutrition and Underweight (BMI < 19).    This patient is being managed with:   Diet Soft and Bite Sized-  Mildly Thick Liquids (MILDTHICKLIQS)  Low Sodium  Supplement Feeding Modality:  Oral  Ensure Enlive Cans or Servings Per Day:  1       Frequency:  Two Times a day  Entered: Jun 17 2022 12:17PM    Diet Soft and Bite Sized-  Mildly Thick Liquids (MILDTHICKLIQS)  Entered: Jun 16 2022  4:48PM    The following pending diet order is being considered for treatment of Severe protein-calorie malnutrition and Underweight (BMI < 19):null
This patient has been assessed with a concern for Malnutrition and has been determined to have a diagnosis/diagnoses of Severe protein-calorie malnutrition and Underweight (BMI < 19).    This patient is being managed with:   Diet NPO-  Except Medications     Special Instructions for Nursing:  Except Medications  Entered: Jun 24 2022  8:52AM    The following pending diet order is being considered for treatment of Severe protein-calorie malnutrition and Underweight (BMI < 19):  Diet Soft and Bite Sized-  Mildly Thick Liquids (MILDTHICKLIQS)  Low Sodium  Supplement Feeding Modality:  Oral  Ensure Enlive Cans or Servings Per Day:  1       Frequency:  Two Times a day  Entered: Jun 17 2022 12:17PM  
This patient has been assessed with a concern for Malnutrition and has been determined to have a diagnosis/diagnoses of Severe protein-calorie malnutrition and Underweight (BMI < 19).    This patient is being managed with:   Diet Pureed-  Moderately Thick Liquids (MODTHICKLIQS)  Low Sodium  Entered: Jun 19 2022  2:32PM    The following pending diet order is being considered for treatment of Severe protein-calorie malnutrition and Underweight (BMI < 19):  Diet Soft and Bite Sized-  Mildly Thick Liquids (MILDTHICKLIQS)  Low Sodium  Supplement Feeding Modality:  Oral  Ensure Enlive Cans or Servings Per Day:  1       Frequency:  Two Times a day  Entered: Jun 17 2022 12:17PM  
This patient has been assessed with a concern for Malnutrition and has been determined to have a diagnosis/diagnoses of Severe protein-calorie malnutrition and Underweight (BMI < 19).    This patient is being managed with:   Diet Soft and Bite Sized-  Mildly Thick Liquids (MILDTHICKLIQS)  Low Sodium  Supplement Feeding Modality:  Oral  Ensure Enlive Cans or Servings Per Day:  1       Frequency:  Two Times a day  Entered: Jun 17 2022 12:17PM    Diet Soft and Bite Sized-  Mildly Thick Liquids (MILDTHICKLIQS)  Entered: Jun 16 2022  4:48PM    The following pending diet order is being considered for treatment of Severe protein-calorie malnutrition and Underweight (BMI < 19):null
This patient has been assessed with a concern for Malnutrition and has been determined to have a diagnosis/diagnoses of Severe protein-calorie malnutrition and Underweight (BMI < 19).    This patient is being managed with:   Diet NPO with Tube Feed-  Tube Feeding Modality: Nasogastric  Jevity 1.5  Total Volume for 24 Hours (mL): 1200  Continuous  Starting Tube Feed Rate {mL per Hour}: 20  Increase Tube Feed Rate by (mL): 10     Every 4 hours  Until Goal Tube Feed Rate (mL per Hour): 50  Tube Feed Duration (in Hours): 24  Tube Feed Start Time: 17:25  Entered: Jun 26 2022  5:21PM
This patient has been assessed with a concern for Malnutrition and has been determined to have a diagnosis/diagnoses of Severe protein-calorie malnutrition and Underweight (BMI < 19).    This patient is being managed with:   Diet NPO with Tube Feed-  Tube Feeding Modality: Nasogastric  Jevity 1.5  Total Volume for 24 Hours (mL): 1200  Continuous  Starting Tube Feed Rate {mL per Hour}: 20  Increase Tube Feed Rate by (mL): 10     Every 4 hours  Until Goal Tube Feed Rate (mL per Hour): 50  Tube Feed Duration (in Hours): 24  Tube Feed Start Time: 17:25  Entered: Jun 26 2022  5:21PM    The following pending diet order is being considered for treatment of Severe protein-calorie malnutrition and Underweight (BMI < 19):  Diet Soft and Bite Sized-  Mildly Thick Liquids (MILDTHICKLIQS)  Low Sodium  Supplement Feeding Modality:  Oral  Ensure Enlive Cans or Servings Per Day:  1       Frequency:  Two Times a day  Entered: Jun 17 2022 12:17PM  
This patient has been assessed with a concern for Malnutrition and has been determined to have a diagnosis/diagnoses of Severe protein-calorie malnutrition and Underweight (BMI < 19).    This patient is being managed with:   Diet Pureed-  Moderately Thick Liquids (MODTHICKLIQS)  Low Sodium  Supplement Feeding Modality:  Oral  Ensure Enlive Cans or Servings Per Day:  1       Frequency:  Two Times a day  Entered: Jun 20 2022 10:02AM    The following pending diet order is being considered for treatment of Severe protein-calorie malnutrition and Underweight (BMI < 19):  Diet Soft and Bite Sized-  Mildly Thick Liquids (MILDTHICKLIQS)  Low Sodium  Supplement Feeding Modality:  Oral  Ensure Enlive Cans or Servings Per Day:  1       Frequency:  Two Times a day  Entered: Jun 17 2022 12:17PM

## 2022-07-02 NOTE — PROGRESS NOTE ADULT - SUBJECTIVE AND OBJECTIVE BOX
Date/Time Patient Seen:  		  Referring MD:   Data Reviewed	       Patient is a 89y old  Male who presents with a chief complaint of weight loss (01 Jul 2022 15:38)      Subjective/HPI     PAST MEDICAL & SURGICAL HISTORY:  Atrial fibrillation    HTN (hypertension)    HLD (hyperlipidemia)    Anemia    CHF (congestive heart failure)    DVT, lower extremity          Medication list         MEDICATIONS  (STANDING):  albuterol/ipratropium for Nebulization 3 milliLiter(s) Nebulizer every 6 hours  aMIOdarone    Tablet 200 milliGRAM(s) Oral <User Schedule>  apixaban 2.5 milliGRAM(s) Oral every 12 hours  chlorhexidine 2% Cloths 1 Application(s) Topical <User Schedule>  dextrose 5%. 1000 milliLiter(s) (25 mL/Hr) IV Continuous <Continuous>  digoxin     Tablet 125 MICROGram(s) Oral daily  midodrine 15 milliGRAM(s) Oral every 8 hours  pantoprazole   Suspension 40 milliGRAM(s) Oral daily  phenylephrine    Infusion 0.1 MICROgram(s)/kG/Min (2.31 mL/Hr) IV Continuous <Continuous>  phenylephrine    Infusion 8 MICROgram(s)/kG/Min (86 mL/Hr) IV Continuous <Continuous>  pyridostigmine 30 milliGRAM(s) Oral every 8 hours  sodium chloride 3%  Inhalation 4 milliLiter(s) Inhalation every 6 hours    MEDICATIONS  (PRN):  acetaminophen     Tablet .. 650 milliGRAM(s) Oral every 6 hours PRN Temp greater or equal to 38C (100.4F), Mild Pain (1 - 3)         Vitals log        ICU Vital Signs Last 24 Hrs  T(C): 36.1 (01 Jul 2022 17:00), Max: 36.4 (01 Jul 2022 12:42)  T(F): 97 (01 Jul 2022 17:00), Max: 97.5 (01 Jul 2022 12:42)  HR: 109 (02 Jul 2022 05:12) (98 - 142)  BP: 62/33 (02 Jul 2022 04:00) (56/38 - 128/57)  BP(mean): 43 (02 Jul 2022 04:00) (43 - 90)  ABP: --  ABP(mean): --  RR: 44 (02 Jul 2022 04:00) (30 - 75)  SpO2: 89% (02 Jul 2022 05:12) (75% - 98%)           Input and Output:  I&O's Detail    30 Jun 2022 07:01  -  01 Jul 2022 07:00  --------------------------------------------------------  IN:    Amiodarone: 133.2 mL    Amiodarone: 133.6 mL    dextrose 5%: 425 mL    dextrose 5%: 200 mL    IV PiggyBack: 500 mL    IV PiggyBack: 50 mL    Jevity 1.5: 420 mL    Phenylephrine: 125.7 mL  Total IN: 1987.5 mL    OUT:    Indwelling Catheter - Urethral (mL): 540 mL  Total OUT: 540 mL    Total NET: 1447.5 mL      01 Jul 2022 07:01  -  02 Jul 2022 06:18  --------------------------------------------------------  IN:    Amiodarone: 167 mL    dextrose 5%: 550 mL    Enteral Tube Flush: 60 mL    IV PiggyBack: 100 mL    Jevity 1.5: 150 mL    Phenylephrine: 1100.7 mL  Total IN: 2127.7 mL    OUT:    Indwelling Catheter - Urethral (mL): 265 mL  Total OUT: 265 mL    Total NET: 1862.7 mL          Lab Data                        12.7   x     )-----------( 156      ( 02 Jul 2022 05:37 )             40.4     07-01    134<L>  |  97  |  70<H>  ----------------------------<  252<H>  3.0<L>   |  29  |  1.80<H>    Ca    8.1<L>      01 Jul 2022 06:35  Phos  3.3     07-01  Mg     2.2     07-01    TPro  6.6  /  Alb  2.1<L>  /  TBili  2.0<H>  /  DBili  x   /  AST  35  /  ALT  26  /  AlkPhos  94  07-01            Review of Systems	      Objective     Physical Examination    heart s1s2  lung dec BS  abd soft      Pertinent Lab findings & Imaging      Elmore:  NO   Adequate UO     I&O's Detail    30 Jun 2022 07:01  -  01 Jul 2022 07:00  --------------------------------------------------------  IN:    Amiodarone: 133.2 mL    Amiodarone: 133.6 mL    dextrose 5%: 425 mL    dextrose 5%: 200 mL    IV PiggyBack: 500 mL    IV PiggyBack: 50 mL    Jevity 1.5: 420 mL    Phenylephrine: 125.7 mL  Total IN: 1987.5 mL    OUT:    Indwelling Catheter - Urethral (mL): 540 mL  Total OUT: 540 mL    Total NET: 1447.5 mL      01 Jul 2022 07:01  -  02 Jul 2022 06:18  --------------------------------------------------------  IN:    Amiodarone: 167 mL    dextrose 5%: 550 mL    Enteral Tube Flush: 60 mL    IV PiggyBack: 100 mL    Jevity 1.5: 150 mL    Phenylephrine: 1100.7 mL  Total IN: 2127.7 mL    OUT:    Indwelling Catheter - Urethral (mL): 265 mL  Total OUT: 265 mL    Total NET: 1862.7 mL               Discussed with:     Cultures:	        Radiology

## 2022-07-02 NOTE — PROGRESS NOTE ADULT - PROBLEM SELECTOR PLAN 5
H/o HTN but now hypotensive- continue midodrine  BP still labile.  Continue to monitor hypotensive and clinically declining

## 2022-07-02 NOTE — PROGRESS NOTE ADULT - PROBLEM SELECTOR PROBLEM 6
CHF (congestive heart failure)
Chronic systolic heart failure
CHF (congestive heart failure)
Chronic systolic heart failure
CHF (congestive heart failure)
Chronic systolic heart failure
MARISOL (acute kidney injury)
Chronic systolic heart failure
CHF (congestive heart failure)
Chronic systolic heart failure
MARISOL (acute kidney injury)
Chronic systolic heart failure

## 2022-07-02 NOTE — DISCHARGE NOTE FOR THE EXPIRED PATIENT - HOSPITAL COURSE
HPI: 89 y/o M w/ pmh of Afib on apixaban, htn, hld, chronic diastolic HF, subacute anorexia, wt loss of 50 months over the last few months, presented to [facility] for acute decompensated HF 2/2 to [facility]MP[facility], hospital courser c/b metabolic encephalopathy, acute hypercarbic resp failure, congestive hepatopathy, MARISOL and severe shock.  Patient with continued worsening resp failure and CHF. Patient was made DNR/DNI by family. Patient with increased vasopressor needs, family called in to bedside. GOC addressed with attending. Patient with asystole at 18:30. No intervention per code status/GOC. Patient with absent heart tones, no palpable pulse, cease of respirations. Pronounced at 1830 by myself, attending notified. Family at bedside.

## 2022-07-02 NOTE — PROGRESS NOTE ADULT - SUBJECTIVE AND OBJECTIVE BOX
Neurology follow up note    ROBERT JOHNSON89yMale      Interval History:    Patient resting in bed     Allergies    No Known Allergies    Intolerances        MEDICATIONS    acetaminophen     Tablet .. 650 milliGRAM(s) Oral every 6 hours PRN  albuterol/ipratropium for Nebulization 3 milliLiter(s) Nebulizer every 6 hours  aMIOdarone    Tablet 200 milliGRAM(s) Oral <User Schedule>  cefepime   IVPB      cefepime   IVPB 2000 milliGRAM(s) IV Intermittent once  chlorhexidine 2% Cloths 1 Application(s) Topical <User Schedule>  dextrose 5%. 1000 milliLiter(s) IV Continuous <Continuous>  digoxin     Tablet 125 MICROGram(s) Oral daily  midodrine 15 milliGRAM(s) Oral every 8 hours  pantoprazole   Suspension 40 milliGRAM(s) Oral daily  phenylephrine    Infusion 0.1 MICROgram(s)/kG/Min IV Continuous <Continuous>  phenylephrine    Infusion 8 MICROgram(s)/kG/Min IV Continuous <Continuous>  pyridostigmine 30 milliGRAM(s) Oral every 8 hours  sodium chloride 3%  Inhalation 4 milliLiter(s) Inhalation every 6 hours  vancomycin  IVPB 1000 milliGRAM(s) IV Intermittent once              Vital Signs Last 24 Hrs  T(C): 36.3 (02 Jul 2022 05:00), Max: 36.4 (01 Jul 2022 12:42)  T(F): 97.3 (02 Jul 2022 05:00), Max: 97.5 (01 Jul 2022 12:42)  HR: 100 (02 Jul 2022 08:35) (98 - 142)  BP: 109/68 (02 Jul 2022 07:00) (56/38 - 172/77)  BP(mean): 86 (02 Jul 2022 07:00) (43 - 111)  RR: 43 (02 Jul 2022 07:30) (29 - 75)  SpO2: 85% (02 Jul 2022 08:35) (75% - 98%)        REVIEW OF SYSTEMS:  unable to obtain  hi-flow lethargic     PHYSICAL EXAMINATION:  GENERAL:  The patient does appear to be cachectic in appearance.   HEENT:  Head:  Normocephalic, atraumatic.  Eyes:  No scleral icterus.  Ears:  Hard of hearing.  NECK:  Supple.  CARDIOVASCULAR:  S1 and S2 heard.  RESPIRATORY:  Decreased breath sounds bilaterally.  ABDOMEN:  Soft, nontender.  EXTREMITIES:  No clubbing or cyanosis was noted.     NEUROLOGIC:  The patient is lethargic   The patient has poor vision out of both eyes, which is his baseline.  Speech was fluent.  No dysarthria.  Motor:  Bilateral upper slight flexion at elbows   bilateral lower 3-/5.   previous exam No head tremors were noted.  No resting tremors were noted.  Upper extremities, no cogwheel rigidity was noted.  No significant bradykinesia was noted.               LABS:  CBC Full  -  ( 02 Jul 2022 05:37 )  WBC Count : 14.40 K/uL  RBC Count : 3.87 M/uL  Hemoglobin : 12.7 g/dL  Hematocrit : 40.4 %  Platelet Count - Automated : 156 K/uL  Mean Cell Volume : 104.4 fl  Mean Cell Hemoglobin : 32.8 pg  Mean Cell Hemoglobin Concentration : 31.4 gm/dL  Auto Neutrophil # : 12.38 K/uL  Auto Lymphocyte # : 1.44 K/uL  Auto Monocyte # : 0.58 K/uL  Auto Eosinophil # : 0.00 K/uL  Auto Basophil # : 0.00 K/uL  Auto Neutrophil % : 59.0 %  Auto Lymphocyte % : 10.0 %  Auto Monocyte % : 4.0 %  Auto Eosinophil % : 0.0 %  Auto Basophil % : 0.0 %      07-02    138  |  103  |  80<H>  ----------------------------<  99  4.5   |  19<L>  |  2.90<H>    Ca    8.7      02 Jul 2022 05:37  Phos  4.2     07-02  Mg     2.3     07-02    TPro  6.7  /  Alb  2.0<L>  /  TBili  2.5<H>  /  DBili  x   /  AST  125<H>  /  ALT  62  /  AlkPhos  93  07-02    Hemoglobin A1C:     LIVER FUNCTIONS - ( 02 Jul 2022 05:37 )  Alb: 2.0 g/dL / Pro: 6.7 g/dL / ALK PHOS: 93 U/L / ALT: 62 U/L / AST: 125 U/L / GGT: x           Vitamin B12   PT/INR - ( 02 Jul 2022 05:37 )   PT: 35.0 sec;   INR: 2.96 ratio         PTT - ( 02 Jul 2022 05:37 )  PTT:35.5 sec      RADIOLOGY      ANALYSIS AND PLAN:  A 89-year-old with episode of altered mental status in regards to lethargy and generalized weakness.  For generalized weakness, suspect most likely metabolic encephalopathy secondary to respiratory issues along with decreased oral intake, suspect less likely this is a primary central nervous system event.  Suspect the patient's episodes of head possibly falling forward secondary to generalized weakness.  The patient does complain of weakness in his neck muscles as well, suspect less likely this is a primary neurological event such as Parkinson's, no other symptoms at present to suggest an underlying cause of Parkinson's.  For history of atrial fibrillation, continue the patient on anticoagulation.  repeat head ct no changes   monitor respiratory status as needed   trial of pyridostigmine  ACH levels so far appear normal rest pending suspect less likely MG   prognosis at present appears poor     Spoke with daughter, Leigh, at [phone] 6/30 today went to [facility] 7/1    Greater than 23 minutes of time was spent with the patient, plan of care, reviewing data, with greater than 50% of the visit was spent counseling and/or coordinating care with multidisciplinary healthcare team

## 2022-07-02 NOTE — PROGRESS NOTE ADULT - PROBLEM SELECTOR PROBLEM 1
Atrial fibrillation with RVR
Acute respiratory failure with hypercapnia
Acute respiratory failure with hypercapnia
Atrial fibrillation with RVR
Atrial fibrillation with RVR
Acute respiratory failure with hypercapnia
Atrial fibrillation with RVR
Atrial fibrillation with RVR
Acute respiratory failure with hypercapnia
Acute respiratory failure with hypercapnia
Atrial fibrillation with RVR
Acute respiratory failure with hypercapnia
Acute encephalopathy
Acute encephalopathy

## 2022-07-02 NOTE — PROGRESS NOTE ADULT - PROBLEM SELECTOR PLAN 3
Off isolation  - Supplemental oxygen ongoing  Prognosis guarded Supplemental oxygen ongoing  Prognosis guarded

## 2022-07-02 NOTE — PROGRESS NOTE ADULT - PROBLEM SELECTOR PROBLEM 8
MARISOL (acute kidney injury)
Gout
Unexplained weight loss
Gout
MARISOL (acute kidney injury)
MARISOL (acute kidney injury)
Gout
MARISOL (acute kidney injury)
Gout
MARISOL (acute kidney injury)
Unexplained weight loss
MARISOL (acute kidney injury)

## 2022-07-02 NOTE — PROGRESS NOTE ADULT - ASSESSMENT
89yo male with PMH of chronic AFib on Eliquis, CHF (EF 64%), HTN, DVT, HLD, gout, macular degeneration who presented for cough and weakness, admitted for acute on chronic heart failure exacerbation with bilateral pleural effusions, AFib with RVR and +hMPV. Rapid Response called on 6/24 for AMS, hypoxia and agonal breathing and found to hypercapnic with lactic acidosis, placed on BiPAP and transferred to ICU for further management.     Neuro:  - encephalopathy 2/2 acute hypercapnia  - no improvement   - continue pyridostigmine for possible MG per neuro, negative ach receptor ab and voltage C channel ab     Cardio:  - acute on chronic HFpEF,  - Eliquis discontinued due to worsening renal function   - continue midodrine 15mg po q8hrs  - On high dose of Rob    Pulm:  - acute hypercapnic respiratory failure 2/2 acute decompensated heart failure  - on BiPAP as tolerated   - continue secretion clearance measures    GI:   - NPO with tube feeds    Renal:   - MARISOL likely prerenal, worsening renal function  - Maintain strict Is/Os, maintain mead for closely UOP monitoring     ID:   - start cefepime and vanco     Heme:  - Eliquis discontinued due to worsening renal function     Endo:  - Blood glucose goal in -180, continue D5 IVF @25 cc/hr    Skin:  - No lines, mead 6/27    Dispo:  - DNR/I

## 2022-07-02 NOTE — PROGRESS NOTE ADULT - ASSESSMENT
89y old  Male pmg afib on ac htn hld chief complaint of weakness    NIPPV  IVF  NEBS  on AC  VS noted  labs reviewed      on AMIO  pressors as needed - keep MAP > 60  CM follow up    AF management - rate and rhythm control  AC for AF  I and O  cvs rx regimen and BP control  hMPV - resp viral illness - supportive measures - Albuterol PRN for sob and or wheezing  monitor VS and HD and Sat  Pleural Eff - Atelectasis - I ze if able to tolerate - no immediate need for thoracentesis - medical management  GOC discussion - DNR  isolation precs for hMPV  cough rx regimen  replmelinda liu

## 2022-07-02 NOTE — PROGRESS NOTE ADULT - PROVIDER SPECIALTY LIST ADULT
Cardiology
Neurology
Pulmonology
Cardiology
Critical Care
Infectious Disease
Neurology
Pulmonology
Pulmonology
Cardiology
Critical Care
Critical Care
Internal Medicine
Neurology
Neurology
Pulmonology
Cardiology
Cardiology
Critical Care
Internal Medicine
Neurology
Pulmonology
Cardiology
Internal Medicine
Cardiology
Hospitalist
Internal Medicine
Hospitalist
Internal Medicine
Hospitalist
Internal Medicine
Hospitalist

## 2022-07-02 NOTE — PROGRESS NOTE ADULT - PROBLEM SELECTOR PLAN 8
Resolved;  Avoid nephrotoxic agents.  Monitor renal functions acute on chronic renal failure  Poor prognosis

## 2022-07-02 NOTE — PROGRESS NOTE ADULT - PROBLEM SELECTOR PROBLEM 9
Need for prophylactic measure
Gout
Need for prophylactic measure
Gout

## 2022-07-02 NOTE — PROGRESS NOTE ADULT - PROBLEM SELECTOR PROBLEM 5
HTN (hypertension)
MARISOL (acute kidney injury)
MARISOL (acute kidney injury)
Pleural effusion
MARISOL (acute kidney injury)
HTN (hypertension)
MARISOL (acute kidney injury)
HTN (hypertension)
Pleural effusion
HTN (hypertension)
MARISOL (acute kidney injury)
HTN (hypertension)
HTN (hypertension)

## 2022-07-05 LAB — VOLTAGE-GATED K CHANNEL AB RESULT: 80 PMOL/L — HIGH (ref 0–31)

## 2022-07-26 LAB — ACHR MOD AB SER-ACNC: NEGATIVE — SIGNIFICANT CHANGE UP

## 2023-01-03 NOTE — PROGRESS NOTE ADULT - PROBLEM SELECTOR PLAN 8
Has The Growth Been Previously Biopsied?: has been previously biopsied Body Location Override (Optional): Right lower eyelid - Multiple differentials possible. May be adverse effect of digoxin, recently discontinued  - TSH wnl    - CT shows multiple bilateral small lung nodules, indeterminant etiology. Metastatic disease is a possibility. Recommend follow-up chest CT in 4 weeks to determine the stability. Further evaluation can be performed with PET CT. Will consider repeat imaging as outpatient or PET  - Palliative care consulted for GOC

## 2023-04-27 NOTE — PHYSICAL THERAPY INITIAL EVALUATION ADULT - PERTINENT HX OF CURRENT PROBLEM, REHAB EVAL
stated
89 yr old male admitted for weight loss and weakness. + HMPV. CT chest confirms mild B/L pleural effusions with pulmonary edema.

## 2023-08-09 NOTE — ED ADULT NURSE NOTE - HIV OFFER
Opt out Localized Dermabrasion With Wire Brush Text: The patient was draped in routine manner.  Localized dermabrasion using 3 x 17 mm wire brush was performed in routine manner to papillary dermis. This spot dermabrasion is being performed to complete skin cancer reconstruction. It also will eliminate the other sun damaged precancerous cells that are known to be part of the regional effect of a lifetime's worth of sun exposure. This localized dermabrasion is therapeutic and should not be considered cosmetic in any regard. Localized Dermabrasion Text: The patient was draped in routine manner.  Localized dermabrasion using 3 x 17 mm wire brush was performed in routine manner to papillary dermis. This spot dermabrasion is being performed to complete skin cancer reconstruction. It also will eliminate the other sun damaged precancerous cells that are known to be part of the regional effect of a lifetime's worth of sun exposure. This localized dermabrasion is therapeutic and should not be considered cosmetic in any regard.

## 2024-02-28 NOTE — PROGRESS NOTE ADULT - PROBLEM SELECTOR PLAN 8
Chronic   - At home on allopurinol 300mg 1 tablet daily and colchicine 0.6mg 1 tablet daily  - continue home allopurinol  - Uric acid wnl [Annual] : an annual visit. Chronic   - Holding home allopurinol 300mg qd and colchicine 0.6mg qd  - Uric acid wnl

## 2024-11-29 NOTE — H&P ADULT - PROBLEM/PLAN-3
Called patient regarding appointment scheduled with Deloris on 01/02. I informed the patient that due to Deloris's resignation we had to move the appointment to be with . I provided the new date and time for the appointment during the call.   
DISPLAY PLAN FREE TEXT

## 2024-12-23 NOTE — PROGRESS NOTE ADULT - PROBLEM SELECTOR PLAN 4
Heart rate better controlled.  Continue to monitor.  Continue Digoxin; check digoxin level due to potential toxicity; adjust dose as needed  Cardiac monitoring  Cardiology consult follow up ongoing PAST SURGICAL HISTORY:  No significant past surgical history no